# Patient Record
Sex: MALE | Race: WHITE | NOT HISPANIC OR LATINO | Employment: FULL TIME | URBAN - METROPOLITAN AREA
[De-identification: names, ages, dates, MRNs, and addresses within clinical notes are randomized per-mention and may not be internally consistent; named-entity substitution may affect disease eponyms.]

---

## 2017-11-20 ENCOUNTER — HOSPITAL ENCOUNTER (INPATIENT)
Facility: HOSPITAL | Age: 62
LOS: 9 days | Discharge: HOME HEALTH | End: 2017-11-29
Attending: THORACIC SURGERY (CARDIOTHORACIC VASCULAR SURGERY) | Admitting: THORACIC SURGERY (CARDIOTHORACIC VASCULAR SURGERY)

## 2017-11-20 ENCOUNTER — APPOINTMENT (OUTPATIENT)
Dept: GENERAL RADIOLOGY | Facility: HOSPITAL | Age: 62
End: 2017-11-20

## 2017-11-20 DIAGNOSIS — I25.10 CORONARY ARTERY DISEASE INVOLVING NATIVE CORONARY ARTERY OF NATIVE HEART, ANGINA PRESENCE UNSPECIFIED: Primary | ICD-10-CM

## 2017-11-20 DIAGNOSIS — I25.119 CORONARY ARTERY DISEASE INVOLVING NATIVE CORONARY ARTERY OF NATIVE HEART WITH ANGINA PECTORIS (HCC): ICD-10-CM

## 2017-11-20 DIAGNOSIS — Z86.19 HX OF HEPATITIS C: ICD-10-CM

## 2017-11-20 DIAGNOSIS — Z74.09 IMPAIRED FUNCTIONAL MOBILITY, BALANCE, GAIT, AND ENDURANCE: ICD-10-CM

## 2017-11-20 DIAGNOSIS — Z00.6 ENCOUNTER FOR EXAMINATION FOR NORMAL COMPARISON AND CONTROL IN CLINICAL RESEARCH PROGRAM: ICD-10-CM

## 2017-11-20 LAB
ABO GROUP BLD: NORMAL
ALBUMIN SERPL-MCNC: 4.4 G/DL (ref 3.2–4.8)
ALBUMIN/GLOB SERPL: 1.5 G/DL (ref 1.5–2.5)
ALP SERPL-CCNC: 56 U/L (ref 25–100)
ALT SERPL W P-5'-P-CCNC: 52 U/L (ref 7–40)
ANION GAP SERPL CALCULATED.3IONS-SCNC: 12 MMOL/L (ref 3–11)
APTT PPP: 24.7 SECONDS (ref 24–31)
ARTICHOKE IGE QN: 124 MG/DL (ref 0–130)
AST SERPL-CCNC: 23 U/L (ref 0–33)
BILIRUB SERPL-MCNC: 0.5 MG/DL (ref 0.3–1.2)
BLD GP AB SCN SERPL QL: NEGATIVE
BUN BLD-MCNC: 23 MG/DL (ref 9–23)
BUN/CREAT SERPL: 15.3 (ref 7–25)
CALCIUM SPEC-SCNC: 9.5 MG/DL (ref 8.7–10.4)
CHLORIDE SERPL-SCNC: 100 MMOL/L (ref 99–109)
CHOLEST SERPL-MCNC: 193 MG/DL (ref 0–200)
CO2 SERPL-SCNC: 24 MMOL/L (ref 20–31)
CREAT BLD-MCNC: 1.5 MG/DL (ref 0.6–1.3)
DEPRECATED RDW RBC AUTO: 43.9 FL (ref 37–54)
ERYTHROCYTE [DISTWIDTH] IN BLOOD BY AUTOMATED COUNT: 13.2 % (ref 11.3–14.5)
GFR SERPL CREATININE-BSD FRML MDRD: 47 ML/MIN/1.73
GLOBULIN UR ELPH-MCNC: 2.9 GM/DL
GLUCOSE BLD-MCNC: 319 MG/DL (ref 70–100)
GLUCOSE BLDC GLUCOMTR-MCNC: 313 MG/DL (ref 70–130)
HBA1C MFR BLD: 7.1 % (ref 4.8–5.6)
HCT VFR BLD AUTO: 45 % (ref 38.9–50.9)
HDLC SERPL-MCNC: 37 MG/DL (ref 40–60)
HGB BLD-MCNC: 15.5 G/DL (ref 13.1–17.5)
INR PPP: 0.94
MCH RBC QN AUTO: 31.6 PG (ref 27–31)
MCHC RBC AUTO-ENTMCNC: 34.4 G/DL (ref 32–36)
MCV RBC AUTO: 91.8 FL (ref 80–99)
PA ADP PRP-ACNC: 216 PRU
PLATELET # BLD AUTO: 162 10*3/MM3 (ref 150–450)
PMV BLD AUTO: 9.4 FL (ref 6–12)
POTASSIUM BLD-SCNC: 4.1 MMOL/L (ref 3.5–5.5)
PROT SERPL-MCNC: 7.3 G/DL (ref 5.7–8.2)
PROTHROMBIN TIME: 10.2 SECONDS (ref 9.6–11.5)
RBC # BLD AUTO: 4.9 10*6/MM3 (ref 4.2–5.76)
RH BLD: POSITIVE
SODIUM BLD-SCNC: 136 MMOL/L (ref 132–146)
TRIGL SERPL-MCNC: 345 MG/DL (ref 0–150)
WBC NRBC COR # BLD: 8.24 10*3/MM3 (ref 3.5–10.8)

## 2017-11-20 PROCEDURE — 93005 ELECTROCARDIOGRAM TRACING: CPT | Performed by: PHYSICIAN ASSISTANT

## 2017-11-20 PROCEDURE — 86850 RBC ANTIBODY SCREEN: CPT | Performed by: PHYSICIAN ASSISTANT

## 2017-11-20 PROCEDURE — 86900 BLOOD TYPING SEROLOGIC ABO: CPT | Performed by: PHYSICIAN ASSISTANT

## 2017-11-20 PROCEDURE — 85730 THROMBOPLASTIN TIME PARTIAL: CPT | Performed by: PHYSICIAN ASSISTANT

## 2017-11-20 PROCEDURE — 63710000001 INSULIN ISOPHANE HUMAN PER 5 UNITS: Performed by: PHYSICIAN ASSISTANT

## 2017-11-20 PROCEDURE — 85027 COMPLETE CBC AUTOMATED: CPT | Performed by: PHYSICIAN ASSISTANT

## 2017-11-20 PROCEDURE — 86901 BLOOD TYPING SEROLOGIC RH(D): CPT | Performed by: PHYSICIAN ASSISTANT

## 2017-11-20 PROCEDURE — 71010 HC CHEST PA OR AP: CPT

## 2017-11-20 PROCEDURE — 83036 HEMOGLOBIN GLYCOSYLATED A1C: CPT | Performed by: PHYSICIAN ASSISTANT

## 2017-11-20 PROCEDURE — 85576 BLOOD PLATELET AGGREGATION: CPT | Performed by: PHYSICIAN ASSISTANT

## 2017-11-20 PROCEDURE — 63710000001 INSULIN LISPRO (HUMAN) PER 5 UNITS: Performed by: THORACIC SURGERY (CARDIOTHORACIC VASCULAR SURGERY)

## 2017-11-20 PROCEDURE — 80061 LIPID PANEL: CPT | Performed by: PHYSICIAN ASSISTANT

## 2017-11-20 PROCEDURE — 86920 COMPATIBILITY TEST SPIN: CPT

## 2017-11-20 PROCEDURE — 85610 PROTHROMBIN TIME: CPT | Performed by: PHYSICIAN ASSISTANT

## 2017-11-20 PROCEDURE — 82962 GLUCOSE BLOOD TEST: CPT

## 2017-11-20 PROCEDURE — 86900 BLOOD TYPING SEROLOGIC ABO: CPT

## 2017-11-20 PROCEDURE — 86923 COMPATIBILITY TEST ELECTRIC: CPT

## 2017-11-20 PROCEDURE — 86901 BLOOD TYPING SEROLOGIC RH(D): CPT

## 2017-11-20 PROCEDURE — 80053 COMPREHEN METABOLIC PANEL: CPT | Performed by: PHYSICIAN ASSISTANT

## 2017-11-20 RX ORDER — METOPROLOL SUCCINATE 50 MG/1
100 TABLET, EXTENDED RELEASE ORAL DAILY
Status: DISCONTINUED | OUTPATIENT
Start: 2017-11-21 | End: 2017-11-20

## 2017-11-20 RX ORDER — FAMOTIDINE 20 MG
4000 TABLET ORAL DAILY
COMMUNITY
End: 2022-06-13

## 2017-11-20 RX ORDER — SIROLIMUS 1 MG/1
1 TABLET, FILM COATED ORAL DAILY
Status: DISCONTINUED | OUTPATIENT
Start: 2017-11-21 | End: 2017-11-21

## 2017-11-20 RX ORDER — ATORVASTATIN CALCIUM 80 MG/1
80 TABLET, FILM COATED ORAL NIGHTLY
COMMUNITY
End: 2022-03-23

## 2017-11-20 RX ORDER — DEXTROSE MONOHYDRATE 25 G/50ML
25 INJECTION, SOLUTION INTRAVENOUS
Status: DISCONTINUED | OUTPATIENT
Start: 2017-11-20 | End: 2017-11-22 | Stop reason: HOSPADM

## 2017-11-20 RX ORDER — RAMIPRIL 5 MG/1
5 CAPSULE ORAL DAILY
COMMUNITY
End: 2017-11-29 | Stop reason: HOSPADM

## 2017-11-20 RX ORDER — BACITRACIN 500 UNIT/G
320 OINTMENT (GRAM) TOPICAL 2 TIMES DAILY
COMMUNITY
End: 2022-12-27

## 2017-11-20 RX ORDER — CHLORHEXIDINE GLUCONATE 0.12 MG/ML
15 RINSE ORAL EVERY 12 HOURS
Status: DISPENSED | OUTPATIENT
Start: 2017-11-20 | End: 2017-11-21

## 2017-11-20 RX ORDER — FENOFIBRATE 145 MG/1
145 TABLET, COATED ORAL DAILY
Status: DISCONTINUED | OUTPATIENT
Start: 2017-11-21 | End: 2017-11-22 | Stop reason: HOSPADM

## 2017-11-20 RX ORDER — NICOTINE POLACRILEX 4 MG
15 LOZENGE BUCCAL
Status: DISCONTINUED | OUTPATIENT
Start: 2017-11-20 | End: 2017-11-22 | Stop reason: HOSPADM

## 2017-11-20 RX ORDER — FENOFIBRATE 145 MG/1
145 TABLET, COATED ORAL DAILY
COMMUNITY
End: 2022-06-13

## 2017-11-20 RX ORDER — ACETAMINOPHEN 325 MG/1
650 TABLET ORAL EVERY 6 HOURS PRN
Status: ON HOLD | COMMUNITY
End: 2017-11-21

## 2017-11-20 RX ORDER — METOPROLOL SUCCINATE 100 MG/1
100 TABLET, EXTENDED RELEASE ORAL DAILY
COMMUNITY
End: 2017-11-29 | Stop reason: HOSPADM

## 2017-11-20 RX ORDER — ICOSAPENT ETHYL 1000 MG/1
2 CAPSULE ORAL 2 TIMES DAILY WITH MEALS
Status: ON HOLD | COMMUNITY
End: 2017-11-21

## 2017-11-20 RX ORDER — SODIUM CHLORIDE 0.9 % (FLUSH) 0.9 %
1-10 SYRINGE (ML) INJECTION AS NEEDED
Status: DISCONTINUED | OUTPATIENT
Start: 2017-11-20 | End: 2017-11-22 | Stop reason: HOSPADM

## 2017-11-20 RX ORDER — ACETAMINOPHEN 325 MG/1
650 TABLET ORAL EVERY 4 HOURS PRN
Status: DISCONTINUED | OUTPATIENT
Start: 2017-11-20 | End: 2017-11-22 | Stop reason: HOSPADM

## 2017-11-20 RX ORDER — METOPROLOL SUCCINATE 50 MG/1
100 TABLET, EXTENDED RELEASE ORAL NIGHTLY
Status: DISCONTINUED | OUTPATIENT
Start: 2017-11-20 | End: 2017-11-22 | Stop reason: HOSPADM

## 2017-11-20 RX ORDER — BISACODYL 5 MG/1
5 TABLET, DELAYED RELEASE ORAL DAILY PRN
Status: DISCONTINUED | OUTPATIENT
Start: 2017-11-20 | End: 2017-11-22 | Stop reason: HOSPADM

## 2017-11-20 RX ORDER — RAMIPRIL 5 MG/1
5 CAPSULE ORAL DAILY
Status: DISCONTINUED | OUTPATIENT
Start: 2017-11-21 | End: 2017-11-22 | Stop reason: HOSPADM

## 2017-11-20 RX ORDER — ATORVASTATIN CALCIUM 40 MG/1
80 TABLET, FILM COATED ORAL NIGHTLY
Status: DISCONTINUED | OUTPATIENT
Start: 2017-11-20 | End: 2017-11-22 | Stop reason: HOSPADM

## 2017-11-20 RX ORDER — ONDANSETRON 4 MG/1
4 TABLET, FILM COATED ORAL EVERY 6 HOURS PRN
Status: DISCONTINUED | OUTPATIENT
Start: 2017-11-20 | End: 2017-11-22 | Stop reason: HOSPADM

## 2017-11-20 RX ORDER — IPRATROPIUM BROMIDE AND ALBUTEROL SULFATE 2.5; .5 MG/3ML; MG/3ML
3 SOLUTION RESPIRATORY (INHALATION) EVERY 4 HOURS PRN
Status: DISCONTINUED | OUTPATIENT
Start: 2017-11-20 | End: 2017-11-22 | Stop reason: HOSPADM

## 2017-11-20 RX ORDER — ASPIRIN 81 MG/1
81 TABLET ORAL ONCE
Status: COMPLETED | OUTPATIENT
Start: 2017-11-20 | End: 2017-11-20

## 2017-11-20 RX ORDER — SIROLIMUS 1 MG/1
1 TABLET, FILM COATED ORAL DAILY
COMMUNITY
End: 2022-06-13 | Stop reason: SDUPTHER

## 2017-11-20 RX ORDER — TIMOLOL MALEATE 5 MG/ML
1 SOLUTION/ DROPS OPHTHALMIC DAILY
Status: DISCONTINUED | OUTPATIENT
Start: 2017-11-21 | End: 2017-11-24

## 2017-11-20 RX ORDER — ONDANSETRON 2 MG/ML
4 INJECTION INTRAMUSCULAR; INTRAVENOUS EVERY 6 HOURS PRN
Status: DISCONTINUED | OUTPATIENT
Start: 2017-11-20 | End: 2017-11-22 | Stop reason: HOSPADM

## 2017-11-20 RX ORDER — MULTIVIT WITH MINERALS/LUTEIN
1000 TABLET ORAL DAILY
COMMUNITY

## 2017-11-20 RX ORDER — CHLORHEXIDINE GLUCONATE 500 MG/1
1 CLOTH TOPICAL EVERY 12 HOURS PRN
Status: DISCONTINUED | OUTPATIENT
Start: 2017-11-20 | End: 2017-11-22 | Stop reason: HOSPADM

## 2017-11-20 RX ORDER — BRIMONIDINE TARTRATE AND TIMOLOL MALEATE 2; 5 MG/ML; MG/ML
1 SOLUTION OPHTHALMIC EVERY 12 HOURS
COMMUNITY

## 2017-11-20 RX ADMIN — Medication 5 MG: at 22:03

## 2017-11-20 RX ADMIN — INSULIN LISPRO 3 UNITS: 100 INJECTION, SOLUTION INTRAVENOUS; SUBCUTANEOUS at 22:02

## 2017-11-20 RX ADMIN — ATORVASTATIN CALCIUM 80 MG: 40 TABLET, FILM COATED ORAL at 20:49

## 2017-11-20 RX ADMIN — ASPIRIN 81 MG: 81 TABLET, COATED ORAL at 20:49

## 2017-11-20 RX ADMIN — CHLORHEXIDINE GLUCONATE 15 ML: 1.2 RINSE ORAL at 20:50

## 2017-11-20 RX ADMIN — METOPROLOL SUCCINATE 100 MG: 50 TABLET, EXTENDED RELEASE ORAL at 22:03

## 2017-11-20 RX ADMIN — INSULIN HUMAN 26 UNITS: 100 INJECTION, SUSPENSION SUBCUTANEOUS at 20:54

## 2017-11-20 NOTE — NURSING NOTE
ACC REVIEW REPORT: [facility]        PATIENT NAME: Quirino Sheppard    PATIENT ID: [id number]    BED: S 451    BED TYPE: telemetry    BED GIVEN TO: pending    TIME BED GIVEN: pending    YOB: 1955    AGE: 62    GENDER: M    PREVIOUS ADMIT TO [facility]: no    PATIENT CLASS:     TODAY'S DATE: 11/20/2017    TRANSFER DATE: 11-20-17    ETA: after 1645    TRANSFERRING FACILITY: [facility]    TRANSFERRING FACILITY PHONE # : [phone]    TRANSFERRING MD:     DATE/TIME REQUEST RECEIVED: 11-20-17@[address] [place] [address] RN: Annabelle Ragland    REPORT FROM: Michelle    TIME REPORT TAKEN: 12:00    DIAGNOSIS: CAD for CABG    REASON FOR TRANSFER TO [facility]: higher level of care    TRANSPORTATION: pending    CLINICAL REASON FOR TRANSFER TO [facility]: 62 y.o. Has had episodes of soa - he had a positive GXT -->heart cath today showing multivessel dz - is scheduled for a CABG 11/21/17      CLINICAL INFORMATION    HEIGHT:     WEIGHT:     ALLERGIES: NKA    RAM: no    INFECTIOUS DISEASE: hepatitis C    ISOLATION:     LAST VITAL SIGNS:  TIME: 12:00  TEMP: 97.7  PULSE: 82  B/P: 143/82  RESP:     UPDATE: vs @1400: hr 83, /77, RR 17, oxygen sat 96% on RA    LAB INFORMATION: labs this a.m. wnl except for elevated cholesterol; fsbs at 0730 was 188    CULTURE INFORMATION:     MEDS/IV FLUIDS: D51/2NS@KVO; only meds given was pre-cath: fentanyl & versed  Update - IV changed to SL; no new meds given      CARDIAC SYSTEM:    CHEST PAIN: no - has not had any CP - only episodes of SOA PTA    RHYTHM: NSR    Is patient taking or has patient been given any drugs that could increase bleeding? no       HEART CATH: yes    HEART CATH DATE: 11-20-17    HEART CATH RESULTS: multivessel dz - 95% blockages    VASOSEAL: MYNX closure in rt groin    CARDIAC NOTES:  Rt groin site intact without swelling; rt pedal pulses palpable but a little weaker than the left      RESPIRATORY SYSTEM:    LUNG SOUNDS: clear    OXYGEN: no    O2 SAT: 98% on RA    RESPIRATORY  STATUS: no soa today - he thought he was having panic attacks - spells occur usually in the morning when he was getting ready for work      CNS/MUSCULOSKELETAL    ALERT AND ORIENTED: yes    CNS/MUSCULOSKELETAL NOTES: has been ambulating since cath was done      GI//GY    GI//GY NOTES: pt has a hx of hepatitis C & a liver transplant (about 15 years ago)    PAST MEDICAL HISTORY: DM, HTN, HLD, panic attacks, liver transplant    OTHER NOTES:   Pt was transferred from the cath lab to the floor at 1400; updated info given per Michelle Ragland, JOAQUIM  11/20/2017  12:06 PM

## 2017-11-21 ENCOUNTER — APPOINTMENT (OUTPATIENT)
Dept: CARDIOLOGY | Facility: HOSPITAL | Age: 62
End: 2017-11-21

## 2017-11-21 ENCOUNTER — APPOINTMENT (OUTPATIENT)
Dept: PULMONOLOGY | Facility: HOSPITAL | Age: 62
End: 2017-11-21

## 2017-11-21 PROBLEM — I25.10 CORONARY ARTERY DISEASE INVOLVING NATIVE CORONARY ARTERY OF NATIVE HEART: Status: ACTIVE | Noted: 2017-11-20

## 2017-11-21 PROBLEM — Z86.19 HX OF HEPATITIS C: Status: ACTIVE | Noted: 2017-11-21

## 2017-11-21 PROBLEM — I10 ESSENTIAL HYPERTENSION: Status: ACTIVE | Noted: 2017-11-21

## 2017-11-21 PROBLEM — B19.20 HEPATITIS C: Status: ACTIVE | Noted: 2017-11-21

## 2017-11-21 PROBLEM — Z94.4 HX OF LIVER TRANSPLANT: Status: ACTIVE | Noted: 2017-11-21

## 2017-11-21 PROBLEM — E11.9 DIABETES MELLITUS, TYPE II: Status: ACTIVE | Noted: 2017-11-21

## 2017-11-21 PROBLEM — E78.5 HYPERLIPEMIA: Status: ACTIVE | Noted: 2017-11-21

## 2017-11-21 LAB
ABO GROUP BLD: NORMAL
AMPHET+METHAMPHET UR QL: NEGATIVE
AMPHETAMINES UR QL: NEGATIVE
ANION GAP SERPL CALCULATED.3IONS-SCNC: 7 MMOL/L (ref 3–11)
BARBITURATES UR QL SCN: NEGATIVE
BENZODIAZ UR QL SCN: NEGATIVE
BH CV ECHO MEAS - AO MAX PG (FULL): 4.2 MMHG
BH CV ECHO MEAS - AO MAX PG: 10 MMHG
BH CV ECHO MEAS - AO MEAN PG (FULL): 1.7 MMHG
BH CV ECHO MEAS - AO MEAN PG: 4.9 MMHG
BH CV ECHO MEAS - AO ROOT AREA (BSA CORRECTED): 1.4
BH CV ECHO MEAS - AO ROOT AREA: 7.6 CM^2
BH CV ECHO MEAS - AO ROOT DIAM: 3.1 CM
BH CV ECHO MEAS - AO V2 MAX: 156.4 CM/SEC
BH CV ECHO MEAS - AO V2 MEAN: 99.3 CM/SEC
BH CV ECHO MEAS - AO V2 VTI: 28.7 CM
BH CV ECHO MEAS - AVA(I,A): 2.8 CM^2
BH CV ECHO MEAS - AVA(I,D): 2.8 CM^2
BH CV ECHO MEAS - AVA(V,A): 2.5 CM^2
BH CV ECHO MEAS - AVA(V,D): 2.5 CM^2
BH CV ECHO MEAS - BSA(HAYCOCK): 2.1 M^2
BH CV ECHO MEAS - BSA(HAYCOCK): 2.2 M^2
BH CV ECHO MEAS - BSA: 2 M^2
BH CV ECHO MEAS - BSA: 2.2 M^2
BH CV ECHO MEAS - BZI_BMI: 23.1 KILOGRAMS/M^2
BH CV ECHO MEAS - BZI_BMI: 31.3 KILOGRAMS/M^2
BH CV ECHO MEAS - BZI_METRIC_HEIGHT: 170.2 CM
BH CV ECHO MEAS - BZI_METRIC_HEIGHT: 195.6 CM
BH CV ECHO MEAS - BZI_METRIC_WEIGHT: 88.5 KG
BH CV ECHO MEAS - BZI_METRIC_WEIGHT: 90.7 KG
BH CV ECHO MEAS - EDV(CUBED): 72.5 ML
BH CV ECHO MEAS - EDV(TEICH): 77.3 ML
BH CV ECHO MEAS - EF(CUBED): 75.9 %
BH CV ECHO MEAS - EF(TEICH): 68.3 %
BH CV ECHO MEAS - ESV(CUBED): 17.5 ML
BH CV ECHO MEAS - ESV(TEICH): 24.5 ML
BH CV ECHO MEAS - FS: 37.8 %
BH CV ECHO MEAS - IVS/LVPW: 0.93
BH CV ECHO MEAS - IVSD: 0.94 CM
BH CV ECHO MEAS - LA DIMENSION: 3.5 CM
BH CV ECHO MEAS - LA/AO: 1.1
BH CV ECHO MEAS - LV MASS(C)D: 131.4 GRAMS
BH CV ECHO MEAS - LV MASS(C)DI: 59.4 GRAMS/M^2
BH CV ECHO MEAS - LV MAX PG: 5.8 MMHG
BH CV ECHO MEAS - LV MEAN PG: 3.2 MMHG
BH CV ECHO MEAS - LV V1 MAX: 120.9 CM/SEC
BH CV ECHO MEAS - LV V1 MEAN: 81.2 CM/SEC
BH CV ECHO MEAS - LV V1 VTI: 24.9 CM
BH CV ECHO MEAS - LVIDD: 4.2 CM
BH CV ECHO MEAS - LVIDS: 2.6 CM
BH CV ECHO MEAS - LVOT AREA (M): 3.1 CM^2
BH CV ECHO MEAS - LVOT AREA: 3.2 CM^2
BH CV ECHO MEAS - LVOT DIAM: 2 CM
BH CV ECHO MEAS - LVPWD: 1 CM
BH CV ECHO MEAS - MV A MAX VEL: 77.7 CM/SEC
BH CV ECHO MEAS - MV DEC TIME: 0.12 SEC
BH CV ECHO MEAS - MV E MAX VEL: 81.4 CM/SEC
BH CV ECHO MEAS - MV E/A: 1
BH CV ECHO MEAS - PA ACC SLOPE: 622.1 CM/SEC^2
BH CV ECHO MEAS - PA ACC TIME: 0.15 SEC
BH CV ECHO MEAS - PA MAX PG: 5.8 MMHG
BH CV ECHO MEAS - PA PR(ACCEL): 10.9 MMHG
BH CV ECHO MEAS - PA V2 MAX: 120 CM/SEC
BH CV ECHO MEAS - RVDD: 1.8 CM
BH CV ECHO MEAS - SI(AO): 98.8 ML/M^2
BH CV ECHO MEAS - SI(CUBED): 24.9 ML/M^2
BH CV ECHO MEAS - SI(LVOT): 36 ML/M^2
BH CV ECHO MEAS - SI(TEICH): 23.8 ML/M^2
BH CV ECHO MEAS - SV(AO): 218.6 ML
BH CV ECHO MEAS - SV(CUBED): 55 ML
BH CV ECHO MEAS - SV(LVOT): 79.7 ML
BH CV ECHO MEAS - SV(TEICH): 52.8 ML
BH CV ECHO MEAS - TAPSE (>1.6): 1.9 CM2
BH CV ECHO MEAS - TV MAX PG: 1.4 MMHG
BH CV ECHO MEAS - TV V2 MAX: 59.7 CM/SEC
BH CV XLRA - RV BASE: 2.5 CM
BH CV XLRA - RV LENGTH: 5.4 CM
BH CV XLRA - RV MID: 2.4 CM
BH CV XLRA - TDI S': 14.3 CM/SEC
BH CV XLRA MEAS LEFT DIST CCA EDV: 28.9 CM/SEC
BH CV XLRA MEAS LEFT DIST CCA PSV: 128.2 CM/SEC
BH CV XLRA MEAS LEFT DIST ICA EDV: 26.5 CM/SEC
BH CV XLRA MEAS LEFT DIST ICA PSV: 76.1 CM/SEC
BH CV XLRA MEAS LEFT ICA/CCA RATIO: 0.55
BH CV XLRA MEAS LEFT MID CCA EDV: 25.1 CM/SEC
BH CV XLRA MEAS LEFT MID CCA PSV: 130.7 CM/SEC
BH CV XLRA MEAS LEFT MID ICA EDV: 24.4 CM/SEC
BH CV XLRA MEAS LEFT MID ICA PSV: 69.1 CM/SEC
BH CV XLRA MEAS LEFT PROX CCA EDV: 25.1 CM/SEC
BH CV XLRA MEAS LEFT PROX CCA PSV: 133.3 CM/SEC
BH CV XLRA MEAS LEFT PROX ECA EDV: 0 CM/SEC
BH CV XLRA MEAS LEFT PROX ECA PSV: 88 CM/SEC
BH CV XLRA MEAS LEFT PROX ICA EDV: 20.3 CM/SEC
BH CV XLRA MEAS LEFT PROX ICA PSV: 71.2 CM/SEC
BH CV XLRA MEAS LEFT PROX SCLA EDV: 0 CM/SEC
BH CV XLRA MEAS LEFT PROX SCLA PSV: 181 CM/SEC
BH CV XLRA MEAS LEFT VERTEBRAL A EDV: 0 CM/SEC
BH CV XLRA MEAS LEFT VERTEBRAL A PSV: 37 CM/SEC
BH CV XLRA MEAS RIGHT DIST CCA EDV: 28.1 CM/SEC
BH CV XLRA MEAS RIGHT DIST CCA PSV: 145.9 CM/SEC
BH CV XLRA MEAS RIGHT DIST ICA EDV: 22.7 CM/SEC
BH CV XLRA MEAS RIGHT DIST ICA PSV: 77.7 CM/SEC
BH CV XLRA MEAS RIGHT ICA/CCA RATIO: 0.74
BH CV XLRA MEAS RIGHT MID CCA EDV: 29.5 CM/SEC
BH CV XLRA MEAS RIGHT MID CCA PSV: 175.4 CM/SEC
BH CV XLRA MEAS RIGHT MID ICA EDV: 27.9 CM/SEC
BH CV XLRA MEAS RIGHT MID ICA PSV: 94.3 CM/SEC
BH CV XLRA MEAS RIGHT PROX CCA EDV: 22.5 CM/SEC
BH CV XLRA MEAS RIGHT PROX CCA PSV: 161.4 CM/SEC
BH CV XLRA MEAS RIGHT PROX ECA EDV: 23.9 CM/SEC
BH CV XLRA MEAS RIGHT PROX ECA PSV: 158.6 CM/SEC
BH CV XLRA MEAS RIGHT PROX ICA EDV: 23.6 CM/SEC
BH CV XLRA MEAS RIGHT PROX ICA PSV: 107.4 CM/SEC
BH CV XLRA MEAS RIGHT PROX SCLA EDV: 0 CM/SEC
BH CV XLRA MEAS RIGHT PROX SCLA PSV: 224.5 CM/SEC
BH CV XLRA MEAS RIGHT VERTEBRAL A EDV: 16.6 CM/SEC
BH CV XLRA MEAS RIGHT VERTEBRAL A PSV: 54.1 CM/SEC
BILIRUB UR QL STRIP: NEGATIVE
BUN BLD-MCNC: 25 MG/DL (ref 9–23)
BUN/CREAT SERPL: 19.2 (ref 7–25)
BUPRENORPHINE SERPL-MCNC: NEGATIVE NG/ML
CALCIUM SPEC-SCNC: 9.6 MG/DL (ref 8.7–10.4)
CANNABINOIDS SERPL QL: NEGATIVE
CHLORIDE SERPL-SCNC: 102 MMOL/L (ref 99–109)
CLARITY UR: CLEAR
CO2 SERPL-SCNC: 27 MMOL/L (ref 20–31)
COCAINE UR QL: NEGATIVE
COLOR UR: YELLOW
CREAT BLD-MCNC: 1.3 MG/DL (ref 0.6–1.3)
DEPRECATED RDW RBC AUTO: 43.1 FL (ref 37–54)
ERYTHROCYTE [DISTWIDTH] IN BLOOD BY AUTOMATED COUNT: 13 % (ref 11.3–14.5)
GFR SERPL CREATININE-BSD FRML MDRD: 56 ML/MIN/1.73
GLUCOSE BLD-MCNC: 257 MG/DL (ref 70–100)
GLUCOSE BLDC GLUCOMTR-MCNC: 225 MG/DL (ref 70–130)
GLUCOSE BLDC GLUCOMTR-MCNC: 244 MG/DL (ref 70–130)
GLUCOSE BLDC GLUCOMTR-MCNC: 257 MG/DL (ref 70–130)
GLUCOSE BLDC GLUCOMTR-MCNC: 268 MG/DL (ref 70–130)
GLUCOSE BLDC GLUCOMTR-MCNC: 304 MG/DL (ref 70–130)
GLUCOSE BLDC GLUCOMTR-MCNC: 312 MG/DL (ref 70–130)
GLUCOSE BLDC GLUCOMTR-MCNC: 354 MG/DL (ref 70–130)
GLUCOSE UR STRIP-MCNC: ABNORMAL MG/DL
HCT VFR BLD AUTO: 42 % (ref 38.9–50.9)
HGB BLD-MCNC: 14.2 G/DL (ref 13.1–17.5)
HGB UR QL STRIP.AUTO: NEGATIVE
KETONES UR QL STRIP: NEGATIVE
LEUKOCYTE ESTERASE UR QL STRIP.AUTO: NEGATIVE
LV EF 2D ECHO EST: 60 %
MCH RBC QN AUTO: 30.9 PG (ref 27–31)
MCHC RBC AUTO-ENTMCNC: 33.8 G/DL (ref 32–36)
MCV RBC AUTO: 91.3 FL (ref 80–99)
METHADONE UR QL SCN: NEGATIVE
NITRITE UR QL STRIP: NEGATIVE
OPIATES UR QL: NEGATIVE
OXYCODONE UR QL SCN: NEGATIVE
PA ADP PRP-ACNC: 240 PRU
PCP UR QL SCN: NEGATIVE
PH UR STRIP.AUTO: 7 [PH] (ref 5–8)
PLATELET # BLD AUTO: 143 10*3/MM3 (ref 150–450)
PMV BLD AUTO: 9.3 FL (ref 6–12)
POTASSIUM BLD-SCNC: 4.2 MMOL/L (ref 3.5–5.5)
PROPOXYPH UR QL: NEGATIVE
PROT UR QL STRIP: NEGATIVE
RBC # BLD AUTO: 4.6 10*6/MM3 (ref 4.2–5.76)
RH BLD: POSITIVE
SODIUM BLD-SCNC: 136 MMOL/L (ref 132–146)
SP GR UR STRIP: 1.01 (ref 1–1.03)
TRICYCLICS UR QL SCN: NEGATIVE
UROBILINOGEN UR QL STRIP: ABNORMAL
WBC NRBC COR # BLD: 7.49 10*3/MM3 (ref 3.5–10.8)

## 2017-11-21 PROCEDURE — 94010 BREATHING CAPACITY TEST: CPT

## 2017-11-21 PROCEDURE — 94660 CPAP INITIATION&MGMT: CPT

## 2017-11-21 PROCEDURE — 82962 GLUCOSE BLOOD TEST: CPT

## 2017-11-21 PROCEDURE — 99253 IP/OBS CNSLTJ NEW/EST LOW 45: CPT | Performed by: NURSE PRACTITIONER

## 2017-11-21 PROCEDURE — 63710000001 SIROLIMUS PER 1 MG: Performed by: PHYSICIAN ASSISTANT

## 2017-11-21 PROCEDURE — 93880 EXTRACRANIAL BILAT STUDY: CPT | Performed by: INTERNAL MEDICINE

## 2017-11-21 PROCEDURE — 85027 COMPLETE CBC AUTOMATED: CPT | Performed by: PHYSICIAN ASSISTANT

## 2017-11-21 PROCEDURE — 85576 BLOOD PLATELET AGGREGATION: CPT | Performed by: PHYSICIAN ASSISTANT

## 2017-11-21 PROCEDURE — 80306 DRUG TEST PRSMV INSTRMNT: CPT | Performed by: PHYSICIAN ASSISTANT

## 2017-11-21 PROCEDURE — 93010 ELECTROCARDIOGRAM REPORT: CPT | Performed by: INTERNAL MEDICINE

## 2017-11-21 PROCEDURE — 80048 BASIC METABOLIC PNL TOTAL CA: CPT | Performed by: PHYSICIAN ASSISTANT

## 2017-11-21 PROCEDURE — 63710000001 INSULIN ISOPHANE HUMAN PER 5 UNITS: Performed by: PHYSICIAN ASSISTANT

## 2017-11-21 PROCEDURE — 94799 UNLISTED PULMONARY SVC/PX: CPT

## 2017-11-21 PROCEDURE — 94010 BREATHING CAPACITY TEST: CPT | Performed by: INTERNAL MEDICINE

## 2017-11-21 PROCEDURE — 93306 TTE W/DOPPLER COMPLETE: CPT

## 2017-11-21 PROCEDURE — 93880 EXTRACRANIAL BILAT STUDY: CPT

## 2017-11-21 PROCEDURE — 81003 URINALYSIS AUTO W/O SCOPE: CPT | Performed by: PHYSICIAN ASSISTANT

## 2017-11-21 PROCEDURE — 93306 TTE W/DOPPLER COMPLETE: CPT | Performed by: INTERNAL MEDICINE

## 2017-11-21 PROCEDURE — 99221 1ST HOSP IP/OBS SF/LOW 40: CPT | Performed by: THORACIC SURGERY (CARDIOTHORACIC VASCULAR SURGERY)

## 2017-11-21 RX ORDER — ICOSAPENT ETHYL 1000 MG/1
2 CAPSULE ORAL 2 TIMES DAILY WITH MEALS
COMMUNITY
End: 2022-06-13

## 2017-11-21 RX ORDER — ACETAMINOPHEN 325 MG/1
650 TABLET ORAL EVERY 6 HOURS PRN
COMMUNITY
End: 2022-06-13

## 2017-11-21 RX ORDER — SIROLIMUS 1 MG/1
1 TABLET, FILM COATED ORAL DAILY
Status: DISCONTINUED | OUTPATIENT
Start: 2017-11-22 | End: 2017-11-29 | Stop reason: HOSPADM

## 2017-11-21 RX ORDER — CHLORHEXIDINE GLUCONATE 0.12 MG/ML
15 RINSE ORAL EVERY 12 HOURS
Status: DISCONTINUED | OUTPATIENT
Start: 2017-11-21 | End: 2017-11-22 | Stop reason: HOSPADM

## 2017-11-21 RX ORDER — ALPRAZOLAM 0.25 MG/1
0.25 TABLET ORAL EVERY 8 HOURS PRN
Status: DISCONTINUED | OUTPATIENT
Start: 2017-11-21 | End: 2017-11-22 | Stop reason: HOSPADM

## 2017-11-21 RX ADMIN — INSULIN LISPRO 5 UNITS: 100 INJECTION, SOLUTION INTRAVENOUS; SUBCUTANEOUS at 21:15

## 2017-11-21 RX ADMIN — SIROLIMUS 1 MG: 1 TABLET ORAL at 05:58

## 2017-11-21 RX ADMIN — INSULIN LISPRO 3 UNITS: 100 INJECTION, SOLUTION INTRAVENOUS; SUBCUTANEOUS at 17:36

## 2017-11-21 RX ADMIN — INSULIN HUMAN 26 UNITS: 100 INJECTION, SUSPENSION SUBCUTANEOUS at 17:37

## 2017-11-21 RX ADMIN — Medication 5 MG: at 21:16

## 2017-11-21 RX ADMIN — ALPRAZOLAM 0.25 MG: 0.25 TABLET ORAL at 21:16

## 2017-11-21 RX ADMIN — SERTRALINE 50 MG: 50 TABLET, FILM COATED ORAL at 05:58

## 2017-11-21 RX ADMIN — FENOFIBRATE 145 MG: 145 TABLET ORAL at 09:35

## 2017-11-21 RX ADMIN — ATORVASTATIN CALCIUM 80 MG: 40 TABLET, FILM COATED ORAL at 21:16

## 2017-11-21 RX ADMIN — METOPROLOL SUCCINATE 100 MG: 50 TABLET, EXTENDED RELEASE ORAL at 21:16

## 2017-11-21 RX ADMIN — INSULIN LISPRO 4 UNITS: 100 INJECTION, SOLUTION INTRAVENOUS; SUBCUTANEOUS at 08:15

## 2017-11-21 RX ADMIN — RAMIPRIL 5 MG: 5 CAPSULE ORAL at 09:35

## 2017-11-21 RX ADMIN — INSULIN LISPRO 6 UNITS: 100 INJECTION, SOLUTION INTRAVENOUS; SUBCUTANEOUS at 13:10

## 2017-11-21 RX ADMIN — TIMOLOL MALEATE 1 DROP: 5 SOLUTION/ DROPS OPHTHALMIC at 09:35

## 2017-11-21 RX ADMIN — ACETAMINOPHEN 650 MG: 325 TABLET ORAL at 14:49

## 2017-11-21 RX ADMIN — INSULIN HUMAN 32 UNITS: 100 INJECTION, SUSPENSION SUBCUTANEOUS at 08:17

## 2017-11-21 NOTE — H&P
CTS History & Physical         Patient Care Team:  Amol Raymond MD as PCP - General (Rheumatology)    Chief complaint shortness of breath    HPI  Patient is a 62 y.o. male with shortness of breath and significant fatigue.  He had very mild chest pain symptoms.  Catheterization at an outside hospital demonstrated multivessel coronary disease and the patient was transferred here to evaluate for coronary bypass grafting surgery.  On arrival here he was pain-free and breathing unlabored.  His past medical history is significant for a liver transplantation approximately 2001    Review of Systems  Constitutional: Negative for malaise/fatigue.  Negative for chills, fever, night sweats and weight loss.  HENT: Negative for hearing loss, odynophagia and sore throat.    Cardiovascular: Negative for claudication and dyspnea on exertion.  Negative for chest pain, leg swelling, orthopnea and palpitations.  Respiratory: Negative for shortness of breath.  Negative for cough and hemoptysis.  Endocrine:  Negative for cold intolerance, heat intolerance, polydipsia, polyphagia and polyuria.  Hematologic/Lymphatic: Negative for easy bruising/bleeding.  For immunosuppression.  History of hepatitis C  Musculoskeletal: Negative for joint pain, joint swelling and myalgias.  Gastrointestinal: Negative for abdominal pain, constipation, diarrhea, hematemesis, hematochezia, melena, nausea and vomiting.  Positive for liver transplant secondary to hepatitis C  Genitourinary: Negative for dysuria, frequency and hematuria.  Neurological: Negative for focal weakness, headaches, numbness and seizures.  Psychiatric/Behavioral: Positive for history of depression with anxiety  All other systems are reviewed and are negative.              Musculoskeletal: No joint pain or back pain.    All other review of systems is negative    History  No past medical history on file.  No past surgical history on file.  No family history on file.  Social History    Substance Use Topics   • Smoking status: Never Smoker   • Smokeless tobacco: Not on file   • Alcohol use Not on file     Prescriptions Prior to Admission   Medication Sig Dispense Refill Last Dose   • acetaminophen (TYLENOL) 325 MG tablet Take 650 mg by mouth Every 6 (Six) Hours As Needed for Mild Pain .      • atorvastatin (LIPITOR) 80 MG tablet Take 80 mg by mouth Every Night.      • brimonidine-timolol (COMBIGAN) 0.2-0.5 % ophthalmic solution Administer 1 drop to both eyes Every 12 (Twelve) Hours.      • fenofibrate (TRICOR) 145 MG tablet Take 145 mg by mouth Daily.      • icosapent ethyl (VASCEPA) 1 g capsule capsule Take 2 g by mouth 2 (Two) Times a Day With Meals.      • insulin NPH (humuLIN N,novoLIN N) 100 UNIT/ML injection Inject 32 Units under the skin Every Morning.      • insulin NPH (humuLIN N,novoLIN N) 100 UNIT/ML injection Inject 26 Units under the skin Every Evening.      • Liraglutide (VICTOZA) 18 MG/3ML solution pen-injector injection Inject 0.6 mg under the skin Daily.      • metoprolol succinate XL (TOPROL-XL) 100 MG 24 hr tablet Take 100 mg by mouth Daily.      • Multiple Vitamins-Minerals (MULTIVITAMIN ADULTS) tablet Take 1 tablet by mouth Daily.      • ramipril (ALTACE) 5 MG capsule Take 5 mg by mouth Daily.      • Saw Palmetto 160 MG capsule Take 160 mg by mouth Daily.      • sertraline (ZOLOFT) 50 MG tablet Take 50 mg by mouth Daily.      • sirolimus (RAPAMUNE) 1 MG tablet Take 1 mg by mouth Daily.      • Vitamin D, Cholecalciferol, 1000 units capsule Take 4,000 Units by mouth Daily.      • vitamin E 1000 UNIT capsule Take 1,000 Units by mouth Daily.        Allergies:  Review of patient's allergies indicates no known allergies.      Objective    Vital Signs  Temp:  [97.9 °F (36.6 °C)] 97.9 °F (36.6 °C)  Heart Rate:  [73-86] 83  Resp:  [18] 18  BP: (127-166)/() 127/77      Physical Exam:  CONSTITUTIONAL: Alert and conversant, appears nervous.  In his hospital bed.  Well nourished, No  acute distress  EYES: Sclera clean, Anicteric, Pupils equal  ENT: No nasal deviation, Trachea midline  NECK: No neck masses, Supple  LUNGS: No wheezing, Cough, non-congested  HEART: No rubs, No murmurs  GASTROINTESTINAL: Soft, non-distended, No masses, Non tender  to palpation, normal bowel sounds well-healed abdominal incision  NEURO: No motor deficits, No sensory deficits, Cranial Nerves 2 through 12 grossly intact  PSYCHIATRIC: Oriented to person, place and time, No memory deficits, Mood appropriate  VASCULAR: No carotid bruits, Femoral pulses palpable and symmetric  MUSKULOSKELETAL: No extremity trauma or extremity asymmetry    Results:      Results from last 7 days  Lab Units 11/21/17  0519   WBC 10*3/mm3 7.49   HEMOGLOBIN g/dL 14.2   HEMATOCRIT % 42.0   PLATELETS 10*3/mm3 143*       Results from last 7 days  Lab Units 11/21/17  0516   SODIUM mmol/L 136   POTASSIUM mmol/L 4.2   CHLORIDE mmol/L 102   CO2 mmol/L 27.0   BUN mg/dL 25*   CREATININE mg/dL 1.30   GLUCOSE mg/dL 257*   CALCIUM mg/dL 9.6         ABGs:       Invalid input(s): PO2, PCO2        Imaging Results (last 72 hours)     Procedure Component Value Units Date/Time    XR Chest 1 View [[id number]] Updated:  11/20/17 2029          Assessment    Active Problems:    CAD (coronary artery disease)      History of hepatitis C.  History of prior liver transplantation secondary to hepatitis C.  Hypertension and hyperlipidemia also history of diabetes.  All of these factors for coronary bypass grafting at increased risk.  Still awaiting carotid duplex findings as well as echocardiogram to assess left ventricular function.  Patient is aware that surgery has with it a risk of stroke bleeding infection death and renal failure and he agrees to proceed.       Plan  For coronary bypass surgery tomorrow      Please note that portions of this note were completed with a voice recognition program. Efforts were made to edit the dictations, but occasionally words are  mistranscribed.    Naga Guaman MD  11/21/17  8:21 AM

## 2017-11-21 NOTE — CONSULTS
[facility] Medicine Services  CONSULT NOTE      Patient Name: Quirino Sheppard  : 1955  MRN: [id number]    Primary Care Physician: Amol Raymond MD  Referring Provider: Naga Guaman MD    Subjective   Subjective     Reason for Consultation:  Diabetes management    HPI:  Quirino Sheppard is a 62 y.o. male who presented to OSH with SOB.  Heart cath revealed MVCAD and he was transferred here for surgical evaluation.  He is scheduled for CABG 17.  Hospital medicine was consulted for management of his diabetes.  He has been diabetic since  and states his blood sugars run 150-200.  He reports he has lots of anxiety when his blood sugar drops around 150 or if it gets to high he gives himself more insulin.  Typically patient adds 150mg/dl to his actual meter reading to justify giving himself more insulin because he knows he will eat a bunch and food and the normal dose won't be enough.        Review of Systems  Gen- No fevers, chills  CV- No chest pain, palpitations  Resp- No cough, + dyspnea  GI- No N/V/D, abd pain    Otherwise 10-system ROS reviewed and is negative except as mentioned in the HPI.      Past Medical History:   Diagnosis Date   • Anxiety and depression    • Hepatitis C    • Hyperlipidemia    • Hypertension        Past Surgical History:   Procedure Laterality Date   • LIVER TRANSPLANTATION         Family History: family history includes Diabetes in his paternal grandmother.     Social History:  reports that he has never smoked. He has never used smokeless tobacco. He reports that he does not drink alcohol.    Medications:  Prescriptions Prior to Admission   Medication Sig Dispense Refill Last Dose   • acetaminophen (TYLENOL) 325 MG tablet Take 650 mg by mouth Every 6 (Six) Hours As Needed for Mild Pain .      • atorvastatin (LIPITOR) 80 MG tablet Take 80 mg by mouth Every Night.      • brimonidine-timolol (COMBIGAN) 0.2-0.5 % ophthalmic solution Administer  1 drop to both eyes Every 12 (Twelve) Hours.      • fenofibrate (TRICOR) 145 MG tablet Take 145 mg by mouth Daily.      • icosapent ethyl (VASCEPA) 1 g capsule capsule Take 2 g by mouth 2 (Two) Times a Day With Meals.      • insulin NPH (humuLIN N,novoLIN N) 100 UNIT/ML injection Inject 32 Units under the skin Every Morning.      • insulin NPH (humuLIN N,novoLIN N) 100 UNIT/ML injection Inject 26 Units under the skin Every Evening.      • Liraglutide (VICTOZA) 18 MG/3ML solution pen-injector injection Inject 0.6 mg under the skin Daily.      • metoprolol succinate XL (TOPROL-XL) 100 MG 24 hr tablet Take 100 mg by mouth Daily.      • Multiple Vitamins-Minerals (MULTIVITAMIN ADULTS) tablet Take 1 tablet by mouth Daily.      • ramipril (ALTACE) 5 MG capsule Take 5 mg by mouth Daily.      • Saw Palmetto 160 MG capsule Take 160 mg by mouth Daily.      • sertraline (ZOLOFT) 50 MG tablet Take 50 mg by mouth Daily.      • sirolimus (RAPAMUNE) 1 MG tablet Take 1 mg by mouth Daily.      • Vitamin D, Cholecalciferol, 1000 units capsule Take 4,000 Units by mouth Daily.      • vitamin E 1000 UNIT capsule Take 1,000 Units by mouth Daily.          No Known Allergies    Objective   Objective     Vital Signs:   Temp:  [97.5 °F (36.4 °C)-97.9 °F (36.6 °C)] 97.5 °F (36.4 °C)  Heart Rate:  [73-86] 74  Resp:  [18] 18  BP: (127-166)/() 127/77     Physical Exam  Constitutional: No acute distress, awake, alert, son at bedside  Eyes: PERRLA, sclerae anicteric, no conjunctival injection  HENT: NCAT, mucous membranes moist  Neck: Supple, no thyromegaly, no lymphadenopathy, trachea midline  Respiratory: Clear to auscultation bilaterally, nonlabored respirations   Cardiovascular: RRR, no murmurs, rubs, or gallops, palpable pedal pulses bilaterally  Gastrointestinal: Positive bowel sounds, soft, nontender, nondistended  Musculoskeletal: No bilateral ankle edema, no clubbing or cyanosis to extremities  Psychiatric: Appropriate affect,  cooperative  Neurologic: Oriented x 3, strength symmetric in all extremities, Cranial Nerves grossly intact to confrontation, speech clear  Skin: No rashes      Results Reviewed:  I have personally reviewed current lab, radiology, and data and agree.      Results from last 7 days  Lab Units 11/21/17  0519 11/20/17  2042   WBC 10*3/mm3 7.49 8.24   HEMOGLOBIN g/dL 14.2 15.5   HEMATOCRIT % 42.0 45.0   PLATELETS 10*3/mm3 143* 162   INR   --  0.94       Results from last 7 days  Lab Units 11/21/17  0516 11/20/17  2042   SODIUM mmol/L 136 136   POTASSIUM mmol/L 4.2 4.1   CHLORIDE mmol/L 102 100   CO2 mmol/L 27.0 24.0   BUN mg/dL 25* 23   CREATININE mg/dL 1.30 1.50*   GLUCOSE mg/dL 257* 319*   CALCIUM mg/dL 9.6 9.5   ALT (SGPT) U/L  --  52*   AST (SGOT) U/L  --  23       Imaging Results (last 24 hours)     Procedure Component Value Units Date/Time    XR Chest 1 View [[id number]] Collected:  11/21/17 1147     Updated:  11/21/17 1147    Narrative:       EXAMINATION: XR CHEST 1 VW-11/20/2017:      INDICATION: Preop.      COMPARISON: NONE.     FINDINGS: The heart, mediastinum and pulmonary vasculature appear within  normal limits. The lungs appear normally expanded and clear except for a  couple of granulomatous calcifications.           Impression:       No evidence of active chest disease.     D:  11/21/2017  E:  11/21/2017                      Assessment/Plan   Assessment / Plan     Hospital Problem List     * (Principal)Coronary artery disease involving native coronary artery of native heart    Overview Signed 11/21/2017  8:33 AM by DEREK Conteh     Added automatically from request for surgery [id number]         CAD (coronary artery disease)    Hx of liver transplant    Essential hypertension    Hyperlipemia    Diabetes mellitus, type II    Hx of hepatitis C            Plan:  --continue current regimen, but add scheduled humalog  --diabetes educator/dietician consult  --will continue to follow until surgery and once out  of ICU      Thank you for allowing [facility] Medicine Service to provide consultative care for your patient, we will continue to follow while clinically appropriate.    DAVID Conley   11/21/17   12:09 PM

## 2017-11-21 NOTE — PROGRESS NOTES
Discharge Planning Assessment  [facility]     Patient Name: Quirino Sheppard  MRN: [id number]  Today's Date: 11/21/2017    Admit Date: 11/20/2017          Discharge Needs Assessment       11/21/17 1422    Living Environment    Lives With alone    Living Arrangements house    Transportation Available car;family or friend will provide    Living Environment    Provides Primary Care For no one    Quality Of Family Relationships supportive    Able to Return to Prior Living Arrangements yes    Discharge Needs Assessment    Equipment Currently Used at Home bipap/ cpap            Discharge Plan       11/21/17 1423    Case Management/Social Work Plan    Plan Home    Patient/Family In Agreement With Plan yes    Additional Comments Spoke with patient at bedside. He lives alone in a one story house in [facility]. He has 2 dtrs and a son who live close by and will assist if needed. PTA he was independent with ADL's and mobility. He denies any DME/HH needs at this time. [facility] Goal is to return home. CM will follow.         Discharge Placement     No information found        Expected Discharge Date and Time     Expected Discharge Date Expected Discharge Time    Nov 27, 2017               Demographic Summary       11/21/17 1422    Referral Information    Arrived From home or self-care    Reason For Consult discharge planning            Functional Status       11/21/17 1422    Functional Status Prior    Ambulation 0-->independent    Transferring 0-->independent    Toileting 0-->independent    Bathing 0-->independent    Dressing 0-->independent    Eating 0-->independent    Communication 0-->understands/communicates without difficulty    Swallowing 0-->swallows foods/liquids without difficulty    Activity Tolerance    Usual Activity Tolerance good    Current Activity Tolerance moderate            Psychosocial     None            Abuse/Neglect     None            Legal     None            Substance  Abuse     None            Patient Forms     None          Bernice Faust RN

## 2017-11-21 NOTE — PLAN OF CARE
Problem: Patient Care Overview (Adult)  Goal: Plan of Care Review  Outcome: Ongoing (interventions implemented as appropriate)  Goal: Adult Individualization and Mutuality  Outcome: Ongoing (interventions implemented as appropriate)  Goal: Discharge Needs Assessment  Outcome: Ongoing (interventions implemented as appropriate)    Problem: Cardiac Surgery (Adult)  Goal: Signs and Symptoms of Listed Potential Problems Will be Absent or Manageable (Cardiac Surgery)  Outcome: Ongoing (interventions implemented as appropriate)

## 2017-11-21 NOTE — PLAN OF CARE
Problem: Patient Care Overview (Adult)  Goal: Plan of Care Review  Outcome: Ongoing (interventions implemented as appropriate)    11/21/17 0236   Coping/Psychosocial Response Interventions   Plan Of Care Reviewed With patient   Patient Care Overview   Progress progress toward functional goals as expected   Outcome Evaluation   Outcome Summary/Follow up Plan new admit, vss, plan for cabg dr painting to see in am, will monitor         Problem: Cardiac Surgery (Adult)  Goal: Signs and Symptoms of Listed Potential Problems Will be Absent or Manageable (Cardiac Surgery)  Outcome: Ongoing (interventions implemented as appropriate)    11/21/17 0236   Cardiac Surgery   Problems Assessed (Cardiac Surgery) hemodynamic instability   Problems Present (Cardiac Surgery) hemodynamic instability

## 2017-11-22 ENCOUNTER — ANESTHESIA EVENT (OUTPATIENT)
Dept: PERIOP | Facility: HOSPITAL | Age: 62
End: 2017-11-22

## 2017-11-22 ENCOUNTER — APPOINTMENT (OUTPATIENT)
Dept: GENERAL RADIOLOGY | Facility: HOSPITAL | Age: 62
End: 2017-11-22

## 2017-11-22 ENCOUNTER — ANESTHESIA (OUTPATIENT)
Dept: PERIOP | Facility: HOSPITAL | Age: 62
End: 2017-11-22

## 2017-11-22 LAB
ACT BLD: 114 SECONDS (ref 82–152)
ACT BLD: 120 SECONDS (ref 82–152)
ACT BLD: 120 SECONDS (ref 82–152)
ACT BLD: 499 SECONDS (ref 82–152)
ACT BLD: 527 SECONDS (ref 82–152)
ACT BLD: 637 SECONDS (ref 82–152)
ACT BLD: 813 SECONDS (ref 82–152)
ALBUMIN SERPL-MCNC: 4.3 G/DL (ref 3.2–4.8)
ALBUMIN SERPL-MCNC: 4.3 G/DL (ref 3.2–4.8)
ANION GAP SERPL CALCULATED.3IONS-SCNC: 7 MMOL/L (ref 3–11)
ANION GAP SERPL CALCULATED.3IONS-SCNC: 7 MMOL/L (ref 3–11)
APTT PPP: 24.6 SECONDS (ref 24–31)
ARTERIAL PATENCY WRIST A: ABNORMAL
ARTERIAL PATENCY WRIST A: ABNORMAL
ATMOSPHERIC PRESS: ABNORMAL MMHG
ATMOSPHERIC PRESS: ABNORMAL MMHG
BACTERIA UR QL AUTO: ABNORMAL /HPF
BASE EXCESS BLDA CALC-SCNC: -1 MMOL/L (ref -5–5)
BASE EXCESS BLDA CALC-SCNC: -1 MMOL/L (ref -5–5)
BASE EXCESS BLDA CALC-SCNC: -2 MMOL/L (ref -5–5)
BASE EXCESS BLDA CALC-SCNC: -2.2 MMOL/L (ref 0–2)
BASE EXCESS BLDA CALC-SCNC: 1 MMOL/L (ref -5–5)
BASE EXCESS BLDA CALC-SCNC: 1.3 MMOL/L (ref 0–2)
BDY SITE: ABNORMAL
BDY SITE: ABNORMAL
BILIRUB UR QL STRIP: NEGATIVE
BUN BLD-MCNC: 29 MG/DL (ref 9–23)
BUN BLD-MCNC: 29 MG/DL (ref 9–23)
BUN/CREAT SERPL: 19.3 (ref 7–25)
BUN/CREAT SERPL: 20.7 (ref 7–25)
CA-I BLDA-SCNC: 1.09 MMOL/L (ref 1.2–1.32)
CA-I BLDA-SCNC: 1.1 MMOL/L (ref 1.2–1.32)
CA-I BLDA-SCNC: 1.13 MMOL/L (ref 1.2–1.32)
CA-I BLDA-SCNC: 1.27 MMOL/L (ref 1.2–1.32)
CA-I BLDA-SCNC: 1.27 MMOL/L (ref 1.2–1.32)
CA-I BLDA-SCNC: 1.41 MMOL/L (ref 1.2–1.32)
CA-I SERPL ISE-MCNC: 1.35 MMOL/L (ref 1.12–1.32)
CALCIUM SPEC-SCNC: 10.1 MG/DL (ref 8.7–10.4)
CALCIUM SPEC-SCNC: 9.2 MG/DL (ref 8.7–10.4)
CHLORIDE SERPL-SCNC: 101 MMOL/L (ref 99–109)
CHLORIDE SERPL-SCNC: 105 MMOL/L (ref 99–109)
CLARITY UR: CLEAR
CO2 BLDA-SCNC: 25 MMOL/L (ref 24–29)
CO2 BLDA-SCNC: 25 MMOL/L (ref 24–29)
CO2 BLDA-SCNC: 25.5 MMOL/L (ref 22–33)
CO2 BLDA-SCNC: 27 MMOL/L (ref 24–29)
CO2 BLDA-SCNC: 27 MMOL/L (ref 24–29)
CO2 BLDA-SCNC: 28 MMOL/L (ref 24–29)
CO2 BLDA-SCNC: 28.1 MMOL/L (ref 22–33)
CO2 BLDA-SCNC: 30 MMOL/L (ref 24–29)
CO2 SERPL-SCNC: 25 MMOL/L (ref 20–31)
CO2 SERPL-SCNC: 28 MMOL/L (ref 20–31)
COHGB MFR BLD: 0.7 % (ref 0–2)
COHGB MFR BLD: 0.8 % (ref 0–2)
COLOR UR: YELLOW
CREAT BLD-MCNC: 1.4 MG/DL (ref 0.6–1.3)
CREAT BLD-MCNC: 1.5 MG/DL (ref 0.6–1.3)
DEPRECATED RDW RBC AUTO: 42.2 FL (ref 37–54)
DEPRECATED RDW RBC AUTO: 43 FL (ref 37–54)
ERYTHROCYTE [DISTWIDTH] IN BLOOD BY AUTOMATED COUNT: 12.8 % (ref 11.3–14.5)
ERYTHROCYTE [DISTWIDTH] IN BLOOD BY AUTOMATED COUNT: 12.9 % (ref 11.3–14.5)
GFR SERPL CREATININE-BSD FRML MDRD: 47 ML/MIN/1.73
GFR SERPL CREATININE-BSD FRML MDRD: 51 ML/MIN/1.73
GLUCOSE BLD-MCNC: 171 MG/DL (ref 70–100)
GLUCOSE BLD-MCNC: 200 MG/DL (ref 70–100)
GLUCOSE BLDC GLUCOMTR-MCNC: 131 MG/DL (ref 70–130)
GLUCOSE BLDC GLUCOMTR-MCNC: 134 MG/DL (ref 70–130)
GLUCOSE BLDC GLUCOMTR-MCNC: 138 MG/DL (ref 70–130)
GLUCOSE BLDC GLUCOMTR-MCNC: 148 MG/DL (ref 70–130)
GLUCOSE BLDC GLUCOMTR-MCNC: 151 MG/DL (ref 70–130)
GLUCOSE BLDC GLUCOMTR-MCNC: 164 MG/DL (ref 70–130)
GLUCOSE BLDC GLUCOMTR-MCNC: 166 MG/DL (ref 70–130)
GLUCOSE BLDC GLUCOMTR-MCNC: 167 MG/DL (ref 70–130)
GLUCOSE BLDC GLUCOMTR-MCNC: 168 MG/DL (ref 70–130)
GLUCOSE BLDC GLUCOMTR-MCNC: 170 MG/DL (ref 70–130)
GLUCOSE BLDC GLUCOMTR-MCNC: 177 MG/DL (ref 70–130)
GLUCOSE BLDC GLUCOMTR-MCNC: 181 MG/DL (ref 70–130)
GLUCOSE BLDC GLUCOMTR-MCNC: 188 MG/DL (ref 70–130)
GLUCOSE BLDC GLUCOMTR-MCNC: 196 MG/DL (ref 70–130)
GLUCOSE BLDC GLUCOMTR-MCNC: 221 MG/DL (ref 70–130)
GLUCOSE UR STRIP-MCNC: NEGATIVE MG/DL
HCO3 BLDA-SCNC: 23.4 MMOL/L (ref 22–26)
HCO3 BLDA-SCNC: 23.8 MMOL/L (ref 22–26)
HCO3 BLDA-SCNC: 24.1 MMOL/L (ref 20–26)
HCO3 BLDA-SCNC: 25.7 MMOL/L (ref 22–26)
HCO3 BLDA-SCNC: 26.1 MMOL/L (ref 22–26)
HCO3 BLDA-SCNC: 26.7 MMOL/L (ref 20–26)
HCO3 BLDA-SCNC: 26.8 MMOL/L (ref 22–26)
HCO3 BLDA-SCNC: 27.9 MMOL/L (ref 22–26)
HCT VFR BLD AUTO: 33.7 % (ref 38.9–50.9)
HCT VFR BLD AUTO: 37.5 % (ref 38.9–50.9)
HCT VFR BLD CALC: 36.6 %
HCT VFR BLD CALC: 42 %
HCT VFR BLDA CALC: 28 % (ref 38–51)
HCT VFR BLDA CALC: 29 % (ref 38–51)
HCT VFR BLDA CALC: 30 % (ref 38–51)
HCT VFR BLDA CALC: 32 % (ref 38–51)
HCT VFR BLDA CALC: 39 % (ref 38–51)
HCT VFR BLDA CALC: 41 % (ref 38–51)
HGB BLD-MCNC: 11.4 G/DL (ref 13.1–17.5)
HGB BLD-MCNC: 13.1 G/DL (ref 13.1–17.5)
HGB BLDA-MCNC: 10.2 G/DL (ref 12–17)
HGB BLDA-MCNC: 10.9 G/DL (ref 12–17)
HGB BLDA-MCNC: 11.9 G/DL (ref 13.5–17.5)
HGB BLDA-MCNC: 13.3 G/DL (ref 12–17)
HGB BLDA-MCNC: 13.7 G/DL (ref 13.5–17.5)
HGB BLDA-MCNC: 13.9 G/DL (ref 12–17)
HGB BLDA-MCNC: 9.5 G/DL (ref 12–17)
HGB BLDA-MCNC: 9.9 G/DL (ref 12–17)
HGB UR QL STRIP.AUTO: ABNORMAL
HOROWITZ INDEX BLD+IHG-RTO: 100 %
HOROWITZ INDEX BLD+IHG-RTO: 40 %
HYALINE CASTS UR QL AUTO: ABNORMAL /LPF
INR PPP: 1.27
KETONES UR QL STRIP: NEGATIVE
LEUKOCYTE ESTERASE UR QL STRIP.AUTO: NEGATIVE
MAGNESIUM SERPL-MCNC: 3 MG/DL (ref 1.3–2.7)
MAGNESIUM SERPL-MCNC: 3.8 MG/DL (ref 1.3–2.7)
MCH RBC QN AUTO: 30.6 PG (ref 27–31)
MCH RBC QN AUTO: 31.7 PG (ref 27–31)
MCHC RBC AUTO-ENTMCNC: 33.8 G/DL (ref 32–36)
MCHC RBC AUTO-ENTMCNC: 34.9 G/DL (ref 32–36)
MCV RBC AUTO: 90.6 FL (ref 80–99)
MCV RBC AUTO: 90.8 FL (ref 80–99)
METHGB BLD QL: 1.3 % (ref 0–1.5)
METHGB BLD QL: 1.4 % (ref 0–1.5)
MODALITY: ABNORMAL
MODALITY: ABNORMAL
NITRITE UR QL STRIP: NEGATIVE
OXYHGB MFR BLDV: 96.9 % (ref 94–99)
OXYHGB MFR BLDV: 97.2 % (ref 94–99)
PCO2 BLDA: 36 MM HG (ref 35–45)
PCO2 BLDA: 42.3 MM HG (ref 35–45)
PCO2 BLDA: 43.9 MM HG (ref 35–45)
PCO2 BLDA: 44.4 MM HG (ref 35–48)
PCO2 BLDA: 46 MM HG (ref 35–48)
PCO2 BLDA: 50.4 MM HG (ref 35–45)
PCO2 BLDA: 57.7 MM HG (ref 35–45)
PCO2 BLDA: 60.5 MM HG (ref 35–45)
PEEP RESPIRATORY: 5 CM[H2O]
PH BLDA: 7.26 PH UNITS (ref 7.35–7.6)
PH BLDA: 7.27 PH UNITS (ref 7.35–7.6)
PH BLDA: 7.33 PH UNITS (ref 7.35–7.45)
PH BLDA: 7.33 PH UNITS (ref 7.35–7.6)
PH BLDA: 7.34 PH UNITS (ref 7.35–7.6)
PH BLDA: 7.39 PH UNITS (ref 7.35–7.45)
PH BLDA: 7.4 PH UNITS (ref 7.35–7.6)
PH BLDA: 7.43 PH UNITS (ref 7.35–7.6)
PH UR STRIP.AUTO: <=5 [PH] (ref 5–8)
PHOSPHATE SERPL-MCNC: 2.3 MG/DL (ref 2.4–5.1)
PHOSPHATE SERPL-MCNC: 3.4 MG/DL (ref 2.4–5.1)
PLATELET # BLD AUTO: 192 10*3/MM3 (ref 150–450)
PLATELET # BLD AUTO: 229 10*3/MM3 (ref 150–450)
PMV BLD AUTO: 9.3 FL (ref 6–12)
PMV BLD AUTO: 9.5 FL (ref 6–12)
PO2 BLDA: 126 MMHG (ref 80–105)
PO2 BLDA: 138 MM HG (ref 83–108)
PO2 BLDA: 170 MM HG (ref 83–108)
PO2 BLDA: 321 MMHG (ref 80–105)
PO2 BLDA: 378 MMHG (ref 80–105)
PO2 BLDA: 386 MMHG (ref 80–105)
PO2 BLDA: 389 MMHG (ref 80–105)
PO2 BLDA: 85 MMHG (ref 80–105)
POTASSIUM BLD-SCNC: 3.9 MMOL/L (ref 3.5–5.5)
POTASSIUM BLD-SCNC: 4.2 MMOL/L (ref 3.5–5.5)
POTASSIUM BLDA-SCNC: 4.5 MMOL/L (ref 3.5–4.9)
POTASSIUM BLDA-SCNC: 4.6 MMOL/L (ref 3.5–4.9)
POTASSIUM BLDA-SCNC: 4.9 MMOL/L (ref 3.5–4.9)
POTASSIUM BLDA-SCNC: 5.1 MMOL/L (ref 3.5–4.9)
POTASSIUM BLDA-SCNC: 5.3 MMOL/L (ref 3.5–4.9)
POTASSIUM BLDA-SCNC: 5.4 MMOL/L (ref 3.5–4.9)
PROT UR QL STRIP: NEGATIVE
PROTHROMBIN TIME: 13.9 SECONDS (ref 9.6–11.5)
RBC # BLD AUTO: 3.72 10*6/MM3 (ref 4.2–5.76)
RBC # BLD AUTO: 4.13 10*6/MM3 (ref 4.2–5.76)
RBC # UR: ABNORMAL /HPF
REF LAB TEST METHOD: ABNORMAL
SAO2 % BLDA: 100 % (ref 95–98)
SAO2 % BLDA: 97 % (ref 95–98)
SAO2 % BLDA: 99 % (ref 95–98)
SET MECH RESP RATE: 12
SODIUM BLD-SCNC: 133 MMOL/L (ref 132–146)
SODIUM BLD-SCNC: 140 MMOL/L (ref 132–146)
SODIUM BLDA-SCNC: 132 MMOL/L (ref 138–146)
SODIUM BLDA-SCNC: 133 MMOL/L (ref 138–146)
SODIUM BLDA-SCNC: 134 MMOL/L (ref 138–146)
SODIUM BLDA-SCNC: 136 MMOL/L (ref 138–146)
SP GR UR STRIP: 1.01 (ref 1–1.03)
SPERM URNS QL MICRO: ABNORMAL /HPF
SQUAMOUS #/AREA URNS HPF: ABNORMAL /HPF
UROBILINOGEN UR QL STRIP: ABNORMAL
VENTILATOR MODE: ABNORMAL
VENTILATOR MODE: ABNORMAL
VT ON VENT VENT: 700 ML
WBC NRBC COR # BLD: 13.55 10*3/MM3 (ref 3.5–10.8)
WBC NRBC COR # BLD: 15.58 10*3/MM3 (ref 3.5–10.8)
WBC UR QL AUTO: ABNORMAL /HPF

## 2017-11-22 PROCEDURE — 25010000002 POTASSIUM CHLORIDE PER 2 MEQ OF POTASSIUM

## 2017-11-22 PROCEDURE — 25010000002 PROTAMINE SULFATE PER 10 MG

## 2017-11-22 PROCEDURE — 82330 ASSAY OF CALCIUM: CPT

## 2017-11-22 PROCEDURE — 85014 HEMATOCRIT: CPT

## 2017-11-22 PROCEDURE — 85027 COMPLETE CBC AUTOMATED: CPT | Performed by: PHYSICIAN ASSISTANT

## 2017-11-22 PROCEDURE — 85347 COAGULATION TIME ACTIVATED: CPT

## 2017-11-22 PROCEDURE — 25010000002 MIDAZOLAM PER 1 MG: Performed by: ANESTHESIOLOGY

## 2017-11-22 PROCEDURE — 94799 UNLISTED PULMONARY SVC/PX: CPT

## 2017-11-22 PROCEDURE — C1729 CATH, DRAINAGE: HCPCS | Performed by: THORACIC SURGERY (CARDIOTHORACIC VASCULAR SURGERY)

## 2017-11-22 PROCEDURE — 82805 BLOOD GASES W/O2 SATURATION: CPT | Performed by: PHYSICIAN ASSISTANT

## 2017-11-22 PROCEDURE — 25010000002 DOPAMINE PER 40 MG: Performed by: ANESTHESIOLOGY

## 2017-11-22 PROCEDURE — 82330 ASSAY OF CALCIUM: CPT | Performed by: PHYSICIAN ASSISTANT

## 2017-11-22 PROCEDURE — 99024 POSTOP FOLLOW-UP VISIT: CPT | Performed by: THORACIC SURGERY (CARDIOTHORACIC VASCULAR SURGERY)

## 2017-11-22 PROCEDURE — 99291 CRITICAL CARE FIRST HOUR: CPT | Performed by: INTERNAL MEDICINE

## 2017-11-22 PROCEDURE — 80069 RENAL FUNCTION PANEL: CPT | Performed by: PHYSICIAN ASSISTANT

## 2017-11-22 PROCEDURE — 25010000002 AMIODARONE PER 30 MG

## 2017-11-22 PROCEDURE — 33508 ENDOSCOPIC VEIN HARVEST: CPT | Performed by: THORACIC SURGERY (CARDIOTHORACIC VASCULAR SURGERY)

## 2017-11-22 PROCEDURE — 25010000002 HEPARIN (PORCINE) PER 1000 UNITS

## 2017-11-22 PROCEDURE — 93010 ELECTROCARDIOGRAM REPORT: CPT | Performed by: INTERNAL MEDICINE

## 2017-11-22 PROCEDURE — 06BQ4ZZ EXCISION OF LEFT SAPHENOUS VEIN, PERCUTANEOUS ENDOSCOPIC APPROACH: ICD-10-PCS | Performed by: THORACIC SURGERY (CARDIOTHORACIC VASCULAR SURGERY)

## 2017-11-22 PROCEDURE — 85610 PROTHROMBIN TIME: CPT | Performed by: PHYSICIAN ASSISTANT

## 2017-11-22 PROCEDURE — 82803 BLOOD GASES ANY COMBINATION: CPT

## 2017-11-22 PROCEDURE — 25010000002 FENTANYL CITRATE (PF) 100 MCG/2ML SOLUTION: Performed by: THORACIC SURGERY (CARDIOTHORACIC VASCULAR SURGERY)

## 2017-11-22 PROCEDURE — 25010000002 CEFUROXIME PER 750 MG: Performed by: PHYSICIAN ASSISTANT

## 2017-11-22 PROCEDURE — 25010000002 MAGNESIUM SULFATE PER 500 MG OF MAGNESIUM

## 2017-11-22 PROCEDURE — P9041 ALBUMIN (HUMAN),5%, 50ML: HCPCS | Performed by: PHYSICIAN ASSISTANT

## 2017-11-22 PROCEDURE — 25010000002 FUROSEMIDE PER 20 MG

## 2017-11-22 PROCEDURE — 71010 HC CHEST PA OR AP: CPT

## 2017-11-22 PROCEDURE — P9035 PLATELET PHERES LEUKOREDUCED: HCPCS

## 2017-11-22 PROCEDURE — 25010000002 HEPARIN (PORCINE) PER 1000 UNITS: Performed by: ANESTHESIOLOGY

## 2017-11-22 PROCEDURE — 33518 CABG ARTERY-VEIN TWO: CPT | Performed by: PHYSICIAN ASSISTANT

## 2017-11-22 PROCEDURE — 83735 ASSAY OF MAGNESIUM: CPT | Performed by: PHYSICIAN ASSISTANT

## 2017-11-22 PROCEDURE — 25010000002 ALBUMIN HUMAN 5% PER 50 ML: Performed by: INTERNAL MEDICINE

## 2017-11-22 PROCEDURE — 25810000003 DEXTROSE 5 % WITH KCL 20 MEQ 20-5 MEQ/L-% SOLUTION: Performed by: PHYSICIAN ASSISTANT

## 2017-11-22 PROCEDURE — 82805 BLOOD GASES W/O2 SATURATION: CPT | Performed by: THORACIC SURGERY (CARDIOTHORACIC VASCULAR SURGERY)

## 2017-11-22 PROCEDURE — 93005 ELECTROCARDIOGRAM TRACING: CPT | Performed by: PHYSICIAN ASSISTANT

## 2017-11-22 PROCEDURE — 84295 ASSAY OF SERUM SODIUM: CPT

## 2017-11-22 PROCEDURE — 81001 URINALYSIS AUTO W/SCOPE: CPT | Performed by: PHYSICIAN ASSISTANT

## 2017-11-22 PROCEDURE — 25010000002 ALBUMIN HUMAN 5% PER 50 ML: Performed by: PHYSICIAN ASSISTANT

## 2017-11-22 PROCEDURE — 33533 CABG ARTERIAL SINGLE: CPT | Performed by: PHYSICIAN ASSISTANT

## 2017-11-22 PROCEDURE — 33533 CABG ARTERIAL SINGLE: CPT | Performed by: THORACIC SURGERY (CARDIOTHORACIC VASCULAR SURGERY)

## 2017-11-22 PROCEDURE — 33518 CABG ARTERY-VEIN TWO: CPT | Performed by: THORACIC SURGERY (CARDIOTHORACIC VASCULAR SURGERY)

## 2017-11-22 PROCEDURE — 02100Z9 BYPASS CORONARY ARTERY, ONE ARTERY FROM LEFT INTERNAL MAMMARY, OPEN APPROACH: ICD-10-PCS | Performed by: THORACIC SURGERY (CARDIOTHORACIC VASCULAR SURGERY)

## 2017-11-22 PROCEDURE — 63710000001 INSULIN REGULAR HUMAN PER 5 UNITS: Performed by: THORACIC SURGERY (CARDIOTHORACIC VASCULAR SURGERY)

## 2017-11-22 PROCEDURE — 87086 URINE CULTURE/COLONY COUNT: CPT | Performed by: PHYSICIAN ASSISTANT

## 2017-11-22 PROCEDURE — 25010000002 INSULIN REGULAR HUMAN PER 5 UNITS: Performed by: PHYSICIAN ASSISTANT

## 2017-11-22 PROCEDURE — 5A1221Z PERFORMANCE OF CARDIAC OUTPUT, CONTINUOUS: ICD-10-PCS | Performed by: THORACIC SURGERY (CARDIOTHORACIC VASCULAR SURGERY)

## 2017-11-22 PROCEDURE — 25010000002 ONDANSETRON PER 1 MG: Performed by: PHYSICIAN ASSISTANT

## 2017-11-22 PROCEDURE — 94002 VENT MGMT INPAT INIT DAY: CPT

## 2017-11-22 PROCEDURE — C1751 CATH, INF, PER/CENT/MIDLINE: HCPCS | Performed by: ANESTHESIOLOGY

## 2017-11-22 PROCEDURE — 25010000002 MANNITOL PER 50 ML

## 2017-11-22 PROCEDURE — 85730 THROMBOPLASTIN TIME PARTIAL: CPT | Performed by: PHYSICIAN ASSISTANT

## 2017-11-22 PROCEDURE — 021109W BYPASS CORONARY ARTERY, TWO ARTERIES FROM AORTA WITH AUTOLOGOUS VENOUS TISSUE, OPEN APPROACH: ICD-10-PCS | Performed by: THORACIC SURGERY (CARDIOTHORACIC VASCULAR SURGERY)

## 2017-11-22 PROCEDURE — 84132 ASSAY OF SERUM POTASSIUM: CPT

## 2017-11-22 PROCEDURE — 25010000002 CEFUROXIME PER 750 MG: Performed by: ANESTHESIOLOGY

## 2017-11-22 PROCEDURE — 25010000002 PAPAVERINE PER 60 MG: Performed by: THORACIC SURGERY (CARDIOTHORACIC VASCULAR SURGERY)

## 2017-11-22 PROCEDURE — 82947 ASSAY GLUCOSE BLOOD QUANT: CPT

## 2017-11-22 PROCEDURE — C1894 INTRO/SHEATH, NON-LASER: HCPCS | Performed by: THORACIC SURGERY (CARDIOTHORACIC VASCULAR SURGERY)

## 2017-11-22 PROCEDURE — 25010000002 HEPARIN (PORCINE) PER 1000 UNITS: Performed by: THORACIC SURGERY (CARDIOTHORACIC VASCULAR SURGERY)

## 2017-11-22 PROCEDURE — 25010000002 PROTAMINE SULFATE PER 10 MG: Performed by: ANESTHESIOLOGY

## 2017-11-22 PROCEDURE — 25010000002 PHENYLEPHRINE PER 1 ML

## 2017-11-22 PROCEDURE — 25010000002 PROPOFOL 10 MG/ML EMULSION: Performed by: ANESTHESIOLOGY

## 2017-11-22 PROCEDURE — P9041 ALBUMIN (HUMAN),5%, 50ML: HCPCS | Performed by: INTERNAL MEDICINE

## 2017-11-22 PROCEDURE — 36430 TRANSFUSION BLD/BLD COMPNT: CPT

## 2017-11-22 RX ORDER — ASPIRIN 325 MG
325 TABLET, DELAYED RELEASE (ENTERIC COATED) ORAL DAILY
Status: DISCONTINUED | OUTPATIENT
Start: 2017-11-23 | End: 2017-11-29 | Stop reason: HOSPADM

## 2017-11-22 RX ORDER — OXYCODONE HYDROCHLORIDE AND ACETAMINOPHEN 5; 325 MG/1; MG/1
2 TABLET ORAL EVERY 4 HOURS PRN
Status: DISCONTINUED | OUTPATIENT
Start: 2017-11-22 | End: 2017-11-29 | Stop reason: HOSPADM

## 2017-11-22 RX ORDER — PROPOFOL 10 MG/ML
VIAL (ML) INTRAVENOUS AS NEEDED
Status: DISCONTINUED | OUTPATIENT
Start: 2017-11-22 | End: 2017-11-22 | Stop reason: SURG

## 2017-11-22 RX ORDER — PROTAMINE SULFATE 10 MG/ML
50 INJECTION, SOLUTION INTRAVENOUS ONCE
Status: COMPLETED | OUTPATIENT
Start: 2017-11-22 | End: 2017-11-22

## 2017-11-22 RX ORDER — DOCUSATE SODIUM 100 MG/1
100 CAPSULE, LIQUID FILLED ORAL 2 TIMES DAILY PRN
Status: DISCONTINUED | OUTPATIENT
Start: 2017-11-22 | End: 2017-11-29 | Stop reason: HOSPADM

## 2017-11-22 RX ORDER — NITROGLYCERIN 20 MG/100ML
5-200 INJECTION INTRAVENOUS CONTINUOUS PRN
Status: DISCONTINUED | OUTPATIENT
Start: 2017-11-22 | End: 2017-11-25

## 2017-11-22 RX ORDER — PAPAVERINE HYDROCHLORIDE 30 MG/ML
INJECTION INTRAMUSCULAR; INTRAVENOUS AS NEEDED
Status: DISCONTINUED | OUTPATIENT
Start: 2017-11-22 | End: 2017-11-22 | Stop reason: HOSPADM

## 2017-11-22 RX ORDER — ONDANSETRON 2 MG/ML
4 INJECTION INTRAMUSCULAR; INTRAVENOUS EVERY 6 HOURS PRN
Status: DISCONTINUED | OUTPATIENT
Start: 2017-11-22 | End: 2017-11-29 | Stop reason: HOSPADM

## 2017-11-22 RX ORDER — FENTANYL CITRATE 50 UG/ML
25 INJECTION, SOLUTION INTRAMUSCULAR; INTRAVENOUS
Status: DISCONTINUED | OUTPATIENT
Start: 2017-11-22 | End: 2017-11-24

## 2017-11-22 RX ORDER — MAGNESIUM SULFATE HEPTAHYDRATE 40 MG/ML
4 INJECTION, SOLUTION INTRAVENOUS AS NEEDED
Status: DISCONTINUED | OUTPATIENT
Start: 2017-11-22 | End: 2017-11-29 | Stop reason: HOSPADM

## 2017-11-22 RX ORDER — HYDROCODONE BITARTRATE AND ACETAMINOPHEN 7.5; 325 MG/1; MG/1
1 TABLET ORAL EVERY 4 HOURS PRN
Status: DISCONTINUED | OUTPATIENT
Start: 2017-11-22 | End: 2017-11-29 | Stop reason: HOSPADM

## 2017-11-22 RX ORDER — BISACODYL 10 MG
10 SUPPOSITORY, RECTAL RECTAL DAILY PRN
Status: DISCONTINUED | OUTPATIENT
Start: 2017-11-23 | End: 2017-11-29 | Stop reason: HOSPADM

## 2017-11-22 RX ORDER — POTASSIUM CHLORIDE 29.8 MG/ML
20 INJECTION INTRAVENOUS
Status: DISCONTINUED | OUTPATIENT
Start: 2017-11-22 | End: 2017-11-29 | Stop reason: HOSPADM

## 2017-11-22 RX ORDER — SUFENTANIL CITRATE 50 UG/ML
INJECTION EPIDURAL; INTRAVENOUS AS NEEDED
Status: DISCONTINUED | OUTPATIENT
Start: 2017-11-22 | End: 2017-11-22 | Stop reason: SURG

## 2017-11-22 RX ORDER — SODIUM CHLORIDE 9 MG/ML
INJECTION, SOLUTION INTRAVENOUS AS NEEDED
Status: DISCONTINUED | OUTPATIENT
Start: 2017-11-22 | End: 2017-11-22 | Stop reason: HOSPADM

## 2017-11-22 RX ORDER — DOPAMINE HYDROCHLORIDE 160 MG/100ML
INJECTION, SOLUTION INTRAVENOUS CONTINUOUS PRN
Status: DISCONTINUED | OUTPATIENT
Start: 2017-11-22 | End: 2017-11-22 | Stop reason: SURG

## 2017-11-22 RX ORDER — FAMOTIDINE 20 MG/1
20 TABLET, FILM COATED ORAL ONCE
Status: COMPLETED | OUTPATIENT
Start: 2017-11-22 | End: 2017-11-22

## 2017-11-22 RX ORDER — DEXMEDETOMIDINE HYDROCHLORIDE 4 UG/ML
.2-1.5 INJECTION, SOLUTION INTRAVENOUS CONTINUOUS PRN
Status: DISCONTINUED | OUTPATIENT
Start: 2017-11-22 | End: 2017-11-24

## 2017-11-22 RX ORDER — SODIUM CHLORIDE, SODIUM LACTATE, POTASSIUM CHLORIDE, CALCIUM CHLORIDE 600; 310; 30; 20 MG/100ML; MG/100ML; MG/100ML; MG/100ML
9 INJECTION, SOLUTION INTRAVENOUS CONTINUOUS
Status: DISCONTINUED | OUTPATIENT
Start: 2017-11-22 | End: 2017-11-22 | Stop reason: HOSPADM

## 2017-11-22 RX ORDER — ALBUMIN, HUMAN INJ 5% 5 %
500 SOLUTION INTRAVENOUS AS NEEDED
Status: COMPLETED | OUTPATIENT
Start: 2017-11-22 | End: 2017-11-22

## 2017-11-22 RX ORDER — LIDOCAINE HYDROCHLORIDE 10 MG/ML
0.5 INJECTION, SOLUTION EPIDURAL; INFILTRATION; INTRACAUDAL; PERINEURAL ONCE AS NEEDED
Status: DISCONTINUED | OUTPATIENT
Start: 2017-11-22 | End: 2017-11-22 | Stop reason: HOSPADM

## 2017-11-22 RX ORDER — 0.9 % SODIUM CHLORIDE 0.9 %
10 VIAL (ML) INJECTION EVERY 12 HOURS SCHEDULED
Status: DISCONTINUED | OUTPATIENT
Start: 2017-11-22 | End: 2017-11-22 | Stop reason: HOSPADM

## 2017-11-22 RX ORDER — PHENYLEPHRINE HCL IN 0.9% NACL 0.5 MG/5ML
.5-3 SYRINGE (ML) INTRAVENOUS CONTINUOUS PRN
Status: DISCONTINUED | OUTPATIENT
Start: 2017-11-22 | End: 2017-11-25

## 2017-11-22 RX ORDER — ALPRAZOLAM 0.25 MG/1
0.25 TABLET ORAL NIGHTLY PRN
Status: DISPENSED | OUTPATIENT
Start: 2017-11-22 | End: 2017-11-24

## 2017-11-22 RX ORDER — FAMOTIDINE 10 MG/ML
20 INJECTION, SOLUTION INTRAVENOUS ONCE
Status: DISCONTINUED | OUTPATIENT
Start: 2017-11-22 | End: 2017-11-22 | Stop reason: HOSPADM

## 2017-11-22 RX ORDER — ACETAMINOPHEN 325 MG/1
650 TABLET ORAL EVERY 4 HOURS PRN
Status: DISCONTINUED | OUTPATIENT
Start: 2017-11-22 | End: 2017-11-29 | Stop reason: HOSPADM

## 2017-11-22 RX ORDER — ATORVASTATIN CALCIUM 40 MG/1
40 TABLET, FILM COATED ORAL NIGHTLY
Status: DISCONTINUED | OUTPATIENT
Start: 2017-11-22 | End: 2017-11-24

## 2017-11-22 RX ORDER — PROTAMINE SULFATE 10 MG/ML
INJECTION, SOLUTION INTRAVENOUS AS NEEDED
Status: DISCONTINUED | OUTPATIENT
Start: 2017-11-22 | End: 2017-11-22 | Stop reason: SURG

## 2017-11-22 RX ORDER — MIDAZOLAM HYDROCHLORIDE 1 MG/ML
INJECTION INTRAMUSCULAR; INTRAVENOUS AS NEEDED
Status: DISCONTINUED | OUTPATIENT
Start: 2017-11-22 | End: 2017-11-22 | Stop reason: SURG

## 2017-11-22 RX ORDER — PROPOFOL 10 MG/ML
VIAL (ML) INTRAVENOUS CONTINUOUS PRN
Status: DISCONTINUED | OUTPATIENT
Start: 2017-11-22 | End: 2017-11-22 | Stop reason: SURG

## 2017-11-22 RX ORDER — DOBUTAMINE HYDROCHLORIDE 100 MG/100ML
2-20 INJECTION INTRAVENOUS CONTINUOUS PRN
Status: DISCONTINUED | OUTPATIENT
Start: 2017-11-22 | End: 2017-11-24

## 2017-11-22 RX ORDER — SODIUM CHLORIDE 0.9 % (FLUSH) 0.9 %
1-10 SYRINGE (ML) INJECTION AS NEEDED
Status: DISCONTINUED | OUTPATIENT
Start: 2017-11-22 | End: 2017-11-22 | Stop reason: HOSPADM

## 2017-11-22 RX ORDER — SODIUM CHLORIDE 9 MG/ML
30 INJECTION, SOLUTION INTRAVENOUS CONTINUOUS PRN
Status: DISCONTINUED | OUTPATIENT
Start: 2017-11-22 | End: 2017-11-24

## 2017-11-22 RX ORDER — PROTAMINE SULFATE 10 MG/ML
INJECTION, SOLUTION INTRAVENOUS
Status: COMPLETED
Start: 2017-11-22 | End: 2017-11-22

## 2017-11-22 RX ORDER — ASPIRIN 325 MG
325 TABLET ORAL ONCE
Status: COMPLETED | OUTPATIENT
Start: 2017-11-22 | End: 2017-11-22

## 2017-11-22 RX ORDER — MEPERIDINE HYDROCHLORIDE 25 MG/ML
25 INJECTION INTRAMUSCULAR; INTRAVENOUS; SUBCUTANEOUS EVERY 4 HOURS PRN
Status: ACTIVE | OUTPATIENT
Start: 2017-11-22 | End: 2017-11-22

## 2017-11-22 RX ORDER — METOPROLOL TARTRATE 5 MG/5ML
2.5 INJECTION INTRAVENOUS EVERY 6 HOURS SCHEDULED
Status: DISCONTINUED | OUTPATIENT
Start: 2017-11-22 | End: 2017-11-23

## 2017-11-22 RX ORDER — MAGNESIUM SULFATE HEPTAHYDRATE 40 MG/ML
2 INJECTION, SOLUTION INTRAVENOUS AS NEEDED
Status: DISCONTINUED | OUTPATIENT
Start: 2017-11-22 | End: 2017-11-29 | Stop reason: HOSPADM

## 2017-11-22 RX ORDER — ROCURONIUM BROMIDE 10 MG/ML
INJECTION, SOLUTION INTRAVENOUS AS NEEDED
Status: DISCONTINUED | OUTPATIENT
Start: 2017-11-22 | End: 2017-11-22 | Stop reason: SURG

## 2017-11-22 RX ORDER — DOPAMINE HYDROCHLORIDE 160 MG/100ML
2-20 INJECTION, SOLUTION INTRAVENOUS CONTINUOUS PRN
Status: DISCONTINUED | OUTPATIENT
Start: 2017-11-22 | End: 2017-11-24

## 2017-11-22 RX ORDER — SENNA AND DOCUSATE SODIUM 50; 8.6 MG/1; MG/1
2 TABLET, FILM COATED ORAL 2 TIMES DAILY
Status: DISCONTINUED | OUTPATIENT
Start: 2017-11-22 | End: 2017-11-29 | Stop reason: HOSPADM

## 2017-11-22 RX ORDER — FAMOTIDINE 20 MG/1
20 TABLET, FILM COATED ORAL 2 TIMES DAILY
Status: DISCONTINUED | OUTPATIENT
Start: 2017-11-22 | End: 2017-11-24

## 2017-11-22 RX ORDER — BISACODYL 5 MG/1
10 TABLET, DELAYED RELEASE ORAL DAILY PRN
Status: DISCONTINUED | OUTPATIENT
Start: 2017-11-22 | End: 2017-11-29 | Stop reason: HOSPADM

## 2017-11-22 RX ORDER — CHLORHEXIDINE GLUCONATE 0.12 MG/ML
15 RINSE ORAL EVERY 12 HOURS SCHEDULED
Status: DISCONTINUED | OUTPATIENT
Start: 2017-11-22 | End: 2017-11-24

## 2017-11-22 RX ORDER — MORPHINE SULFATE 4 MG/ML
2 INJECTION, SOLUTION INTRAMUSCULAR; INTRAVENOUS
Status: DISCONTINUED | OUTPATIENT
Start: 2017-11-22 | End: 2017-11-24

## 2017-11-22 RX ORDER — ALBUMIN, HUMAN INJ 5% 5 %
250 SOLUTION INTRAVENOUS ONCE
Status: COMPLETED | OUTPATIENT
Start: 2017-11-22 | End: 2017-11-22

## 2017-11-22 RX ORDER — NALOXONE HCL 0.4 MG/ML
0.4 VIAL (ML) INJECTION
Status: DISCONTINUED | OUTPATIENT
Start: 2017-11-22 | End: 2017-11-24

## 2017-11-22 RX ORDER — POTASSIUM CHLORIDE, DEXTROSE MONOHYDRATE 150; 5 MG/100ML; G/100ML
30 INJECTION, SOLUTION INTRAVENOUS CONTINUOUS
Status: DISCONTINUED | OUTPATIENT
Start: 2017-11-22 | End: 2017-11-24

## 2017-11-22 RX ORDER — POTASSIUM CHLORIDE 1.5 G/1.77G
40 POWDER, FOR SOLUTION ORAL AS NEEDED
Status: DISCONTINUED | OUTPATIENT
Start: 2017-11-22 | End: 2017-11-29 | Stop reason: HOSPADM

## 2017-11-22 RX ORDER — HEPARIN SODIUM 1000 [USP'U]/ML
INJECTION, SOLUTION INTRAVENOUS; SUBCUTANEOUS AS NEEDED
Status: DISCONTINUED | OUTPATIENT
Start: 2017-11-22 | End: 2017-11-22 | Stop reason: SURG

## 2017-11-22 RX ORDER — SODIUM CHLORIDE 0.9 % (FLUSH) 0.9 %
30 SYRINGE (ML) INJECTION ONCE AS NEEDED
Status: DISCONTINUED | OUTPATIENT
Start: 2017-11-22 | End: 2017-11-24

## 2017-11-22 RX ORDER — FAMOTIDINE 10 MG/ML
20 INJECTION, SOLUTION INTRAVENOUS 2 TIMES DAILY
Status: DISCONTINUED | OUTPATIENT
Start: 2017-11-22 | End: 2017-11-24

## 2017-11-22 RX ORDER — AMINOCAPROIC ACID 250 MG/ML
INJECTION, SOLUTION INTRAVENOUS AS NEEDED
Status: DISCONTINUED | OUTPATIENT
Start: 2017-11-22 | End: 2017-11-22 | Stop reason: SURG

## 2017-11-22 RX ORDER — POTASSIUM CHLORIDE 750 MG/1
40 CAPSULE, EXTENDED RELEASE ORAL AS NEEDED
Status: DISCONTINUED | OUTPATIENT
Start: 2017-11-22 | End: 2017-11-29 | Stop reason: HOSPADM

## 2017-11-22 RX ORDER — GLYCOPYRROLATE 0.2 MG/ML
INJECTION INTRAMUSCULAR; INTRAVENOUS AS NEEDED
Status: DISCONTINUED | OUTPATIENT
Start: 2017-11-22 | End: 2017-11-22 | Stop reason: SURG

## 2017-11-22 RX ORDER — FAMOTIDINE 20 MG/1
20 TABLET, FILM COATED ORAL ONCE
Status: DISCONTINUED | OUTPATIENT
Start: 2017-11-22 | End: 2017-11-22 | Stop reason: HOSPADM

## 2017-11-22 RX ADMIN — OXYCODONE AND ACETAMINOPHEN 2 TABLET: 5; 325 TABLET ORAL at 23:01

## 2017-11-22 RX ADMIN — POTASSIUM CHLORIDE AND DEXTROSE MONOHYDRATE 30 ML/HR: 150; 5 INJECTION, SOLUTION INTRAVENOUS at 11:14

## 2017-11-22 RX ADMIN — SIROLIMUS 1 MG: 1 TABLET, FILM COATED ORAL at 05:28

## 2017-11-22 RX ADMIN — FAMOTIDINE 20 MG: 10 INJECTION, SOLUTION INTRAVENOUS at 18:49

## 2017-11-22 RX ADMIN — ALPRAZOLAM 0.25 MG: 0.25 TABLET ORAL at 23:59

## 2017-11-22 RX ADMIN — ALBUMIN HUMAN 500 ML: 0.05 INJECTION, SOLUTION INTRAVENOUS at 12:27

## 2017-11-22 RX ADMIN — SERTRALINE 50 MG: 50 TABLET, FILM COATED ORAL at 04:51

## 2017-11-22 RX ADMIN — SODIUM CHLORIDE, PRESERVATIVE FREE 10 ML: 5 INJECTION INTRAVENOUS at 06:10

## 2017-11-22 RX ADMIN — GLYCOPYRROLATE 0.4 MG: 0.2 INJECTION, SOLUTION INTRAMUSCULAR; INTRAVENOUS at 09:35

## 2017-11-22 RX ADMIN — PROTAMINE SULFATE 50 MG: 10 INJECTION, SOLUTION INTRAVENOUS at 11:14

## 2017-11-22 RX ADMIN — SODIUM CHLORIDE 6 UNITS/HR: 9 INJECTION, SOLUTION INTRAVENOUS at 07:45

## 2017-11-22 RX ADMIN — SUFENTANIL CITRATE 100 MCG: 50 INJECTION EPIDURAL; INTRAVENOUS at 07:55

## 2017-11-22 RX ADMIN — ALBUMIN HUMAN 250 ML: 0.05 INJECTION, SOLUTION INTRAVENOUS at 20:42

## 2017-11-22 RX ADMIN — EPHEDRINE SULFATE 10 MG: 50 INJECTION INTRAMUSCULAR; INTRAVENOUS; SUBCUTANEOUS at 09:43

## 2017-11-22 RX ADMIN — PROTAMINE SULFATE 100 MG: 10 INJECTION, SOLUTION INTRAVENOUS at 10:10

## 2017-11-22 RX ADMIN — MIDAZOLAM HYDROCHLORIDE 5 MG: 1 INJECTION, SOLUTION INTRAMUSCULAR; INTRAVENOUS at 09:35

## 2017-11-22 RX ADMIN — PROPOFOL 25 MCG/KG/MIN: 10 INJECTION, EMULSION INTRAVENOUS at 10:15

## 2017-11-22 RX ADMIN — ROCURONIUM BROMIDE 20 MG: 10 INJECTION INTRAVENOUS at 09:35

## 2017-11-22 RX ADMIN — PROTAMINE SULFATE 350 MG: 10 INJECTION, SOLUTION INTRAVENOUS at 09:40

## 2017-11-22 RX ADMIN — MIDAZOLAM HYDROCHLORIDE 5 MG: 1 INJECTION, SOLUTION INTRAMUSCULAR; INTRAVENOUS at 07:09

## 2017-11-22 RX ADMIN — HEPARIN SODIUM 36000 UNITS: 1000 INJECTION, SOLUTION INTRAVENOUS; SUBCUTANEOUS at 08:20

## 2017-11-22 RX ADMIN — ASPIRIN 325 MG ORAL TABLET 325 MG: 325 PILL ORAL at 16:19

## 2017-11-22 RX ADMIN — OXYCODONE AND ACETAMINOPHEN 2 TABLET: 5; 325 TABLET ORAL at 13:29

## 2017-11-22 RX ADMIN — SODIUM CHLORIDE, POTASSIUM CHLORIDE, SODIUM LACTATE AND CALCIUM CHLORIDE 9 ML/HR: 600; 310; 30; 20 INJECTION, SOLUTION INTRAVENOUS at 06:10

## 2017-11-22 RX ADMIN — SODIUM CHLORIDE, POTASSIUM CHLORIDE, SODIUM LACTATE AND CALCIUM CHLORIDE: 600; 310; 30; 20 INJECTION, SOLUTION INTRAVENOUS at 07:09

## 2017-11-22 RX ADMIN — FAMOTIDINE 20 MG: 20 TABLET, FILM COATED ORAL at 06:17

## 2017-11-22 RX ADMIN — SODIUM CHLORIDE 7 UNITS/HR: 9 INJECTION, SOLUTION INTRAVENOUS at 23:57

## 2017-11-22 RX ADMIN — WATER 1.5 G: 1 INJECTION INTRAMUSCULAR; INTRAVENOUS; SUBCUTANEOUS at 16:14

## 2017-11-22 RX ADMIN — WATER 1.5 G: 1 INJECTION INTRAMUSCULAR; INTRAVENOUS; SUBCUTANEOUS at 23:59

## 2017-11-22 RX ADMIN — AMINOCAPROIC ACID 10 G: 250 INJECTION, SOLUTION INTRAVENOUS at 07:32

## 2017-11-22 RX ADMIN — CHLORHEXIDINE GLUCONATE 15 ML: 1.2 RINSE ORAL at 04:51

## 2017-11-22 RX ADMIN — NOREPINEPHRINE BITARTRATE 0.04 MCG/KG/MIN: 8 SOLUTION at 13:30

## 2017-11-22 RX ADMIN — Medication 2 TABLET: at 18:49

## 2017-11-22 RX ADMIN — ONDANSETRON 4 MG: 2 INJECTION INTRAMUSCULAR; INTRAVENOUS at 19:16

## 2017-11-22 RX ADMIN — OXYCODONE AND ACETAMINOPHEN 2 TABLET: 5; 325 TABLET ORAL at 18:49

## 2017-11-22 RX ADMIN — ALPRAZOLAM 0.25 MG: 0.25 TABLET ORAL at 04:51

## 2017-11-22 RX ADMIN — DOPAMINE HYDROCHLORIDE 5 MCG/KG/MIN: 160 INJECTION, SOLUTION INTRAVENOUS at 09:51

## 2017-11-22 RX ADMIN — PROPOFOL 70 MG: 10 INJECTION, EMULSION INTRAVENOUS at 07:09

## 2017-11-22 RX ADMIN — SUFENTANIL CITRATE 100 MCG: 50 INJECTION EPIDURAL; INTRAVENOUS at 09:35

## 2017-11-22 RX ADMIN — ALBUMIN HUMAN 500 ML: 0.05 INJECTION, SOLUTION INTRAVENOUS at 17:57

## 2017-11-22 RX ADMIN — FENTANYL CITRATE 25 MCG: 50 INJECTION INTRAMUSCULAR; INTRAVENOUS at 13:29

## 2017-11-22 RX ADMIN — ROCURONIUM BROMIDE 70 MG: 10 INJECTION INTRAVENOUS at 07:09

## 2017-11-22 RX ADMIN — CEFUROXIME 1.5 G: 1.5 INJECTION, POWDER, FOR SOLUTION INTRAVENOUS at 07:30

## 2017-11-22 RX ADMIN — SUFENTANIL CITRATE 50 MCG: 50 INJECTION EPIDURAL; INTRAVENOUS at 07:09

## 2017-11-22 NOTE — ANESTHESIA PROCEDURE NOTES
Central Line    Patient location during procedure: OR  Indications: vascular access  Preanesthetic Checklist  Completed: patient identified, site marked, surgical consent, pre-op evaluation, timeout performed, IV checked, risks and benefits discussed and monitors and equipment checked  Central Line Prep  Sterile Tech:cap, gloves, gown, mask and sterile barriers  Prep: chloraprep  Patient monitoring: blood pressure monitoring, continuous pulse oximetry and EKG  Central Line Procedure  Laterality:right  Location:internal jugular  Catheter Type:Cordis and Mills-Dorie  Catheter Size:9 Fr  Guidance:ultrasound guided  PROCEDURE NOTE/ULTRASOUND INTERPRETATION.  Using ultrasound guidance the potential vascular sites for insertion of the catheter were visualized to determine the patency of the vessel to be used for vascular access.  After selecting the appropriate site for insertion, the needle was visualized under ultrasound being inserted into the internal jugular vein, followed by ultrasound confirmation of wire and catheter placement. There were no abnormalities seen on ultrasound; an image was taken; and the patient tolerated the procedure with no complications.   Assessment  Post procedure:biopatch applied, line sutured, occlusive dressing applied and secured with tape  Assessement:blood return through all ports, free fluid flow and chest x-ray ordered  Complications:no  Patient Tolerance:patient tolerated the procedure well with no apparent complications

## 2017-11-22 NOTE — ANESTHESIA POSTPROCEDURE EVALUATION
Patient: Quirino Sheppard    Procedure Summary     Date Anesthesia Start Anesthesia Stop Room / Location    11/22/17 0702 1046 [facility] / [facility]       Procedure Diagnosis Surgeon Provider    MEDIAN STERNOTOMY, CORONARY ARTERY BYPASS GRAFT X 3, UTILIZING THE LEFT INTERNAL MAMMARY ARTERY AND EVH USING THE LEFT GREATER SAPHENOUS VEIN (N/A Chest) Coronary artery disease involving native coronary artery of native heart, angina presence unspecified  (Coronary artery disease involving native coronary artery of native heart, angina presence unspecified [I25.10]) MD Maninder Kelsey MD          Anesthesia Type: general  Last vitals  BP   130/80 (11/22/17 0611)   Temp   97.5 °F (36.4 °C) (11/22/17 0611)   Pulse   76 (11/22/17 0611)   Resp   18 (11/22/17 0611)     SpO2   98 % (11/22/17 0611)     Post Anesthesia Care and Evaluation    Patient location during evaluation: ICU  Patient participation: complete - patient cannot participate  Level of consciousness: obtunded/minimal responses  Pain score: 0  Pain management: adequate  Airway patency: patent  Anesthetic complications: No anesthetic complications  PONV Status: none  Cardiovascular status: acceptable  Respiratory status: acceptable, ETT, intubated and ventilator  Hydration status: acceptable

## 2017-11-22 NOTE — PROGRESS NOTES
CTS Progress Note       LOS: 2 days   Patient Care Team:  Amol Raymond MD as PCP - General (Rheumatology)    No chief complaint on file.  Chief complaint;;; coronary artery disease    Vital Signs:  Temp:  [97.5 °F (36.4 °C)-98.7 °F (37.1 °C)] 98.1 °F (36.7 °C)  Heart Rate:  [70-83] 70  Resp:  [18] 18  BP: (125-144)/(80-93) 144/93    Physical Exam: No angina       Results:     Results from last 7 days  Lab Units 11/21/17  0519   WBC 10*3/mm3 7.49   HEMOGLOBIN g/dL 14.2   HEMATOCRIT % 42.0   PLATELETS 10*3/mm3 143*       Results from last 7 days  Lab Units 11/21/17  0516   SODIUM mmol/L 136   POTASSIUM mmol/L 4.2   CHLORIDE mmol/L 102   CO2 mmol/L 27.0   BUN mg/dL 25*   CREATININE mg/dL 1.30   GLUCOSE mg/dL 257*   CALCIUM mg/dL 9.6           Imaging Results (last 24 hours)     Procedure Component Value Units Date/Time    XR Chest 1 View [[id number]] Collected:  11/21/17 1147     Updated:  11/21/17 2159    Narrative:       EXAMINATION: XR CHEST 1 VW-11/20/2017:      INDICATION: Preop.      COMPARISON: NONE.     FINDINGS: The heart, mediastinum and pulmonary vasculature appear within  normal limits. The lungs appear normally expanded and clear except for a  couple of granulomatous calcifications.           Impression:       No evidence of active chest disease.     D:  11/21/2017  E:  11/21/2017     This report was finalized on 11/21/2017 9:57 PM by DR. Kenan Stacy MD.             Assessment    Principal Problem:    Coronary artery disease involving native coronary artery of native heart  Active Problems:    CAD (coronary artery disease)    Hx of liver transplant    Essential hypertension    Hyperlipemia    Diabetes mellitus, type II    Hx of hepatitis C    Patient is again aware the risk of infection may be higher because of his anti-rejection medications secondary to his liver transplant.  He agrees to proceed    Plan   For coronary surgery today    Please note that portions of this note were completed with a voice  recognition program. Efforts were made to edit the dictations, but occasionally words are mistranscribed.    Naga Guaman MD  11/22/17  6:02 AM

## 2017-11-22 NOTE — ANESTHESIA PROCEDURE NOTES
Airway  Urgency: elective    Airway not difficult    General Information and Staff    Patient location during procedure: OR    Indications and Patient Condition  Indications for airway management: airway protection    Preoxygenated: yes  MILS not maintained throughout  Mask difficulty assessment: 0 - not attempted    Final Airway Details  Final airway type: endotracheal airway      Successful airway: ETT  Cuffed: yes   Successful intubation technique: direct laryngoscopy  Endotracheal tube insertion site: oral  Blade: Andreina  Blade size: #4  ETT size: 8.0 mm  Cormack-Lehane Classification: grade I - full view of glottis  Placement verified by: chest auscultation and capnometry   Measured from: lips  ETT to lips (cm): 20  Number of attempts at approach: 1    Additional Comments  Negative epigastric sounds, Breath sound equal bilaterally with symmetric chest rise and fall

## 2017-11-22 NOTE — ANESTHESIA PROCEDURE NOTES
Arterial Line    Patient location during procedure: pre-op   Line placed for hemodynamic monitoring.  Preanesthetic Checklist  Completed: patient identified, site marked, surgical consent, pre-op evaluation, timeout performed, IV checked, risks and benefits discussed and monitors and equipment checked  Arterial Line Prep   Sterile Tech: cap, gloves and sterile barriers  Prep: ChloraPrep  Patient monitoring: blood pressure monitoring, continuous pulse oximetry and EKG  Arterial Line Procedure   Laterality:right  Location:  radial artery  Catheter size: 20 G   Guidance: ultrasound guided and palpation technique  Number of attempts: 1  Successful placement: yes          Post Assessment   Dressing Type: line sutured, occlusive dressing applied, secured with tape and wrist guard applied.   Complications no  Circ/Move/Sens Assessment: normal and unchanged.   Patient Tolerance: patient tolerated the procedure well with no apparent complications

## 2017-11-22 NOTE — PROGRESS NOTES
INTENSIVIST / PULMONARY FOLLOW UP NOTE     Hospital:  LOS: 2 days   Mr. Quirino Sheppard, 62 y.o. male is followed for:   Principal Problem:    Coronary artery disease involving native coronary artery of native heart  Active Problems:    CAD (coronary artery disease)    Hx of liver transplant    Essential hypertension    Hyperlipemia    Diabetes mellitus, type II    Hx of hepatitis C          SUBJECTIVE   Post op on vent    The patient's relevant past medical, surgical, family, and social history were reviewed    Allergies and medications were reviewed    ROS:  Per subjective, all other systems were reviewed and were negative        OBJECTIVE     Vital Sign Min/Max for last 24 hours:  Temp  Min: 95.8 °F (35.4 °C)  Max: 98.1 °F (36.7 °C)   BP  Min: 96/73  Max: 144/93   Pulse  Min: 70  Max: 88   Resp  Min: 12  Max: 18   SpO2  Min: 94 %  Max: 100 %   No Data Recorded     Physical Exam:  General Appearance:  Intubated, in no acute distress  Eyes:  No scleral icterus or pallor, pupils normal  Ears, Nose, Mouth, Throat:  Atraumatic, oral endotracheal tube  Neck:  Trachea midline, thyroid normal  Respiratory:  Clear to auscultation bilaterally, normal effort  Cardiovascular:  Regular rate and rhythm, no murmurs, no peripheral edema  Gastrointestinal:  Soft, non-tender, non-distended, no hepatosplenomegaly  Skin:  Normal temperature, no rash  Psychiatric:  Intubated, sedated, no agitation  Neuro:  No new focal neurologic deficits observed      Telemetry:  Sinus Rhythm: normal sinus rhythm           Hemodynamics:   CVP: CVP (mmHg): 13 mmHg   PAP: PAP: 27/13   PAOP: PCWP (mmHg): 13 mmHg (pad)   CO: CO (L/min): 5.7 L/min   CI: CI (L/min/m2): 2.6 L/min/m2   SVI: SVI: 31.33   SVR: SVR (dyne*sec)/cm5: 982.46     SpO2: 99 % SpO2  Min: 94 %  Max: 100 %   Device: mechanical ventilator    Flow Rate:   No Data Recorded     Mechanical Ventilator Settings:  Vent Mode: SIMV/VC    Vt (Set, L): 0.7 L    Resp Rate (Set): 16 Resp Rate  (Observed) Vent: 16   FiO2 (%): (S) 40 %    PEEP/CPAP (cm H2O): 5 cm H20           I:E Ratio (Obs): 1:1.90     Intake/Ouptut 24 hrs (7:00AM - 6:59 AM)  Intake & Output (last 3 days)       11/19 0701 - 11/20 0700 11/20 0701 - 11/21 0700 11/21 0701 - 11/22 0700 11/22 0701 - 11/23 0700    P.O.  480      I.V. (mL/kg)    1000 (11.5)    Blood    535    Total Intake(mL/kg)  480 (5.4)  1535 (17.7)    Urine (mL/kg/hr)  1000 1700 (0.8) 1630 (2.9)    Other    370 (0.7)    Total Output   1000 1700 2000    Net   [phone]                  Lines, Drains & Airways    Active LDAs     Name:   Placement date:   Placement time:   Site:   Days:    Pulmonary Artery Catheter - Triple Lumen 11/22/17 1046 Right internal jugular vein continuous hemodynamic catheter  11/22/17    1046      less than 1    Peripheral IV Line - Single Lumen 11/20/17 2200 basilic vein (medial side of arm), left 18 gauge  11/20/17    2200      1    Y Chest Tube 1 and 2 11/22/17 11/22/17          less than 1    Chest Tube 11/22/17 0855 chest tube placed by physician mediastinum  11/22/17    0855      less than 1    Naso/Oral/Gastric Tube 11/22/17 1046 right nostril  11/22/17    1046      less than 1    Urethral Catheter 11/22/17 0730 100% silicone 16 10 19  11/22/17    0730      less than 1    Airway 11/22/17 0620 oral;endotracheal tube with subglottic suction  11/22/17    0620      less than 1    Arterial Line 11/22/17 0620 Right radial artery 20 gauge  11/22/17    0620      less than 1    Percutaneous Central Line - Double Lumen 11/22/17 0753  11/22/17    0753 created via procedure documentation    less than 1                Hematology:    Results from last 7 days  Lab Units 11/22/17  1119 11/22/17  1004 11/22/17  0937 11/22/17  0914 11/22/17  0850 11/22/17  0836 11/22/17  0709 11/21/17  0519 11/20/17  2042   WBC 10*3/mm3 15.58*  --   --   --   --   --   --  7.49 8.24   HEMOGLOBIN g/dL 13.1  --   --   --   --   --   --  14.2 15.5   HEMOGLOBIN, POC g/dL  --   10.9* 9.9* 10.2* 9.5* 13.3 13.9  --   --    HEMATOCRIT % 37.5*  --   --   --   --   --   --  42.0 45.0   HEMATOCRIT POC %  --  32* 29* 30* 28* 39 41  --   --    PLATELETS 10*3/mm3 229  --   --   --   --   --   --  143* 162     Electrolytes, Magnesium and Phosphorus:    Results from last 7 days  Lab Units 11/22/17  1119 11/21/17  0516 11/20/17 2042   SODIUM mmol/L 133 136 136   CHLORIDE mmol/L 101 102 100   POTASSIUM mmol/L 4.2 4.2 4.1   CO2 mmol/L 25.0 27.0 24.0   MAGNESIUM mg/dL 3.8*  --   --    PHOSPHORUS mg/dL 3.4  --   --      Renal:    Results from last 7 days  Lab Units 11/22/17  1119 11/21/17  0516 11/20/17 2042   CREATININE mg/dL 1.40* 1.30 1.50*   BUN mg/dL 29* 25* 23     Estimated Creatinine Clearance: 67 mL/min (by C-G formula based on Cr of 1.4).  Hepatic:    Results from last 7 days  Lab Units 11/20/17 2042   ALK PHOS U/L 56   BILIRUBIN mg/dL 0.5   ALT (SGPT) U/L 52*   AST (SGOT) U/L 23     Arterial Blood Gases:    Results from last 7 days  Lab Units 11/22/17  1112 11/22/17  1004 11/22/17  0937 11/22/17  0914 11/22/17  0850 11/22/17  0836 11/22/17  0709   PH, ARTERIAL pH units 7.327* 7.34* 7.40 7.33* 7.26* 7.27* 7.43   PCO2, ARTERIAL mm Hg 46.0  --   --   --   --   --   --    PO2 ART mm Hg 170.0*  --   --   --   --   --   --    FIO2 % 100  --   --   --   --   --   --          Results from last 7 days  Lab Units 11/20/17 2042   HEMOGLOBIN A1C % 7.10*       No results found for: LACTATE    Relevant imaging studies and labs from 11/22/17 were reviewed and interpreted by me    Medications (drips):    dexmedetomidine    dextrose 5 % with KCl 20 mEq Last Rate: 30 mL/hr (11/22/17 1114)   DOBUTamine    DOPamine Last Rate: 2 mcg/kg/min (11/22/17 1331)   EPINEPHrine    insulin regular infusion 1 unit/mL (CCU use) Last Rate: 6 Units/hr (11/22/17 1113)   niCARdipine    nitroglycerin    norepinephrine Last Rate: 0.04 mcg/kg/min (11/22/17 1330)   phenylephrine    propofol Last Rate: Stopped (11/22/17 1310)    sodium chloride    vasopressin          [START ON 11/23/2017] aspirin 325 mg Oral Daily   aspirin 325 mg Oral Once   atorvastatin 40 mg Oral Nightly   cefuroxime 1.5 g Intravenous Q8H   chlorhexidine 15 mL Mouth/Throat Q12H   famotidine 20 mg Intravenous BID   Or      famotidine 20 mg Oral BID   [START ON 11/23/2017] metoprolol tartrate 12.5 mg Oral Q12H   metoprolol tartrate 2.5 mg Intravenous Q6H   sennosides-docusate sodium 2 tablet Oral BID   sertraline 50 mg Oral Daily   sirolimus 1 mg Oral Daily   timolol 1 drop Both Eyes Daily       Assessment/Plan   IMPRESSION / PLAN     Inpatient Problem List:  62 y.o.male:  Hospital Problem List     * (Principal)Coronary artery disease involving native coronary artery of native heart    Overview Signed 11/21/2017  8:33 AM by DEREK Conteh     Added automatically from request for surgery [id number]         CAD (coronary artery disease)    Hx of liver transplant    Essential hypertension    Hyperlipemia    Diabetes mellitus, type II    Hx of hepatitis C           Impression:  62 y.o.male with relevant PMH of Liver Transplant 2001, HTN, HLD, IDDM, h/o Hep C, CAD, Thrombocytopenia admitted 11/20/2017 post op 3vCABG+EVH&LIMA    Plan:    Mechanical Ventilation - lifetime non-smoker, wean vent per protocol    Liver Transplant - remote (2001), continue immunosuppression, monitor for infection    IDDM (A1C 7.1) - insulin gtt x 24 hours    Post op 3vCABG - per CTS    Foot Pumps    Nutrition - NPO Diet    Plan of care and goals reviewed with mulitdisciplinary team at daily rounds    Critical Care time spent in direct patient care: 30 minutes (excluding procedure time, if applicable) including high complexity decision making to assess, manipulate, and support vital organ system failure in this individual who has impairment of one or more vital organ systems such that there is a high probability of imminent or life threatening deterioration in the patient’s condition.       Eligio  MD [facility]  Intensive Care [facility]  11/22/17 1:31 PM

## 2017-11-22 NOTE — OP NOTE
Operative Report    Preop Diagnosis: Coronary artery disease.  Previous liver transplant.  Hypertension hyperlipidemia and hepatitis C.  Thrombocytopenia preoperatively        Procedure: Coronary bypass grafting ×3 with endoscopic harvesting of the left great saphenous vein.  Left internal mammary artery to left anterior descending saphenous vein graft to the obtuse marginal saphenous vein graft to posterolateral branch of the right        Surgeons: Naga Ya PA-C        Indication: Patient referred with significant coronary artery disease and the above listed medical problems.  He understood that the surgery the risk of stroke bleeding infection and death and that he may be more prone to infection secondary to his antirejection medications for his liver transplant and he agreed to proceed.        Description: Supine position sterile prep and drape left great saphenous vein harvested endoscopically median sternotomy performed fully heparinized.  Cannulas placed in the ascending aorta and right appendage patient begun on cardiopulmonary bypass.  Aorta crossclamped and antegrade cardioplegia given.  First saphenous vein graft sutured to a 1-1/2-2 mm obtuse marginal branch of the circumflex.  It should be noted that in addition to the angiographic coronary artery disease that there was significant soft rubbery cholesterol type plaque throughout the entire length of every coronary artery.  Thus the anastomoses were actually placed in areas of soft plaque as there was no area that did not plaque.  Second saphenous vein graft which sutured to the posterior lateral branch of the right which had a 2 mm lumen but again soft plaque throughout.  The diagonal branch the LAD was extremely small and nongraftable.  The left internal mammary was sutured to the left anterior descending coronary which is again had soft plaque throughout its course.  The 2 proximal vein grafts were sutured to the ascending  aorta patient was weaned from his initially paced but subsequent to sinus rhythm.  Platelets were given secondary to thrombocytopenia preoperatively but no red cells were transfused the sternum was closed wire fashion skin with suture and the sponge and needle count reported as correct.  Total cross-clamp time 51 minutes total cardiopulmonary time 60 minutes.      Please note that portions of this note were completed with a voice recognition program. Efforts were made to edit the dictations, but occasionally words are mistranscribed.

## 2017-11-22 NOTE — PLAN OF CARE
Problem: Patient Care Overview (Adult)  Goal: Plan of Care Review  Outcome: Ongoing (interventions implemented as appropriate)    11/22/17 2091   Coping/Psychosocial Response Interventions   Plan Of Care Reviewed With patient;daughter   Patient Care Overview   Progress progress toward functional goals as expected   Outcome Evaluation   Outcome Summary/Follow up Plan vss, pt prepped for surgery in cabg no issues will monitor         Problem: Cardiac Surgery (Adult)  Goal: Signs and Symptoms of Listed Potential Problems Will be Absent or Manageable (Cardiac Surgery)  Outcome: Ongoing (interventions implemented as appropriate)

## 2017-11-22 NOTE — CONSULTS
[facility] Consult Note      Patient Care Team:  Amol Raymond MD as PCP - General (Rheumatology)  MD Deniz Duke Robert O, MD    Subjective     History of Present Illness:  Mr. Sheppard is a 60-year-old male with a history of hypertension, hypercholesterolemia, diabetes mellitus, and prior liver transplant.  He was admitted to [facility] on 11/20/17 due to multivessel CAD.  He is currently intubated and sedated and status post three-vessel CABG by Dr. Guaman.  Preop echocardiogram revealed an ejection fraction of approximately 60%.  We have been asked to see Mr. Sheppard due to his hypertension, hypercholesterolemia and for medical management.      Current Facility-Administered Medications:   •  acetaminophen (TYLENOL) tablet 650 mg, 650 mg, Oral, Q4H PRN, Naga Guaman MD  •  albumin human 5 % bottle 500 mL, 500 mL, Intravenous, PRN, DEREK Conteh, 500 mL at 11/22/17 1227  •  albuterol (PROVENTIL) nebulizer solution 0.5% 2.5 mg/0.5mL, 2.5 mg, Nebulization, Q4H PRN, DEREK Conteh  •  [START ON 11/23/2017] aspirin EC tablet 325 mg, 325 mg, Oral, Daily, DEREK Conteh  •  aspirin tablet 325 mg, 325 mg, Oral, Once, DEREK Conteh, Stopped at 11/22/17 1227  •  atorvastatin (LIPITOR) tablet 40 mg, 40 mg, Oral, Nightly, DEREK Conteh  •  bisacodyl (DULCOLAX) EC tablet 10 mg, 10 mg, Oral, Daily PRN, DEREK Conteh  •  [START ON 11/23/2017] bisacodyl (DULCOLAX) suppository 10 mg, 10 mg, Rectal, Daily PRN, DEREK Conteh  •  calcium gluconate 1 g in sodium chloride 0.9 % 100 mL IVPB, 1 g, Intravenous, Once PRN **AND** calcium gluconate 6 g in sodium chloride 0.9 % 500 mL IVPB, 6 g, Intravenous, Once PRN, DEREK Conteh  •  calcium gluconate 2 g in sodium chloride 0.9 % 100 mL IVPB, 2 g, Intravenous, Once PRN, DEREK Conteh  •  cefuroxime(ZINACEF) in SWFI 1500mg/20ml IV PUSH  syringe, 1.5 g, Intravenous, Q8H, DEREK Conteh  •  chlorhexidine (PERIDEX) 0.12 % solution 15 mL, 15 mL, Mouth/Throat, Q12H, DEREK Conteh  •  dexmedetomidine (PRECEDEX) 400 mcg/100 mL (4 mcg/mL) infusion, 0.2-1.5 mcg/kg/hr, Intravenous, Continuous PRN, DEREK Conteh  •  dextrose 5 % with KCl 20 mEq/L infusion, 30 mL/hr, Intravenous, Continuous, DEREK Conteh, Last Rate: 30 mL/hr at 11/22/17 1114, 30 mL/hr at 11/22/17 1114  •  DOBUTamine (DOBUTREX) 1 mg/mL infusion, 2-20 mcg/kg/min, Intravenous, Continuous PRN, DEREK Conteh  •  docusate sodium (COLACE) capsule 100 mg, 100 mg, Oral, BID PRN, DEREK Conteh  •  DOPamine 400 mg/250 mL (1.6 mg/mL) infusion, 2-20 mcg/kg/min, Intravenous, Continuous PRN, DEREK Conteh, Last Rate: 12.99 mL/hr at 11/22/17 1140, 4 mcg/kg/min at 11/22/17 1140  •  EPINEPHrine PF (ADRENALIN) 10 mg in sodium chloride 0.9 % 250 mL (0.04 mg/mL) infusion, 0.02-0.3 mcg/kg/min, Intravenous, Continuous PRN, DEREK Conteh  •  famotidine (PEPCID) injection 20 mg, 20 mg, Intravenous, BID **OR** famotidine (PEPCID) tablet 20 mg, 20 mg, Oral, BID, DEREK Conteh  •  fentaNYL citrate (PF) (SUBLIMAZE) injection 25 mcg, 25 mcg, Intravenous, Q30 Min PRN **AND** naloxone (NARCAN) injection 0.4 mg, 0.4 mg, Intravenous, Q5 Min PRN, Naga Guaman MD  •  HYDROcodone-acetaminophen (NORCO) 7.5-325 MG per tablet 1 tablet, 1 tablet, Oral, Q4H PRN, Naga Guaman MD  •  insulin regular (HumuLIN R,NovoLIN R) 100 Units in sodium chloride 0.9 % 100 mL (1 Units/mL) infusion, 0-50 Units/hr, Intravenous, Continuous PRN, DEREK Conteh, Last Rate: 6 mL/hr at 11/22/17 1113, 6 Units/hr at 11/22/17 1113  •  [START ON 11/23/2017] magnesium hydroxide (MILK OF MAGNESIA) suspension 2400 mg/10mL 10 mL, 10 mL, Oral, Daily PRN, DEREK Conteh  •  Magnesium Sulfate 2 gram Bolus, followed by 8 gram infusion (total Mg dose 10 grams)- Mg less than or equal  to 1mg/dL, 2 g, Intravenous, PRN **OR** Magnesium Sulfate 6 gram Infusion (2 gm x 3) -Mg 1.1 -1.5 mg/dL, 2 g, Intravenous, PRN **OR** magnesium sulfate 4 gram infusion- Mg 1.6-1.9 mg/dL, 4 g, Intravenous, PRN, DEREK Conteh  •  meperidine (DEMEROL) injection 25 mg, 25 mg, Intravenous, Q4H PRN, Naga Guaman MD  •  [START ON 11/23/2017] metoprolol tartrate (LOPRESSOR) half tablet 12.5 mg, 12.5 mg, Oral, Q12H, DEREK Conteh  •  metoprolol tartrate (LOPRESSOR) injection 2.5 mg, 2.5 mg, Intravenous, Q6H, DEREK Conteh  •  Morphine sulfate (PF) injection 2 mg, 2 mg, Intravenous, Q30 Min PRN, Naga Guaman MD  •  niCARdipine (CARDENE-IV) 40 mg/200 mL (0.2 mg/mL) in 0.9% NaCl infusion, 5-15 mg/hr, Intravenous, Continuous PRN, DEREK Conteh  •  nitroglycerin 50 mg/250 mL (0.2 mg/mL) infusion, 5-200 mcg/min, Intravenous, Continuous PRN, DEREK Conteh  •  norepinephrine (LEVOPHED) 8 mg/250 mL (32 mcg/mL) in sodium chloride 0.9% infusion (premix), 0.02-0.3 mcg/kg/min, Intravenous, Continuous PRN, DEREK Conteh  •  ondansetron (ZOFRAN) injection 4 mg, 4 mg, Intravenous, Q6H PRN, DEREK Conteh  •  oxyCODONE-acetaminophen (PERCOCET) 5-325 MG per tablet 2 tablet, 2 tablet, Oral, Q4H PRN, Naga Guaman MD  •  phenylephrine (PETRA-SYNEPHRINE) 50 mg/250 mL (0.2 mg/mL) in 0.9% NS  infusion, 0.5-3 mcg/kg/min, Intravenous, Continuous PRN, DEREK Conteh  •  potassium chloride (MICRO-K) CR capsule 40 mEq, 40 mEq, Oral, PRN **OR** potassium chloride (KLOR-CON) packet 40 mEq, 40 mEq, Oral, PRN, DEREK Conteh  •  potassium chloride 20 mEq in 50 mL IVPB, 20 mEq, Intravenous, Q1H PRN **OR** potassium chloride 20 mEq in 50 mL IVPB, 20 mEq, Intravenous, Q1H PRN, DEREK Conteh  •  propofol (DIPRIVAN) infusion 10 mg/mL 100 mL, 5-50 mcg/kg/min, Intravenous, Continuous PRN, Naga Guaman MD, Last Rate: 12.99 mL/hr at 11/22/17 1113, 25 mcg/kg/min at 11/22/17  1113  •  racemic epinephrine (RACEPINEPHRINE) nebulizer solution 0.5 mL, 0.5 mL, Nebulization, Once PRN, DEREK Conteh  •  sennosides-docusate sodium (SENOKOT-S) 8.6-50 MG tablet 2 tablet, 2 tablet, Oral, BID, DEREK Conteh  •  sertraline (ZOLOFT) tablet 50 mg, 50 mg, Oral, Daily, DEREK Holliday, 50 mg at 11/22/17 0451  •  sirolimus (RAPAMUNE) tablet 1 mg, 1 mg, Oral, Daily, DEREK Holliday, 1 mg at 11/22/17 0528  •  sodium chloride 0.9 % flush 30 mL, 30 mL, Intravenous, Once PRN, DEREK Conteh  •  sodium chloride 0.9 % infusion, 30 mL/hr, Intravenous, Continuous PRN, DEREK Conteh  •  timolol (TIMOPTIC) 0.5 % ophthalmic solution 1 drop, 1 drop, Both Eyes, Daily, DEREK Holliday, 1 drop at 11/21/17 0935  •  vasopressin (PITRESSIN) 20 Units in sodium chloride 0.9 % 100 mL (0.2 Units/mL) infusion, 0.02-0.1 Units/min, Intravenous, Continuous PRN, DEREK Conteh    Social History     Social History   • Marital status:      Spouse name: N/A   • Number of children: N/A   • Years of education: N/A     Occupational History   • Not on file.     Social History Main Topics   • Smoking status: Never Smoker   • Smokeless tobacco: Never Used   • Alcohol use No   • Drug use: Not on file   • Sexual activity: Not on file     Other Topics Concern   • Not on file     Social History Narrative       Family History   Problem Relation Age of Onset   • Diabetes Paternal Grandmother        Review of Systems   Unable to perform ROS: Intubated          Objective     Vital Signs  Temp:  [95.8 °F (35.4 °C)-98.1 °F (36.7 °C)] 96.3 °F (35.7 °C)  Heart Rate:  [70-88] 83  Resp:  [12-18] 16  BP: ()/(69-93) 114/75  Arterial Line BP: ()/(57-69) 121/66  FiO2 (%):  [60 %-100 %] 60 %      Intake/Output Summary (Last 24 hours) at 11/22/17 1302  Last data filed at 11/22/17 1200   Gross per 24 hour   Intake             1535 ml   Output             3400 ml   Net            -1865 ml     I/O this  shift:  In: 1535 [I.V.:1000; Blood:535]  Out: 2000 [Urine:1630; Other:370]  Physical Exam   Constitutional: He appears well-developed and well-nourished.   HENT:   Head: Normocephalic and atraumatic.   Eyes: Right eye exhibits no discharge. Left eye exhibits no discharge.   Neck: Neck supple. No JVD present. No tracheal deviation present.   Cardiovascular: Normal rate and regular rhythm.  Exam reveals friction rub. Exam reveals no gallop.    Pulmonary/Chest: Breath sounds normal. He has no wheezes. He has no rales.   Abdominal: Soft. Bowel sounds are normal. He exhibits no distension.   Musculoskeletal: He exhibits no edema or deformity.   Neurological:   Sedated   Skin: Skin is warm and dry.   Psychiatric:   Sedated             Results Review:    I reviewed the patient's new clinical results.      WBC WBC   Date/Time Value Ref Range Status   11/22/2017 1119 15.58 (H) 3.50 - 10.80 10*3/mm3 Final   11/21/2017 0519 7.49 3.50 - 10.80 10*3/mm3 Final   11/20/2017 2042 8.24 3.50 - 10.80 10*3/mm3 Final      HGB Hemoglobin   Date/Time Value Ref Range Status   11/22/2017 1119 13.1 13.1 - 17.5 g/dL Final   11/22/2017 1004 10.9 (L) 12.0 - 17.0 g/dL Final   11/22/2017 0937 9.9 (L) 12.0 - 17.0 g/dL Final   11/22/2017 0914 10.2 (L) 12.0 - 17.0 g/dL Final   11/22/2017 0850 9.5 (L) 12.0 - 17.0 g/dL Final   11/22/2017 0836 13.3 12.0 - 17.0 g/dL Final   11/22/2017 0709 13.9 12.0 - 17.0 g/dL Final   11/21/2017 0519 14.2 13.1 - 17.5 g/dL Final   11/20/2017 2042 15.5 13.1 - 17.5 g/dL Final      HCT Hematocrit   Date/Time Value Ref Range Status   11/22/2017 1119 37.5 (L) 38.9 - 50.9 % Final   11/22/2017 1004 32 (L) 38 - 51 % Final   11/22/2017 0937 29 (L) 38 - 51 % Final   11/22/2017 0914 30 (L) 38 - 51 % Final   11/22/2017 0850 28 (L) 38 - 51 % Final   11/22/2017 0836 39 38 - 51 % Final   11/22/2017 0709 41 38 - 51 % Final   11/21/2017 0519 42.0 38.9 - 50.9 % Final   11/20/2017 2042 45.0 38.9 - 50.9 % Final      Platlets No results  found for: LABPLAT     PT/INR:      Protime   Date/Time Value Ref Range Status   11/22/2017 1119 13.9 (H) 9.6 - 11.5 Seconds Final   11/20/2017 2042 10.2 9.6 - 11.5 Seconds Final   /  INR   Date/Time Value Ref Range Status   11/22/2017 1119 1.27  Final   11/20/2017 2042 0.94  Final       Sodium Sodium   Date/Time Value Ref Range Status   11/22/2017 1119 133 132 - 146 mmol/L Final   11/21/2017 0516 136 132 - 146 mmol/L Final   11/20/2017 2042 136 132 - 146 mmol/L Final      Potassium Potassium   Date/Time Value Ref Range Status   11/22/2017 1119 4.2 3.5 - 5.5 mmol/L Final   11/21/2017 0516 4.2 3.5 - 5.5 mmol/L Final   11/20/2017 2042 4.1 3.5 - 5.5 mmol/L Final      Chloride Chloride   Date/Time Value Ref Range Status   11/22/2017 1119 101 99 - 109 mmol/L Final   11/21/2017 0516 102 99 - 109 mmol/L Final   11/20/2017 2042 100 99 - 109 mmol/L Final      Bicarbonate No results found for: PLASMABICARB   BUN BUN   Date/Time Value Ref Range Status   11/22/2017 1119 29 (H) 9 - 23 mg/dL Final   11/21/2017 0516 25 (H) 9 - 23 mg/dL Final   11/20/2017 2042 23 9 - 23 mg/dL Final      Creatinine Creatinine   Date/Time Value Ref Range Status   11/22/2017 1119 1.40 (H) 0.60 - 1.30 mg/dL Final   11/21/2017 0516 1.30 0.60 - 1.30 mg/dL Final   11/20/2017 2042 1.50 (H) 0.60 - 1.30 mg/dL Final      Calcium Calcium   Date/Time Value Ref Range Status   11/22/2017 1119 10.1 8.7 - 10.4 mg/dL Final   11/21/2017 0516 9.6 8.7 - 10.4 mg/dL Final   11/20/2017 2042 9.5 8.7 - 10.4 mg/dL Final      Magnesium Magnesium   Date/Time Value Ref Range Status   11/22/2017 1119 3.8 (H) 1.3 - 2.7 mg/dL Final      Troponin       No components found for: TROP                                                EKG: normal sinus rhythm, nonspecific ST and T waves changes.    Assessment/Plan   Patient Active Problem List   Diagnosis   • CAD (coronary artery disease)   • Hepatitis C   • Hx of liver transplant   • Essential hypertension   • Hyperlipemia   • Diabetes  mellitus, type II   • Coronary artery disease involving native coronary artery of native heart   • Hx of hepatitis C   EF~60%    Hemodynamically stable early post 3 vessel CABG    I discussed the patients findings and my recommendations with nursing staff    DEREK Stanley  11/22/17  1:02 PM

## 2017-11-22 NOTE — ANESTHESIA PREPROCEDURE EVALUATION
Anesthesia Evaluation     Patient summary reviewed and Nursing notes reviewed   NPO Solid Status: > 8 hours  NPO Liquid Status: > 8 hours     Airway   Mallampati: III  TM distance: >3 FB  Neck ROM: full  no difficulty expected  Dental      Pulmonary     breath sounds clear to auscultation  Cardiovascular     ECG reviewed  Rhythm: regular  Rate: normal    (+) hypertension, CAD, hyperlipidemia      Neuro/Psych  (+) psychiatric history Anxiety,    GI/Hepatic/Renal/Endo    (+)  hepatitis C, liver disease, diabetes mellitus using insulin,     Musculoskeletal     Abdominal    Substance History      OB/GYN          Other                                        Anesthesia Plan    ASA 4     general   ([address])  intravenous induction   Anesthetic plan and risks discussed with patient.

## 2017-11-22 NOTE — PROGRESS NOTES
Continued Stay Note  [facility]     Patient Name: Quirino Sheppard  MRN: [id number]  Today's Date: 11/22/2017    Admit Date: 11/20/2017          Discharge Plan       11/22/17 1401    Case Management/Social Work Plan    Plan ICU    Additional Comments Currently in ICU s/p CABG x 3 today.  Case Management will follow for all needs/discharge planning.  Kasey Christensen, ext. [phone]              Discharge Codes     None        Expected Discharge Date and Time     Expected Discharge Date Expected Discharge Time    Nov 27, 2017             CISCO Rizo

## 2017-11-22 NOTE — PLAN OF CARE
Problem: Perioperative Period (Adult)  Goal: Signs and Symptoms of Listed Potential Problems Will be Absent or Manageable (Perioperative Period)  Outcome: Ongoing (interventions implemented as appropriate)    11/22/17 0616   Perioperative Period   Problems Assessed (Perioperative Period) all   Problems Present (Perioperative Period) none

## 2017-11-22 NOTE — PLAN OF CARE
Problem: Patient Care Overview (Adult)  Goal: Plan of Care Review  Outcome: Ongoing (interventions implemented as appropriate)    11/22/17 1708   Coping/Psychosocial Response Interventions   Plan Of Care Reviewed With patient   Patient Care Overview   Progress improving   Outcome Evaluation   Outcome Summary/Follow up Plan pt out of OR at 1046. CABG x 3 with left EVH. Pt extubated at 1455 to 4L NC. Levophed and insulin gtt remain. dopamine off. MT's x 3; output WNL. Right femoral arterial line DC'ed. Pt oriented x 4. Passed post-extubation dysphagia screen; clear liquid diet effective now.          Problem: Cardiac Surgery (Adult)  Goal: Signs and Symptoms of Listed Potential Problems Will be Absent or Manageable (Cardiac Surgery)  Outcome: Ongoing (interventions implemented as appropriate)    11/22/17 1708   Cardiac Surgery   Problems Assessed (Cardiac Surgery) all   Problems Present (Cardiac Surgery) pain;fluid imbalance         Problem: Pressure Ulcer Risk (Abdullahi Scale) (Adult,Obstetrics,Pediatric)  Goal: Identify Related Risk Factors and Signs and Symptoms  Outcome: Ongoing (interventions implemented as appropriate)    11/22/17 1708   Pressure Ulcer Risk (Abdullahi Scale)   Related Risk Factors (Pressure Ulcer Risk (Abdullahi Scale)) critical care admission;mobility impaired;tissue perfusion altered       Goal: Skin Integrity    11/22/17 1708   Pressure Ulcer Risk (Abdullahi Scale) (Adult,Obstetrics,Pediatric)   Skin Integrity making progress toward outcome

## 2017-11-23 ENCOUNTER — APPOINTMENT (OUTPATIENT)
Dept: GENERAL RADIOLOGY | Facility: HOSPITAL | Age: 62
End: 2017-11-23

## 2017-11-23 PROBLEM — Z86.19 HX OF HEPATITIS C: Status: RESOLVED | Noted: 2017-11-21 | Resolved: 2017-11-23

## 2017-11-23 PROBLEM — I25.10 CORONARY ARTERY DISEASE INVOLVING NATIVE CORONARY ARTERY OF NATIVE HEART: Status: RESOLVED | Noted: 2017-11-20 | Resolved: 2017-11-23

## 2017-11-23 PROBLEM — I25.119 CORONARY ARTERY DISEASE INVOLVING NATIVE CORONARY ARTERY OF NATIVE HEART WITH ANGINA PECTORIS (HCC): Status: ACTIVE | Noted: 2017-11-20

## 2017-11-23 LAB
ABO + RH BLD: NORMAL
ALBUMIN SERPL-MCNC: 4.3 G/DL (ref 3.2–4.8)
ANION GAP SERPL CALCULATED.3IONS-SCNC: 8 MMOL/L (ref 3–11)
BASOPHILS # BLD AUTO: 0.01 10*3/MM3 (ref 0–0.2)
BASOPHILS NFR BLD AUTO: 0.1 % (ref 0–1)
BH BB BLOOD EXPIRATION DATE: NORMAL
BH BB BLOOD TYPE BARCODE: 6200
BH BB DISPENSE STATUS: NORMAL
BH BB PRODUCT CODE: NORMAL
BH BB UNIT NUMBER: NORMAL
BUN BLD-MCNC: 29 MG/DL (ref 9–23)
BUN/CREAT SERPL: 20.7 (ref 7–25)
CALCIUM SPEC-SCNC: 9.2 MG/DL (ref 8.7–10.4)
CHLORIDE SERPL-SCNC: 103 MMOL/L (ref 99–109)
CO2 SERPL-SCNC: 26 MMOL/L (ref 20–31)
CREAT BLD-MCNC: 1.4 MG/DL (ref 0.6–1.3)
CROSSMATCH INTERPRETATION: NORMAL
CROSSMATCH INTERPRETATION: NORMAL
DEPRECATED RDW RBC AUTO: 42.8 FL (ref 37–54)
EOSINOPHIL # BLD AUTO: 0 10*3/MM3 (ref 0–0.3)
EOSINOPHIL NFR BLD AUTO: 0 % (ref 0–3)
ERYTHROCYTE [DISTWIDTH] IN BLOOD BY AUTOMATED COUNT: 12.9 % (ref 11.3–14.5)
GFR SERPL CREATININE-BSD FRML MDRD: 51 ML/MIN/1.73
GLUCOSE BLD-MCNC: 147 MG/DL (ref 70–100)
GLUCOSE BLDC GLUCOMTR-MCNC: 136 MG/DL (ref 70–130)
GLUCOSE BLDC GLUCOMTR-MCNC: 137 MG/DL (ref 70–130)
GLUCOSE BLDC GLUCOMTR-MCNC: 139 MG/DL (ref 70–130)
GLUCOSE BLDC GLUCOMTR-MCNC: 139 MG/DL (ref 70–130)
GLUCOSE BLDC GLUCOMTR-MCNC: 141 MG/DL (ref 70–130)
GLUCOSE BLDC GLUCOMTR-MCNC: 146 MG/DL (ref 70–130)
GLUCOSE BLDC GLUCOMTR-MCNC: 149 MG/DL (ref 70–130)
GLUCOSE BLDC GLUCOMTR-MCNC: 150 MG/DL (ref 70–130)
GLUCOSE BLDC GLUCOMTR-MCNC: 150 MG/DL (ref 70–130)
GLUCOSE BLDC GLUCOMTR-MCNC: 154 MG/DL (ref 70–130)
GLUCOSE BLDC GLUCOMTR-MCNC: 155 MG/DL (ref 70–130)
GLUCOSE BLDC GLUCOMTR-MCNC: 161 MG/DL (ref 70–130)
GLUCOSE BLDC GLUCOMTR-MCNC: 191 MG/DL (ref 70–130)
GLUCOSE BLDC GLUCOMTR-MCNC: 272 MG/DL (ref 70–130)
GLUCOSE BLDC GLUCOMTR-MCNC: 302 MG/DL (ref 70–130)
HCT VFR BLD AUTO: 32.6 % (ref 38.9–50.9)
HGB BLD-MCNC: 11.1 G/DL (ref 13.1–17.5)
IMM GRANULOCYTES # BLD: 0.06 10*3/MM3 (ref 0–0.03)
IMM GRANULOCYTES NFR BLD: 0.4 % (ref 0–0.6)
INR PPP: 0.99
LYMPHOCYTES # BLD AUTO: 0.66 10*3/MM3 (ref 0.6–4.8)
LYMPHOCYTES NFR BLD AUTO: 4.8 % (ref 24–44)
MAGNESIUM SERPL-MCNC: 2.3 MG/DL (ref 1.3–2.7)
MCH RBC QN AUTO: 30.9 PG (ref 27–31)
MCHC RBC AUTO-ENTMCNC: 34 G/DL (ref 32–36)
MCV RBC AUTO: 90.8 FL (ref 80–99)
MONOCYTES # BLD AUTO: 1.57 10*3/MM3 (ref 0–1)
MONOCYTES NFR BLD AUTO: 11.5 % (ref 0–12)
NEUTROPHILS # BLD AUTO: 11.41 10*3/MM3 (ref 1.5–8.3)
NEUTROPHILS NFR BLD AUTO: 83.2 % (ref 41–71)
PHOSPHATE SERPL-MCNC: 4.8 MG/DL (ref 2.4–5.1)
PLATELET # BLD AUTO: 201 10*3/MM3 (ref 150–450)
PMV BLD AUTO: 9.6 FL (ref 6–12)
POTASSIUM BLD-SCNC: 4.3 MMOL/L (ref 3.5–5.5)
PROTHROMBIN TIME: 10.8 SECONDS (ref 9.6–11.5)
RBC # BLD AUTO: 3.59 10*6/MM3 (ref 4.2–5.76)
SODIUM BLD-SCNC: 137 MMOL/L (ref 132–146)
UNIT  ABO: NORMAL
UNIT  RH: NORMAL
WBC NRBC COR # BLD: 13.71 10*3/MM3 (ref 3.5–10.8)

## 2017-11-23 PROCEDURE — 83735 ASSAY OF MAGNESIUM: CPT | Performed by: PHYSICIAN ASSISTANT

## 2017-11-23 PROCEDURE — 85025 COMPLETE CBC W/AUTO DIFF WBC: CPT | Performed by: PHYSICIAN ASSISTANT

## 2017-11-23 PROCEDURE — 80069 RENAL FUNCTION PANEL: CPT | Performed by: PHYSICIAN ASSISTANT

## 2017-11-23 PROCEDURE — 25010000002 DOPAMINE PER 40 MG: Performed by: PHYSICIAN ASSISTANT

## 2017-11-23 PROCEDURE — 99291 CRITICAL CARE FIRST HOUR: CPT | Performed by: INTERNAL MEDICINE

## 2017-11-23 PROCEDURE — 25010000002 ONDANSETRON PER 1 MG: Performed by: PHYSICIAN ASSISTANT

## 2017-11-23 PROCEDURE — 85610 PROTHROMBIN TIME: CPT | Performed by: PHYSICIAN ASSISTANT

## 2017-11-23 PROCEDURE — 71010 HC CHEST PA OR AP: CPT

## 2017-11-23 PROCEDURE — 99232 SBSQ HOSP IP/OBS MODERATE 35: CPT | Performed by: INTERNAL MEDICINE

## 2017-11-23 PROCEDURE — 97163 PT EVAL HIGH COMPLEX 45 MIN: CPT

## 2017-11-23 PROCEDURE — 93010 ELECTROCARDIOGRAM REPORT: CPT | Performed by: INTERNAL MEDICINE

## 2017-11-23 PROCEDURE — 93005 ELECTROCARDIOGRAM TRACING: CPT | Performed by: PHYSICIAN ASSISTANT

## 2017-11-23 PROCEDURE — 63710000001 INSULIN DETEMIR PER 5 UNITS: Performed by: INTERNAL MEDICINE

## 2017-11-23 PROCEDURE — 82962 GLUCOSE BLOOD TEST: CPT

## 2017-11-23 PROCEDURE — 25010000002 CEFUROXIME PER 750 MG: Performed by: PHYSICIAN ASSISTANT

## 2017-11-23 RX ORDER — DEXTROSE MONOHYDRATE 25 G/50ML
25 INJECTION, SOLUTION INTRAVENOUS
Status: DISCONTINUED | OUTPATIENT
Start: 2017-11-23 | End: 2017-11-24

## 2017-11-23 RX ORDER — NICOTINE POLACRILEX 4 MG
15 LOZENGE BUCCAL
Status: DISCONTINUED | OUTPATIENT
Start: 2017-11-23 | End: 2017-11-24

## 2017-11-23 RX ORDER — ALBUMIN, HUMAN INJ 5% 5 %
500 SOLUTION INTRAVENOUS AS NEEDED
Status: DISCONTINUED | OUTPATIENT
Start: 2017-11-23 | End: 2017-11-29 | Stop reason: HOSPADM

## 2017-11-23 RX ORDER — SIMETHICONE 80 MG
80 TABLET,CHEWABLE ORAL 4 TIMES DAILY PRN
Status: DISCONTINUED | OUTPATIENT
Start: 2017-11-23 | End: 2017-11-29 | Stop reason: HOSPADM

## 2017-11-23 RX ADMIN — WATER 1.5 G: 1 INJECTION INTRAMUSCULAR; INTRAVENOUS; SUBCUTANEOUS at 23:38

## 2017-11-23 RX ADMIN — ASPIRIN 325 MG: 325 TABLET, DELAYED RELEASE ORAL at 08:10

## 2017-11-23 RX ADMIN — SIMETHICONE CHEW TAB 80 MG 80 MG: 80 TABLET ORAL at 16:23

## 2017-11-23 RX ADMIN — ONDANSETRON 4 MG: 2 INJECTION INTRAMUSCULAR; INTRAVENOUS at 02:31

## 2017-11-23 RX ADMIN — WATER 1.5 G: 1 INJECTION INTRAMUSCULAR; INTRAVENOUS; SUBCUTANEOUS at 08:11

## 2017-11-23 RX ADMIN — SERTRALINE 50 MG: 50 TABLET, FILM COATED ORAL at 05:00

## 2017-11-23 RX ADMIN — INSULIN DETEMIR 20 UNITS: 100 INJECTION, SOLUTION SUBCUTANEOUS at 14:22

## 2017-11-23 RX ADMIN — HYDROCODONE BITARTRATE AND ACETAMINOPHEN 1 TABLET: 7.5; 325 TABLET ORAL at 22:08

## 2017-11-23 RX ADMIN — FAMOTIDINE 20 MG: 20 TABLET, FILM COATED ORAL at 08:10

## 2017-11-23 RX ADMIN — ATORVASTATIN CALCIUM 40 MG: 40 TABLET, FILM COATED ORAL at 22:08

## 2017-11-23 RX ADMIN — HYDROCODONE BITARTRATE AND ACETAMINOPHEN 1 TABLET: 7.5; 325 TABLET ORAL at 08:43

## 2017-11-23 RX ADMIN — BISACODYL 10 MG: 5 TABLET, COATED ORAL at 22:08

## 2017-11-23 RX ADMIN — ALPRAZOLAM 0.25 MG: 0.25 TABLET ORAL at 22:08

## 2017-11-23 RX ADMIN — Medication 2 TABLET: at 17:48

## 2017-11-23 RX ADMIN — Medication 2 TABLET: at 08:10

## 2017-11-23 RX ADMIN — ONDANSETRON 4 MG: 2 INJECTION INTRAMUSCULAR; INTRAVENOUS at 16:13

## 2017-11-23 RX ADMIN — SODIUM CHLORIDE, POTASSIUM CHLORIDE, SODIUM LACTATE AND CALCIUM CHLORIDE 1000 ML: 600; 310; 30; 20 INJECTION, SOLUTION INTRAVENOUS at 16:24

## 2017-11-23 RX ADMIN — SIMETHICONE CHEW TAB 80 MG 80 MG: 80 TABLET ORAL at 22:07

## 2017-11-23 RX ADMIN — SIROLIMUS 1 MG: 1 TABLET, FILM COATED ORAL at 08:12

## 2017-11-23 RX ADMIN — ONDANSETRON 4 MG: 2 INJECTION INTRAMUSCULAR; INTRAVENOUS at 09:51

## 2017-11-23 RX ADMIN — OXYCODONE AND ACETAMINOPHEN 2 TABLET: 5; 325 TABLET ORAL at 04:58

## 2017-11-23 RX ADMIN — INSULIN DETEMIR 20 UNITS: 100 INJECTION, SOLUTION SUBCUTANEOUS at 22:07

## 2017-11-23 RX ADMIN — WATER 1.5 G: 1 INJECTION INTRAMUSCULAR; INTRAVENOUS; SUBCUTANEOUS at 16:13

## 2017-11-23 RX ADMIN — TIMOLOL MALEATE 1 DROP: 5 SOLUTION/ DROPS OPHTHALMIC at 10:14

## 2017-11-23 RX ADMIN — HYDROCODONE BITARTRATE AND ACETAMINOPHEN 1 TABLET: 7.5; 325 TABLET ORAL at 16:13

## 2017-11-23 RX ADMIN — ONDANSETRON 4 MG: 2 INJECTION INTRAMUSCULAR; INTRAVENOUS at 22:07

## 2017-11-23 RX ADMIN — CHLORHEXIDINE GLUCONATE 15 ML: 1.2 RINSE ORAL at 08:10

## 2017-11-23 RX ADMIN — DOPAMINE HYDROCHLORIDE 5 MCG/KG/MIN: 160 INJECTION, SOLUTION INTRAVENOUS at 04:14

## 2017-11-23 RX ADMIN — FAMOTIDINE 20 MG: 20 TABLET, FILM COATED ORAL at 17:48

## 2017-11-23 NOTE — PROGRESS NOTES
[facility]  IP Progress Note      Chief Complaint/Reason for service:    · Postoperative hypotension         The patient is up in chair without complaint.  Patient remains on    Past medical, surgical, social and family history reviewed in the patient's electronic medical record.         Vital Sign Min/Max for last 24 hours  Temp  Min: 96.3 °F (35.7 °C)  Max: 99.4 °F (37.4 °C)   BP  Min: 81/55  Max: 143/81   Pulse  Min: 69  Max: 86   Resp  Min: 16  Max: 24   SpO2  Min: 92 %  Max: 100 %   Flow (L/min)  Min: 2  Max: 4      Intake/Output Summary (Last 24 hours) at 11/23/17 1113  Last data filed at 11/23/17 1000   Gross per 24 hour   Intake             2733 ml   Output             3880 ml   Net            -1147 ml           CVP: CVP (mmHg): 10 mmHg   PAP: PAP: 30/13   PAOP: PCWP (mmHg): 13 mmHg (using PAD)   CO: CO (L/min): 4.8 L/min   CI: CI (L/min/m2): 2.42 L/min/m2   SVI: SVI: 30.63   SVR: SVR (dyne*sec)/cm5: 1150         Physical Exam   Constitutional: He is oriented to person, place, and time. He appears well-developed and well-nourished.   Cardiovascular: Normal rate and regular rhythm.    No murmur heard.  Pulmonary/Chest: Breath sounds normal.   Neurological: He is alert and oriented to person, place, and time.   Psychiatric: He has a normal mood and affect. His behavior is normal.       Results Review:   I reviewed the patient's recent labs in the electronic medical record.      EKG (11/23/2017): Sinus rhythm with nonspecific ST-T wave abnormality      Tele:  [facility] Problem List     Coronary artery disease involving native coronary artery of native heart with angina pectoris    Overview Addendum 11/23/2017 11:12 AM by Akil Barba IV, MD     · Echo (12/21/17): LVEF 60%.  No significant valvular abnormality  · CABG by Jordi Guaman (11/22/17): LIMA to LAD, SVG to OM, SVG to RPL         Hepatitis C    Hx of liver transplant    Essential hypertension     Hyperlipidemia LDL goal <70    Type 2 diabetes mellitus                 · Continue routine postoperative care    Akil Barba IV, MD  11/23/2017

## 2017-11-23 NOTE — PLAN OF CARE
Problem: Patient Care Overview (Adult)  Goal: Plan of Care Review  Outcome: Ongoing (interventions implemented as appropriate)    11/23/17 1040   Coping/Psychosocial Response Interventions   Plan Of Care Reviewed With patient   Patient Care Overview   Progress progress toward functional goals as expected   Outcome Evaluation   Outcome Summary/Follow up Plan PT eval completed. Pt amb 50ft with RW with Wen x2, recliner in tow. Pt limited by high anxiety with mobility, weakness, c/o dizziness. Pt required encouragement throughout mobility. PT recomends d/c to acute rehab pending progress, as pt lives alone.          Problem: Inpatient Physical Therapy  Goal: Bed Mobility Goal LTG- PT  Outcome: Ongoing (interventions implemented as appropriate)    11/23/17 1040   Bed Mobility PT LTG   Bed Mobility PT LTG, Date Established 11/23/17   Bed Mobility PT LTG, Time to Achieve 2 wks   Bed Mobility PT LTG, Activity Type roll left/roll right;supine to sit/sit to supine   Bed Mobility PT LTG, [place] Level conditional independence   Bed Mobility PT LTG, Outcome goal ongoing       Goal: Transfer Training Goal 1 LTG- PT  Outcome: Ongoing (interventions implemented as appropriate)    11/23/17 1040   Transfer Training PT LTG   Transfer Training PT LTG, Date Established 11/23/17   Transfer Training PT LTG, Time to Achieve 2 wks   Transfer Training PT LTG, Activity Type bed to chair /chair to bed;sit to stand/stand to sit   Transfer Training PT LTG, [place] Level conditional independence   Transfer Training PT LTG, Outcome goal ongoing       Goal: Gait Training Goal LTG- PT  Outcome: Ongoing (interventions implemented as appropriate)    11/23/17 1040   Gait Training PT LTG   Gait Training Goal PT LTG, Date Established 11/23/17   Gait Training Goal PT LTG, Time to Achieve 2 wks   Gait Training Goal PT LTG, [place] Level conditional independence   Gait Training Goal PT LTG, Distance to Achieve 200   Gait Training Goal PT  LTG, Outcome goal ongoing       Goal: Stair Training Goal LTG- PT  Outcome: Ongoing (interventions implemented as appropriate)    11/23/17 1040   Stair Training PT LTG   Stair Training Goal PT LTG, Date Established 11/23/17   Stair Training Goal PT LTG, Time to Achieve 2 wks   Stair Training Goal PT LTG, Number of Steps 13   Stair Training Goal PT LTG, [place] Level contact guard assist   Stair Training Goal PT LTG, Assist Device 1 handrail   Stair Training Goal PT LTG, Outcome goal ongoing

## 2017-11-23 NOTE — PLAN OF CARE
Problem: Patient Care Overview (Adult)  Goal: Plan of Care Review  Outcome: Ongoing (interventions implemented as appropriate)    11/23/17 1549   Coping/Psychosocial Response Interventions   Plan Of Care Reviewed With patient;daughter   Patient Care Overview   Progress improving   Outcome Evaluation   Outcome Summary/Follow up Plan levophed weaned off, dopamine weaning, orthostatic hypotension, complaining of being bloated and gas pains, refusing narcotics, insulin changed to sliding scale.         Problem: Cardiac Surgery (Adult)  Goal: Signs and Symptoms of Listed Potential Problems Will be Absent or Manageable (Cardiac Surgery)  Outcome: Ongoing (interventions implemented as appropriate)    11/23/17 1549   Cardiac Surgery   Problems Assessed (Cardiac Surgery) all   Problems Present (Cardiac Surgery) pain;dysrhythmia/arrhythmia;hemodynamic instability         Problem: Pressure Ulcer Risk (Abdullahi Scale) (Adult,Obstetrics,Pediatric)  Goal: Identify Related Risk Factors and Signs and Symptoms  Outcome: Ongoing (interventions implemented as appropriate)    11/23/17 1549   Pressure Ulcer Risk (Abdullahi Scale)   Related Risk Factors (Pressure Ulcer Risk (Abdullahi Scale)) age extremes;critical care admission;mobility impaired;medical devices       Goal: Skin Integrity  Outcome: Ongoing (interventions implemented as appropriate)    11/23/17 1549   Pressure Ulcer Risk (Abdullahi Scale) (Adult,Obstetrics,Pediatric)   Skin Integrity making progress toward outcome         Problem: Pain, Acute (Adult)  Goal: Identify Related Risk Factors and Signs and Symptoms  Outcome: Ongoing (interventions implemented as appropriate)    11/23/17 1549   Pain, Acute   Related Risk Factors (Acute Pain) surgery;psychosocial factor;patient perception;knowledge deficit;fear   Signs and Symptoms (Acute Pain) fear of reinjury;guarding/abnormal posturing/positioning;moaning;questions meaning of pain       Goal: Acceptable Pain Control/Comfort  Level  Outcome: Ongoing (interventions implemented as appropriate)    11/23/17 0454   Pain, Acute (Adult)   Acceptable Pain Control/Comfort Level making progress toward outcome

## 2017-11-23 NOTE — THERAPY EVALUATION
Acute Care - Physical Therapy Initial Evaluation  [facility]     Patient Name: Quirino Sheppard  : 1955  MRN: [id number]  Today's Date: 2017   Onset of Illness/Injury or Date of Surgery Date: 17  Date of Referral to PT: 17  Referring Physician: DEREK Ya      Admit Date: 2017     Visit Dx:    ICD-10-CM ICD-9-CM   1. Coronary artery disease involving native coronary artery of native heart, angina presence unspecified I25.10 414.01   2. Encounter for examination for normal comparison and control in clinical research program Z00.6 V70.7   3. Impaired functional mobility, balance, gait, and endurance Z74.09 V49.89     Patient Active Problem List   Diagnosis   • Coronary artery disease involving native coronary artery of native heart with angina pectoris   • Hepatitis C   • Hx of liver transplant   • Essential hypertension   • Hyperlipidemia LDL goal <70   • Type 2 diabetes mellitus     Past Medical History:   Diagnosis Date   • Anxiety and depression    • Diabetes mellitus    • Hepatitis C    • Hyperlipidemia    • Hypertension      Past Surgical History:   Procedure Laterality Date   • LIVER TRANSPLANTATION            PT ASSESSMENT (last 72 hours)      PT Evaluation       17 0930 17 9515    Rehab Evaluation    Document Type evaluation  -SJ     Subjective Information agree to therapy;complains of;pain;nausea/vomiting;weakness  -SJ     Patient Effort, Rehab Treatment good  -SJ     Symptoms Noted During/After Treatment none  -SJ     General Information    Patient Profile Review yes  -SJ     Onset of Illness/Injury or Date of Surgery Date 17  -SJ     Referring Physician DEREK Ya  -SJ     General Observations Pt in ICU, seated in recliner, multiple lines, chest tubes, O2nc  -SJ     Pertinent History Of Current Problem 62 y.o.M s/p CABG x3  -SJ     Precautions/Limitations oxygen therapy device and L/min;cardiac precautions;sternal precautions  -SJ     Prior Level of  Function independent:;all household mobility;community mobility;gait;transfer;bed mobility;ADL's  -SJ     Equipment Currently Used at Home none  -SJ     Plans/Goals Discussed With patient;agreed upon  -SJ     Risks Reviewed patient:;LOB;nausea/vomiting;dizziness;increased discomfort;change in vital signs;lines disloged  -SJ     Benefits Reviewed patient:;improve function;increase independence;increase strength;increase balance;decrease pain;increase knowledge  -SJ     Barriers to Rehab none identified  -SJ     Living Environment    Lives With alone  -SJ     Living Arrangements house  -SJ     Home Accessibility bed and bath on same level;stairs within home  -SJ     Number of Stairs Within Home 13  -SJ     Transportation Available  family or friend will provide;car  -    Living Environment Comment Pt states he will need to go to the basement to do laundry  -SJ     Clinical Impression    Date of Referral to PT 11/22/17  -SJ     PT Diagnosis impaired mobility  -SJ     Patient/Family Goals Statement return to home  -SJ     Criteria for Skilled Therapeutic Interventions Met yes;treatment indicated  -SJ     Pathology/Pathophysiology Noted (Describe Specifically for Each System) musculoskeletal  -SJ     Impairments Found (describe specific impairments) gait, locomotion, and balance  -SJ     Functional Limitations in Following Categories (Describe Specific Limitations) self-care;home management;community/leisure  -SJ     Rehab Potential good, to achieve stated therapy goals  -SJ     Predicted Duration of Therapy Intervention (days/wks) 2wks  -SJ     Vital Signs    Pre Systolic BP Rehab 97  -SJ     Pre Treatment Diastolic BP 60  -SJ     Post Systolic BP Rehab 123  -SJ     Post Treatment Diastolic BP 75  -SJ     Pretreatment Heart Rate (beats/min) 71  -SJ     Posttreatment Heart Rate (beats/min) 72  -SJ     Pre SpO2 (%) 97  -SJ     O2 Delivery Pre Treatment supplemental O2  -SJ     Pre Patient Position Sitting  -SJ     Intra  Patient Position Standing  -SJ     Post Patient Position Supine  -SJ     Pain Assessment    Pain Assessment Alexis-Ayala FACES  -SJ     Pain Score 7  -SJ     Post Pain Score 7  -SJ     Pain Type Acute pain  -SJ     Pain Location Mediastinum  -SJ     Pain Intervention(s) Ambulation/increased activity;Repositioned  -SJ     Response to Interventions angela  -SJ     Cognitive Assessment/Intervention    Current Cognitive/Communication Assessment functional  -SJ     Orientation Status oriented x 4  -SJ     Follows Commands/Answers Questions 75% of the time;able to follow single-step instructions;needs cueing;needs repetition  -SJ     Personal Safety mild impairment;decreased awareness, need for assist;decreased awareness, need for safety  -SJ     Personal Safety Interventions gait belt;muscle strengthening facilitated;nonskid shoes/slippers when out of bed  -SJ     ROM (Range of Motion)    General ROM no range of motion deficits identified  -SJ     MMT (Manual Muscle Testing)    General MMT Assessment lower extremity strength deficits identified  -SJ     General MMT Assessment Detail BLE's 4/5  -SJ     Bed Mobility, Assessment/Treatment    Bed Mobility, Assistive Device draw sheet  -SJ     Bed Mobility, Scoot/Bridge, [place] dependent (less than 25% patient effort);2 person assist required;verbal cues required  -     Bed Mob, Sit to Supine, [place] maximum assist (25% patient effort);2 person assist required;verbal cues required  -SJ     Bed Mobility, Safety Issues decreased use of arms for pushing/pulling;decreased use of legs for bridging/pushing  -SJ     Bed Mobility, Impairments pain;strength decreased  -     Bed Mobility, Comment max cues for sequencing, sternal precautions  -SJ     Transfer Assessment/Treatment    Transfers, Chair-Bed [place] minimum assist (75% patient effort);2 person assist required;verbal cues required  -SJ     Transfers, Sit-Stand [place] minimum assist (75% patient effort);2  person assist required;verbal cues required  -SJ     Transfers, Stand-Sit [place] minimum assist (75% patient effort);verbal cues required  -SJ     Transfer, Safety Issues sequencing ability decreased;step length decreased;weight-shifting ability decreased  -SJ     Transfer, Impairments strength decreased;impaired balance;coordination impaired;pain  -SJ     Transfer, Comment Pt has high anxiety with movement, req max cues and encouragemnt throughout mobility  -SJ     Gait Assessment/Treatment    Gait, [place] Level minimum assist (75% patient effort);2 person assist required;verbal cues required  -SJ     Gait, Assistive Device rolling walker  -SJ     Gait, Distance (Feet) 50  -SJ     Gait, Gait Pattern Analysis swing-to gait  -SJ     Gait, Gait Deviations ines decreased;decreased heel strike;double stance time increased;forward flexed posture  -SJ     Gait, Safety Issues balance decreased during turns;sequencing ability decreased;step length decreased;weight-shifting ability decreased;supplemental O2  -SJ     Gait, Impairments strength decreased;impaired balance;coordination impaired;pain  -SJ     Gait, Comment Pt has high anxiety with movement, required cues to keep RW close, and for energy conservation. Recliner in tow.  -SJ     Motor Skills/Interventions    Additional Documentation Balance Skills Training (Group)  -SJ     Balance Skills Training    Sitting-Level of Assistance Minimum assistance  -SJ     Sitting-Balance Support Feet supported  -SJ     Sitting-Balance Activities Trunk control activities  -SJ     Sitting # of Minutes 3  -SJ     Standing-Level of Assistance Minimum assistance;x2  -SJ     Static Standing Balance Support assistive device  -SJ     Gait Balance-Level of Assistance Minimum assistance;x2  -SJ     Gait Balance Support assistive device  -SJ     Positioning and Restraints    Pre-Treatment Position sitting in chair/recliner  -SJ     Post Treatment Position bed  -SJ     In Bed  supine;call light within reach;encouraged to call for assist;with nsg;SCD pump applied  -       11/21/17 1422 11/20/17 2000    General Information    Equipment Currently Used at Home bipap/ cpap  -MB respiratory supplies  -TA    Living Environment    Lives With alone  -MB alone  -TA    Living Arrangements house  -MB house  -TA    Home Accessibility  no concerns  -TA    Stair Railings at Home  none  -TA    Type of Financial/Environmental Concern  none  -TA    Transportation Available car;family or friend will provide  -MB none  -TA      User Key  (r) = Recorded By, (t) = Taken By, (c) = Cosigned By    Initials Name Provider Type     Annelise Wood, PT Physical Therapist    TA Kennedi Raygoza, RN Registered Nurse    NACHO Faust, RN Case Manager     Kate Mayen, RN Registered Nurse          Physical Therapy Education     Title: PT OT SLP Therapies (Active)     Topic: Physical Therapy (Active)     Point: Mobility training (Active)    Learning Progress Summary    Learner Readiness Method Response Comment Documented by Status   Patient Acceptance E,D NR   11/23/17 1045 Active               Point: Home exercise program (Active)    Learning Progress Summary    Learner Readiness Method Response Comment Documented by Status   Patient Acceptance E,D NR   11/23/17 1045 Active               Point: Body mechanics (Active)    Learning Progress Summary    Learner Readiness Method Response Comment Documented by Status   Patient Acceptance E,D NR   11/23/17 1045 Active               Point: Precautions (Active)    Learning Progress Summary    Learner Readiness Method Response Comment Documented by Status   Patient Acceptance E,D NR   11/23/17 1045 Active                      User Key     Initials Effective Dates Name Provider Type [facility] 06/19/15 -  Annelise Wood PT Physical Therapist PT                PT Recommendation and Plan  Anticipated Equipment Needs At Discharge:  (TBD)  Anticipated  Discharge Disposition: inpatient rehabilitation facility  Planned Therapy Interventions: balance training, bed mobility training, gait training, home exercise program, patient/family education, stair training, strengthening, transfer training  PT Frequency: daily  Plan of Care Review  Plan Of Care Reviewed With: patient  Progress: progress toward functional goals as expected  Outcome Summary/Follow up Plan: PT eval completed. Pt amb 50ft with RW with Wen x2, recliner in tow. Pt limited by high anxiety with mobility, weakness, c/o dizziness. Pt required encouragement throughout mobility. PT recomends d/c to acute rehab pending progress, as pt lives alone.           IP PT Goals       11/23/17 1040          Bed Mobility PT LTG    Bed Mobility PT LTG, Date Established 11/23/17  -SJ      Bed Mobility PT LTG, Time to Achieve 2 wks  -SJ      Bed Mobility PT LTG, Activity Type roll left/roll right;supine to sit/sit to supine  -SJ      Bed Mobility PT LTG, [place] Level conditional independence  -SJ      Bed Mobility PT LTG, Outcome goal ongoing  -SJ      Transfer Training PT LTG    Transfer Training PT LTG, Date Established 11/23/17  -SJ      Transfer Training PT LTG, Time to Achieve 2 wks  -SJ      Transfer Training PT LTG, Activity Type bed to chair /chair to bed;sit to stand/stand to sit  -SJ      Transfer Training PT LTG, [place] Level conditional independence  -SJ      Transfer Training PT LTG, Outcome goal ongoing  -SJ      Gait Training PT LTG    Gait Training Goal PT LTG, Date Established 11/23/17  -SJ      Gait Training Goal PT LTG, Time to Achieve 2 wks  -SJ      Gait Training Goal PT LTG, [place] Level conditional independence  -SJ      Gait Training Goal PT LTG, Distance to Achieve 200  -SJ      Gait Training Goal PT LTG, Outcome goal ongoing  -SJ      Stair Training PT LTG    Stair Training Goal PT LTG, Date Established 11/23/17  -SJ      Stair Training Goal PT LTG, Time to Achieve 2 wks  -SJ       Stair Training Goal PT LTG, Number of Steps 13  -      Stair Training Goal PT LTG, [place] Level contact guard assist  -      Stair Training Goal PT LTG, Assist Device 1 handrail  -      Stair Training Goal PT LTG, Outcome goal ongoing  -        User Key  (r) = Recorded By, (t) = Taken By, (c) = Cosigned By    Initials Name Provider Type    ROXI Wood PT Physical Therapist                Outcome Measures       11/23/17 0930          How much help from another person do you currently need...    Turning from your back to your side while in flat bed without using bedrails? 2  -SJ      Moving from lying on back to sitting on the side of a flat bed without bedrails? 2  -SJ      Moving to and from a bed to a chair (including a wheelchair)? 2  -SJ      Standing up from a chair using your arms (e.g., wheelchair, bedside chair)? 2  -SJ      Climbing 3-5 steps with a railing? 1  -SJ      To walk in hospital room? 2  -SJ      AM-PAC 6 Clicks Score 11  -      Functional Assessment    Outcome Measure Options AM-PAC 6 Clicks Basic Mobility (PT)  -        User Key  (r) = Recorded By, (t) = Taken By, (c) = Cosigned By    Initials Name Provider Type    ROXI Wood PT Physical Therapist           Time Calculation:         PT Charges       11/23/17 1047          Time Calculation    Start Time 0930  -SJ      PT Received On 11/23/17  -      PT Goal Re-Cert Due Date 12/03/17  -        User Key  (r) = Recorded By, (t) = Taken By, (c) = Cosigned By    Initials Name Provider Type     Annelise Wood PT Physical Therapist          Therapy Charges for Today     Code Description Service Date Service Provider Modifiers Qty    08925514130 HC PT EVAL HIGH COMPLEXITY 4 11/23/2017 Annelise Wood, PT GP 1    57027711980 HC PT THER SUPP EA 15 MIN 11/23/2017 Annelise Wood, PT GP 2          PT G-Codes  Outcome Measure Options: AM-PAC 6 Clicks Basic Mobility (PT)      Annelise Wood  PT  11/23/2017

## 2017-11-23 NOTE — PLAN OF CARE
Problem: Patient Care Overview (Adult)  Goal: Plan of Care Review  Outcome: Ongoing (interventions implemented as appropriate)    11/23/17 0346   Coping/Psychosocial Response Interventions   Plan Of Care Reviewed With patient   Patient Care Overview   Progress no change   Outcome Evaluation   Outcome Summary/Follow up Plan Pt has had severe anxiety issues this shift. Levo and Dopamine titrated, insulin gtt continued. Mcclelland catheter d/c'd and a condom cath placed.        Goal: Discharge Needs Assessment  Outcome: Ongoing (interventions implemented as appropriate)    11/23/17 0346   Discharge Needs Assessment   Discharge Disposition still a patient         Problem: Cardiac Surgery (Adult)  Goal: Signs and Symptoms of Listed Potential Problems Will be Absent or Manageable (Cardiac Surgery)  Outcome: Ongoing (interventions implemented as appropriate)    11/23/17 0346   Cardiac Surgery   Problems Assessed (Cardiac Surgery) all   Problems Present (Cardiac Surgery) pain;fluid imbalance;situational response         Problem: Pressure Ulcer Risk (Abdullahi Scale) (Adult,Obstetrics,Pediatric)  Goal: Identify Related Risk Factors and Signs and Symptoms  Outcome: Ongoing (interventions implemented as appropriate)    11/23/17 0346   Pressure Ulcer Risk (Abdullahi Scale)   Related Risk Factors (Pressure Ulcer Risk (Abdullahi Scale)) critical care admission;medical devices;medication;mobility impaired;tissue perfusion altered       Goal: Skin Integrity  Outcome: Ongoing (interventions implemented as appropriate)    11/23/17 0346   Pressure Ulcer Risk (Abdullahi Scale) (Adult,Obstetrics,Pediatric)   Skin Integrity making progress toward outcome

## 2017-11-23 NOTE — PROGRESS NOTES
INTENSIVIST / PULMONARY FOLLOW UP NOTE     Hospital:  LOS: 3 days   Mr. Quirino Sheppard, 62 y.o. male is followed for:   Principal Problem:    Coronary artery disease involving native coronary artery of native heart  Active Problems:    CAD (coronary artery disease)    Hx of liver transplant    Essential hypertension    Hyperlipemia    Diabetes mellitus, type II    Hx of hepatitis C          SUBJECTIVE   Extubated, still requiring pressors    The patient's relevant past medical, surgical, family, and social history were reviewed    Allergies and medications were reviewed    ROS:  Per subjective, all other systems were reviewed and were negative        OBJECTIVE     Vital Sign Min/Max for last 24 hours:  Temp  Min: 96.3 °F (35.7 °C)  Max: 99.4 °F (37.4 °C)   BP  Min: 81/55  Max: 143/81   Pulse  Min: 69  Max: 86   Resp  Min: 16  Max: 24   SpO2  Min: 92 %  Max: 100 %   Flow (L/min)  Min: 2  Max: 4     Physical Exam:  General Appearance:  Alert, in no acute distress  Eyes:  No scleral icterus or pallor, pupils normal  Ears, Nose, Mouth, Throat:  Atraumatic, oropharynx clear  Neck:  Trachea midline, thyroid normal  Respiratory:  Clear to auscultation bilaterally, normal effort  Cardiovascular:  Regular rate and rhythm, no murmurs, no peripheral edema  Gastrointestinal:  Soft, non-tender, non-distended, no hepatosplenomegaly  Skin:  Normal temperature, no rash  Psychiatric:  Normal mood and affect, normal judgement and insight  Neuro:  No new focal neurologic deficits observed      Telemetry:  Sinus Rhythm: normal sinus rhythm           Hemodynamics:   CVP: CVP (mmHg): 10 mmHg   PAP: PAP: 30/13   PAOP: PCWP (mmHg): 13 mmHg (using PAD)   CO: CO (L/min): 4.8 L/min   CI: CI (L/min/m2): 2.42 L/min/m2   SVI: SVI: 30.63   SVR: SVR (dyne*sec)/cm5: 1150     SpO2: 96 % SpO2  Min: 92 %  Max: 100 %   Device: nasal cannula with humidification    Flow Rate: 2 Flow (L/min)  Min: 2  Max: 4     Mechanical Ventilator Settings:  Vent  Mode: (S) PS    Vt (Set, L): 0.7 L    Resp Rate (Set): (S) 6 Resp Rate (Observed) Vent: 19.7   FiO2 (%): 40 %    PEEP/CPAP (cm H2O): 5 cm H20           I:E Ratio (Obs): 1:2.30     Intake/Ouptut 24 hrs (7:00AM - 6:59 AM)  Intake & Output (last 3 days)       11/20 0701 - 11/21 0700 11/21 0701 - 11/22 0700 11/22 0701 - 11/23 0700 11/23 0701 - 11/24 0700    P.O. 480       I.V. (mL/kg)   2513 (29) 200 (2.3)    Blood   535     IV Piggyback   1000 20    Total Intake(mL/kg) 480 (5.4)  4048 (46.7) 220 (2.5)    Urine (mL/kg/hr) 1000 1700 (0.8) 3990 (1.9)     Other   1060 (0.5) 340 (1)    Total Output 1000 1700 5050 340    Net -520 -1700 -1002 -120                  Lines, Drains & Airways    Active LDAs     Name:   Placement date:   Placement time:   Site:   Days:    Pulmonary Artery Catheter - Triple Lumen 11/22/17 1046 Right internal jugular vein continuous hemodynamic catheter  11/22/17    1046      less than 1    Peripheral IV Line - Single Lumen 11/20/17 2200 basilic vein (medial side of arm), left 18 gauge  11/20/17    2200      1    Y Chest Tube 1 and 2 11/22/17 11/22/17          less than 1    Chest Tube 11/22/17 0855 chest tube placed by physician mediastinum  11/22/17    0855      less than 1    Naso/Oral/Gastric Tube 11/22/17 1046 right nostril  11/22/17    1046      less than 1    Urethral Catheter 11/22/17 0730 100% silicone 16 10 19  11/22/17    0730      less than 1    Airway 11/22/17 0620 oral;endotracheal tube with subglottic suction  11/22/17    0620      less than 1    Arterial Line 11/22/17 0620 Right radial artery 20 gauge  11/22/17    0620      less than 1    Percutaneous Central Line - Double Lumen 11/22/17 0753  11/22/17    0753 created via procedure documentation    less than 1                Hematology:    Results from last 7 days  Lab Units 11/23/17  0349 11/22/17  1429 11/22/17  1119 11/22/17  1004 11/22/17  0937 11/22/17  0914 11/22/17  0850  11/21/17  0519 11/20/17  2042   WBC 10*3/mm3 13.71*  13.55* 15.58*  --   --   --   --   --  7.49 8.24   HEMOGLOBIN g/dL 11.1* 11.4* 13.1  --   --   --   --   --  14.2 15.5   HEMOGLOBIN, POC g/dL  --   --   --  10.9* 9.9* 10.2* 9.5*  < >  --   --    HEMATOCRIT % 32.6* 33.7* 37.5*  --   --   --   --   --  42.0 45.0   HEMATOCRIT POC %  --   --   --  32* 29* 30* 28*  < >  --   --    PLATELETS 10*3/mm3 201 192 229  --   --   --   --   --  143* 162   < > = values in this interval not displayed.  Electrolytes, Magnesium and Phosphorus:    Results from last 7 days  Lab Units 11/23/17  0349 11/22/17  1429 11/22/17  1119 11/21/17  0516 11/20/17  2042   SODIUM mmol/L 137 140 133 136 136   CHLORIDE mmol/L 103 105 101 102 100   POTASSIUM mmol/L 4.3 3.9 4.2 4.2 4.1   CO2 mmol/L 26.0 28.0 25.0 27.0 24.0   MAGNESIUM mg/dL 2.3 3.0* 3.8*  --   --    PHOSPHORUS mg/dL 4.8 2.3* 3.4  --   --      Renal:    Results from last 7 days  Lab Units 11/23/17  0349 11/22/17  1429 11/22/17  1119 11/21/17  0516 11/20/17  2042   CREATININE mg/dL 1.40* 1.50* 1.40* 1.30 1.50*   BUN mg/dL 29* 29* 29* 25* 23     Estimated Creatinine Clearance: 57.5 mL/min (by C-G formula based on Cr of 1.4).  Hepatic:    Results from last 7 days  Lab Units 11/20/17  2042   ALK PHOS U/L 56   BILIRUBIN mg/dL 0.5   ALT (SGPT) U/L 52*   AST (SGOT) U/L 23     Arterial Blood Gases:    Results from last 7 days  Lab Units 11/22/17  1442 11/22/17  1112 11/22/17  1004 11/22/17  0937 11/22/17  0914 11/22/17  0850 11/22/17  0836   PH, ARTERIAL pH units 7.387 7.327* 7.34* 7.40 7.33* 7.26* 7.27*   PCO2, ARTERIAL mm Hg 44.4 46.0  --   --   --   --   --    PO2 ART mm Hg 138.0* 170.0*  --   --   --   --   --    FIO2 % 40 100  --   --   --   --   --          Results from last 7 days  Lab Units 11/20/17 2042   HEMOGLOBIN A1C % 7.10*       No results found for: LACTATE    Relevant imaging studies and labs from 11/23/17 were reviewed and interpreted by me    Medications (drips):    dexmedetomidine    dextrose 5 % with KCl 20 mEq Last Rate:  30 mL/hr (11/22/17 1114)   DOBUTamine    DOPamine Last Rate: 3 mcg/kg/min (11/23/17 0800)   EPINEPHrine    insulin regular infusion 1 unit/mL (CCU use) Last Rate: 9 Units/hr (11/23/17 1003)   niCARdipine    nitroglycerin    norepinephrine Last Rate: 0.02 mcg/kg/min (11/23/17 0800)   phenylephrine    propofol Last Rate: Stopped (11/22/17 1310)   sodium chloride    vasopressin          aspirin 325 mg Oral Daily   atorvastatin 40 mg Oral Nightly   cefuroxime 1.5 g Intravenous Q8H   chlorhexidine 15 mL Mouth/Throat Q12H   famotidine 20 mg Intravenous BID   Or      famotidine 20 mg Oral BID   metoprolol tartrate 12.5 mg Oral Q12H   metoprolol tartrate 2.5 mg Intravenous Q6H   pharmacy consult - MTM  Does not apply Daily   sennosides-docusate sodium 2 tablet Oral BID   sertraline 50 mg Oral Daily   sirolimus 1 mg Oral Daily   timolol 1 drop Both Eyes Daily       Assessment/Plan   IMPRESSION / PLAN     Inpatient Problem List:  62 y.o.male:  Hospital Problem List     * (Principal)Coronary artery disease involving native coronary artery of native heart    Overview Signed 11/21/2017  8:33 AM by DEREK Conteh     Added automatically from request for surgery [id number]         CAD (coronary artery disease)    Hx of liver transplant    Essential hypertension    Hyperlipemia    Diabetes mellitus, type II    Hx of hepatitis C           Impression:  62 y.o.male with relevant PMH of Liver Transplant 2001, HTN, HLD, IDDM, h/o Hep C, CAD, Thrombocytopenia admitted 11/20/2017 post op 3vCABG+EVH&LIMA    Plan:    Mechanical Ventilation - extubated doing well    Liver Transplant - remote (2001), continue immunosuppression, monitor for infection    IDDM (A1C 7.1) - insulin gtt x 24 hours --> tx today    Post op 3vCABG - per CTS    Hypotension - give 500 cc 5% albumin, try to wean off low dose pressors    Monitor for gastric ileus (large air bubble on CXR)    Foot Pumps    Nutrition - Diet Regular; Cardiac, Consistent  Carbohydrate    Plan of care and goals reviewed with mulitdisciplinary team at daily rounds    Critical Care time spent in direct patient care: 30 minutes (excluding procedure time, if applicable) including high complexity decision making to assess, manipulate, and support vital organ system failure in this individual who has impairment of one or more vital organ systems such that there is a high probability of imminent or life threatening deterioration in the patient’s condition.       Eligio Mota MD  Intensive Care Medicine  11/23/17 11:04 AM

## 2017-11-23 NOTE — PROGRESS NOTES
Cardiothoracic Surgery Progress Note      POD # 1 s/p CABGX3       LOS: 3 days      Subjective:  Awake, alert, extubated  Up in chair, cooperative  Low dose pressors, weaning      Objective:  Vital Signs  Temp:  [95.8 °F (35.4 °C)-99.4 °F (37.4 °C)] 99.4 °F (37.4 °C)  Heart Rate:  [70-88] 71  Resp:  [12-24] 16  BP: ()/(52-86) 121/64  Arterial Line BP: ()/(48-73) 106/60  FiO2 (%):  [40 %-100 %] 40 %    Physical Exam:   Lungs: clear to auscultation, respirations regular, respirations even and respirations unlabored   Heart: Regular rate and rhythm, no rubs, murmurs, gallops    Skin: Incision c/d/i     Results:    Results from last 7 days  Lab Units 11/23/17  0349   WBC 10*3/mm3 13.71*   HEMOGLOBIN g/dL 11.1*   HEMATOCRIT % 32.6*   PLATELETS 10*3/mm3 201       Results from last 7 days  Lab Units 11/23/17  0349   SODIUM mmol/L 137   POTASSIUM mmol/L 4.3   CHLORIDE mmol/L 103   CO2 mmol/L 26.0   BUN mg/dL 29*   CREATININE mg/dL 1.40*   GLUCOSE mg/dL 147*   CALCIUM mg/dL 9.2         Assessment:  CV stable  CTs 1060cc out last 18 hours, slowing only 440cc out last 12 hours  Plan:  Continue support  D/C lines    DEREK Keith  11/23/17  7:48 AM

## 2017-11-24 ENCOUNTER — APPOINTMENT (OUTPATIENT)
Dept: GENERAL RADIOLOGY | Facility: HOSPITAL | Age: 62
End: 2017-11-24

## 2017-11-24 LAB
ALBUMIN SERPL-MCNC: 4.3 G/DL (ref 3.2–4.8)
ALP SERPL-CCNC: 68 U/L (ref 25–100)
ALT SERPL W P-5'-P-CCNC: 346 U/L (ref 7–40)
ANION GAP SERPL CALCULATED.3IONS-SCNC: 6 MMOL/L (ref 3–11)
AST SERPL-CCNC: 195 U/L (ref 0–33)
BACTERIA SPEC AEROBE CULT: NORMAL
BILIRUB CONJ SERPL-MCNC: 0.3 MG/DL (ref 0–0.2)
BILIRUB INDIRECT SERPL-MCNC: 0.5 MG/DL (ref 0.1–1.1)
BILIRUB SERPL-MCNC: 0.8 MG/DL (ref 0.3–1.2)
BUN BLD-MCNC: 41 MG/DL (ref 9–23)
BUN/CREAT SERPL: 25.6 (ref 7–25)
CALCIUM SPEC-SCNC: 9.2 MG/DL (ref 8.7–10.4)
CHLORIDE SERPL-SCNC: 95 MMOL/L (ref 99–109)
CO2 SERPL-SCNC: 27 MMOL/L (ref 20–31)
CREAT BLD-MCNC: 1.6 MG/DL (ref 0.6–1.3)
DEPRECATED RDW RBC AUTO: 44.1 FL (ref 37–54)
ERYTHROCYTE [DISTWIDTH] IN BLOOD BY AUTOMATED COUNT: 13 % (ref 11.3–14.5)
GFR SERPL CREATININE-BSD FRML MDRD: 44 ML/MIN/1.73
GLUCOSE BLD-MCNC: 273 MG/DL (ref 70–100)
GLUCOSE BLDC GLUCOMTR-MCNC: 271 MG/DL (ref 70–130)
GLUCOSE BLDC GLUCOMTR-MCNC: 278 MG/DL (ref 70–130)
GLUCOSE BLDC GLUCOMTR-MCNC: 298 MG/DL (ref 70–130)
GLUCOSE BLDC GLUCOMTR-MCNC: 310 MG/DL (ref 70–130)
GLUCOSE BLDC GLUCOMTR-MCNC: 317 MG/DL (ref 70–130)
GLUCOSE BLDC GLUCOMTR-MCNC: 437 MG/DL (ref 70–130)
HCT VFR BLD AUTO: 32.9 % (ref 38.9–50.9)
HGB BLD-MCNC: 11 G/DL (ref 13.1–17.5)
MCH RBC QN AUTO: 31.1 PG (ref 27–31)
MCHC RBC AUTO-ENTMCNC: 33.4 G/DL (ref 32–36)
MCV RBC AUTO: 92.9 FL (ref 80–99)
PLATELET # BLD AUTO: 168 10*3/MM3 (ref 150–450)
PMV BLD AUTO: 10 FL (ref 6–12)
POTASSIUM BLD-SCNC: 4.7 MMOL/L (ref 3.5–5.5)
PROT SERPL-MCNC: 6.9 G/DL (ref 5.7–8.2)
RBC # BLD AUTO: 3.54 10*6/MM3 (ref 4.2–5.76)
SODIUM BLD-SCNC: 128 MMOL/L (ref 132–146)
WBC NRBC COR # BLD: 11.1 10*3/MM3 (ref 3.5–10.8)

## 2017-11-24 PROCEDURE — 93010 ELECTROCARDIOGRAM REPORT: CPT | Performed by: INTERNAL MEDICINE

## 2017-11-24 PROCEDURE — 93005 ELECTROCARDIOGRAM TRACING: CPT | Performed by: PHYSICIAN ASSISTANT

## 2017-11-24 PROCEDURE — 63710000001 INSULIN DETEMIR PER 5 UNITS: Performed by: INTERNAL MEDICINE

## 2017-11-24 PROCEDURE — 80076 HEPATIC FUNCTION PANEL: CPT | Performed by: INTERNAL MEDICINE

## 2017-11-24 PROCEDURE — 99232 SBSQ HOSP IP/OBS MODERATE 35: CPT | Performed by: NURSE PRACTITIONER

## 2017-11-24 PROCEDURE — 85027 COMPLETE CBC AUTOMATED: CPT | Performed by: PHYSICIAN ASSISTANT

## 2017-11-24 PROCEDURE — 82962 GLUCOSE BLOOD TEST: CPT

## 2017-11-24 PROCEDURE — 71010 HC CHEST PA OR AP: CPT

## 2017-11-24 PROCEDURE — 99233 SBSQ HOSP IP/OBS HIGH 50: CPT | Performed by: INTERNAL MEDICINE

## 2017-11-24 PROCEDURE — 80048 BASIC METABOLIC PNL TOTAL CA: CPT | Performed by: PHYSICIAN ASSISTANT

## 2017-11-24 PROCEDURE — 97110 THERAPEUTIC EXERCISES: CPT

## 2017-11-24 PROCEDURE — 63710000001 INSULIN LISPRO (HUMAN) PER 5 UNITS: Performed by: INTERNAL MEDICINE

## 2017-11-24 RX ORDER — ATORVASTATIN CALCIUM 40 MG/1
80 TABLET, FILM COATED ORAL NIGHTLY
Status: DISCONTINUED | OUTPATIENT
Start: 2017-11-24 | End: 2017-11-29 | Stop reason: HOSPADM

## 2017-11-24 RX ORDER — BRIMONIDINE TARTRATE 2 MG/ML
1 SOLUTION/ DROPS OPHTHALMIC EVERY 12 HOURS
Status: DISCONTINUED | OUTPATIENT
Start: 2017-11-24 | End: 2017-11-29 | Stop reason: HOSPADM

## 2017-11-24 RX ORDER — TIMOLOL MALEATE 5 MG/ML
1 SOLUTION/ DROPS OPHTHALMIC EVERY 12 HOURS SCHEDULED
Status: DISCONTINUED | OUTPATIENT
Start: 2017-11-24 | End: 2017-11-29 | Stop reason: HOSPADM

## 2017-11-24 RX ORDER — FENOFIBRATE 145 MG/1
145 TABLET, COATED ORAL DAILY
Status: DISCONTINUED | OUTPATIENT
Start: 2017-11-24 | End: 2017-11-29 | Stop reason: HOSPADM

## 2017-11-24 RX ADMIN — BRIMONIDINE TARTRATE 1 DROP: 2 SOLUTION/ DROPS OPHTHALMIC at 20:45

## 2017-11-24 RX ADMIN — CHLORHEXIDINE GLUCONATE 15 ML: 1.2 RINSE ORAL at 08:34

## 2017-11-24 RX ADMIN — FENOFIBRATE 145 MG: 145 TABLET ORAL at 11:48

## 2017-11-24 RX ADMIN — ASPIRIN 325 MG: 325 TABLET, DELAYED RELEASE ORAL at 08:34

## 2017-11-24 RX ADMIN — TIMOLOL MALEATE 1 DROP: 5 SOLUTION/ DROPS OPHTHALMIC at 20:45

## 2017-11-24 RX ADMIN — METOPROLOL TARTRATE 12.5 MG: 25 TABLET, FILM COATED ORAL at 20:44

## 2017-11-24 RX ADMIN — HYDROCODONE BITARTRATE AND ACETAMINOPHEN 1 TABLET: 7.5; 325 TABLET ORAL at 02:09

## 2017-11-24 RX ADMIN — INSULIN DETEMIR 20 UNITS: 100 INJECTION, SOLUTION SUBCUTANEOUS at 08:46

## 2017-11-24 RX ADMIN — HYDROCODONE BITARTRATE AND ACETAMINOPHEN 1 TABLET: 7.5; 325 TABLET ORAL at 12:56

## 2017-11-24 RX ADMIN — INSULIN DETEMIR 10 UNITS: 100 INJECTION, SOLUTION SUBCUTANEOUS at 11:40

## 2017-11-24 RX ADMIN — ALPRAZOLAM 0.25 MG: 0.25 TABLET ORAL at 20:45

## 2017-11-24 RX ADMIN — FAMOTIDINE 20 MG: 20 TABLET, FILM COATED ORAL at 08:34

## 2017-11-24 RX ADMIN — SERTRALINE 50 MG: 50 TABLET, FILM COATED ORAL at 06:14

## 2017-11-24 RX ADMIN — INSULIN DETEMIR 30 UNITS: 100 INJECTION, SOLUTION SUBCUTANEOUS at 22:07

## 2017-11-24 RX ADMIN — Medication 2 TABLET: at 17:13

## 2017-11-24 RX ADMIN — INSULIN LISPRO 10 UNITS: 100 INJECTION, SOLUTION INTRAVENOUS; SUBCUTANEOUS at 11:43

## 2017-11-24 RX ADMIN — OXYCODONE AND ACETAMINOPHEN 2 TABLET: 5; 325 TABLET ORAL at 04:07

## 2017-11-24 RX ADMIN — ATORVASTATIN CALCIUM 80 MG: 40 TABLET, FILM COATED ORAL at 20:45

## 2017-11-24 RX ADMIN — Medication 2 TABLET: at 08:34

## 2017-11-24 RX ADMIN — SIROLIMUS 1 MG: 1 TABLET, FILM COATED ORAL at 08:36

## 2017-11-24 RX ADMIN — SIMETHICONE CHEW TAB 80 MG 80 MG: 80 TABLET ORAL at 04:07

## 2017-11-24 RX ADMIN — TIMOLOL MALEATE 1 DROP: 5 SOLUTION/ DROPS OPHTHALMIC at 08:34

## 2017-11-24 RX ADMIN — INSULIN LISPRO 10 UNITS: 100 INJECTION, SOLUTION INTRAVENOUS; SUBCUTANEOUS at 17:13

## 2017-11-24 NOTE — PROGRESS NOTES
"Adult Nutrition  Assessment/PES    Patient Name:  Quirino Sheppard  YOB: 1955  MRN: [id number]  Admit Date:  11/20/2017    Assessment Date:  11/24/2017          Reason for Assessment       11/24/17 1045    Reason for Assessment    Reason For Assessment/Visit multidisciplinary rounds;length of stay    Time Spent (min) 30    Diagnosis Diagnosis    Cardiac CABG;HTN;CAD   s/p CABG (11/22)    Endocrine DM Type 2    Other diagnosis hx of hep C, hx of liver transplant (2001)   PMH: He  has a past medical history of Anxiety and depression; Diabetes mellitus; Hepatitis C; Hyperlipidemia; and Hypertension.   PSxH: He  has a past surgical history that includes Liver transplantation and Coronary artery bypass graft (N/A, 11/22/2017).              Nutrition/Diet History       11/24/17 1045    Nutrition/Diet History    Reported/Observed By Patient    Appetite Improved    Other Pt reports tolerating PO, ate Yoruba toast, cereal, and sausage this AM            Anthropometrics       11/24/17 1046    Anthropometrics (Special Considerations)    Height Used for Calculations 1.702 m (5' 7\")    Weight Used for Calculations 86.6 kg (191 lb)   per stated wt on (11/22)    RD Calculated BMI (kg/m2) 29.9            Labs/Tests/Procedures/Meds       11/24/17 1047    Labs/Tests/Procedures/Meds    Labs/Tests Review Reviewed;Glucose;BUN;Creat   adjusting insulin    Medication Review Reviewed, pertinent     Results from last 7 days  Lab Units 11/24/17  0410   SODIUM mmol/L 128*   POTASSIUM mmol/L 4.7   CHLORIDE mmol/L 95*   CO2 mmol/L 27.0   BUN mg/dL 41*   CREATININE mg/dL 1.60*   CALCIUM mg/dL 9.2   BILIRUBIN mg/dL 0.8   ALK PHOS U/L 68   ALT (SGPT) U/L 346*   AST (SGOT) U/L 195*   GLUCOSE mg/dL 273*                Nutrition Prescription Ordered       11/24/17 1049    Nutrition Prescription PO    Current PO Diet Regular    Common Modifiers Cardiac;Consistent Carbohydrate            Evaluation of Received Nutrient/Fluid Intake       " 11/24/17 1049    PO Evaluation    Number of Meals 2    % PO Intake 38            Problem/Interventions:        Problem 1       11/24/17 1049    Nutrition Diagnoses Problem 1    Problem 1 Inadequate Nutrient Intake    Etiology (related to) --   clinical condition    Signs/Symptoms (evidenced by) PO Intake    Percent (%) intake recorded 38 %    Over number of meals 2                    Intervention Goal       11/24/17 1049    Intervention Goal    General Nutrition support treatment    PO Establish PO            Nutrition Intervention       11/24/17 1049    Nutrition Intervention    RD/Tech Action Care plan reviewd;Follow Tx progress;Encourage intake;Interview for preference;Menu provided;Menu adjusted              Education/Evaluation       11/24/17 1050    Monitor/Evaluation    Monitor Per protocol;PO intake;Pertinent labs        Electronically signed by:  Lor Abdul MS RD/LD CNSC  11/24/17 10:50 AM

## 2017-11-24 NOTE — PROGRESS NOTES
INTENSIVIST / PULMONARY FOLLOW UP NOTE     Hospital:  LOS: 4 days   Mr. Quirino Sheppard, 62 y.o. male is followed for:   Active Problems:    Coronary artery disease involving native coronary artery of native heart with angina pectoris    Hepatitis C    Hx of liver transplant    Essential hypertension    Hyperlipidemia LDL goal <70    Type 2 diabetes mellitus          SUBJECTIVE   Off pressors, slight bump in cr    The patient's relevant past medical, surgical, family, and social history were reviewed    Allergies and medications were reviewed    ROS:  Per subjective, all other systems were reviewed and were negative        OBJECTIVE     Vital Sign Min/Max for last 24 hours:  Temp  Min: 97.5 °F (36.4 °C)  Max: 98.6 °F (37 °C)   BP  Min: 87/65  Max: 126/73   Pulse  Min: 64  Max: 85   Resp  Min: 16  Max: 24   SpO2  Min: 89 %  Max: 99 %   Flow (L/min)  Min: 2  Max: 4     Physical Exam:  General Appearance:  Alert, in no acute distress  Eyes:  No scleral icterus or pallor, pupils normal  Ears, Nose, Mouth, Throat:  Atraumatic, oropharynx clear  Neck:  Trachea midline, thyroid normal  Respiratory:  Clear to auscultation bilaterally, normal effort  Cardiovascular:  Regular rate and rhythm, no murmurs, no peripheral edema  Gastrointestinal:  Soft, non-tender, non-distended, no hepatosplenomegaly  Skin:  Normal temperature, no rash  Psychiatric:  Normal mood and affect, normal judgement and insight  Neuro:  No new focal neurologic deficits observed      Telemetry:  Sinus Rhythm: normal sinus rhythm             SpO2: 95 % SpO2  Min: 89 %  Max: 99 %   Device: nasal cannula with humidification    Flow Rate: 2 Flow (L/min)  Min: 2  Max: 4   Intake/Ouptut 24 hrs (7:00AM - 6:59 AM)  Intake & Output (last 3 days)       11/21 0701 - 11/22 0700 11/22 0701 - 11/23 0700 11/23 0701 - 11/24 0700 11/24 0701 - 11/25 0700    P.O.    720    I.V. (mL/kg)  2513 (29) 886 (10.2) 20 (0.2)    Blood  535      IV Piggyback  1000 40     Total  Intake(mL/kg)  4048 (46.7) 926 (10.7) 740 (8.5)    Urine (mL/kg/hr) 1700 (0.8) 3990 (1.9) 625 (0.3) 300 (0.6)    Other  1060 (0.5) 870 (0.4) 120 (0.2)    Total Output 1700 5050 1495 420    Net -1700 -1002 -569 +320                  Lines, Drains & Airways    Active LDAs     Name:   Placement date:   Placement time:   Site:   Days:    Pulmonary Artery Catheter - Triple Lumen 11/22/17 1046 Right internal jugular vein continuous hemodynamic catheter  11/22/17    1046      less than 1    Peripheral IV Line - Single Lumen 11/20/17 2200 basilic vein (medial side of arm), left 18 gauge  11/20/17    2200      1    Y Chest Tube 1 and 2 11/22/17 11/22/17          less than 1    Chest Tube 11/22/17 0855 chest tube placed by physician mediastinum  11/22/17    0855      less than 1    Naso/Oral/Gastric Tube 11/22/17 1046 right nostril  11/22/17    1046      less than 1    Urethral Catheter 11/22/17 0730 100% silicone 16 10 19  11/22/17    0730      less than 1    Airway 11/22/17 0620 oral;endotracheal tube with subglottic suction  11/22/17    0620      less than 1    Arterial Line 11/22/17 0620 Right radial artery 20 gauge  11/22/17    0620      less than 1    Percutaneous Central Line - Double Lumen 11/22/17 0753  11/22/17    0753 created via procedure documentation    less than 1                Hematology:    Results from last 7 days  Lab Units 11/24/17  0410 11/23/17  0349 11/22/17  1429 11/22/17  1119 11/22/17  1004 11/22/17  0937 11/22/17  0914  11/21/17  0519 11/20/17  2042   WBC 10*3/mm3 11.10* 13.71* 13.55* 15.58*  --   --   --   --  7.49 8.24   HEMOGLOBIN g/dL 11.0* 11.1* 11.4* 13.1  --   --   --   --  14.2 15.5   HEMOGLOBIN, POC g/dL  --   --   --   --  10.9* 9.9* 10.2*  < >  --   --    HEMATOCRIT % 32.9* 32.6* 33.7* 37.5*  --   --   --   --  42.0 45.0   HEMATOCRIT POC %  --   --   --   --  32* 29* 30*  < >  --   --    PLATELETS 10*3/mm3 168 201 192 229  --   --   --   --  143* 162   < > = values in this interval not  displayed.  Electrolytes, Magnesium and Phosphorus:    Results from last 7 days  Lab Units 11/24/17  0410 11/23/17  0349 11/22/17  1429 11/22/17  1119 11/21/17  0516 11/20/17 2042   SODIUM mmol/L 128* 137 140 133 136 136   CHLORIDE mmol/L 95* 103 105 101 102 100   POTASSIUM mmol/L 4.7 4.3 3.9 4.2 4.2 4.1   CO2 mmol/L 27.0 26.0 28.0 25.0 27.0 24.0   MAGNESIUM mg/dL  --  2.3 3.0* 3.8*  --   --    PHOSPHORUS mg/dL  --  4.8 2.3* 3.4  --   --      Renal:    Results from last 7 days  Lab Units 11/24/17 0410 11/23/17 0349 11/22/17 1429 11/22/17 1119 11/21/17  0516 11/20/17 2042   CREATININE mg/dL 1.60* 1.40* 1.50* 1.40* 1.30 1.50*   BUN mg/dL 41* 29* 29* 29* 25* 23     Estimated Creatinine Clearance: 50.3 mL/min (by C-G formula based on Cr of 1.6).  Hepatic:    Results from last 7 days  Lab Units 11/24/17 0410 11/20/17 2042   ALK PHOS U/L 68 56   BILIRUBIN mg/dL 0.8 0.5   BILIRUBIN DIRECT mg/dL 0.3*  --    ALT (SGPT) U/L 346* 52*   AST (SGOT) U/L 195* 23     Arterial Blood Gases:    Results from last 7 days  Lab Units 11/22/17  1442 11/22/17  1112 11/22/17  1004 11/22/17  0937 11/22/17  0914 11/22/17  0850 11/22/17  0836   PH, ARTERIAL pH units 7.387 7.327* 7.34* 7.40 7.33* 7.26* 7.27*   PCO2, ARTERIAL mm Hg 44.4 46.0  --   --   --   --   --    PO2 ART mm Hg 138.0* 170.0*  --   --   --   --   --    FIO2 % 40 100  --   --   --   --   --          Results from last 7 days  Lab Units 11/20/17 2042   HEMOGLOBIN A1C % 7.10*       No results found for: LACTATE    Relevant imaging studies and labs from 11/24/17 were reviewed and interpreted by me    Medications (drips):    niCARdipine    nitroglycerin    norepinephrine Last Rate: Stopped (11/23/17 1200)   phenylephrine          aspirin 325 mg Oral Daily   atorvastatin 80 mg Oral Nightly   brimonidine 1 drop Both Eyes Q12H   And      timolol 1 drop Both Eyes Q12H   fenofibrate 145 mg Oral Daily   insulin detemir 30 Units Subcutaneous Q12H   insulin lispro 0-14 Units  Subcutaneous 4x Daily AC & at Bedtime   insulin lispro 10 Units Subcutaneous TID With Meals   metoprolol tartrate 12.5 mg Oral Q12H   pharmacy consult - MTM  Does not apply Daily   sennosides-docusate sodium 2 tablet Oral BID   sertraline 50 mg Oral Daily   sirolimus 1 mg Oral Daily       Assessment/Plan   IMPRESSION / PLAN     Inpatient Problem List:  62 y.o.male:  Hospital Problem List     Coronary artery disease involving native coronary artery of native heart with angina pectoris    Overview Addendum 11/23/2017 11:12 AM by Akil Barba IV, MD     · Echo (12/21/17): LVEF 60%.  No significant valvular abnormality  · CABG by Jordi Guaman (11/22/17): LIMA to LAD, SVG to OM, SVG to RPL         Hepatitis C    Hx of liver transplant    Essential hypertension    Hyperlipidemia LDL goal <70    Type 2 diabetes mellitus           Impression:  62 y.o.male with relevant PMH of Liver Transplant 2001, HTN, HLD, IDDM, h/o Hep C, CAD, Thrombocytopenia admitted 11/20/2017 post op 3vCABG+EVH&LIMA    Plan:    Liver Transplant - remote (2001), continue immunosuppression, monitor for infection    IDDM (A1C 7.1) - increase SQ insulin, d/c dextrose in IVF    Post op 3vCABG - per CTS    Hypotension - off pressors, slight bump in Cr, monitor    Monitor for gastric ileus (large air bubble on CXR)    Foot Pumps    Nutrition - Diet Regular; Cardiac, Consistent Carbohydrate    Plan of care and goals reviewed with mulitdisciplinary team at daily rounds           Eligio Mota MD  Intensive Care Medicine  11/24/17 12:48 PM

## 2017-11-24 NOTE — PLAN OF CARE
Problem: Patient Care Overview (Adult)  Goal: Plan of Care Review  Outcome: Ongoing (interventions implemented as appropriate)    11/24/17 1836   Coping/Psychosocial Response Interventions   Plan Of Care Reviewed With patient   Patient Care Overview   Progress progress toward functional goals as expected   Outcome Evaluation   Outcome Summary/Follow up Plan Pt weened off pressors, ambulated in hallway x2 200 ft. cont to pullmonary toilet and mgt pain. blood glucose remains high, insulin increased pre pharmacy. CT and wires remain. VSS          Problem: Cardiac Surgery (Adult)  Goal: Signs and Symptoms of Listed Potential Problems Will be Absent or Manageable (Cardiac Surgery)  Outcome: Ongoing (interventions implemented as appropriate)    11/24/17 1836   Cardiac Surgery   Problems Assessed (Cardiac Surgery) all   Problems Present (Cardiac Surgery) pain;acute renal impairment;hemodynamic instability;situational response;electrolyte imbalance         Problem: Pressure Ulcer Risk (Abdullahi Scale) (Adult,Obstetrics,Pediatric)  Goal: Identify Related Risk Factors and Signs and Symptoms  Outcome: Outcome(s) achieved Date Met:  11/24/17 11/24/17 1836   Pressure Ulcer Risk (Abdullahi Scale)   Related Risk Factors (Pressure Ulcer Risk (Abdullahi Scale)) critical care admission;mobility impaired;length of surgery;tissue perfusion altered       Goal: Skin Integrity  Outcome: Ongoing (interventions implemented as appropriate)    11/24/17 1836   Pressure Ulcer Risk (Abdullahi Scale) (Adult,Obstetrics,Pediatric)   Skin Integrity making progress toward outcome         Problem: Pain, Acute (Adult)  Goal: Identify Related Risk Factors and Signs and Symptoms  Outcome: Ongoing (interventions implemented as appropriate)    11/24/17 1836   Pain, Acute   Related Risk Factors (Acute Pain) fear;positioning   Signs and Symptoms (Acute Pain) questions meaning of pain       Goal: Acceptable Pain Control/Comfort Level  Outcome: Ongoing (interventions  implemented as appropriate)    11/24/17 4848   Pain, Acute (Adult)   Acceptable Pain Control/Comfort Level making progress toward outcome

## 2017-11-24 NOTE — PROGRESS NOTES
Cardiothoracic Surgery Progress Note      POD # 2 s/p CABGX3       LOS: 4 days      Subjective:  Up in chair, sore        Objective:  Vital Signs  Temp:  [97.5 °F (36.4 °C)-99 °F (37.2 °C)] 98.4 °F (36.9 °C)  Heart Rate:  [64-84] 82  Resp:  [16-24] 20  BP: ()/(58-79) 109/65  Arterial Line BP: ()/(53-70) 99/55    Physical Exam:   Lungs: clear to auscultation, respirations regular, respirations even and respirations unlabored   Heart: Regular rate and rhythm, no rubs, murmurs, gallops    Skin: Incision c/d/i     Results:    Results from last 7 days  Lab Units 11/24/17  0410   WBC 10*3/mm3 11.10*   HEMOGLOBIN g/dL 11.0*   HEMATOCRIT % 32.9*   PLATELETS 10*3/mm3 168       Results from last 7 days  Lab Units 11/24/17  0410   SODIUM mmol/L 128*   POTASSIUM mmol/L 4.7   CHLORIDE mmol/L 95*   CO2 mmol/L 27.0   BUN mg/dL 41*   CREATININE mg/dL 1.60*   GLUCOSE mg/dL 273*   CALCIUM mg/dL 9.2         Assessment:  BUN/CRE 41/1.6  [id number] last 24 hours, slowing    Plan:  Continue CTs till am  Then to tele    DEREK Keith  11/24/17  7:19 AM

## 2017-11-24 NOTE — THERAPY TREATMENT NOTE
Acute Care - Physical Therapy Treatment Note  [facility]     Patient Name: Quirino Sheppard  : 1955  MRN: [id number]  Today's Date: 2017  Onset of Illness/Injury or Date of Surgery Date: 17  Date of Referral to PT: 17  Referring Physician: DEREK Ya    Admit Date: 2017    Visit Dx:    ICD-10-CM ICD-9-CM   1. Coronary artery disease involving native coronary artery of native heart, angina presence unspecified I25.10 414.01   2. Encounter for examination for normal comparison and control in clinical research program Z00.6 V70.7   3. Impaired functional mobility, balance, gait, and endurance Z74.09 V49.89     Patient Active Problem List   Diagnosis   • Coronary artery disease involving native coronary artery of native heart with angina pectoris   • Hepatitis C   • Hx of liver transplant   • Essential hypertension   • Hyperlipidemia LDL goal <70   • Type 2 diabetes mellitus               Adult Rehabilitation Note       17 0825          Rehab Assessment/Intervention    Discipline physical therapist  -CARLA      Document Type therapy note (daily note)  -CARLA      Subjective Information no complaints;agree to therapy  -CARLA      Patient Effort, Rehab Treatment good  -CARLA      Symptoms Noted During/After Treatment none  -CARLA      Precautions/Limitations oxygen therapy device and L/min;cardiac precautions;sternal precautions  -CARLA      Recorded by [CARLA] Dana Ford, PT      Vital Signs    Pre Systolic BP Rehab 113  -CARLA      Pre Treatment Diastolic BP 60  -CARLA      Post Systolic BP Rehab 123  -CARLA      Post Treatment Diastolic BP 74  -CARLA      Pretreatment Heart Rate (beats/min) 84  -CARLA      Posttreatment Heart Rate (beats/min) 90  -CARLA      Pre SpO2 (%) 98  -CARLA      O2 Delivery Pre Treatment supplemental O2  -CARLA      Post SpO2 (%) 99  -CARLA      O2 Delivery Post Treatment supplemental O2  -CARLA      Pre Patient Position Sitting  -CARLA      Intra Patient Position Standing  -CARLA      Post Patient  Position Supine  -CARLA      Recorded by [CARLA] Dana Ford PT      Pain Assessment    Pain Score 3  -CARLA      Post Pain Score 3  -CARLA      Pain Location Mediastinum  -CARLA      Pain Intervention(s) Repositioned;Ambulation/increased activity  -CARLA      Response to Interventions --   tolerated  -CARLA      Recorded by [CARLA] Dana Ford PT      Cognitive Assessment/Intervention    Current Cognitive/Communication Assessment functional  -CARLA      Orientation Status oriented x 4  -CARLA      Follows Commands/Answers Questions 100% of the time  -CARLA      Personal Safety WNL/WFL  -CARLA      Personal Safety Interventions gait belt;nonskid shoes/slippers when out of bed  -CARLA      Recorded by [CARLA] Dana Ford PT      Bed Mobility, Assessment/Treatment    Bed Mobility, Assistive Device draw sheet  -CARLA      Bed Mobility, Scoot/Bridge, [place] moderate assist (50% patient effort);2 person assist required  -CARLA      Bed Mob, Sit to Supine, [place] moderate assist (50% patient effort);2 person assist required  -CARLA      Bed Mobility, Safety Issues decreased use of arms for pushing/pulling;decreased use of legs for bridging/pushing  -CARLA      Bed Mobility, Impairments strength decreased  -CARLA      Bed Mobility, Comment cues for sequencing and maintaining sternal precautions  -CARLA      Recorded by [CARLA] Dana Ford PT      Transfer Assessment/Treatment    Transfers, Chair-Bed [place] minimum assist (75% patient effort);2 person assist required  -CARLA      Transfers, Sit-Stand [place] minimum assist (75% patient effort);2 person assist required  -CARLA      Transfers, Stand-Sit [place] minimum assist (75% patient effort);2 person assist required  -CARLA      Transfer, Impairments impaired balance;strength decreased  -CARLA      Transfer, Comment sterna precautions   -CARLA      Recorded by [CARLA] Dana Ford PT      Gait Assessment/Treatment    Gait, [place] Level hand held assist;2 person assist required  -CARLA       Gait, Distance (Feet) 200  -CARLA      Gait, Gait Pattern Analysis swing-to gait  -CARLA      Gait, Gait Deviations step length decreased  -CARLA      Gait, Safety Issues step length decreased;supplemental O2;balance decreased during turns  -CARLA      Gait, Impairments strength decreased;impaired balance  -CARLA      Gait, Comment improved mobility today  -CARLA      Recorded by [CARLA] Dana Ford, PT      Balance Skills Training    Sitting-Level of Assistance Close supervision  -CARLA      Sitting-Balance Support Feet supported  -CARLA      Standing-Level of Assistance Minimum assistance;x2  -CARLA      Gait Balance-Level of Assistance Minimum assistance;x2  -CARLA      Recorded by [CARLA] Dana Ford PT      Therapy Exercises    Bilateral Lower Extremities AROM:;10 reps;sitting;ankle pumps/circles;hip flexion;LAQ  -CARLA      Recorded by [CARLA] Dana Ford PT      Positioning and Restraints    Pre-Treatment Position sitting in chair/recliner  -CARLA      Post Treatment Position bed  -CARLA      In Bed notified nsg;supine;call light within reach;with nsg  -CRALA      Recorded by [CARLA] Dana Ford PT        User Key  (r) = Recorded By, (t) = Taken By, (c) = Cosigned By    Initials Name Effective Dates    CARLA Ford, PT 06/19/15 -                 IP PT Goals       11/23/17 1040          Bed Mobility PT LTG    Bed Mobility PT LTG, Date Established 11/23/17  -SJ      Bed Mobility PT LTG, Time to Achieve 2 wks  -SJ      Bed Mobility PT LTG, Activity Type roll left/roll right;supine to sit/sit to supine  -      Bed Mobility PT LTG, [place] Level conditional independence  -      Bed Mobility PT LTG, Outcome goal ongoing  -SJ      Transfer Training PT LTG    Transfer Training PT LTG, Date Established 11/23/17  -SJ      Transfer Training PT LTG, Time to Achieve 2 wks  -SJ      Transfer Training PT LTG, Activity Type bed to chair /chair to bed;sit to stand/stand to sit  -SJ      Transfer Training PT LTG, [place] Level  conditional independence  -SJ      Transfer Training PT LTG, Outcome goal ongoing  -SJ      Gait Training PT LTG    Gait Training Goal PT LTG, Date Established 11/23/17  -SJ      Gait Training Goal PT LTG, Time to Achieve 2 wks  -SJ      Gait Training Goal PT LTG, [place] Level conditional independence  -SJ      Gait Training Goal PT LTG, Distance to Achieve 200  -SJ      Gait Training Goal PT LTG, Outcome goal ongoing  -SJ      Stair Training PT LTG    Stair Training Goal PT LTG, Date Established 11/23/17  -SJ      Stair Training Goal PT LTG, Time to Achieve 2 wks  -SJ      Stair Training Goal PT LTG, Number of Steps 13  -SJ      Stair Training Goal PT LTG, [place] Level contact guard assist  -SJ      Stair Training Goal PT LTG, Assist Device 1 handrail  -SJ      Stair Training Goal PT LTG, Outcome goal ongoing  -SJ        User Key  (r) = Recorded By, (t) = Taken By, (c) = Cosigned By    Initials Name Provider Type    ROXI Wood PT Physical Therapist          Physical Therapy Education     Title: PT OT SLP Therapies (Active)     Topic: Physical Therapy (Active)     Point: Mobility training (Active)    Learning Progress Summary    Learner Readiness Method Response Comment Documented by Status   Patient Acceptance E NR   11/24/17 1028 Active    Acceptance E,D NR   11/23/17 1045 Active               Point: Home exercise program (Active)    Learning Progress Summary    Learner Readiness Method Response Comment Documented by Status   Patient Acceptance E NR   11/24/17 1028 Active    Acceptance E,D NR   11/23/17 1045 Active               Point: Body mechanics (Active)    Learning Progress Summary    Learner Readiness Method Response Comment Documented by Status   Patient Acceptance E NR   11/24/17 1028 Active    Acceptance E,D NR   11/23/17 1045 Active               Point: Precautions (Active)    Learning Progress Summary    Learner Readiness Method Response Comment Documented by Status    Patient Acceptance E NR  CARLA 11/24/17 1028 Active    Acceptance E,D NR   11/23/17 1045 Active                      User Key     Initials Effective Dates Name Provider Type Discipline    CARLA 06/19/15 -  Dana Ford, PT Physical Therapist PT     06/19/15 -  Annelise Wood, PT Physical Therapist PT                    PT Recommendation and Plan  Anticipated Equipment Needs At Discharge:  (TBD)  Anticipated Discharge Disposition: inpatient rehabilitation facility  Planned Therapy Interventions: balance training, bed mobility training, gait training, home exercise program, patient/family education, stair training, strengthening, transfer training  PT Frequency: daily  Plan of Care Review  Plan Of Care Reviewed With: patient  Progress: improving  Outcome Summary/Follow up Plan: patient able to ambulate 200 ft with less assist today still requires max cueing for sternal precautions          Outcome Measures       11/24/17 0825 11/23/17 0930       How much help from another person do you currently need...    Turning from your back to your side while in flat bed without using bedrails? 2  -CARLA 2  -SJ     Moving from lying on back to sitting on the side of a flat bed without bedrails? 2  -CARLA 2  -SJ     Moving to and from a bed to a chair (including a wheelchair)? 3  -CARLA 2  -SJ     Standing up from a chair using your arms (e.g., wheelchair, bedside chair)? 3  -CARLA 2  -SJ     Climbing 3-5 steps with a railing? 2  -CARLA 1  -SJ     To walk in hospital room? 3  -CARLA 2  -SJ     AM-PAC 6 Clicks Score 15  -CARLA 11  -SJ     Functional Assessment    Outcome Measure Options  AM-PAC 6 Clicks Basic Mobility (PT)  -SJ       User Key  (r) = Recorded By, (t) = Taken By, (c) = Cosigned By    Initials Name Provider Type    CARLA Ford, PT Physical Therapist    SJ Annelise Wood, PT Physical Therapist           Time Calculation:         PT Charges       11/24/17 1030          Time Calculation    Start Time 0825  -CARLA      PT  Received On 11/24/17  -CARLA      PT Goal Re-Cert Due Date 12/03/17  -CARLA      Time Calculation- PT    Total Timed Code Minutes- PT 24 minute(s)  -CARLA        User Key  (r) = Recorded By, (t) = Taken By, (c) = Cosigned By    Initials Name Provider Type    CARLA Ford PT Physical Therapist          Therapy Charges for Today     Code Description Service Date Service Provider Modifiers Qty    60211844397 HC PT THER PROC EA 15 MIN 11/24/2017 Dana Ford, PT GP 2    84871540309 HC PT THER SUPP EA 15 MIN 11/24/2017 Dana Ford, PT GP 1          PT G-Codes  Outcome Measure Options: AM-PAC 6 Clicks Basic Mobility (PT)    Dana Ford PT  11/24/2017

## 2017-11-24 NOTE — PLAN OF CARE
Problem: Patient Care Overview (Adult)  Goal: Plan of Care Review  Outcome: Ongoing (interventions implemented as appropriate)    11/24/17 1028   Coping/Psychosocial Response Interventions   Plan Of Care Reviewed With patient   Patient Care Overview   Progress improving   Outcome Evaluation   Outcome Summary/Follow up Plan patient able to ambulate 200 ft with less assist today still requires max cueing for sternal precautions         Problem: Inpatient Physical Therapy  Goal: Bed Mobility Goal LTG- PT  Outcome: Ongoing (interventions implemented as appropriate)    11/23/17 1040   Bed Mobility PT LTG   Bed Mobility PT LTG, Date Established 11/23/17   Bed Mobility PT LTG, Time to Achieve 2 wks   Bed Mobility PT LTG, Activity Type roll left/roll right;supine to sit/sit to supine   Bed Mobility PT LTG, [place] Level conditional independence   Bed Mobility PT LTG, Outcome goal ongoing       Goal: Transfer Training Goal 1 LTG- PT  Outcome: Ongoing (interventions implemented as appropriate)    11/23/17 1040   Transfer Training PT LTG   Transfer Training PT LTG, Date Established 11/23/17   Transfer Training PT LTG, Time to Achieve 2 wks   Transfer Training PT LTG, Activity Type bed to chair /chair to bed;sit to stand/stand to sit   Transfer Training PT LTG, [place] Level conditional independence   Transfer Training PT LTG, Outcome goal ongoing       Goal: Gait Training Goal LTG- PT  Outcome: Ongoing (interventions implemented as appropriate)    11/23/17 1040   Gait Training PT LTG   Gait Training Goal PT LTG, Date Established 11/23/17   Gait Training Goal PT LTG, Time to Achieve 2 wks   Gait Training Goal PT LTG, [place] Level conditional independence   Gait Training Goal PT LTG, Distance to Achieve 200   Gait Training Goal PT LTG, Outcome goal ongoing       Goal: Stair Training Goal LTG- PT  Outcome: Ongoing (interventions implemented as appropriate)    11/23/17 1040   Stair Training PT LTG   Stair Training  Goal PT LTG, Date Established 11/23/17   Stair Training Goal PT LTG, Time to Achieve 2 wks   Stair Training Goal PT LTG, Number of Steps 13   Stair Training Goal PT LTG, [place] Level contact guard assist   Stair Training Goal PT LTG, Assist Device 1 handrail   Stair Training Goal PT LTG, Outcome goal ongoing

## 2017-11-24 NOTE — PLAN OF CARE
Problem: Patient Care Overview (Adult)  Goal: Plan of Care Review  Outcome: Ongoing (interventions implemented as appropriate)    11/24/17 0530   Coping/Psychosocial Response Interventions   Plan Of Care Reviewed With patient   Patient Care Overview   Progress improving   Outcome Evaluation   Outcome Summary/Follow up Plan Pt is on 3L NC, no drips, MT's continued with minimal output, and moderate anxiety. Pt c/o constant gas discomfort; medicated per order.        Goal: Discharge Needs Assessment  Outcome: Ongoing (interventions implemented as appropriate)    11/24/17 0530   Discharge Needs Assessment   Discharge Disposition still a patient         Problem: Cardiac Surgery (Adult)  Goal: Signs and Symptoms of Listed Potential Problems Will be Absent or Manageable (Cardiac Surgery)  Outcome: Ongoing (interventions implemented as appropriate)    11/24/17 0530   Cardiac Surgery   Problems Assessed (Cardiac Surgery) all   Problems Present (Cardiac Surgery) pain         Problem: Pressure Ulcer Risk (Abdullahi Scale) (Adult,Obstetrics,Pediatric)  Goal: Identify Related Risk Factors and Signs and Symptoms  Outcome: Ongoing (interventions implemented as appropriate)    11/24/17 0530   Pressure Ulcer Risk (Abdullahi Scale)   Related Risk Factors (Pressure Ulcer Risk (Abdullahi Scale)) critical care admission;medication;mobility impaired       Goal: Skin Integrity  Outcome: Ongoing (interventions implemented as appropriate)    11/24/17 0530   Pressure Ulcer Risk (Abdullahi Scale) (Adult,Obstetrics,Pediatric)   Skin Integrity making progress toward outcome         Problem: Pain, Acute (Adult)  Goal: Identify Related Risk Factors and Signs and Symptoms  Outcome: Ongoing (interventions implemented as appropriate)    11/24/17 0530   Pain, Acute   Related Risk Factors (Acute Pain) fear;knowledge deficit;patient perception;persistent pain;procedure/treatment;psychosocial factor;surgery   Signs and Symptoms (Acute Pain) altered time  perception;BADLs/IADLs reluctance/inability to perform;fatigue/weakness;impaired thought process/concentration;moaning;nausea/vomiting/anorexia;sleep pattern alteration;verbalization of pain descriptors       Goal: Acceptable Pain Control/Comfort Level  Outcome: Ongoing (interventions implemented as appropriate)    11/24/17 0530   Pain, Acute (Adult)   Acceptable Pain Control/Comfort Level making progress toward outcome

## 2017-11-24 NOTE — PROGRESS NOTES
[facility] at [facility]  IP Progress Note      Chief Complaint/Reason for service:    · Post Op HTN  · Multi Vessel CAD s/p CABG         Patient lying in bed sleeping but arouses easily.  Denies chest pain or dyspnea.     Past medical, surgical, social and family history reviewed in the patient's electronic medical record.           Vital Sign Min/Max for last 24 hours  Temp  Min: 97.5 °F (36.4 °C)  Max: 98.6 °F (37 °C)   BP  Min: 87/65  Max: 130/76   Pulse  Min: 64  Max: 85   Resp  Min: 16  Max: 24   SpO2  Min: 89 %  Max: 99 %   Flow (L/min)  Min: 2  Max: 4      Intake/Output Summary (Last 24 hours) at 11/24/17 0941  Last data filed at 11/24/17 0600   Gross per 24 hour   Intake              926 ml   Output             1425 ml   Net             -499 ml           Physical Exam   Constitutional: He is oriented to person, place, and time. He appears well-developed and well-nourished.   HENT:   Head: Normocephalic.   Neck: No JVD present. Carotid bruit is not present.   Cardiovascular: Normal rate, regular rhythm and normal heart sounds.  Exam reveals no gallop and no friction rub.    No murmur heard.  Pulmonary/Chest: Effort normal and breath sounds normal.   Abdominal: Soft. Bowel sounds are normal. He exhibits no distension.   Neurological: He is alert and oriented to person, place, and time.   Skin: Skin is warm and dry.   Psychiatric: He has a normal mood and affect.       Results Review:   I reviewed the patient's recent labs in the electronic medical record.      EKG:  NSR 11/24/2017    Tele:  [facility]         Hospital Problem List     Coronary artery disease involving native coronary artery of native heart with angina pectoris    Overview Addendum 11/23/2017 11:12 AM by Akil Barba IV, MD     · Echo (12/21/17): LVEF 60%.  No significant valvular abnormality  · CABG by Jordi Guaman (11/22/17): LIMA to LAD, SVG to OM, SVG to RPL         Hepatitis C    Hx of liver transplant     Essential hypertension    Hyperlipidemia LDL goal <70    Type 2 diabetes mellitus                 · Likely transfer to telemetry tomorrow once CT drains removed  · Continue routine post op care    Dede Payne, APRN  11/24/2017

## 2017-11-25 LAB
ALBUMIN SERPL-MCNC: 3.9 G/DL (ref 3.2–4.8)
ALP SERPL-CCNC: 62 U/L (ref 25–100)
ALT SERPL W P-5'-P-CCNC: 240 U/L (ref 7–40)
ANION GAP SERPL CALCULATED.3IONS-SCNC: 6 MMOL/L (ref 3–11)
AST SERPL-CCNC: 58 U/L (ref 0–33)
BILIRUB CONJ SERPL-MCNC: 0.2 MG/DL (ref 0–0.2)
BILIRUB INDIRECT SERPL-MCNC: 0.4 MG/DL (ref 0.1–1.1)
BILIRUB SERPL-MCNC: 0.6 MG/DL (ref 0.3–1.2)
BUN BLD-MCNC: 37 MG/DL (ref 9–23)
BUN/CREAT SERPL: 33.6 (ref 7–25)
CALCIUM SPEC-SCNC: 9.1 MG/DL (ref 8.7–10.4)
CHLORIDE SERPL-SCNC: 99 MMOL/L (ref 99–109)
CO2 SERPL-SCNC: 29 MMOL/L (ref 20–31)
CREAT BLD-MCNC: 1.1 MG/DL (ref 0.6–1.3)
DEPRECATED RDW RBC AUTO: 44.2 FL (ref 37–54)
ERYTHROCYTE [DISTWIDTH] IN BLOOD BY AUTOMATED COUNT: 13.2 % (ref 11.3–14.5)
GFR SERPL CREATININE-BSD FRML MDRD: 68 ML/MIN/1.73
GLUCOSE BLD-MCNC: 174 MG/DL (ref 70–100)
GLUCOSE BLDC GLUCOMTR-MCNC: 116 MG/DL (ref 70–130)
GLUCOSE BLDC GLUCOMTR-MCNC: 164 MG/DL (ref 70–130)
GLUCOSE BLDC GLUCOMTR-MCNC: 192 MG/DL (ref 70–130)
GLUCOSE BLDC GLUCOMTR-MCNC: 197 MG/DL (ref 70–130)
GLUCOSE BLDC GLUCOMTR-MCNC: 202 MG/DL (ref 70–130)
GLUCOSE BLDC GLUCOMTR-MCNC: 204 MG/DL (ref 70–130)
GLUCOSE BLDC GLUCOMTR-MCNC: 213 MG/DL (ref 70–130)
GLUCOSE BLDC GLUCOMTR-MCNC: 238 MG/DL (ref 70–130)
GLUCOSE BLDC GLUCOMTR-MCNC: 274 MG/DL (ref 70–130)
GLUCOSE BLDC GLUCOMTR-MCNC: 305 MG/DL (ref 70–130)
GLUCOSE BLDC GLUCOMTR-MCNC: 319 MG/DL (ref 70–130)
GLUCOSE BLDC GLUCOMTR-MCNC: 320 MG/DL (ref 70–130)
GLUCOSE BLDC GLUCOMTR-MCNC: 407 MG/DL (ref 70–130)
GLUCOSE BLDC GLUCOMTR-MCNC: 409 MG/DL (ref 70–130)
HCT VFR BLD AUTO: 32.6 % (ref 38.9–50.9)
HGB BLD-MCNC: 10.9 G/DL (ref 13.1–17.5)
MCH RBC QN AUTO: 31 PG (ref 27–31)
MCHC RBC AUTO-ENTMCNC: 33.4 G/DL (ref 32–36)
MCV RBC AUTO: 92.6 FL (ref 80–99)
PLATELET # BLD AUTO: 160 10*3/MM3 (ref 150–450)
PMV BLD AUTO: 9.7 FL (ref 6–12)
POTASSIUM BLD-SCNC: 4.6 MMOL/L (ref 3.5–5.5)
PROT SERPL-MCNC: 6.4 G/DL (ref 5.7–8.2)
RBC # BLD AUTO: 3.52 10*6/MM3 (ref 4.2–5.76)
SODIUM BLD-SCNC: 134 MMOL/L (ref 132–146)
WBC NRBC COR # BLD: 7.5 10*3/MM3 (ref 3.5–10.8)

## 2017-11-25 PROCEDURE — 99232 SBSQ HOSP IP/OBS MODERATE 35: CPT | Performed by: INTERNAL MEDICINE

## 2017-11-25 PROCEDURE — 85027 COMPLETE CBC AUTOMATED: CPT | Performed by: PHYSICIAN ASSISTANT

## 2017-11-25 PROCEDURE — 80076 HEPATIC FUNCTION PANEL: CPT

## 2017-11-25 PROCEDURE — 63710000001 INSULIN DETEMIR PER 5 UNITS: Performed by: INTERNAL MEDICINE

## 2017-11-25 PROCEDURE — 80048 BASIC METABOLIC PNL TOTAL CA: CPT | Performed by: PHYSICIAN ASSISTANT

## 2017-11-25 PROCEDURE — 25010000002 INSULIN REGULAR HUMAN PER 5 UNITS: Performed by: NURSE PRACTITIONER

## 2017-11-25 PROCEDURE — 82962 GLUCOSE BLOOD TEST: CPT

## 2017-11-25 PROCEDURE — 97110 THERAPEUTIC EXERCISES: CPT

## 2017-11-25 RX ORDER — ALPRAZOLAM 0.25 MG/1
0.25 TABLET ORAL 3 TIMES DAILY PRN
Status: DISCONTINUED | OUTPATIENT
Start: 2017-11-25 | End: 2017-11-29 | Stop reason: HOSPADM

## 2017-11-25 RX ADMIN — TIMOLOL MALEATE 1 DROP: 5 SOLUTION/ DROPS OPHTHALMIC at 20:33

## 2017-11-25 RX ADMIN — HYDROCODONE BITARTRATE AND ACETAMINOPHEN 1 TABLET: 7.5; 325 TABLET ORAL at 03:39

## 2017-11-25 RX ADMIN — SIMETHICONE CHEW TAB 80 MG 80 MG: 80 TABLET ORAL at 14:19

## 2017-11-25 RX ADMIN — TIMOLOL MALEATE 1 DROP: 5 SOLUTION/ DROPS OPHTHALMIC at 09:44

## 2017-11-25 RX ADMIN — ASPIRIN 325 MG: 325 TABLET, DELAYED RELEASE ORAL at 09:44

## 2017-11-25 RX ADMIN — METOPROLOL TARTRATE 12.5 MG: 25 TABLET, FILM COATED ORAL at 09:44

## 2017-11-25 RX ADMIN — SERTRALINE 50 MG: 50 TABLET, FILM COATED ORAL at 05:27

## 2017-11-25 RX ADMIN — FENOFIBRATE 145 MG: 145 TABLET ORAL at 09:44

## 2017-11-25 RX ADMIN — ATORVASTATIN CALCIUM 80 MG: 40 TABLET, FILM COATED ORAL at 20:33

## 2017-11-25 RX ADMIN — BRIMONIDINE TARTRATE 1 DROP: 2 SOLUTION/ DROPS OPHTHALMIC at 20:33

## 2017-11-25 RX ADMIN — Medication 2 TABLET: at 09:44

## 2017-11-25 RX ADMIN — ALPRAZOLAM 0.25 MG: 0.25 TABLET ORAL at 20:33

## 2017-11-25 RX ADMIN — HYDROCODONE BITARTRATE AND ACETAMINOPHEN 1 TABLET: 7.5; 325 TABLET ORAL at 14:16

## 2017-11-25 RX ADMIN — HYDROCODONE BITARTRATE AND ACETAMINOPHEN 1 TABLET: 7.5; 325 TABLET ORAL at 22:16

## 2017-11-25 RX ADMIN — INSULIN DETEMIR 30 UNITS: 100 INJECTION, SOLUTION SUBCUTANEOUS at 09:43

## 2017-11-25 RX ADMIN — BRIMONIDINE TARTRATE 1 DROP: 2 SOLUTION/ DROPS OPHTHALMIC at 09:44

## 2017-11-25 RX ADMIN — INSULIN LISPRO 10 UNITS: 100 INJECTION, SOLUTION INTRAVENOUS; SUBCUTANEOUS at 09:43

## 2017-11-25 RX ADMIN — METOPROLOL TARTRATE 12.5 MG: 25 TABLET, FILM COATED ORAL at 20:36

## 2017-11-25 RX ADMIN — SIROLIMUS 1 MG: 1 TABLET, FILM COATED ORAL at 09:48

## 2017-11-25 RX ADMIN — SODIUM CHLORIDE 4.2 UNITS/HR: 9 INJECTION, SOLUTION INTRAVENOUS at 16:24

## 2017-11-25 NOTE — THERAPY TREATMENT NOTE
Acute Care - Physical Therapy Treatment Note  [facility]     Patient Name: Quirino Sheppard  : 1955  MRN: [id number]  Today's Date: 2017  Onset of Illness/Injury or Date of Surgery Date: 17  Date of Referral to PT: 17  Referring Physician: DEREK Ya    Admit Date: 2017    Visit Dx:    ICD-10-CM ICD-9-CM   1. Coronary artery disease involving native coronary artery of native heart, angina presence unspecified I25.10 414.01   2. Encounter for examination for normal comparison and control in clinical research program Z00.6 V70.7   3. Impaired functional mobility, balance, gait, and endurance Z74.09 V49.89     Patient Active Problem List   Diagnosis   • Coronary artery disease involving native coronary artery of native heart with angina pectoris   • Hepatitis C   • Hx of liver transplant   • Essential hypertension   • Hyperlipidemia LDL goal <70   • Type 2 diabetes mellitus               Adult Rehabilitation Note       17 1027 17 0825       Rehab Assessment/Intervention    Discipline physical therapist  -LS physical therapist  -CARLA     Document Type therapy note (daily note)  -LS therapy note (daily note)  -CARLA     Subjective Information agree to therapy;complains of;pain  -LS no complaints;agree to therapy  -CARLA     Patient Effort, Rehab Treatment good  -LS good  -CARLA     Symptoms Noted During/After Treatment  none  -CARLA     Precautions/Limitations cardiac precautions;oxygen therapy device and L/min;sternal precautions  -LS oxygen therapy device and L/min;cardiac precautions;sternal precautions  -CARLA     Recorded by [LS] Dorita Packer, PT [CARLA] Dana Ford, PT     Vital Signs    Pre Systolic BP Rehab 123  -  -CARLA     Pre Treatment Diastolic BP 59  -LS 60  -CARLA     Post Systolic BP Rehab 152  -  -CARLA     Post Treatment Diastolic BP 85  -LS 74  -CARLA     Pretreatment Heart Rate (beats/min) 84  -LS 84  -CARLA     Posttreatment Heart Rate (beats/min) 80  -LS 90  -CARLA     Pre  SpO2 (%) 95  -LS 98  -CARLA     O2 Delivery Pre Treatment supplemental O2  -LS supplemental O2  -CARLA     Post SpO2 (%) 100  -LS 99  -CARLA     O2 Delivery Post Treatment supplemental O2  -LS supplemental O2  -CARLA     Pre Patient Position Sitting  -LS Sitting  -CARLA     Intra Patient Position Standing  -LS Standing  -CARLA     Post Patient Position Supine  -LS Supine  -CARLA     Recorded by [LS] Dorita Packer, PT [CARLA] Dana Ford, PT     Pain Assessment    Pain Assessment 0-10  -LS      Pain Score 4  -LS 3  -CARLA     Post Pain Score 4  -LS 3  -CARLA     Pain Type Surgical pain  -LS      Pain Location Sternum  -LS Mediastinum  -CARLA     Pain Intervention(s) Repositioned;Ambulation/increased activity  -LS Repositioned;Ambulation/increased activity  -CARLA     Response to Interventions tolerated  -LS --   tolerated  -CARLA     Recorded by [LS] Dorita Packer, PT [CARLA] Dana Ford PT     Cognitive Assessment/Intervention    Current Cognitive/Communication Assessment functional  -LS functional  -CARLA     Orientation Status oriented x 4  -LS oriented x 4  -CARLA     Follows Commands/Answers Questions able to follow single-step instructions;100% of the time;needs cueing  - 100% of the time  -CARLA     Personal Safety mild impairment;decreased awareness, need for safety;decreased insight to deficits  -LS WNL/WFL  -CARLA     Personal Safety Interventions fall prevention program maintained;gait belt;nonskid shoes/slippers when out of bed  -LS gait belt;nonskid shoes/slippers when out of bed  -CARLA     Recorded by [LS] Dorita Packer, PT [CARLA] Dana Ford, PT     Bed Mobility, Assessment/Treatment    Bed Mobility, Assistive Device  draw sheet  -CARLA     Bed Mobility, Scoot/Bridge, [place]  moderate assist (50% patient effort);2 person assist required  -CARLA     Bed Mob, Sit to Supine, [place] moderate assist (50% patient effort);2 person assist required;verbal cues required  -LS moderate assist (50% patient effort);2 person assist required  -CARLA      Bed Mobility, Safety Issues  decreased use of arms for pushing/pulling;decreased use of legs for bridging/pushing  -CARLA     Bed Mobility, Impairments pain;strength decreased  -LS strength decreased  -CARLA     Bed Mobility, Comment  cues for sequencing and maintaining sternal precautions  -CRALA     Recorded by [LS] Dorita Packer, PT [CARLA] Dana Ford PT     Transfer Assessment/Treatment    Transfers, Chair-Bed [place]  minimum assist (75% patient effort);2 person assist required  -CARLA     Transfers, Sit-Stand [place] contact guard assist;2 person assist required;verbal cues required  -LS minimum assist (75% patient effort);2 person assist required  -CARLA     Transfers, Stand-Sit [place] contact guard assist;2 person assist required;verbal cues required  -LS minimum assist (75% patient effort);2 person assist required  -CARLA     Transfer, Impairments impaired balance;strength decreased  -LS impaired balance;strength decreased  -CARLA     Transfer, Comment VC's for appropriate hand placement for maintaining sternal precautions.   -LS sterna precautions   -CARLA     Recorded by [LS] Dorita Packer, PT [CARLA] Dana Ford PT     Gait Assessment/Treatment    Gait, [place] Level contact guard assist;verbal cues required  -LS hand held assist;2 person assist required  -CARLA     Gait, Distance (Feet) 300  -  -CARLA     Gait, Gait Pattern Analysis  swing-to gait  -CARLA     Gait, Gait Deviations ines decreased;step length decreased  -LS step length decreased  -CARLA     Gait, Safety Issues  step length decreased;supplemental O2;balance decreased during turns  -CARLA     Gait, Impairments strength decreased;impaired balance  -LS strength decreased;impaired balance  -CARLA     Gait, Comment VC's for upright posture. Distance limited by fatigue.   -LS improved mobility today  -CARLA     Recorded by [LS] Dorita Packer, PT [CARLA] Dana Ford PT     Motor Skills/Interventions    Additional Documentation Balance Skills  Training (Group)  -LS      Recorded by [LS] Dorita Packer, PT      Balance Skills Training    Sitting-Level of Assistance Close supervision  -LS Close supervision  -CARLA     Sitting-Balance Support Feet supported  -LS Feet supported  -CARLA     Standing-Level of Assistance Contact guard;x2  -LS Minimum assistance;x2  -CARLA     Static Standing Balance Support No upper extremity supported  -LS      Gait Balance-Level of Assistance Contact guard;x2  -LS Minimum assistance;x2  -CARLA     Gait Balance Support No upper extremity supported  -LS      Recorded by [LS] Dorita Packer, PT [CARLA] Dana Ford, PT     Therapy Exercises    Bilateral Lower Extremities AROM:;10 reps;sitting;ankle pumps/circles;hip flexion;LAQ  -LS AROM:;10 reps;sitting;ankle pumps/circles;hip flexion;LAQ  -CARLA     Bilateral Upper Extremity AROM:;10 reps;elbow flexion/extension;shoulder abduction/adduction  -LS      Recorded by [LS] Dorita Packer, PT [CARLA] Dana Ford, PT     Positioning and Restraints    Pre-Treatment Position sitting in chair/recliner  -LS sitting in chair/recliner  -CARLA     Post Treatment Position bed  -LS bed  -CARLA     In Bed notified nsg;supine;call light within reach;encouraged to call for assist;exit alarm on;RUE elevated;LUE elevated;SCD pump applied;with nsg;legs elevated  -LS notified nsg;supine;call light within reach;with nsg  -CARLA     Recorded by [LS] Dorita Packer, PT [CARLA] Dana Ford, PT       User Key  (r) = Recorded By, (t) = Taken By, (c) = Cosigned By    Initials Name Effective Dates    CARLA Ford, PT 06/19/15 -     LS Dorita Packer, PT 06/19/15 -                 IP PT Goals       11/25/17 1407 11/23/17 1040       Bed Mobility PT LTG    Bed Mobility PT LTG, Date Established  11/23/17  -SJ     Bed Mobility PT LTG, Time to Achieve  2 wks  -SJ     Bed Mobility PT LTG, Activity Type  roll left/roll right;supine to sit/sit to supine  -SJ     Bed Mobility PT LTG, [place] Level  conditional independence   -SJ     Bed Mobility PT LTG, Outcome goal ongoing  -LS goal ongoing  -SJ     Transfer Training PT LTG    Transfer Training PT LTG, Date Established  11/23/17  -SJ     Transfer Training PT LTG, Time to Achieve  2 wks  -SJ     Transfer Training PT LTG, Activity Type  bed to chair /chair to bed;sit to stand/stand to sit  -SJ     Transfer Training PT LTG, [place] Level  conditional independence  -SJ     Transfer Training PT LTG, Outcome goal ongoing  -LS goal ongoing  -SJ     Gait Training PT LTG    Gait Training Goal PT LTG, Date Established  11/23/17  -SJ     Gait Training Goal PT LTG, Time to Achieve  2 wks  -SJ     Gait Training Goal PT LTG, [place] Level  conditional independence  -SJ     Gait Training Goal PT LTG, Distance to Achieve  200  -SJ     Gait Training Goal PT LTG, Outcome goal ongoing  -LS goal ongoing  -SJ     Stair Training PT LTG    Stair Training Goal PT LTG, Date Established  11/23/17  -SJ     Stair Training Goal PT LTG, Time to Achieve  2 wks  -SJ     Stair Training Goal PT LTG, Number of Steps  13  -SJ     Stair Training Goal PT LTG, [place] Level  contact guard assist  -SJ     Stair Training Goal PT LTG, Assist Device  1 handrail  -SJ     Stair Training Goal PT LTG, Outcome goal ongoing  -LS goal ongoing  -SJ       User Key  (r) = Recorded By, (t) = Taken By, (c) = Cosigned By    Initials Name Provider Type    ROXI Wood, PT Physical Therapist    LORENA Packer, PT Physical Therapist          Physical Therapy Education     Title: PT OT SLP Therapies (Active)     Topic: Physical Therapy (Active)     Point: Mobility training (Active)    Learning Progress Summary    Learner Readiness Method Response Comment Documented by Status   Patient Acceptance E,D NR  LS 11/25/17 1407 Active    Acceptance E NR  CARLA 11/24/17 1028 Active    Acceptance E,D NR  SJ 11/23/17 1045 Active               Point: Home exercise program (Active)    Learning Progress Summary    Learner Readiness Method  Response Comment Documented by Status   Patient Acceptance E,D NR  LS 11/25/17 1407 Active    Acceptance E NR  CARLA 11/24/17 1028 Active    Acceptance E,D NR   11/23/17 1045 Active               Point: Body mechanics (Active)    Learning Progress Summary    Learner Readiness Method Response Comment Documented by Status   Patient Acceptance E,D NR  LS 11/25/17 1407 Active    Acceptance E NR  CARLA 11/24/17 1028 Active    Acceptance E,D NR  SJ 11/23/17 1045 Active               Point: Precautions (Active)    Learning Progress Summary    Learner Readiness Method Response Comment Documented by Status   Patient Acceptance E,D NR  LS 11/25/17 1407 Active    Acceptance E NR  CARLA 11/24/17 1028 Active    Acceptance E,D NR   11/23/17 1045 Active                      User Key     Initials Effective Dates Name Provider Type Discipline     06/19/15 -  Dana Ford, PT Physical Therapist PT     06/19/15 -  Annelise Wood, PT Physical Therapist PT     06/19/15 -  Dorita Packer, PT Physical Therapist PT                    PT Recommendation and Plan  Anticipated Equipment Needs At Discharge:  (TBD)  Anticipated Discharge Disposition: inpatient rehabilitation facility  Planned Therapy Interventions: balance training, bed mobility training, gait training, home exercise program, patient/family education, stair training, strengthening, transfer training  PT Frequency: daily  Plan of Care Review  Plan Of Care Reviewed With: patient  Progress: improving  Outcome Summary/Follow up Plan: Pt demonstrated increased indep with STS transfers; progressed forward ambulation distance to 300 total ft with CGA. Noted slight decreasedbalance with turns. Will cont to progress as clinically warranted.           Outcome Measures       11/25/17 1027 11/24/17 0825 11/23/17 0930    How much help from another person do you currently need...    Turning from your back to your side while in flat bed without using bedrails? 2  -LS 2  -CARLA 2  -SJ     Moving from lying on back to sitting on the side of a flat bed without bedrails? 2  -LS 2  -CARLA 2  -SJ    Moving to and from a bed to a chair (including a wheelchair)? 3  -LS 3  -CARLA 2  -SJ    Standing up from a chair using your arms (e.g., wheelchair, bedside chair)? 3  -LS 3  -CARLA 2  -SJ    Climbing 3-5 steps with a railing? 2  -LS 2  -CARLA 1  -SJ    To walk in hospital room? 3  -LS 3  -CARLA 2  -SJ    AM-PAC 6 Clicks Score 15  -LS 15  -CARLA 11  -SJ    Functional Assessment    Outcome Measure Options AM-PAC 6 Clicks Basic Mobility (PT)  -LS  AM-PAC 6 Clicks Basic Mobility (PT)  -SJ      User Key  (r) = Recorded By, (t) = Taken By, (c) = Cosigned By    Initials Name Provider Type    CARLA Dana Ford, PT Physical Therapist    SJ Annelise Wood, PT Physical Therapist    LS Dorita Packer, PT Physical Therapist           Time Calculation:         PT Charges       11/25/17 1410          Time Calculation    Start Time 1027  -LS      PT Received On 11/25/17  -      PT Goal Re-Cert Due Date 12/03/17  -      Time Calculation- PT    Total Timed Code Minutes- PT 16 minute(s)  -        User Key  (r) = Recorded By, (t) = Taken By, (c) = Cosigned By    Initials Name Provider Type    LORENA Packer, PT Physical Therapist          Therapy Charges for Today     Code Description Service Date Service Provider Modifiers Qty    93906045793 HC PT THER PROC EA 15 MIN 11/25/2017 Dorita Packer, PT GP 1    39023603369 HC PT THER SUPP EA 15 MIN 11/25/2017 Dorita Packer, PT GP 1          PT G-Codes  Outcome Measure Options: AM-PAC 6 Clicks Basic Mobility (PT)    Dorita Packer, PT  11/25/2017

## 2017-11-25 NOTE — PLAN OF CARE
Problem: Patient Care Overview (Adult)  Goal: Plan of Care Review  Outcome: Ongoing (interventions implemented as appropriate)    11/25/17 1606   Coping/Psychosocial Response Interventions   Plan Of Care Reviewed With patient   Patient Care Overview   Progress progress toward functional goals as expected   Outcome Evaluation   Outcome Summary/Follow up Plan VSS, CT, wires, and cordis DC'd pt ambulated ijn hallway. Blood glucose uncontrolled placed back on insulin gtts. Increased anxiety, treated with PO meds         Problem: Cardiac Surgery (Adult)  Goal: Signs and Symptoms of Listed Potential Problems Will be Absent or Manageable (Cardiac Surgery)  Outcome: Ongoing (interventions implemented as appropriate)    11/25/17 1606   Cardiac Surgery   Problems Assessed (Cardiac Surgery) all   Problems Present (Cardiac Surgery) pain;hemodynamic instability;situational response         Problem: Pressure Ulcer Risk (Abdullahi Scale) (Adult,Obstetrics,Pediatric)  Goal: Skin Integrity  Outcome: Outcome(s) achieved Date Met:  11/25/17 11/25/17 1606   Pressure Ulcer Risk (Abdullahi Scale) (Adult,Obstetrics,Pediatric)   Skin Integrity making progress toward outcome         Problem: Pain, Acute (Adult)  Goal: Identify Related Risk Factors and Signs and Symptoms  Outcome: Ongoing (interventions implemented as appropriate)    11/25/17 1606   Pain, Acute   Related Risk Factors (Acute Pain) psychosocial factor;patient perception   Signs and Symptoms (Acute Pain) sleep pattern alteration       Goal: Acceptable Pain Control/Comfort Level  Outcome: Ongoing (interventions implemented as appropriate)    11/25/17 1606   Pain, Acute (Adult)   Acceptable Pain Control/Comfort Level making progress toward outcome

## 2017-11-25 NOTE — PLAN OF CARE
Problem: Patient Care Overview (Adult)  Goal: Plan of Care Review  Outcome: Ongoing (interventions implemented as appropriate)    11/25/17 1407   Coping/Psychosocial Response Interventions   Plan Of Care Reviewed With patient   Patient Care Overview   Progress improving   Outcome Evaluation   Outcome Summary/Follow up Plan Pt demonstrated increased indep with STS transfers; progressed forward ambulation distance to 300 total ft with CGA. Noted slight decreasedbalance with turns. Will cont to progress as clinically warranted.          Problem: Inpatient Physical Therapy  Goal: Bed Mobility Goal LTG- PT  Outcome: Ongoing (interventions implemented as appropriate)    11/23/17 1040 11/25/17 1407   Bed Mobility PT LTG   Bed Mobility PT LTG, Date Established 11/23/17 --    Bed Mobility PT LTG, Time to Achieve 2 wks --    Bed Mobility PT LTG, Activity Type roll left/roll right;supine to sit/sit to supine --    Bed Mobility PT LTG, [place] Level conditional independence --    Bed Mobility PT LTG, Outcome --  goal ongoing       Goal: Transfer Training Goal 1 LTG- PT  Outcome: Ongoing (interventions implemented as appropriate)    11/23/17 1040 11/25/17 1407   Transfer Training PT LTG   Transfer Training PT LTG, Date Established 11/23/17 --    Transfer Training PT LTG, Time to Achieve 2 wks --    Transfer Training PT LTG, Activity Type bed to chair /chair to bed;sit to stand/stand to sit --    Transfer Training PT LTG, [place] Level conditional independence --    Transfer Training PT LTG, Outcome --  goal ongoing       Goal: Gait Training Goal LTG- PT  Outcome: Ongoing (interventions implemented as appropriate)    11/23/17 1040 11/25/17 1407   Gait Training PT LTG   Gait Training Goal PT LTG, Date Established 11/23/17 --    Gait Training Goal PT LTG, Time to Achieve 2 wks --    Gait Training Goal PT LTG, [place] Level conditional independence --    Gait Training Goal PT LTG, Distance to Achieve 200 --    Gait  Training Goal PT LTG, Outcome --  goal ongoing       Goal: Stair Training Goal LTG- PT  Outcome: Ongoing (interventions implemented as appropriate)    11/23/17 1040 11/25/17 1407   Stair Training PT LTG   Stair Training Goal PT LTG, Date Established 11/23/17 --    Stair Training Goal PT LTG, Time to Achieve 2 wks --    Stair Training Goal PT LTG, Number of Steps 13 --    Stair Training Goal PT LTG, [place] Level contact guard assist --    Stair Training Goal PT LTG, Assist Device 1 handrail --    Stair Training Goal PT LTG, Outcome --  goal ongoing

## 2017-11-25 NOTE — PROGRESS NOTES
"Quirino Sheppard  [id number]  1955     LOS: 5 days   Patient Care Team:  Amol Raymond MD as PCP - General (Rheumatology)    Chief Complaint:       Subjective: Alert some pain    Objective:     Vital Sign Min/Max for last 24 hours  Temp  Min: 97.9 °F (36.6 °C)  Max: 98.6 °F (37 °C)   BP  Min: 97/65  Max: 134/77   Pulse  Min: 76  Max: 90   Resp  Min: 16  Max: 20   SpO2  Min: 91 %  Max: 100 %   Flow (L/min)  Min: 2  Max: 2   No Data Recorded     Flowsheet Rows         First Filed Value    Admission Height  77\" (195.6 cm) Documented at 11/20/2017 1918    Admission Weight  195 lb 9.6 oz (88.7 kg) Documented at 11/20/2017 1918          Physical Exam:    Wound:Satisfactory    Pulses:     Mediastinal and Chest Tube Drainage:       Results Review:     Results from last 7 days  Lab Units 11/25/17  0333   WBC 10*3/mm3 7.50   HEMOGLOBIN g/dL 10.9*   HEMATOCRIT % 32.6*   PLATELETS 10*3/mm3 160       Results from last 7 days  Lab Units 11/25/17  0333   SODIUM mmol/L 134   POTASSIUM mmol/L 4.6   CHLORIDE mmol/L 99   CO2 mmol/L 29.0   BUN mg/dL 37*   CREATININE mg/dL 1.10   GLUCOSE mg/dL 174*   CALCIUM mg/dL 9.1       Results from last 7 days  Lab Units 11/22/17  1442   PH, ARTERIAL pH units 7.387   PO2 ART mm Hg 138.0*   PCO2, ARTERIAL mm Hg 44.4   HCO3 ART mmol/L 26.7*         Assessment    Active Problems:    Coronary artery disease involving native coronary artery of native heart with angina pectoris    Hepatitis C    Hx of liver transplant    Essential hypertension    Hyperlipidemia LDL goal <70    Type 2 diabetes mellitus      DC mediastinal tubes        Zac Rico MD  11/25/17  7:05 AM      Please note that portions of this note were completed with a voice recognition program. Efforts were made to edit the dictations, but words may be mistranscribed  "

## 2017-11-25 NOTE — PROGRESS NOTES
INTENSIVIST / PULMONARY FOLLOW UP NOTE     Hospital:  LOS: 5 days   Mr. Quirino Sheppard, 62 y.o. male is followed for:   Active Problems:    Coronary artery disease involving native coronary artery of native heart with angina pectoris    Hepatitis C    Hx of liver transplant    Essential hypertension    Hyperlipidemia LDL goal <70    Type 2 diabetes mellitus          SUBJECTIVE   Off pressors, doing well    The patient's relevant past medical, surgical, family, and social history were reviewed    Allergies and medications were reviewed    ROS:  Per subjective, all other systems were reviewed and were negative        OBJECTIVE     Vital Sign Min/Max for last 24 hours:  Temp  Min: 97.9 °F (36.6 °C)  Max: 98.5 °F (36.9 °C)   BP  Min: 97/65  Max: 134/77   Pulse  Min: 76  Max: 90   Resp  Min: 16  Max: 20   SpO2  Min: 91 %  Max: 100 %   Flow (L/min)  Min: 2  Max: 2     Physical Exam:  General Appearance:  Alert, in no acute distress  Eyes:  No scleral icterus or pallor, pupils normal  Ears, Nose, Mouth, Throat:  Atraumatic, oropharynx clear  Neck:  Trachea midline, thyroid normal  Respiratory:  Clear to auscultation bilaterally, normal effort  Cardiovascular:  Regular rate and rhythm, no murmurs, no peripheral edema  Gastrointestinal:  Soft, non-tender, non-distended, no hepatosplenomegaly  Skin:  Normal temperature, no rash  Psychiatric:  Normal mood and affect, normal judgement and insight  Neuro:  No new focal neurologic deficits observed      Telemetry:  Sinus Rhythm: normal sinus rhythm             SpO2: 97 % SpO2  Min: 91 %  Max: 100 %   Device: nasal cannula with humidification    Flow Rate: 2 Flow (L/min)  Min: 2  Max: 2   Intake/Ouptut 24 hrs (7:00AM - 6:59 AM)  Intake & Output (last 3 days)       11/22 0701 - 11/23 0700 11/23 0701 - 11/24 0700 11/24 0701 - 11/25 0700 11/25 0701 - 11/26 0700    P.O.   1320     I.V. (mL/kg) 2513 (29) 886 (10.2) 20 (0.2)     Blood 535       IV Piggyback 1000 40      Total  Intake(mL/kg) 4048 (46.7) 926 (10.7) 1340 (15.5)     Urine (mL/kg/hr) 3990 (1.9) 625 (0.3) 1850 (0.9)     Other 1060 (0.5) 870 (0.4) 500 (0.2)     Total Output 5050 1495 2350      Net -1002 -569 -1010                    Lines, Drains & Airways    Active LDAs     Name:   Placement date:   Placement time:   Site:   Days:    Pulmonary Artery Catheter - Triple Lumen 11/22/17 1046 Right internal jugular vein continuous hemodynamic catheter  11/22/17    1046      less than 1    Peripheral IV Line - Single Lumen 11/20/17 2200 basilic vein (medial side of arm), left 18 gauge  11/20/17    2200      1    Y Chest Tube 1 and 2 11/22/17 11/22/17          less than 1    Chest Tube 11/22/17 0855 chest tube placed by physician mediastinum  11/22/17    0855      less than 1    Naso/Oral/Gastric Tube 11/22/17 1046 right nostril  11/22/17    1046      less than 1    Urethral Catheter 11/22/17 0730 100% silicone 16 10 19  11/22/17    0730      less than 1    Airway 11/22/17 0620 oral;endotracheal tube with subglottic suction  11/22/17    0620      less than 1    Arterial Line 11/22/17 0620 Right radial artery 20 gauge  11/22/17    0620      less than 1    Percutaneous Central Line - Double Lumen 11/22/17 0753  11/22/17    0753 created via procedure documentation    less than 1                Hematology:    Results from last 7 days  Lab Units 11/25/17  0333 11/24/17  0410 11/23/17  0349 11/22/17  1429 11/22/17  1119 11/22/17  1004 11/22/17  0937  11/21/17  0519 11/20/17  2042   WBC 10*3/mm3 7.50 11.10* 13.71* 13.55* 15.58*  --   --   --  7.49 8.24   HEMOGLOBIN g/dL 10.9* 11.0* 11.1* 11.4* 13.1  --   --   --  14.2 15.5   HEMOGLOBIN, POC g/dL  --   --   --   --   --  10.9* 9.9*  < >  --   --    HEMATOCRIT % 32.6* 32.9* 32.6* 33.7* 37.5*  --   --   --  42.0 45.0   HEMATOCRIT POC %  --   --   --   --   --  32* 29*  < >  --   --    PLATELETS 10*3/mm3 160 168 201 192 229  --   --   --  143* 162   < > = values in this interval not  displayed.  Electrolytes, Magnesium and Phosphorus:    Results from last 7 days  Lab Units 11/25/17  0333 11/24/17  0410 11/23/17  0349 11/22/17  1429 11/22/17  1119 11/21/17  0516 11/20/17  2042   SODIUM mmol/L 134 128* 137 140 133 136 136   CHLORIDE mmol/L 99 95* 103 105 101 102 100   POTASSIUM mmol/L 4.6 4.7 4.3 3.9 4.2 4.2 4.1   CO2 mmol/L 29.0 27.0 26.0 28.0 25.0 27.0 24.0   MAGNESIUM mg/dL  --   --  2.3 3.0* 3.8*  --   --    PHOSPHORUS mg/dL  --   --  4.8 2.3* 3.4  --   --      Renal:    Results from last 7 days  Lab Units 11/25/17 0333 11/24/17 0410 11/23/17 0349 11/22/17 1429 11/22/17  1119 11/21/17  0516 11/20/17  2042   CREATININE mg/dL 1.10 1.60* 1.40* 1.50* 1.40* 1.30 1.50*   BUN mg/dL 37* 41* 29* 29* 29* 25* 23     Estimated Creatinine Clearance: 73.2 mL/min (by C-G formula based on Cr of 1.1).  Hepatic:    Results from last 7 days  Lab Units 11/25/17 0333 11/24/17 0410 11/20/17  2042   ALK PHOS U/L 62 68 56   BILIRUBIN mg/dL 0.6 0.8 0.5   BILIRUBIN DIRECT mg/dL 0.2 0.3*  --    ALT (SGPT) U/L 240* 346* 52*   AST (SGOT) U/L 58* 195* 23     Arterial Blood Gases:    Results from last 7 days  Lab Units 11/22/17  1442 11/22/17  1112 11/22/17  1004 11/22/17  0937 11/22/17  0914 11/22/17  0850 11/22/17  0836   PH, ARTERIAL pH units 7.387 7.327* 7.34* 7.40 7.33* 7.26* 7.27*   PCO2, ARTERIAL mm Hg 44.4 46.0  --   --   --   --   --    PO2 ART mm Hg 138.0* 170.0*  --   --   --   --   --    FIO2 % 40 100  --   --   --   --   --          Results from last 7 days  Lab Units 11/20/17 2042   HEMOGLOBIN A1C % 7.10*       No results found for: LACTATE    Relevant imaging studies and labs from 11/25/17 were reviewed and interpreted by me    Medications (drips):    niCARdipine    nitroglycerin    norepinephrine Last Rate: Stopped (11/23/17 1200)   phenylephrine          aspirin 325 mg Oral Daily   atorvastatin 80 mg Oral Nightly   brimonidine 1 drop Both Eyes Q12H   And      timolol 1 drop Both Eyes Q12H    fenofibrate 145 mg Oral Daily   insulin detemir 30 Units Subcutaneous Q12H   insulin lispro 0-14 Units Subcutaneous 4x Daily AC & at Bedtime   insulin lispro 10 Units Subcutaneous TID With Meals   metoprolol tartrate 12.5 mg Oral Q12H   pharmacy consult - MTM  Does not apply Daily   sennosides-docusate sodium 2 tablet Oral BID   sertraline 50 mg Oral Daily   sirolimus 1 mg Oral Daily       Assessment/Plan   IMPRESSION / PLAN     Inpatient Problem List:  62 y.o.male:  Hospital Problem List     Coronary artery disease involving native coronary artery of native heart with angina pectoris    Overview Addendum 11/23/2017 11:12 AM by Akil Barba IV, MD     · Echo (12/21/17): LVEF 60%.  No significant valvular abnormality  · CABG by Jordi Guaman (11/22/17): LIMA to LAD, SVG to OM, SVG to RPL         Hepatitis C    Hx of liver transplant    Essential hypertension    Hyperlipidemia LDL goal <70    Type 2 diabetes mellitus           Impression:  62 y.o.male with relevant PMH of Liver Transplant 2001, HTN, HLD, IDDM, h/o Hep C, CAD, Thrombocytopenia admitted 11/20/2017 post op 3vCABG+EVH&LIMA    Plan:    Liver Transplant - remote (2001), continue immunosuppression, monitor for infection    IDDM (A1C 7.1) - increase SQ insulin today    Post op 3vCABG - per CTS    Hypotension - off pressors, Cr normalized    Foot Pumps, SQ heparin when ok w/ CTS    Nutrition - Diet Regular; Cardiac, Consistent Carbohydrate     Ok for floor from my standpoint    Plan of care and goals reviewed with mulitdisciplinary team at daily rounds           Eligio Mota MD  Intensive Care Medicine  11/25/17 11:18 AM

## 2017-11-25 NOTE — PLAN OF CARE
Problem: Patient Care Overview (Adult)  Goal: Plan of Care Review  Outcome: Ongoing (interventions implemented as appropriate)    11/25/17 0445   Coping/Psychosocial Response Interventions   Plan Of Care Reviewed With patient   Patient Care Overview   Progress progress toward functional goals as expected   Outcome Evaluation   Outcome Summary/Follow up Plan Pt is on 2L NC, no IV drips, MT's x3, and v-wires. Aggressive pulmonary toileting encouraged.       Goal: Discharge Needs Assessment  Outcome: Ongoing (interventions implemented as appropriate)    11/25/17 0445   Discharge Needs Assessment   Discharge Disposition still a patient         Problem: Cardiac Surgery (Adult)  Goal: Signs and Symptoms of Listed Potential Problems Will be Absent or Manageable (Cardiac Surgery)  Outcome: Ongoing (interventions implemented as appropriate)    11/25/17 0445   Cardiac Surgery   Problems Assessed (Cardiac Surgery) all   Problems Present (Cardiac Surgery) pain;situational response         Problem: Pressure Ulcer Risk (Abdullahi Scale) (Adult,Obstetrics,Pediatric)  Goal: Skin Integrity  Outcome: Ongoing (interventions implemented as appropriate)    11/25/17 0445   Pressure Ulcer Risk (Abdullahi Scale) (Adult,Obstetrics,Pediatric)   Skin Integrity making progress toward outcome         Problem: Pain, Acute (Adult)  Goal: Identify Related Risk Factors and Signs and Symptoms  Outcome: Ongoing (interventions implemented as appropriate)    11/25/17 0445   Pain, Acute   Related Risk Factors (Acute Pain) knowledge deficit;surgery   Signs and Symptoms (Acute Pain) impaired thought process/concentration;moaning;questions meaning of pain       Goal: Acceptable Pain Control/Comfort Level  Outcome: Ongoing (interventions implemented as appropriate)    11/25/17 0445   Pain, Acute (Adult)   Acceptable Pain Control/Comfort Level making progress toward outcome

## 2017-11-25 NOTE — PROGRESS NOTES
"[facility]  IP Progress Note      Chief Complaint: Follow-up of postop hypertension, multivessel CAD, status post CABG.      Subjective:  Sitting up in chair, states that he is feeling better.  Denies chest pain or shortness of breath.    Objective:  Blood pressure 118/69, pulse 82, temperature 98.2 °F (36.8 °C), temperature source Oral, resp. rate 16, height 67\" (170.2 cm), weight 191 lb (86.6 kg), SpO2 97 %.     Intake/Output Summary (Last 24 hours) at 11/25/17 0816  Last data filed at 11/25/17 0600   Gross per 24 hour   Intake             1100 ml   Output             2320 ml   Net            -1220 ml       Physical Exam:  General: No acute distress.   Neck: no JVD.  Chest:No respiratory distress,  few scattered rhonchi.  Cardiovascular: Normal S1 and S2, no murmer, gallop or rub.    Extremities: No edema.    Results Review:     I reviewed the patient's new clinical results.      Results from last 7 days  Lab Units 11/25/17  0333   WBC 10*3/mm3 7.50   HEMOGLOBIN g/dL 10.9*   HEMATOCRIT % 32.6*   PLATELETS 10*3/mm3 160       Results from last 7 days  Lab Units 11/25/17  0333   SODIUM mmol/L 134   POTASSIUM mmol/L 4.6   CHLORIDE mmol/L 99   CO2 mmol/L 29.0   BUN mg/dL 37*   CREATININE mg/dL 1.10   CALCIUM mg/dL 9.1   BILIRUBIN mg/dL 0.6   ALK PHOS U/L 62   ALT (SGPT) U/L 240*   AST (SGOT) U/L 58*   GLUCOSE mg/dL 174*       Results from last 7 days  Lab Units 11/25/17  0333   SODIUM mmol/L 134   POTASSIUM mmol/L 4.6   CHLORIDE mmol/L 99   CO2 mmol/L 29.0   BUN mg/dL 37*   CREATININE mg/dL 1.10   GLUCOSE mg/dL 174*   CALCIUM mg/dL 9.1       Results from last 7 days  Lab Units 11/23/17  0349 11/22/17  1119 11/20/17  2042   INR  0.99 1.27 0.94     No results found for: CKTOTAL, CKMB, CKMBINDEX, TROPONINI, TROPONINT        Results from last 7 days  Lab Units 11/20/17  2042   CHOLESTEROL mg/dL 193   TRIGLYCERIDES mg/dL 345*   HDL CHOL mg/dL 37*           Tele: Sinus " Rythym    Assessment:  1. Multivessel CAD, ejection fraction 60%, status post CABG ×3 by Dr. Guaman November 22, 2017.  2. History of hepatitis C.  3. History of liver transplant.  4. Essential hypertension, controlled.  5. Dyslipidemia.  6. 2 diabetes.  Plan:  1. Stable postoperative course, continue current medications and management.  2. Dr. Buckley will revisit on Monday, November 27, 2017, please call if needed sooner.    Autumn Delgado MD, FACC, [facility]

## 2017-11-26 LAB
ANION GAP SERPL CALCULATED.3IONS-SCNC: 4 MMOL/L (ref 3–11)
BUN BLD-MCNC: 29 MG/DL (ref 9–23)
BUN/CREAT SERPL: 29 (ref 7–25)
CALCIUM SPEC-SCNC: 9.2 MG/DL (ref 8.7–10.4)
CHLORIDE SERPL-SCNC: 99 MMOL/L (ref 99–109)
CO2 SERPL-SCNC: 32 MMOL/L (ref 20–31)
CREAT BLD-MCNC: 1 MG/DL (ref 0.6–1.3)
GFR SERPL CREATININE-BSD FRML MDRD: 76 ML/MIN/1.73
GLUCOSE BLD-MCNC: 167 MG/DL (ref 70–100)
GLUCOSE BLDC GLUCOMTR-MCNC: 103 MG/DL (ref 70–130)
GLUCOSE BLDC GLUCOMTR-MCNC: 119 MG/DL (ref 70–130)
GLUCOSE BLDC GLUCOMTR-MCNC: 153 MG/DL (ref 70–130)
GLUCOSE BLDC GLUCOMTR-MCNC: 176 MG/DL (ref 70–130)
GLUCOSE BLDC GLUCOMTR-MCNC: 190 MG/DL (ref 70–130)
GLUCOSE BLDC GLUCOMTR-MCNC: 221 MG/DL (ref 70–130)
GLUCOSE BLDC GLUCOMTR-MCNC: 260 MG/DL (ref 70–130)
GLUCOSE BLDC GLUCOMTR-MCNC: 264 MG/DL (ref 70–130)
GLUCOSE BLDC GLUCOMTR-MCNC: 265 MG/DL (ref 70–130)
GLUCOSE BLDC GLUCOMTR-MCNC: 293 MG/DL (ref 70–130)
GLUCOSE BLDC GLUCOMTR-MCNC: 295 MG/DL (ref 70–130)
GLUCOSE BLDC GLUCOMTR-MCNC: 308 MG/DL (ref 70–130)
GLUCOSE BLDC GLUCOMTR-MCNC: 94 MG/DL (ref 70–130)
GLUCOSE BLDC GLUCOMTR-MCNC: 99 MG/DL (ref 70–130)
POTASSIUM BLD-SCNC: 4.2 MMOL/L (ref 3.5–5.5)
SODIUM BLD-SCNC: 135 MMOL/L (ref 132–146)

## 2017-11-26 PROCEDURE — 25010000002 INSULIN REGULAR HUMAN PER 5 UNITS: Performed by: NURSE PRACTITIONER

## 2017-11-26 PROCEDURE — 80048 BASIC METABOLIC PNL TOTAL CA: CPT | Performed by: PHYSICIAN ASSISTANT

## 2017-11-26 PROCEDURE — 63710000001 INSULIN DETEMIR PER 5 UNITS: Performed by: INTERNAL MEDICINE

## 2017-11-26 PROCEDURE — 82962 GLUCOSE BLOOD TEST: CPT

## 2017-11-26 PROCEDURE — 99233 SBSQ HOSP IP/OBS HIGH 50: CPT | Performed by: INTERNAL MEDICINE

## 2017-11-26 PROCEDURE — 63710000001 INSULIN LISPRO (HUMAN) PER 5 UNITS: Performed by: INTERNAL MEDICINE

## 2017-11-26 PROCEDURE — 25010000002 HEPARIN (PORCINE) PER 1000 UNITS: Performed by: INTERNAL MEDICINE

## 2017-11-26 PROCEDURE — 97110 THERAPEUTIC EXERCISES: CPT

## 2017-11-26 RX ORDER — ACETAMINOPHEN 325 MG/1
650 TABLET ORAL EVERY 6 HOURS PRN
Status: DISCONTINUED | OUTPATIENT
Start: 2017-11-26 | End: 2017-11-29 | Stop reason: HOSPADM

## 2017-11-26 RX ORDER — NICOTINE POLACRILEX 4 MG
15 LOZENGE BUCCAL
Status: DISCONTINUED | OUTPATIENT
Start: 2017-11-26 | End: 2017-11-29 | Stop reason: HOSPADM

## 2017-11-26 RX ORDER — HEPARIN SODIUM 5000 [USP'U]/ML
5000 INJECTION, SOLUTION INTRAVENOUS; SUBCUTANEOUS EVERY 8 HOURS SCHEDULED
Status: DISCONTINUED | OUTPATIENT
Start: 2017-11-26 | End: 2017-11-29 | Stop reason: HOSPADM

## 2017-11-26 RX ORDER — DEXTROSE MONOHYDRATE 25 G/50ML
25 INJECTION, SOLUTION INTRAVENOUS
Status: DISCONTINUED | OUTPATIENT
Start: 2017-11-26 | End: 2017-11-29 | Stop reason: HOSPADM

## 2017-11-26 RX ADMIN — ATORVASTATIN CALCIUM 80 MG: 40 TABLET, FILM COATED ORAL at 20:27

## 2017-11-26 RX ADMIN — HYDROCODONE BITARTRATE AND ACETAMINOPHEN 1 TABLET: 7.5; 325 TABLET ORAL at 13:45

## 2017-11-26 RX ADMIN — TIMOLOL MALEATE 1 DROP: 5 SOLUTION/ DROPS OPHTHALMIC at 08:21

## 2017-11-26 RX ADMIN — SODIUM CHLORIDE 15 UNITS/HR: 9 INJECTION, SOLUTION INTRAVENOUS at 08:22

## 2017-11-26 RX ADMIN — INSULIN LISPRO 10 UNITS: 100 INJECTION, SOLUTION INTRAVENOUS; SUBCUTANEOUS at 16:38

## 2017-11-26 RX ADMIN — BRIMONIDINE TARTRATE 1 DROP: 2 SOLUTION/ DROPS OPHTHALMIC at 20:28

## 2017-11-26 RX ADMIN — INSULIN DETEMIR 25 UNITS: 100 INJECTION, SOLUTION SUBCUTANEOUS at 20:57

## 2017-11-26 RX ADMIN — SERTRALINE 50 MG: 50 TABLET, FILM COATED ORAL at 06:05

## 2017-11-26 RX ADMIN — BRIMONIDINE TARTRATE 1 DROP: 2 SOLUTION/ DROPS OPHTHALMIC at 08:21

## 2017-11-26 RX ADMIN — METOPROLOL TARTRATE 12.5 MG: 25 TABLET, FILM COATED ORAL at 08:20

## 2017-11-26 RX ADMIN — HEPARIN SODIUM 5000 UNITS: 5000 INJECTION, SOLUTION INTRAVENOUS; SUBCUTANEOUS at 15:16

## 2017-11-26 RX ADMIN — SIROLIMUS 1 MG: 1 TABLET, FILM COATED ORAL at 08:21

## 2017-11-26 RX ADMIN — METOPROLOL TARTRATE 12.5 MG: 25 TABLET, FILM COATED ORAL at 20:30

## 2017-11-26 RX ADMIN — HYDROCODONE BITARTRATE AND ACETAMINOPHEN 1 TABLET: 7.5; 325 TABLET ORAL at 20:28

## 2017-11-26 RX ADMIN — INSULIN LISPRO 15 UNITS: 100 INJECTION, SOLUTION INTRAVENOUS; SUBCUTANEOUS at 11:19

## 2017-11-26 RX ADMIN — INSULIN LISPRO 8 UNITS: 100 INJECTION, SOLUTION INTRAVENOUS; SUBCUTANEOUS at 20:32

## 2017-11-26 RX ADMIN — INSULIN LISPRO 5 UNITS: 100 INJECTION, SOLUTION INTRAVENOUS; SUBCUTANEOUS at 11:20

## 2017-11-26 RX ADMIN — ALPRAZOLAM 0.25 MG: 0.25 TABLET ORAL at 13:41

## 2017-11-26 RX ADMIN — ASPIRIN 325 MG: 325 TABLET, DELAYED RELEASE ORAL at 08:20

## 2017-11-26 RX ADMIN — INSULIN DETEMIR 25 UNITS: 100 INJECTION, SOLUTION SUBCUTANEOUS at 11:07

## 2017-11-26 RX ADMIN — INSULIN LISPRO 15 UNITS: 100 INJECTION, SOLUTION INTRAVENOUS; SUBCUTANEOUS at 16:38

## 2017-11-26 RX ADMIN — Medication 2 TABLET: at 17:01

## 2017-11-26 RX ADMIN — TIMOLOL MALEATE 1 DROP: 5 SOLUTION/ DROPS OPHTHALMIC at 20:28

## 2017-11-26 RX ADMIN — Medication 2 TABLET: at 08:20

## 2017-11-26 RX ADMIN — FENOFIBRATE 145 MG: 145 TABLET ORAL at 08:20

## 2017-11-26 RX ADMIN — HEPARIN SODIUM 5000 UNITS: 5000 INJECTION, SOLUTION INTRAVENOUS; SUBCUTANEOUS at 21:50

## 2017-11-26 RX ADMIN — ALPRAZOLAM 0.25 MG: 0.25 TABLET ORAL at 21:50

## 2017-11-26 NOTE — PROGRESS NOTES
INTENSIVIST / PULMONARY FOLLOW UP NOTE     Hospital:  LOS: 6 days   Mr. Quirino Sheppard, 62 y.o. male is followed for:   Active Problems:    Coronary artery disease involving native coronary artery of native heart with angina pectoris    Hepatitis C    Hx of liver transplant    Essential hypertension    Hyperlipidemia LDL goal <70    Type 2 diabetes mellitus          SUBJECTIVE   Off pressors, doing well, difficult to control DM required insulin gtt yesterday    The patient's relevant past medical, surgical, family, and social history were reviewed    Allergies and medications were reviewed    ROS:  Per subjective, all other systems were reviewed and were negative        OBJECTIVE     Vital Sign Min/Max for last 24 hours:  Temp  Min: 97.6 °F (36.4 °C)  Max: 98.2 °F (36.8 °C)   BP  Min: 97/70  Max: 139/76   Pulse  Min: 74  Max: 89   Resp  Min: 16  Max: 22   SpO2  Min: 87 %  Max: 100 %   Flow (L/min)  Min: 2  Max: 2     Physical Exam:  General Appearance:  Alert, in no acute distress  Eyes:  No scleral icterus or pallor, pupils normal  Ears, Nose, Mouth, Throat:  Atraumatic, oropharynx clear  Neck:  Trachea midline, thyroid normal  Respiratory:  Clear to auscultation bilaterally, normal effort  Cardiovascular:  Regular rate and rhythm, no murmurs, no peripheral edema  Gastrointestinal:  Soft, non-tender, non-distended, no hepatosplenomegaly  Skin:  Normal temperature, no rash  Psychiatric:  Normal mood and affect, normal judgement and insight  Neuro:  No new focal neurologic deficits observed      Telemetry:  Sinus Rhythm: normal sinus rhythm             SpO2: 97 % SpO2  Min: 87 %  Max: 100 %   Device: nasal cannula with humidification    Flow Rate: 2 Flow (L/min)  Min: 2  Max: 2   Intake/Ouptut 24 hrs (7:00AM - 6:59 AM)  Intake & Output (last 3 days)       11/23 0701 - 11/24 0700 11/24 0701 - 11/25 0700 11/25 0701 - 11/26 0700 11/26 0701 - 11/27 0700    P.O.  1320 960 480    I.V. (mL/kg) 886 (10.2) 20 (0.2) 71.1  (0.8)     Blood        IV Piggyback 40       Total Intake(mL/kg) 926 (10.7) 1340 (15.5) 1031.1 (11.9) 480 (5.5)    Urine (mL/kg/hr) 625 (0.3) 1850 (0.9) 2600 (1.3) 350 (1.2)    Other 870 (0.4) 500 (0.2)      Stool   0 (0)     Total Output 1495 2350 2600 350    Net -569 -1010 -1568.9 +130            Unmeasured Stool Occurrence   4 x           Lines, Drains & Airways    Active LDAs     Name:   Placement date:   Placement time:   Site:   Days:    Pulmonary Artery Catheter - Triple Lumen 11/22/17 1046 Right internal jugular vein continuous hemodynamic catheter  11/22/17    1046      less than 1    Peripheral IV Line - Single Lumen 11/20/17 2200 basilic vein (medial side of arm), left 18 gauge  11/20/17    2200      1    Y Chest Tube 1 and 2 11/22/17 11/22/17          less than 1    Chest Tube 11/22/17 0855 chest tube placed by physician mediastinum  11/22/17    0855      less than 1    Naso/Oral/Gastric Tube 11/22/17 1046 right nostril  11/22/17    1046      less than 1    Urethral Catheter 11/22/17 0730 100% silicone 16 10 19  11/22/17    0730      less than 1    Airway 11/22/17 0620 oral;endotracheal tube with subglottic suction  11/22/17    0620      less than 1    Arterial Line 11/22/17 0620 Right radial artery 20 gauge  11/22/17    0620      less than 1    Percutaneous Central Line - Double Lumen 11/22/17 0753  11/22/17    0753 created via procedure documentation    less than 1                Hematology:    Results from last 7 days  Lab Units 11/25/17  0333 11/24/17  0410 11/23/17  0349 11/22/17  1429 11/22/17  1119 11/22/17  1004 11/22/17  0937  11/21/17  0519 11/20/17  2042   WBC 10*3/mm3 7.50 11.10* 13.71* 13.55* 15.58*  --   --   --  7.49 8.24   HEMOGLOBIN g/dL 10.9* 11.0* 11.1* 11.4* 13.1  --   --   --  14.2 15.5   HEMOGLOBIN, POC g/dL  --   --   --   --   --  10.9* 9.9*  < >  --   --    HEMATOCRIT % 32.6* 32.9* 32.6* 33.7* 37.5*  --   --   --  42.0 45.0   HEMATOCRIT POC %  --   --   --   --   --  32* 29*  <  >  --   --    PLATELETS 10*3/mm3 160 168 201 192 229  --   --   --  143* 162   < > = values in this interval not displayed.  Electrolytes, Magnesium and Phosphorus:    Results from last 7 days  Lab Units 11/26/17 0340 11/25/17 0333 11/24/17 0410 11/23/17  0349 11/22/17  1429 11/22/17  1119 11/21/17  0516   SODIUM mmol/L 135 134 128* 137 140 133 136   CHLORIDE mmol/L 99 99 95* 103 105 101 102   POTASSIUM mmol/L 4.2 4.6 4.7 4.3 3.9 4.2 4.2   CO2 mmol/L 32.0* 29.0 27.0 26.0 28.0 25.0 27.0   MAGNESIUM mg/dL  --   --   --  2.3 3.0* 3.8*  --    PHOSPHORUS mg/dL  --   --   --  4.8 2.3* 3.4  --      Renal:    Results from last 7 days  Lab Units 11/26/17 0340 11/25/17 0333 11/24/17 0410 11/23/17 0349 11/22/17  1429 11/22/17  1119 11/21/17  0516   CREATININE mg/dL 1.00 1.10 1.60* 1.40* 1.50* 1.40* 1.30   BUN mg/dL 29* 37* 41* 29* 29* 29* 25*     Estimated Creatinine Clearance: 80.5 mL/min (by C-G formula based on Cr of 1).  Hepatic:    Results from last 7 days  Lab Units 11/25/17 0333 11/24/17 0410 11/20/17  2042   ALK PHOS U/L 62 68 56   BILIRUBIN mg/dL 0.6 0.8 0.5   BILIRUBIN DIRECT mg/dL 0.2 0.3*  --    ALT (SGPT) U/L 240* 346* 52*   AST (SGOT) U/L 58* 195* 23     Arterial Blood Gases:    Results from last 7 days  Lab Units 11/22/17  1442 11/22/17  1112 11/22/17  1004 11/22/17  0937 11/22/17  0914 11/22/17  0850 11/22/17  0836   PH, ARTERIAL pH units 7.387 7.327* 7.34* 7.40 7.33* 7.26* 7.27*   PCO2, ARTERIAL mm Hg 44.4 46.0  --   --   --   --   --    PO2 ART mm Hg 138.0* 170.0*  --   --   --   --   --    FIO2 % 40 100  --   --   --   --   --          Results from last 7 days  Lab Units 11/20/17 2042   HEMOGLOBIN A1C % 7.10*       No results found for: LACTATE    Relevant imaging studies and labs from 11/26/17 were reviewed and interpreted by me    Medications (drips):         aspirin 325 mg Oral Daily   atorvastatin 80 mg Oral Nightly   brimonidine 1 drop Both Eyes Q12H   And      timolol 1 drop Both Eyes Q12H    fenofibrate 145 mg Oral Daily   insulin detemir 25 Units Subcutaneous Q12H   insulin lispro 0-14 Units Subcutaneous 4x Daily AC & at Bedtime   insulin lispro 15 Units Subcutaneous TID With Meals   metoprolol tartrate 12.5 mg Oral Q12H   pharmacy consult - MTM  Does not apply Daily   sennosides-docusate sodium 2 tablet Oral BID   sertraline 50 mg Oral Daily   sirolimus 1 mg Oral Daily       Assessment/Plan   IMPRESSION / PLAN     Inpatient Problem List:  62 y.o.male:  Hospital Problem List     Coronary artery disease involving native coronary artery of native heart with angina pectoris    Overview Addendum 11/23/2017 11:12 AM by Akil Barba IV, MD     · Echo (12/21/17): LVEF 60%.  No significant valvular abnormality  · CABG by Jordi Guaman (11/22/17): LIMA to LAD, SVG to OM, SVG to RPL         Hepatitis C    Hx of liver transplant    Essential hypertension    Hyperlipidemia LDL goal <70    Type 2 diabetes mellitus           Impression:  62 y.o.male with relevant PMH of Liver Transplant 2001, HTN, HLD, IDDM, h/o Hep C, CAD, Thrombocytopenia admitted 11/20/2017 post op 3vCABG+EVH&LIMA    Plan:    Liver Transplant - remote (2001), continue immunosuppression, monitor for infection, monitor LFTs    IDDM (A1C 7.1) - increased SQ insulin today, d/c insulin gtt    Post op 3vCABG - per CTS    Hypotension - off pressors, Cr normalized    Foot Pumps, SQ heparin    Nutrition - Diet Regular; Consistent Carbohydrate, Cardiac     Ok for floor from my standpoint    Plan of care and goals reviewed with mulitdisciplinary team at daily rounds           Eligio Mota MD  Intensive Care Medicine  11/26/17 10:23 AM

## 2017-11-26 NOTE — PLAN OF CARE
Problem: Patient Care Overview (Adult)  Goal: Plan of Care Review  Outcome: Ongoing (interventions implemented as appropriate)    11/26/17 1354   Coping/Psychosocial Response Interventions   Plan Of Care Reviewed With patient   Patient Care Overview   Progress improving   Outcome Evaluation   Outcome Summary/Follow up Plan Pt demonstrated increased indep with STS transfer; progressed gait distance to 340 total ft but cont to demonstrate balance deficits with turns (requiring min A to correct). Edu re: seated HEP.          Problem: Inpatient Physical Therapy  Goal: Bed Mobility Goal LTG- PT  Outcome: Ongoing (interventions implemented as appropriate)    11/23/17 1040 11/26/17 1354   Bed Mobility PT LTG   Bed Mobility PT LTG, Date Established 11/23/17 --    Bed Mobility PT LTG, Time to Achieve 2 wks --    Bed Mobility PT LTG, Activity Type roll left/roll right;supine to sit/sit to supine --    Bed Mobility PT LTG, [place] Level conditional independence --    Bed Mobility PT LTG, Outcome --  goal ongoing       Goal: Transfer Training Goal 1 LTG- PT  Outcome: Ongoing (interventions implemented as appropriate)    11/23/17 1040 11/26/17 1354   Transfer Training PT LTG   Transfer Training PT LTG, Date Established 11/23/17 --    Transfer Training PT LTG, Time to Achieve 2 wks --    Transfer Training PT LTG, Activity Type bed to chair /chair to bed;sit to stand/stand to sit --    Transfer Training PT LTG, [place] Level conditional independence --    Transfer Training PT LTG, Outcome --  goal ongoing       Goal: Gait Training Goal LTG- PT  Outcome: Ongoing (interventions implemented as appropriate)    11/23/17 1040 11/26/17 1354   Gait Training PT LTG   Gait Training Goal PT LTG, Date Established 11/23/17 --    Gait Training Goal PT LTG, Time to Achieve 2 wks --    Gait Training Goal PT LTG, [place] Level conditional independence --    Gait Training Goal PT LTG, Distance to Achieve 200 --    Gait Training Goal  PT LTG, Outcome --  goal ongoing       Goal: Stair Training Goal LTG- PT  Outcome: Ongoing (interventions implemented as appropriate)    11/23/17 1040 11/26/17 1354   Stair Training PT LTG   Stair Training Goal PT LTG, Date Established 11/23/17 --    Stair Training Goal PT LTG, Time to Achieve 2 wks --    Stair Training Goal PT LTG, Number of Steps 13 --    Stair Training Goal PT LTG, [place] Level contact guard assist --    Stair Training Goal PT LTG, Assist Device 1 handrail --    Stair Training Goal PT LTG, Outcome --  goal ongoing

## 2017-11-26 NOTE — PLAN OF CARE
Problem: Patient Care Overview (Adult)  Goal: Plan of Care Review  Outcome: Ongoing (interventions implemented as appropriate)    11/26/17 1656   Coping/Psychosocial Response Interventions   Plan Of Care Reviewed With patient;daughter   Patient Care Overview   Progress progress toward functional goals as expected   Outcome Evaluation   Outcome Summary/Follow up Plan Ambulated in hallway x2 VSS, transitioned off insulin gtt. Transfered to [facility].         Problem: Cardiac Surgery (Adult)  Goal: Signs and Symptoms of Listed Potential Problems Will be Absent or Manageable (Cardiac Surgery)  Outcome: Ongoing (interventions implemented as appropriate)    11/26/17 1656   Cardiac Surgery   Problems Assessed (Cardiac Surgery) all   Problems Present (Cardiac Surgery) pain;situational response         Problem: Pain, Acute (Adult)  Goal: Identify Related Risk Factors and Signs and Symptoms  Outcome: Outcome(s) achieved Date Met:  11/26/17 11/26/17 1656   Pain, Acute   Related Risk Factors (Acute Pain) fear;surgery       Goal: Acceptable Pain Control/Comfort Level  Outcome: Ongoing (interventions implemented as appropriate)    11/26/17 1656   Pain, Acute (Adult)   Acceptable Pain Control/Comfort Level making progress toward outcome

## 2017-11-26 NOTE — PLAN OF CARE
Problem: Patient Care Overview (Adult)  Goal: Plan of Care Review  Outcome: Ongoing (interventions implemented as appropriate)    11/26/17 0247   Coping/Psychosocial Response Interventions   Plan Of Care Reviewed With patient   Patient Care Overview   Progress progress toward functional goals as expected   Outcome Evaluation   Outcome Summary/Follow up Plan Patient on 2 LNC up with 2 to chair. VS stable, blood glucose better controlled with insulin drip. Anxiety better controlled with PO meds. Will get up to chair this AM .          Problem: Cardiac Surgery (Adult)  Goal: Signs and Symptoms of Listed Potential Problems Will be Absent or Manageable (Cardiac Surgery)  Outcome: Ongoing (interventions implemented as appropriate)    11/26/17 0247   Cardiac Surgery   Problems Assessed (Cardiac Surgery) all   Problems Present (Cardiac Surgery) pain         Problem: Pain, Acute (Adult)  Goal: Identify Related Risk Factors and Signs and Symptoms  Outcome: Ongoing (interventions implemented as appropriate)    11/26/17 0247   Pain, Acute   Related Risk Factors (Acute Pain) positioning;patient perception;psychosocial factor   Signs and Symptoms (Acute Pain) sleep pattern alteration       Goal: Acceptable Pain Control/Comfort Level  Outcome: Ongoing (interventions implemented as appropriate)    11/26/17 0247   Pain, Acute (Adult)   Acceptable Pain Control/Comfort Level making progress toward outcome

## 2017-11-26 NOTE — PROGRESS NOTES
"Quirino Sheppard  [id number]  1955     LOS: 6 days   Patient Care Team:  Amol Raymond MD as PCP - General (Rheumatology)    Chief Complaint:       Subjective: Alert no complaints    Objective:     Vital Sign Min/Max for last 24 hours  Temp  Min: 97.6 °F (36.4 °C)  Max: 98.6 °F (37 °C)   BP  Min: 97/70  Max: 139/76   Pulse  Min: 75  Max: 89   Resp  Min: 16  Max: 22   SpO2  Min: 87 %  Max: 100 %   Flow (L/min)  Min: 2  Max: 2   No Data Recorded     Flowsheet Rows         First Filed Value    Admission Height  77\" (195.6 cm) Documented at 11/20/2017 1918    Admission Weight  195 lb 9.6 oz (88.7 kg) Documented at 11/20/2017 1918          Physical Exam:    Wound:Satisfactory Pulses:     Mediastinal and Chest Tube Drainage:       Results Review:     Results from last 7 days  Lab Units 11/25/17  0333   WBC 10*3/mm3 7.50   HEMOGLOBIN g/dL 10.9*   HEMATOCRIT % 32.6*   PLATELETS 10*3/mm3 160       Results from last 7 days  Lab Units 11/26/17  0340   SODIUM mmol/L 135   POTASSIUM mmol/L 4.2   CHLORIDE mmol/L 99   CO2 mmol/L 32.0*   BUN mg/dL 29*   CREATININE mg/dL 1.00   GLUCOSE mg/dL 167*   CALCIUM mg/dL 9.2       Results from last 7 days  Lab Units 11/22/17  1442   PH, ARTERIAL pH units 7.387   PO2 ART mm Hg 138.0*   PCO2, ARTERIAL mm Hg 44.4   HCO3 ART mmol/L 26.7*         Assessment    Active Problems:    Coronary artery disease involving native coronary artery of native heart with angina pectoris    Hepatitis C    Hx of liver transplant    Essential hypertension    Hyperlipidemia LDL goal <70    Type 2 diabetes mellitus      Transfer to telemetry        Zac Rico MD  11/26/17  7:27 AM      Please note that portions of this note were completed with a voice recognition program. Efforts were made to edit the dictations, but words may be mistranscribed  "

## 2017-11-26 NOTE — THERAPY TREATMENT NOTE
Acute Care - Physical Therapy Treatment Note  [facility]     Patient Name: Quirino Sheppard  : 1955  MRN: [id number]  Today's Date: 2017  Onset of Illness/Injury or Date of Surgery Date: 17  Date of Referral to PT: 17  Referring Physician: DEREK Ya    Admit Date: 2017    Visit Dx:    ICD-10-CM ICD-9-CM   1. Coronary artery disease involving native coronary artery of native heart, angina presence unspecified I25.10 414.01   2. Encounter for examination for normal comparison and control in clinical research program Z00.6 V70.7   3. Impaired functional mobility, balance, gait, and endurance Z74.09 V49.89     Patient Active Problem List   Diagnosis   • Coronary artery disease involving native coronary artery of native heart with angina pectoris   • Hepatitis C   • Hx of liver transplant   • Essential hypertension   • Hyperlipidemia LDL goal <70   • Type 2 diabetes mellitus               Adult Rehabilitation Note       17 0930 17 1027 17 0825    Rehab Assessment/Intervention    Discipline physical therapist  -LS physical therapist  -LS physical therapist  -CARLA    Document Type therapy note (daily note)  -LS therapy note (daily note)  -LS therapy note (daily note)  -CARLA    Subjective Information agree to therapy;no complaints  -LS agree to therapy;complains of;pain  -LS no complaints;agree to therapy  -CARLA    Patient Effort, Rehab Treatment good  -LS good  -LS good  -CARLA    Symptoms Noted During/After Treatment   none  -CARLA    Precautions/Limitations cardiac precautions;fall precautions;oxygen therapy device and L/min;sternal precautions  -LS cardiac precautions;oxygen therapy device and L/min;sternal precautions  -LS oxygen therapy device and L/min;cardiac precautions;sternal precautions  -CARLA    Specific Treatment Considerations Noted balance deficits with turning during mobility.   -LS      Recorded by [LS] Dorita Packer, PT [LS] Dorita Packer, PT [CARLA] Dana Ford,  PT    Vital Signs    Pre Systolic BP Rehab 111  -  -  -CARLA    Pre Treatment Diastolic BP 63  -LS 59  -LS 60  -CARLA    Post Systolic BP Rehab 164  -  -  -CARLA    Post Treatment Diastolic BP 91  -LS 85  -LS 74  -CARLA    Pretreatment Heart Rate (beats/min) 79  -LS 84  -LS 84  -CARLA    Posttreatment Heart Rate (beats/min) 76  -LS 80  -LS 90  -CARLA    Pre SpO2 (%) 99  -LS 95  -LS 98  -CARLA    O2 Delivery Pre Treatment supplemental O2   1.5L  -LS supplemental O2  -LS supplemental O2  -CARLA    Post SpO2 (%) 95  -  -LS 99  -CARLA    O2 Delivery Post Treatment supplemental O2  -LS supplemental O2  -LS supplemental O2  -CARLA    Pre Patient Position Sitting  -LS Sitting  -LS Sitting  -CARLA    Intra Patient Position Standing  -LS Standing  -LS Standing  -CARLA    Post Patient Position Supine  -LS Supine  -LS Supine  -CARLA    Recorded by [LS] Dorita Packer, PT [LS] Dorita Packer, PT [CARLA] Dana Ford, PT    Pain Assessment    Pain Assessment 0-10  -LS 0-10  -LS     Pain Score 0  -LS 4  -LS 3  -CARLA    Post Pain Score 0  -LS 4  -LS 3  -CARLA    Pain Type  Surgical pain  -LS     Pain Location  Sternum  -LS Mediastinum  -CARLA    Pain Intervention(s)  Repositioned;Ambulation/increased activity  -LS Repositioned;Ambulation/increased activity  -CARLA    Response to Interventions  tolerated  -LS --   tolerated  -CARLA    Recorded by [LS] Dorita Packer, PT [LS] Dorita Packer, PT [CARLA] Dana Ford, PT    Cognitive Assessment/Intervention    Current Cognitive/Communication Assessment functional  -LS functional  -LS functional  -CARLA    Orientation Status oriented x 4  -LS oriented x 4  -LS oriented x 4  -CARLA    Follows Commands/Answers Questions able to follow single-step instructions;100% of the time;needs cueing  -LS able to follow single-step instructions;100% of the time;needs cueing  -% of the time  -CARLA    Personal Safety mild impairment;decreased insight to deficits  -LS mild impairment;decreased awareness, need for  safety;decreased insight to deficits  -LS WNL/WFL  -CARLA    Personal Safety Interventions fall prevention program maintained;gait belt;nonskid shoes/slippers when out of bed  -LS fall prevention program maintained;gait belt;nonskid shoes/slippers when out of bed  -LS gait belt;nonskid shoes/slippers when out of bed  -CARLA    Recorded by [LS] Dorita Packer PT [LS] Dorita Packer, PT [CARLA] Dana Ford, PT    Bed Mobility, Assessment/Treatment    Bed Mobility, Assistive Device   draw sheet  -CARLA    Bed Mobility, Scoot/Bridge, Caro   moderate assist (50% patient effort);2 person assist required  -CARLA    Bed Mob, Sit to Supine, Caro moderate assist (50% patient effort);verbal cues required  -LS moderate assist (50% patient effort);2 person assist required;verbal cues required  -LS moderate assist (50% patient effort);2 person assist required  -CARLA    Bed Mob, Sidelying to Sit, Caro 2 person assist required  -LS      Bed Mobility, Safety Issues   decreased use of arms for pushing/pulling;decreased use of legs for bridging/pushing  -CARLA    Bed Mobility, Impairments strength decreased;pain  -LS pain;strength decreased  -LS strength decreased  -CARLA    Bed Mobility, Comment   cues for sequencing and maintaining sternal precautions  -CARLA    Recorded by [LS] Dorita Packer, PT [LS] Dorita Packer, PT [CARLA] Dana Ford, PT    Transfer Assessment/Treatment    Transfers, Chair-Bed Caro   minimum assist (75% patient effort);2 person assist required  -CARLA    Transfers, Sit-Stand Caro supervision required;verbal cues required  -LS contact guard assist;2 person assist required;verbal cues required  -LS minimum assist (75% patient effort);2 person assist required  -CARLA    Transfers, Stand-Sit Caro supervision required;verbal cues required  -LS contact guard assist;2 person assist required;verbal cues required  -LS minimum assist (75% patient effort);2 person assist required  -CARLA    Transfer,  Impairments impaired balance;strength decreased  -LS impaired balance;strength decreased  -LS impaired balance;strength decreased  -CARLA    Transfer, Comment VC's for sternal precautions.   -LS VC's for appropriate hand placement for maintaining sternal precautions.   -LS sterna precautions   -CARLA    Recorded by [LS] Dorita Packer, PT [LS] Dorita Packer, PT [CARLA] Dana Ford, PT    Gait Assessment/Treatment    Gait, [place] Level minimum assist (75% patient effort);1 person + 1 person to manage equipment;verbal cues required  -LS contact guard assist;verbal cues required  -LS hand held assist;2 person assist required  -CARLA    Gait, Distance (Feet) 340  -  -  -CARLA    Gait, Gait Pattern Analysis   swing-to gait  -CARLA    Gait, Gait Deviations ines decreased;step length decreased  -LS ines decreased;step length decreased  -LS step length decreased  -CARLA    Gait, Safety Issues   step length decreased;supplemental O2;balance decreased during turns  -CARLA    Gait, Impairments impaired balance  -LS strength decreased;impaired balance  -LS strength decreased;impaired balance  -CARLA    Gait, Comment Primarily CGA; demonstrated 2 slight LOB laterally during turns, requiring min A to maintain balance.   -LS VC's for upright posture. Distance limited by fatigue.   -LS improved mobility today  -CARLA    Recorded by [LS] Dorita Packer, PT [LS] Dorita Packer, PT [CARLA] Dana Ford, PT    Motor Skills/Interventions    Additional Documentation  Balance Skills Training (Group)  -LS     Recorded by  [LS] Dorita Packer PT     Balance Skills Training    Sitting-Level of Assistance Close supervision  -LS Close supervision  -LS Close supervision  -CARLA    Sitting-Balance Support Feet supported  -LS Feet supported  -LS Feet supported  -CARLA    Standing-Level of Assistance Contact guard  -LS Contact guard;x2  -LS Minimum assistance;x2  -CARLA    Static Standing Balance Support No upper extremity supported  -LS No upper extremity  supported  -LS     Gait Balance-Level of Assistance Minimum assistance  -LS Contact guard;x2  -LS Minimum assistance;x2  -CARLA    Gait Balance Support No upper extremity supported  -LS No upper extremity supported  -LS     Recorded by [LS] Dorita Packer, PT [LS] Dorita Packer, PT [CARLA] Dana Ford PT    Therapy Exercises    Bilateral Lower Extremities AROM:;10 reps;sitting;ankle pumps/circles;LAQ;hip flexion  -LS AROM:;10 reps;sitting;ankle pumps/circles;hip flexion;LAQ  -LS AROM:;10 reps;sitting;ankle pumps/circles;hip flexion;LAQ  -CARLA    Bilateral Upper Extremity AROM:;10 reps;sitting;elbow flexion/extension;shoulder abduction/adduction;shoulder extension/flexion  -LS AROM:;10 reps;elbow flexion/extension;shoulder abduction/adduction  -LS     Recorded by [LS] Dorita Packer, PT [LS] Dorita Packer, PT [CARLA] Dana Ford PT    Positioning and Restraints    Pre-Treatment Position sitting in chair/recliner  -LS sitting in chair/recliner  -LS sitting in chair/recliner  -CARLA    Post Treatment Position bed  -LS bed  -LS bed  -CARLA    In Bed notified nsg;supine;call light within reach;encouraged to call for assist;exit alarm on;RUE elevated;LUE elevated  -LS notified nsg;supine;call light within reach;encouraged to call for assist;exit alarm on;RUE elevated;LUE elevated;SCD pump applied;with nsg;legs elevated  -LS notified nsg;supine;call light within reach;with nsg  -CARLA    Recorded by [LS] Dorita Packer, PT [LS] Dorita Packer, PT [CARLA] Dana Ford, PT      User Key  (r) = Recorded By, (t) = Taken By, (c) = Cosigned By    Initials Name Effective Dates    CARLA Ford PT 06/19/15 -     LS Dorita Packer PT 06/19/15 -                 IP PT Goals       11/26/17 1354 11/25/17 1407 11/23/17 1040    Bed Mobility PT LTG    Bed Mobility PT LTG, Date Established   11/23/17  -SJ    Bed Mobility PT LTG, Time to Achieve   2 wks  -SJ    Bed Mobility PT LTG, Activity Type   roll left/roll right;supine to sit/sit to  supine  -SJ    Bed Mobility PT LTG, [place] Level   conditional independence  -SJ    Bed Mobility PT LTG, Outcome goal ongoing  -LS goal ongoing  -LS goal ongoing  -SJ    Transfer Training PT LTG    Transfer Training PT LTG, Date Established   11/23/17  -SJ    Transfer Training PT LTG, Time to Achieve   2 wks  -SJ    Transfer Training PT LTG, Activity Type   bed to chair /chair to bed;sit to stand/stand to sit  -SJ    Transfer Training PT LTG, [place] Level   conditional independence  -SJ    Transfer Training PT LTG, Outcome goal ongoing  -LS goal ongoing  -LS goal ongoing  -SJ    Gait Training PT LTG    Gait Training Goal PT LTG, Date Established   11/23/17  -SJ    Gait Training Goal PT LTG, Time to Achieve   2 wks  -SJ    Gait Training Goal PT LTG, [place] Level   conditional independence  -SJ    Gait Training Goal PT LTG, Distance to Achieve   200  -SJ    Gait Training Goal PT LTG, Outcome goal ongoing  -LS goal ongoing  -LS goal ongoing  -SJ    Stair Training PT LTG    Stair Training Goal PT LTG, Date Established   11/23/17  -SJ    Stair Training Goal PT LTG, Time to Achieve   2 wks  -SJ    Stair Training Goal PT LTG, Number of Steps   13  -SJ    Stair Training Goal PT LTG, [place] Level   contact guard assist  -SJ    Stair Training Goal PT LTG, Assist Device   1 handrail  -SJ    Stair Training Goal PT LTG, Outcome goal ongoing  -LS goal ongoing  -LS goal ongoing  -SJ      User Key  (r) = Recorded By, (t) = Taken By, (c) = Cosigned By    Initials Name Provider Type    ROXI Wood, PT Physical Therapist    LORENA Packer, PT Physical Therapist          Physical Therapy Education     Title: PT OT SLP Therapies (Active)     Topic: Physical Therapy (Active)     Point: Mobility training (Active)    Learning Progress Summary    Learner Readiness Method Response Comment Documented by Status   Patient Acceptance E,D NR  LS 11/26/17 1353 Active    Acceptance E,D NR  LS 11/25/17 1407 Active     Acceptance E NR   11/24/17 1028 Active    Acceptance E,D NR   11/23/17 1045 Active               Point: Home exercise program (Active)    Learning Progress Summary    Learner Readiness Method Response Comment Documented by Status   Patient Acceptance E,D NR   11/26/17 1353 Active    Acceptance E,D NR   11/25/17 1407 Active    Acceptance E NR  CARLA 11/24/17 1028 Active    Acceptance E,D NR   11/23/17 1045 Active               Point: Body mechanics (Active)    Learning Progress Summary    Learner Readiness Method Response Comment Documented by Status   Patient Acceptance E,D NR   11/26/17 1353 Active    Acceptance E,D NR   11/25/17 1407 Active    Acceptance E NR   11/24/17 1028 Active    Acceptance E,D NR   11/23/17 1045 Active               Point: Precautions (Active)    Learning Progress Summary    Learner Readiness Method Response Comment Documented by Status   Patient Acceptance E,D NR   11/26/17 1353 Active    Acceptance E,D NR   11/25/17 1407 Active    Acceptance E NR   11/24/17 1028 Active    Acceptance E,D NR   11/23/17 1045 Active                      User Key     Initials Effective Dates Name Provider Type Discipline     06/19/15 -  Dana Ford, PT Physical Therapist PT     06/19/15 -  Annelise Wood, PT Physical Therapist PT     06/19/15 -  Dorita Packer, PT Physical Therapist PT                    PT Recommendation and Plan  Anticipated Equipment Needs At Discharge:  (TBD)  Anticipated Discharge Disposition: inpatient rehabilitation facility  Planned Therapy Interventions: balance training, bed mobility training, gait training, home exercise program, patient/family education, stair training, strengthening, transfer training  PT Frequency: daily  Plan of Care Review  Plan Of Care Reviewed With: patient  Progress: improving  Outcome Summary/Follow up Plan: Pt demonstrated increased indep with STS transfer; progressed gait distance to 340 total ft but cont to demonstrate  balance deficits with turns (requiring min A to correct). Edu re: seated HEP.           Outcome Measures       11/26/17 0930 11/25/17 1027 11/24/17 0825    How much help from another person do you currently need...    Turning from your back to your side while in flat bed without using bedrails? 3  -LS 2  -LS 2  -CARLA    Moving from lying on back to sitting on the side of a flat bed without bedrails? 2  -LS 2  -LS 2  -CARLA    Moving to and from a bed to a chair (including a wheelchair)? 3  -LS 3  -LS 3  -CARLA    Standing up from a chair using your arms (e.g., wheelchair, bedside chair)? 3  -LS 3  -LS 3  -CARLA    Climbing 3-5 steps with a railing? 2  -LS 2  -LS 2  -CARLA    To walk in hospital room? 3  -LS 3  -LS 3  -CARLA    AM-PAC 6 Clicks Score 16  -LS 15  -LS 15  -CARLA    Functional Assessment    Outcome Measure Options AM-PAC 6 Clicks Basic Mobility (PT)  -LS AM-PAC 6 Clicks Basic Mobility (PT)  -LS       User Key  (r) = Recorded By, (t) = Taken By, (c) = Cosigned By    Initials Name Provider Type    CARLA Ford, PT Physical Therapist    LORENA Packer, PT Physical Therapist           Time Calculation:         PT Charges       11/26/17 1356          Time Calculation    Start Time 0930  -LS      PT Received On 11/26/17  -      PT Goal Re-Cert Due Date 12/03/17  -      Time Calculation- PT    Total Timed Code Minutes- PT 13 minute(s)  -LS        User Key  (r) = Recorded By, (t) = Taken By, (c) = Cosigned By    Initials Name Provider Type    LORENA Packer, PT Physical Therapist          Therapy Charges for Today     Code Description Service Date Service Provider Modifiers Qty    87227279593 HC PT THER PROC EA 15 MIN 11/25/2017 Dorita Packer, PT GP 1    34616859218 HC PT THER SUPP EA 15 MIN 11/25/2017 Dorita Packer, PT GP 1    27009918418 HC PT THER SUPP EA 15 MIN 11/26/2017 Dorita Packer, PT GP 1    76711626651 HC PT THER PROC EA 15 MIN 11/26/2017 Dorita Packer, PT GP 1          PT G-Codes  Outcome Measure  Options: AM-PAC 6 Clicks Basic Mobility (PT)    Dorita Packer, PT  11/26/2017

## 2017-11-27 LAB
ANION GAP SERPL CALCULATED.3IONS-SCNC: 3 MMOL/L (ref 3–11)
BUN BLD-MCNC: 23 MG/DL (ref 9–23)
BUN/CREAT SERPL: 23 (ref 7–25)
CALCIUM SPEC-SCNC: 9 MG/DL (ref 8.7–10.4)
CHLORIDE SERPL-SCNC: 98 MMOL/L (ref 99–109)
CO2 SERPL-SCNC: 30 MMOL/L (ref 20–31)
CREAT BLD-MCNC: 1 MG/DL (ref 0.6–1.3)
GFR SERPL CREATININE-BSD FRML MDRD: 76 ML/MIN/1.73
GLUCOSE BLD-MCNC: 224 MG/DL (ref 70–100)
GLUCOSE BLDC GLUCOMTR-MCNC: 224 MG/DL (ref 70–130)
GLUCOSE BLDC GLUCOMTR-MCNC: 254 MG/DL (ref 70–130)
GLUCOSE BLDC GLUCOMTR-MCNC: 266 MG/DL (ref 70–130)
GLUCOSE BLDC GLUCOMTR-MCNC: 273 MG/DL (ref 70–130)
POTASSIUM BLD-SCNC: 4.4 MMOL/L (ref 3.5–5.5)
SODIUM BLD-SCNC: 131 MMOL/L (ref 132–146)

## 2017-11-27 PROCEDURE — 63710000001 INSULIN LISPRO (HUMAN) PER 5 UNITS: Performed by: INTERNAL MEDICINE

## 2017-11-27 PROCEDURE — 63710000001 INSULIN DETEMIR PER 5 UNITS: Performed by: INTERNAL MEDICINE

## 2017-11-27 PROCEDURE — 99024 POSTOP FOLLOW-UP VISIT: CPT | Performed by: THORACIC SURGERY (CARDIOTHORACIC VASCULAR SURGERY)

## 2017-11-27 PROCEDURE — 82962 GLUCOSE BLOOD TEST: CPT

## 2017-11-27 PROCEDURE — 25010000002 HEPARIN (PORCINE) PER 1000 UNITS: Performed by: INTERNAL MEDICINE

## 2017-11-27 PROCEDURE — 80048 BASIC METABOLIC PNL TOTAL CA: CPT | Performed by: NURSE PRACTITIONER

## 2017-11-27 PROCEDURE — 97116 GAIT TRAINING THERAPY: CPT

## 2017-11-27 PROCEDURE — 97110 THERAPEUTIC EXERCISES: CPT

## 2017-11-27 RX ORDER — HYDROCODONE BITARTRATE AND ACETAMINOPHEN 5; 325 MG/1; MG/1
1 TABLET ORAL EVERY 6 HOURS PRN
Status: DISCONTINUED | OUTPATIENT
Start: 2017-11-27 | End: 2017-11-29 | Stop reason: HOSPADM

## 2017-11-27 RX ORDER — HYDROCODONE BITARTRATE AND ACETAMINOPHEN 5; 325 MG/1; MG/1
1 TABLET ORAL EVERY 6 HOURS PRN
Qty: 30 TABLET | Refills: 0 | Status: SHIPPED | OUTPATIENT
Start: 2017-11-27 | End: 2022-06-13

## 2017-11-27 RX ADMIN — INSULIN LISPRO 8 UNITS: 100 INJECTION, SOLUTION INTRAVENOUS; SUBCUTANEOUS at 21:26

## 2017-11-27 RX ADMIN — METOPROLOL TARTRATE 12.5 MG: 25 TABLET, FILM COATED ORAL at 09:34

## 2017-11-27 RX ADMIN — INSULIN DETEMIR 25 UNITS: 100 INJECTION, SOLUTION SUBCUTANEOUS at 09:30

## 2017-11-27 RX ADMIN — HYDROCODONE BITARTRATE AND ACETAMINOPHEN 1 TABLET: 5; 325 TABLET ORAL at 11:43

## 2017-11-27 RX ADMIN — ALPRAZOLAM 0.25 MG: 0.25 TABLET ORAL at 18:29

## 2017-11-27 RX ADMIN — FENOFIBRATE 145 MG: 145 TABLET ORAL at 09:33

## 2017-11-27 RX ADMIN — SIROLIMUS 1 MG: 1 TABLET, FILM COATED ORAL at 07:51

## 2017-11-27 RX ADMIN — INSULIN LISPRO 8 UNITS: 100 INJECTION, SOLUTION INTRAVENOUS; SUBCUTANEOUS at 07:55

## 2017-11-27 RX ADMIN — INSULIN LISPRO 15 UNITS: 100 INJECTION, SOLUTION INTRAVENOUS; SUBCUTANEOUS at 17:22

## 2017-11-27 RX ADMIN — BRIMONIDINE TARTRATE 1 DROP: 2 SOLUTION/ DROPS OPHTHALMIC at 09:32

## 2017-11-27 RX ADMIN — INSULIN LISPRO 15 UNITS: 100 INJECTION, SOLUTION INTRAVENOUS; SUBCUTANEOUS at 07:57

## 2017-11-27 RX ADMIN — BRIMONIDINE TARTRATE 1 DROP: 2 SOLUTION/ DROPS OPHTHALMIC at 21:25

## 2017-11-27 RX ADMIN — HEPARIN SODIUM 5000 UNITS: 5000 INJECTION, SOLUTION INTRAVENOUS; SUBCUTANEOUS at 05:12

## 2017-11-27 RX ADMIN — ASPIRIN 325 MG: 325 TABLET, DELAYED RELEASE ORAL at 09:33

## 2017-11-27 RX ADMIN — Medication 2 TABLET: at 17:22

## 2017-11-27 RX ADMIN — INSULIN LISPRO 15 UNITS: 100 INJECTION, SOLUTION INTRAVENOUS; SUBCUTANEOUS at 11:44

## 2017-11-27 RX ADMIN — Medication 2 TABLET: at 09:33

## 2017-11-27 RX ADMIN — INSULIN LISPRO 5 UNITS: 100 INJECTION, SOLUTION INTRAVENOUS; SUBCUTANEOUS at 17:22

## 2017-11-27 RX ADMIN — METOPROLOL TARTRATE 12.5 MG: 25 TABLET, FILM COATED ORAL at 21:25

## 2017-11-27 RX ADMIN — INSULIN DETEMIR 25 UNITS: 100 INJECTION, SOLUTION SUBCUTANEOUS at 21:34

## 2017-11-27 RX ADMIN — HEPARIN SODIUM 5000 UNITS: 5000 INJECTION, SOLUTION INTRAVENOUS; SUBCUTANEOUS at 13:20

## 2017-11-27 RX ADMIN — HYDROCODONE BITARTRATE AND ACETAMINOPHEN 1 TABLET: 7.5; 325 TABLET ORAL at 03:15

## 2017-11-27 RX ADMIN — TIMOLOL MALEATE 1 DROP: 5 SOLUTION/ DROPS OPHTHALMIC at 21:25

## 2017-11-27 RX ADMIN — INSULIN LISPRO 8 UNITS: 100 INJECTION, SOLUTION INTRAVENOUS; SUBCUTANEOUS at 11:44

## 2017-11-27 RX ADMIN — SERTRALINE 50 MG: 50 TABLET, FILM COATED ORAL at 05:12

## 2017-11-27 RX ADMIN — HYDROCODONE BITARTRATE AND ACETAMINOPHEN 1 TABLET: 5; 325 TABLET ORAL at 21:24

## 2017-11-27 RX ADMIN — ATORVASTATIN CALCIUM 80 MG: 40 TABLET, FILM COATED ORAL at 21:24

## 2017-11-27 RX ADMIN — ALPRAZOLAM 0.25 MG: 0.25 TABLET ORAL at 11:43

## 2017-11-27 RX ADMIN — HEPARIN SODIUM 5000 UNITS: 5000 INJECTION, SOLUTION INTRAVENOUS; SUBCUTANEOUS at 21:24

## 2017-11-27 RX ADMIN — TIMOLOL MALEATE 1 DROP: 5 SOLUTION/ DROPS OPHTHALMIC at 09:33

## 2017-11-27 NOTE — PLAN OF CARE
Problem: Patient Care Overview (Adult)  Goal: Plan of Care Review  Outcome: Ongoing (interventions implemented as appropriate)    11/27/17 0606   Coping/Psychosocial Response Interventions   Plan Of Care Reviewed With patient   Patient Care Overview   Progress improving   Outcome Evaluation   Outcome Summary/Follow up Plan KALIA FREY sats WNL. Ambulated in moore several times today. Pt with less pain.          Problem: Cardiac Surgery (Adult)  Goal: Signs and Symptoms of Listed Potential Problems Will be Absent or Manageable (Cardiac Surgery)  Outcome: Ongoing (interventions implemented as appropriate)

## 2017-11-27 NOTE — PROGRESS NOTES
Pt. Referred for Phase II Cardiac Rehab. Staff discussed benefits of exercise, program protocol, and educational material provided. Teach back verified.  Permission granted from patient for staff to fax referral information to outlying program at this time.  Staff to fax referral info to [facility].

## 2017-11-27 NOTE — PROGRESS NOTES
[facility]       LOS: 7 days   Patient Care Team:  Amol Raymond MD as PCP - General (Rheumatology)        Subjective       Patient Denies:  Cp, sob, palpitations      Vital Signs  Temp:  [97.4 °F (36.3 °C)-98.2 °F (36.8 °C)] 97.4 °F (36.3 °C)  Heart Rate:  [] 75  Resp:  [18-20] 18  BP: ()/(59-95) 140/95    Intake/Output Summary (Last 24 hours) at 11/27/17 0906  Last data filed at 11/27/17 0311   Gross per 24 hour   Intake              480 ml   Output             2675 ml   Net            -2195 ml          Physical Exam:     General Appearance:    Alert, cooperative, in no acute distress       Neck:   No adenopathy, supple, trachea midline, no JVD       Lungs:     Clear to auscultation,respirations regular, even and                  unlabored    Heart:    Regular rhythm and normal rate, normal S1 and S2, no            murmur, no gallop, no rub, no click   Chest Wall:    No abnormalities observed   Abdomen:     Normal bowel sounds, no masses, no organomegaly, soft        non-tender, non-distended       Extremities:   Moves all extremities well, no edema, no cyanosis, no             redness   Pulses:   Pulses palpable and equal bilaterally     Results Review:     I reviewed the patient's new clinical results.      WBC WBC   Date/Time Value Ref Range Status   11/25/2017 0333 7.50 3.50 - 10.80 10*3/mm3 Final            HGB Hemoglobin   Date/Time Value Ref Range Status   11/25/2017 0333 10.9 (L) 13.1 - 17.5 g/dL Final           HCT Hematocrit   Date/Time Value Ref Range Status   11/25/2017 0333 32.6 (L) 38.9 - 50.9 % Final            Platlets No results found for: LABPLAT  Sodium  Sodium   Date/Time Value Ref Range Status   11/27/2017 0518 131 (L) 132 - 146 mmol/L Final   11/26/2017 0340 135 132 - 146 mmol/L Final   11/25/2017 0333 134 132 - 146 mmol/L Final     Potassium  Potassium   Date/Time Value Ref Range Status   11/27/2017 0518 4.4 3.5 -  5.5 mmol/L Final   11/26/2017 0340 4.2 3.5 - 5.5 mmol/L Final   11/25/2017 0333 4.6 3.5 - 5.5 mmol/L Final     Chloride  Chloride   Date/Time Value Ref Range Status   11/27/2017 0518 98 (L) 99 - 109 mmol/L Final   11/26/2017 0340 99 99 - 109 mmol/L Final   11/25/2017 0333 99 99 - 109 mmol/L Final     BicarbonateNo results found for: PLASMABICARB    BUN BUN   Date/Time Value Ref Range Status   11/27/2017 0518 23 9 - 23 mg/dL Final   11/26/2017 0340 29 (H) 9 - 23 mg/dL Final   11/25/2017 0333 37 (H) 9 - 23 mg/dL Final      Creatinine Creatinine   Date/Time Value Ref Range Status   11/27/2017 0518 1.00 0.60 - 1.30 mg/dL Final   11/26/2017 0340 1.00 0.60 - 1.30 mg/dL Final   11/25/2017 0333 1.10 0.60 - 1.30 mg/dL Final      Calcium Calcium   Date/Time Value Ref Range Status   11/27/2017 0518 9.0 8.7 - 10.4 mg/dL Final   11/26/2017 0340 9.2 8.7 - 10.4 mg/dL Final   11/25/2017 0333 9.1 8.7 - 10.4 mg/dL Final      Mag No results found for: MG        PT/INR:  No results found for: PROTIME/No results found for: INR  Troponin I   No results found for: CKTOTAL, CKMB, CKMBINDEX, TROPONINI, TROPONINT      aspirin 325 mg Oral Daily   atorvastatin 80 mg Oral Nightly   brimonidine 1 drop Both Eyes Q12H   And      timolol 1 drop Both Eyes Q12H   fenofibrate 145 mg Oral Daily   heparin (porcine) 5,000 Units Subcutaneous Q8H   insulin detemir 25 Units Subcutaneous Q12H   insulin lispro 0-14 Units Subcutaneous 4x Daily AC & at Bedtime   insulin lispro 15 Units Subcutaneous TID With Meals   metoprolol tartrate 12.5 mg Oral Q12H   pharmacy consult - MTM  Does not apply Daily   sennosides-docusate sodium 2 tablet Oral BID   sertraline 50 mg Oral Daily   sirolimus 1 mg Oral Daily          Assessment/Plan     Patient Active Problem List   Diagnosis Code   • Coronary artery disease involving native coronary artery of native heart with angina pectoris I25.119   • Hepatitis C B19.20   • Hx of liver transplant Z94.4   • Essential hypertension  I10   • Hyperlipidemia LDL goal <70 E78.5   • Type 2 diabetes mellitus E11.9     CV stable  Increase bp meds  Ambulate     DEREK Stanley  11/27/17  9:06 AM

## 2017-11-27 NOTE — PROGRESS NOTES
Continued Stay Note  [facility]     Patient Name: Quirino Sheppard  MRN: [id number]  Today's Date: 11/27/2017    Admit Date: 11/20/2017          Discharge Plan       11/27/17 0956    Case Management/Social Work Plan    Additional Comments Cm spoke with pt who denies any discharge needs at this time.              Discharge Codes     None        Expected Discharge Date and Time     Expected Discharge Date Expected Discharge Time    Nov 27, 2017             Tamra Potter RN

## 2017-11-27 NOTE — PROGRESS NOTES
CTS Progress Note       LOS: 7 days   Patient Care Team:  Amol Raymond MD as PCP - General (Rheumatology)    No chief complaint on file.      Vital Signs:  Temp:  [97.4 °F (36.3 °C)-98.2 °F (36.8 °C)] 97.4 °F (36.3 °C)  Heart Rate:  [] 75  Resp:  [18-20] 18  BP: ()/(59-95) 140/95    Physical Exam: Chest incision is satisfactory.  Patient is breathing unlabored   Results:     Results from last 7 days  Lab Units 11/25/17  0333   WBC 10*3/mm3 7.50   HEMOGLOBIN g/dL 10.9*   HEMATOCRIT % 32.6*   PLATELETS 10*3/mm3 160       Results from last 7 days  Lab Units 11/27/17  0518   SODIUM mmol/L 131*   POTASSIUM mmol/L 4.4   CHLORIDE mmol/L 98*   CO2 mmol/L 30.0   BUN mg/dL 23   CREATININE mg/dL 1.00   GLUCOSE mg/dL 224*   CALCIUM mg/dL 9.0           Imaging Results (last 24 hours)     ** No results found for the last 24 hours. **          Assessment    Active Problems:    Coronary artery disease involving native coronary artery of native heart with angina pectoris    Hepatitis C    Hx of liver transplant    Essential hypertension    Hyperlipidemia LDL goal <70    Type 2 diabetes mellitus    Patient will continue in the hallway 3 times yesterday on room oxygen.  He looks to be ready for discharge home soon.  However when I mentioned he may be discharged tomorrow he became extremely nervous and anxious and states that he is not ready to go because no one can care for him at home.  I indicated we could arrange for home health to visit also.    Plan   Anticipate discharge home in 48 hours    Please note that portions of this note were completed with a voice recognition program. Efforts were made to edit the dictations, but occasionally words are mistranscribed.    Naga Guaman MD  11/27/17  6:50 AM

## 2017-11-27 NOTE — PROGRESS NOTES
Adult Nutrition  Assessment/PES    Patient Name:  Quirino Sheppard  YOB: 1955  MRN: [id number]  Admit Date:  11/20/2017    Assessment Date:  11/27/2017    Comments:            Reason for Assessment       11/27/17 1739    Reason for Assessment    Reason For Assessment/Visit follow up protocol    Time Spent (min) 20    Diagnosis --   per notes this adm              Nutrition/Diet History       11/27/17 1739    Nutrition/Diet History    Reported/Observed By Patient    Other doing fine              Labs/Tests/Procedures/Meds       11/27/17 1739    Labs/Tests/Procedures/Meds    Labs/Tests Review Reviewed                Nutrition Prescription Ordered       11/27/17 1740    Nutrition Prescription PO    Current PO Diet Regular    Common Modifiers Cardiac            Evaluation of Received Nutrient/Fluid Intake       11/27/17 1740    PO Evaluation    Number of Meals 6    % PO Intake 100            Problem/Interventions:          Problem 2       11/27/17 1740    Nutrition Diagnoses Problem 2    Problem 2 Nutrition Appropriate for Condition at this Time    Signs/Symptoms (evidenced by) PO Intake    Percent (%) intake recorded 100 %    Over number of meals 6                  Intervention Goal       11/27/17 1740    Intervention Goal    General Nutrition support treatment    PO Maintain intake            Nutrition Intervention       11/27/17 1740    Nutrition Intervention    RD/Tech Action Menu provided;Follow Tx progress;Care plan reviewd              Education/Evaluation       11/27/17 1740    Monitor/Evaluation    Monitor Per protocol        Electronically signed by:  Elisha Franklin RD  11/27/17 5:41 PM

## 2017-11-27 NOTE — PLAN OF CARE
Problem: Patient Care Overview (Adult)  Goal: Plan of Care Review  Outcome: Ongoing (interventions implemented as appropriate)    11/27/17 1118   Coping/Psychosocial Response Interventions   Plan Of Care Reviewed With patient   Patient Care Overview   Progress improving   Outcome Evaluation   Outcome Summary/Follow up Plan pt feels much better.ambulat 800 ft today.occan slight loss of balance on turns.try steps         Problem: Inpatient Physical Therapy  Goal: Bed Mobility Goal LTG- PT  Outcome: Outcome(s) achieved Date Met:  11/27/17 11/23/17 1040 11/27/17 1118   Bed Mobility PT LTG   Bed Mobility PT LTG, Date Established 11/23/17 --    Bed Mobility PT LTG, Time to Achieve 2 wks --    Bed Mobility PT LTG, Activity Type roll left/roll right;supine to sit/sit to supine --    Bed Mobility PT LTG, [place] Level conditional independence --    Bed Mobility PT LTG, Outcome --  goal met       Goal: Transfer Training Goal 1 LTG- PT  Outcome: Outcome(s) achieved Date Met:  11/27/17 11/23/17 1040 11/27/17 1118   Transfer Training PT LTG   Transfer Training PT LTG, Date Established 11/23/17 --    Transfer Training PT LTG, Time to Achieve 2 wks --    Transfer Training PT LTG, Activity Type bed to chair /chair to bed;sit to stand/stand to sit --    Transfer Training PT LTG, [place] Level conditional independence --    Transfer Training PT LTG, Outcome --  goal met       Goal: Gait Training Goal LTG- PT  Outcome: Ongoing (interventions implemented as appropriate)    11/23/17 1040 11/27/17 1118   Gait Training PT LTG   Gait Training Goal PT LTG, Date Established 11/23/17 --    Gait Training Goal PT LTG, Time to Achieve 2 wks --    Gait Training Goal PT LTG, [place] Level conditional independence --    Gait Training Goal PT LTG, Distance to Achieve 200 --    Gait Training Goal PT LTG, Outcome --  goal partially met       Goal: Stair Training Goal LTG- PT  Outcome: Ongoing (interventions implemented as  appropriate)    11/23/17 1040 11/27/17 1118   Stair Training PT LTG   Stair Training Goal PT LTG, Date Established 11/23/17 --    Stair Training Goal PT LTG, Time to Achieve 2 wks --    Stair Training Goal PT LTG, Number of Steps 13 --    Stair Training Goal PT LTG, [place] Level contact guard assist --    Stair Training Goal PT LTG, Assist Device 1 handrail --    Stair Training Goal PT LTG, Outcome --  goal ongoing

## 2017-11-27 NOTE — THERAPY TREATMENT NOTE
Acute Care - Physical Therapy Treatment Note  [facility]     Patient Name: Quirino Sheppard  : 1955  MRN: [id number]  Today's Date: 2017  Onset of Illness/Injury or Date of Surgery Date: 17  Date of Referral to PT: 17  Referring Physician: DEREK Ya    Admit Date: 2017    Visit Dx:    ICD-10-CM ICD-9-CM   1. Coronary artery disease involving native coronary artery of native heart, angina presence unspecified I25.10 414.01   2. Encounter for examination for normal comparison and control in clinical research program Z00.6 V70.7   3. Impaired functional mobility, balance, gait, and endurance Z74.09 V49.89     Patient Active Problem List   Diagnosis   • Coronary artery disease involving native coronary artery of native heart with angina pectoris   • Hepatitis C   • Hx of liver transplant   • Essential hypertension   • Hyperlipidemia LDL goal <70   • Type 2 diabetes mellitus               Adult Rehabilitation Note       17 1040 17 0930 17 1027    Rehab Assessment/Intervention    Discipline physical therapy assistant  -UD physical therapist  -LS physical therapist  -LS    Document Type therapy note (daily note)  -UD therapy note (daily note)  -LS therapy note (daily note)  -LS    Subjective Information agree to therapy;no complaints  -UD agree to therapy;no complaints  -LS agree to therapy;complains of;pain  -LS    Patient Effort, Rehab Treatment good  -UD good  -LS good  -LS    Precautions/Limitations cardiac precautions  -UD cardiac precautions;fall precautions;oxygen therapy device and L/min;sternal precautions  -LS cardiac precautions;oxygen therapy device and L/min;sternal precautions  -LS    Specific Treatment Considerations  Noted balance deficits with turning during mobility.   -LS     Recorded by [UD] Stacy Ramos, PTA [LS] Dorita Packer, PT [LS] Dorita Packer, PT    Vital Signs    Pre Systolic BP Rehab 105  -  -  -LS    Pre Treatment Diastolic BP 71   -UD 63  -LS 59  -LS    Post Systolic BP Rehab 165  -  -  -LS    Post Treatment Diastolic BP 86  -UD 91  -LS 85  -LS    Pretreatment Heart Rate (beats/min) 86  -UD 79  -LS 84  -LS    Posttreatment Heart Rate (beats/min) 89  -UD 76  -LS 80  -LS    Pre SpO2 (%) 95  -UD 99  -LS 95  -LS    O2 Delivery Pre Treatment room air  -UD supplemental O2   1.5L  -LS supplemental O2  -LS    Post SpO2 (%) 99  -UD 95  -  -LS    O2 Delivery Post Treatment room air  -UD supplemental O2  -LS supplemental O2  -LS    Pre Patient Position Sitting  -UD Sitting  -LS Sitting  -LS    Intra Patient Position Standing  -UD Standing  -LS Standing  -LS    Post Patient Position Sitting  -UD Supine  -LS Supine  -LS    Recorded by [UD] Stacy Ramos PTA [LS] Dorita Packer, PT [LS] Dorita Packer, PT    Pain Assessment    Pain Assessment No/denies pain  -UD 0-10  -LS 0-10  -LS    Pain Score 0  -UD 0  -LS 4  -LS    Post Pain Score 0  -UD 0  -LS 4  -LS    Pain Type   Surgical pain  -LS    Pain Location   [place]  -LS    Pain Intervention(s)   Repositioned;Ambulation/increased activity  -LS    Response to Interventions   tolerated  -LS    Recorded by [UD] Stacy Ramos PTA [LS] Dorita Packer, PT [LS] Dorita Packer, PT    Cognitive Assessment/Intervention    Current Cognitive/Communication Assessment  functional  -LS functional  -LS    Orientation Status oriented x 4  -UD oriented x 4  -LS oriented x 4  -LS    Follows Commands/Answers Questions 100% of the time  -UD able to follow single-step instructions;100% of the time;needs cueing  -LS able to follow single-step instructions;100% of the time;needs cueing  -LS    Personal Safety  mild impairment;decreased insight to deficits  -LS mild impairment;decreased awareness, need for safety;decreased insight to deficits  -LS    Personal Safety Interventions  fall prevention program maintained;gait belt;nonskid shoes/slippers when out of bed  -LS fall prevention program maintained;gait  belt;nonskid shoes/slippers when out of bed  -LS    Recorded by [UD] Stacy Ramos PTA [LS] Dorita Packer, PT [LS] Dorita Packer, PT    Bed Mobility, Assessment/Treatment    Bed Mob, Supine to Sit, [place] not tested   up in chair  -UD      Bed Mob, Sit to Supine, [place]  moderate assist (50% patient effort);verbal cues required  -LS moderate assist (50% patient effort);2 person assist required;verbal cues required  -LS    Bed Mob, Sidelying to Sit, [place]  2 person assist required  -LS     Bed Mobility, Impairments  strength decreased;pain  -LS pain;strength decreased  -LS    Recorded by [UD] Stacy Ramos PTA [LS] Dorita Packer, PT [LS] Dorita Packer, PT    Transfer Assessment/Treatment    Transfers, Sit-Stand [place] stand by assist  -UD supervision required;verbal cues required  -LS contact guard assist;2 person assist required;verbal cues required  -LS    Transfers, Stand-Sit [place] stand by assist  -UD supervision required;verbal cues required  -LS contact guard assist;2 person assist required;verbal cues required  -LS    Transfer, Impairments  impaired balance;strength decreased  -LS impaired balance;strength decreased  -LS    Transfer, Comment  VC's for sternal precautions.   -LS VC's for appropriate hand placement for maintaining sternal precautions.   -LS    Recorded by [UD] Stacy Ramos PTA [LS] Dorita Packer, PT [LS] Dorita Packer, PT    Gait Assessment/Treatment    Gait, [place] Level contact guard assist  -UD minimum assist (75% patient effort);1 person + 1 person to manage equipment;verbal cues required  -LS contact guard assist;verbal cues required  -LS    Gait, Distance (Feet) 800  -  -  -LS    Gait, Gait Deviations ines decreased;step length decreased  -UD ines decreased;step length decreased  -LS ines decreased;step length decreased  -LS    Gait, Safety Issues balance decreased during turns  -UD      Gait, Impairments strength decreased  -UD  impaired balance  -LS strength decreased;impaired balance  -LS    Gait, Comment --   occan. slight loss of  balance  -UD Primarily CGA; demonstrated 2 slight LOB laterally during turns, requiring min A to maintain balance.   -LS VC's for upright posture. Distance limited by fatigue.   -LS    Recorded by [UD] Stacy Ramos, PTA [LS] Dorita Packer, PT [LS] Dorita Packer, PT    Motor Skills/Interventions    Additional Documentation   Balance Skills Training (Group)  -LS    Recorded by   [LS] Dorita Packer PT    Balance Skills Training    Sitting-Level of Assistance  Close supervision  -LS Close supervision  -LS    Sitting-Balance Support  Feet supported  -LS Feet supported  -LS    Standing-Level of Assistance  Contact guard  -LS Contact guard;x2  -LS    Static Standing Balance Support  No upper extremity supported  -LS No upper extremity supported  -LS    Gait Balance-Level of Assistance  Minimum assistance  -LS Contact guard;x2  -LS    Gait Balance Support  No upper extremity supported  -LS No upper extremity supported  -LS    Recorded by  [LS] Dorita Packer, PT [LS] Dorita Packer, PT    Therapy Exercises    Bilateral Lower Extremities AROM:;10 reps;sitting  -UD AROM:;10 reps;sitting;ankle pumps/circles;LAQ;hip flexion  -LS AROM:;10 reps;sitting;ankle pumps/circles;hip flexion;LAQ  -LS    Bilateral Upper Extremity AROM:;10 reps;sitting  -UD AROM:;10 reps;sitting;elbow flexion/extension;shoulder abduction/adduction;shoulder extension/flexion  -LS AROM:;10 reps;elbow flexion/extension;shoulder abduction/adduction  -LS    Recorded by [UD] Stacy Ramos, PTA [LS] Dorita Packer, PT [LS] Dorita Packer, PT    Positioning and Restraints    Pre-Treatment Position sitting in chair/recliner  -UD sitting in chair/recliner  -LS sitting in chair/recliner  -LS    Post Treatment Position bed  -UD bed  -LS bed  -LS    In Bed notified nsg;sitting EOB;call light within reach;side rails up x2  -UD notified nsg;supine;call light within  reach;encouraged to call for assist;exit alarm on;RUE elevated;LUE elevated  -LS notified nsg;supine;call light within reach;encouraged to call for assist;exit alarm on;RUE elevated;LUE elevated;SCD pump applied;with nsg;legs elevated  -LS    Recorded by [UD] Stacy Ramos, PTA [LS] Dorita Packer, PT [LS] Dorita Packer, PT      User Key  (r) = Recorded By, (t) = Taken By, (c) = Cosigned By    Initials Name Effective Dates    UD Stacy Ramos, PTA 06/22/15 -     LS Dorita Packer, PT 06/19/15 -                 IP PT Goals       11/27/17 1118 11/26/17 1354 11/25/17 1407    Bed Mobility PT LTG    Bed Mobility PT LTG, Outcome goal met  -UD goal ongoing  -LS goal ongoing  -LS    Transfer Training PT LTG    Transfer Training PT LTG, Outcome goal met  -UD goal ongoing  -LS goal ongoing  -LS    Gait Training PT LTG    Gait Training Goal PT LTG, Outcome goal partially met  -UD goal ongoing  -LS goal ongoing  -LS    Stair Training PT LTG    Stair Training Goal PT LTG, Outcome goal ongoing  -UD goal ongoing  -LS goal ongoing  -LS      11/23/17 1040          Bed Mobility PT LTG    Bed Mobility PT LTG, Date Established 11/23/17  -SJ      Bed Mobility PT LTG, Time to Achieve 2 wks  -SJ      Bed Mobility PT LTG, Activity Type roll left/roll right;supine to sit/sit to supine  -SJ      Bed Mobility PT LTG, [place] Level conditional independence  -SJ      Bed Mobility PT LTG, Outcome goal ongoing  -SJ      Transfer Training PT LTG    Transfer Training PT LTG, Date Established 11/23/17  -SJ      Transfer Training PT LTG, Time to Achieve 2 wks  -SJ      Transfer Training PT LTG, Activity Type bed to chair /chair to bed;sit to stand/stand to sit  -SJ      Transfer Training PT LTG, [place] Level conditional independence  -SJ      Transfer Training PT LTG, Outcome goal ongoing  -SJ      Gait Training PT LTG    Gait Training Goal PT LTG, Date Established 11/23/17  -SJ      Gait Training Goal PT LTG, Time to Achieve 2 wks  -SJ       Gait Training Goal PT LTG, [place] Level conditional independence  -SJ      Gait Training Goal PT LTG, Distance to Achieve 200  -SJ      Gait Training Goal PT LTG, Outcome goal ongoing  -SJ      Stair Training PT LTG    Stair Training Goal PT LTG, Date Established 11/23/17  -SJ      Stair Training Goal PT LTG, Time to Achieve 2 wks  -SJ      Stair Training Goal PT LTG, Number of Steps 13  -SJ      Stair Training Goal PT LTG, [place] Level contact guard assist  -SJ      Stair Training Goal PT LTG, Assist Device 1 handrail  -SJ      Stair Training Goal PT LTG, Outcome goal ongoing  -SJ        User Key  (r) = Recorded By, (t) = Taken By, (c) = Cosigned By    Initials Name Provider Type    UD Stacy Ramos, PTA Physical Therapy Assistant    SJ Annelise Wood, PT Physical Therapist    LS Dorita Packer, PT Physical Therapist          Physical Therapy Education     Title: PT OT SLP Therapies (Done)     Topic: Physical Therapy (Done)     Point: Mobility training (Done)    Learning Progress Summary    Learner Readiness Method Response Comment Documented by Status   Patient Eager MEL MARTINEZ,KATTY BOBBY   11/27/17 1118 Done    Acceptance E,D NR   11/26/17 1353 Active    Acceptance E,D NR   11/25/17 1407 Active    Acceptance E NR   11/24/17 1028 Active    Acceptance E,D NR   11/23/17 1045 Active               Point: Home exercise program (Done)    Learning Progress Summary    Learner Readiness Method Response Comment Documented by Status   Patient Eager MEL MARTINEZ,KATTY BOBBY UD 11/27/17 1118 Done    Acceptance E,D NR   11/26/17 1353 Active    Acceptance E,D NR   11/25/17 1407 Active    Acceptance E NR   11/24/17 1028 Active    Acceptance E,D NR   11/23/17 1045 Active               Point: Body mechanics (Done)    Learning Progress Summary    Learner Readiness Method Response Comment Documented by Status   Patient Eager MEL MARTINEZ,KATTY BOBBY   11/27/17 1118 Done    Acceptance E,D NR   11/26/17 1353 Active    Acceptance E,D NR    11/25/17 1407 Active    Acceptance E NR   11/24/17 1028 Active    Acceptance E,D NR   11/23/17 1045 Active               Point: Precautions (Done)    Learning Progress Summary    Learner Readiness Method Response Comment Documented by Status   Patient Eager E,D,H MINERVAKATTY   11/27/17 1118 Done    Acceptance E,D NR   11/26/17 1353 Active    Acceptance E,D NR   11/25/17 1407 Active    Acceptance E NR   11/24/17 1028 Active    Acceptance E,D NR   11/23/17 1045 Active                      User Key     Initials Effective Dates Name Provider Type Discipline     06/19/15 -  Dana Ford, PT Physical Therapist PT     06/22/15 -  Stacy Ramos, PTA Physical Therapy Assistant PT     06/19/15 -  Annelise Wood, PT Physical Therapist PT     06/19/15 -  Dorita Packer, PT Physical Therapist PT                    PT Recommendation and Plan  Anticipated Equipment Needs At Discharge:  (TBD)  Anticipated Discharge Disposition: inpatient rehabilitation facility  Planned Therapy Interventions: balance training, bed mobility training, gait training, home exercise program, patient/family education, stair training, strengthening, transfer training  PT Frequency: daily  Plan of Care Review  Plan Of Care Reviewed With: patient  Progress: improving  Outcome Summary/Follow up Plan: pt feels much better.ambulat 800 ft today.occan slight loss of balance on turns.try steps          Outcome Measures       11/27/17 1040 11/26/17 0930 11/25/17 1027    How much help from another person do you currently need...    Turning from your back to your side while in flat bed without using bedrails? 4  -UD 3  -LS 2  -LS    Moving from lying on back to sitting on the side of a flat bed without bedrails? 4  -UD 2  -LS 2  -LS    Moving to and from a bed to a chair (including a wheelchair)? 3  -UD 3  -LS 3  -LS    Standing up from a chair using your arms (e.g., wheelchair, bedside chair)? 3  -UD 3  -LS 3  -LS    Climbing 3-5 steps with a  railing? 3  -UD 2  -LS 2  -LS    To walk in hospital room? 3  -UD 3  -LS 3  -LS    AM-PAC 6 Clicks Score 20  -UD 16  -LS 15  -LS    Functional Assessment    Outcome Measure Options  AM-PAC 6 Clicks Basic Mobility (PT)  -LS AM-PAC 6 Clicks Basic Mobility (PT)  -LS      User Key  (r) = Recorded By, (t) = Taken By, (c) = Cosigned By    Initials Name Provider Type    UD Stacy Ramos PTA Physical Therapy Assistant    LS Dorita Packer, PT Physical Therapist           Time Calculation:         PT Charges       11/27/17 1120          Time Calculation    PT Received On 11/27/17  -      PT Goal Re-Cert Due Date 12/03/17  -      Time Calculation- PT    Total Timed Code Minutes- PT 24 minute(s)  -UD        User Key  (r) = Recorded By, (t) = Taken By, (c) = Cosigned By    Initials Name Provider Type    UD Stacy Ramos PTA Physical Therapy Assistant          Therapy Charges for Today     Code Description Service Date Service Provider Modifiers Qty    47269876213 HC PT THER PROC EA 15 MIN 11/27/2017 Stacy Ramos PTA GP 1    22279327890 HC GAIT TRAINING EA 15 MIN 11/27/2017 Stacy Ramos PTA GP 1          PT G-Codes  Outcome Measure Options: AM-PAC 6 Clicks Basic Mobility (PT)    Stacy Ramos PTA  11/27/2017

## 2017-11-28 LAB
ANION GAP SERPL CALCULATED.3IONS-SCNC: 7 MMOL/L (ref 3–11)
BUN BLD-MCNC: 23 MG/DL (ref 9–23)
BUN/CREAT SERPL: 23 (ref 7–25)
CALCIUM SPEC-SCNC: 8.8 MG/DL (ref 8.7–10.4)
CHLORIDE SERPL-SCNC: 99 MMOL/L (ref 99–109)
CO2 SERPL-SCNC: 29 MMOL/L (ref 20–31)
CREAT BLD-MCNC: 1 MG/DL (ref 0.6–1.3)
GFR SERPL CREATININE-BSD FRML MDRD: 76 ML/MIN/1.73
GLUCOSE BLD-MCNC: 173 MG/DL (ref 70–100)
GLUCOSE BLDC GLUCOMTR-MCNC: 185 MG/DL (ref 70–130)
GLUCOSE BLDC GLUCOMTR-MCNC: 216 MG/DL (ref 70–130)
GLUCOSE BLDC GLUCOMTR-MCNC: 227 MG/DL (ref 70–130)
GLUCOSE BLDC GLUCOMTR-MCNC: 236 MG/DL (ref 70–130)
GLUCOSE BLDC GLUCOMTR-MCNC: 328 MG/DL (ref 70–130)
POTASSIUM BLD-SCNC: 4 MMOL/L (ref 3.5–5.5)
SODIUM BLD-SCNC: 135 MMOL/L (ref 132–146)

## 2017-11-28 PROCEDURE — 99024 POSTOP FOLLOW-UP VISIT: CPT | Performed by: THORACIC SURGERY (CARDIOTHORACIC VASCULAR SURGERY)

## 2017-11-28 PROCEDURE — 97116 GAIT TRAINING THERAPY: CPT

## 2017-11-28 PROCEDURE — 63710000001 INSULIN DETEMIR PER 5 UNITS: Performed by: NURSE PRACTITIONER

## 2017-11-28 PROCEDURE — 82962 GLUCOSE BLOOD TEST: CPT

## 2017-11-28 PROCEDURE — 63710000001 INSULIN DETEMIR PER 5 UNITS: Performed by: INTERNAL MEDICINE

## 2017-11-28 PROCEDURE — 25010000002 HEPARIN (PORCINE) PER 1000 UNITS: Performed by: INTERNAL MEDICINE

## 2017-11-28 PROCEDURE — 80048 BASIC METABOLIC PNL TOTAL CA: CPT | Performed by: NURSE PRACTITIONER

## 2017-11-28 PROCEDURE — 63710000001 INSULIN LISPRO (HUMAN) PER 5 UNITS: Performed by: NURSE PRACTITIONER

## 2017-11-28 RX ADMIN — HYDROCODONE BITARTRATE AND ACETAMINOPHEN 1 TABLET: 5; 325 TABLET ORAL at 21:29

## 2017-11-28 RX ADMIN — INSULIN LISPRO 15 UNITS: 100 INJECTION, SOLUTION INTRAVENOUS; SUBCUTANEOUS at 08:35

## 2017-11-28 RX ADMIN — INSULIN LISPRO 15 UNITS: 100 INJECTION, SOLUTION INTRAVENOUS; SUBCUTANEOUS at 12:26

## 2017-11-28 RX ADMIN — INSULIN LISPRO 5 UNITS: 100 INJECTION, SOLUTION INTRAVENOUS; SUBCUTANEOUS at 21:23

## 2017-11-28 RX ADMIN — Medication 2 TABLET: at 17:38

## 2017-11-28 RX ADMIN — TIMOLOL MALEATE 1 DROP: 5 SOLUTION/ DROPS OPHTHALMIC at 21:24

## 2017-11-28 RX ADMIN — ALPRAZOLAM 0.25 MG: 0.25 TABLET ORAL at 08:35

## 2017-11-28 RX ADMIN — ALPRAZOLAM 0.25 MG: 0.25 TABLET ORAL at 17:38

## 2017-11-28 RX ADMIN — INSULIN LISPRO 3 UNITS: 100 INJECTION, SOLUTION INTRAVENOUS; SUBCUTANEOUS at 08:35

## 2017-11-28 RX ADMIN — INSULIN DETEMIR 30 UNITS: 100 INJECTION, SOLUTION SUBCUTANEOUS at 21:30

## 2017-11-28 RX ADMIN — INSULIN LISPRO 5 UNITS: 100 INJECTION, SOLUTION INTRAVENOUS; SUBCUTANEOUS at 12:27

## 2017-11-28 RX ADMIN — FENOFIBRATE 145 MG: 145 TABLET ORAL at 08:36

## 2017-11-28 RX ADMIN — SIROLIMUS 1 MG: 1 TABLET, FILM COATED ORAL at 08:37

## 2017-11-28 RX ADMIN — BRIMONIDINE TARTRATE 1 DROP: 2 SOLUTION/ DROPS OPHTHALMIC at 08:35

## 2017-11-28 RX ADMIN — Medication 2 TABLET: at 08:35

## 2017-11-28 RX ADMIN — BRIMONIDINE TARTRATE 1 DROP: 2 SOLUTION/ DROPS OPHTHALMIC at 21:24

## 2017-11-28 RX ADMIN — HEPARIN SODIUM 5000 UNITS: 5000 INJECTION, SOLUTION INTRAVENOUS; SUBCUTANEOUS at 14:42

## 2017-11-28 RX ADMIN — SERTRALINE 50 MG: 50 TABLET, FILM COATED ORAL at 05:31

## 2017-11-28 RX ADMIN — HEPARIN SODIUM 5000 UNITS: 5000 INJECTION, SOLUTION INTRAVENOUS; SUBCUTANEOUS at 21:23

## 2017-11-28 RX ADMIN — INSULIN LISPRO 18 UNITS: 100 INJECTION, SOLUTION INTRAVENOUS; SUBCUTANEOUS at 17:40

## 2017-11-28 RX ADMIN — HEPARIN SODIUM 5000 UNITS: 5000 INJECTION, SOLUTION INTRAVENOUS; SUBCUTANEOUS at 05:31

## 2017-11-28 RX ADMIN — INSULIN LISPRO 10 UNITS: 100 INJECTION, SOLUTION INTRAVENOUS; SUBCUTANEOUS at 17:40

## 2017-11-28 RX ADMIN — ASPIRIN 325 MG: 325 TABLET, DELAYED RELEASE ORAL at 08:35

## 2017-11-28 RX ADMIN — ATORVASTATIN CALCIUM 80 MG: 40 TABLET, FILM COATED ORAL at 21:23

## 2017-11-28 RX ADMIN — TIMOLOL MALEATE 1 DROP: 5 SOLUTION/ DROPS OPHTHALMIC at 08:35

## 2017-11-28 RX ADMIN — METOPROLOL TARTRATE 12.5 MG: 25 TABLET, FILM COATED ORAL at 08:34

## 2017-11-28 RX ADMIN — METOPROLOL TARTRATE 12.5 MG: 25 TABLET, FILM COATED ORAL at 21:22

## 2017-11-28 RX ADMIN — HYDROCODONE BITARTRATE AND ACETAMINOPHEN 1 TABLET: 7.5; 325 TABLET ORAL at 08:35

## 2017-11-28 RX ADMIN — INSULIN DETEMIR 25 UNITS: 100 INJECTION, SOLUTION SUBCUTANEOUS at 08:38

## 2017-11-28 NOTE — THERAPY TREATMENT NOTE
Acute Care - Physical Therapy Treatment Note  [facility]     Patient Name: Quirino Sheppard  : 1955  MRN: [id number]  Today's Date: 2017  Onset of Illness/Injury or Date of Surgery Date: 17  Date of Referral to PT: 17  Referring Physician: DEREK Ya    Admit Date: 2017    Visit Dx:    ICD-10-CM ICD-9-CM   1. Coronary artery disease involving native coronary artery of native heart, angina presence unspecified I25.10 414.01   2. Encounter for examination for normal comparison and control in clinical research program Z00.6 V70.7   3. Impaired functional mobility, balance, gait, and endurance Z74.09 V49.89   4. Coronary artery disease involving native coronary artery of native heart with angina pectoris I25.119 414.01     413.9   5. Hx of hepatitis C Z86.19 V12.09     Patient Active Problem List   Diagnosis   • Coronary artery disease involving native coronary artery of native heart with angina pectoris   • Hepatitis C   • Hx of liver transplant   • Essential hypertension   • Hyperlipidemia LDL goal <70   • Type 2 diabetes mellitus               Adult Rehabilitation Note       17 1020 17 1040 17 0930    Rehab Assessment/Intervention    Discipline physical therapy assistant  -UD physical therapy assistant  -UD physical therapist  -LS    Document Type therapy note (daily note)  -UD therapy note (daily note)  -UD therapy note (daily note)  -LS    Subjective Information agree to therapy;complains of;weakness  -UD agree to therapy;no complaints  -UD agree to therapy;no complaints  -LS    Patient Effort, Rehab Treatment good  -UD good  -UD good  -LS    Symptoms Noted During/After Treatment fatigue  -UD      Precautions/Limitations cardiac precautions  -UD cardiac precautions  -UD cardiac precautions;fall precautions;oxygen therapy device and L/min;sternal precautions  -LS    Specific Treatment Considerations   Noted balance deficits with turning during mobility.   -LS     Recorded by [UD] Stacy Ramos PTA [UD] Stacy Ramos, PTA [LS] Dorita Packer, PT    Vital Signs    Pre Systolic BP Rehab 131  -  -  -LS    Pre Treatment Diastolic BP 75  -UD 71  -UD 63  -LS    Post Systolic BP Rehab 132  -  -  -LS    Post Treatment Diastolic BP 70  -UD 86  -UD 91  -LS    Pretreatment Heart Rate (beats/min) 72  -UD 86  -UD 79  -LS    Posttreatment Heart Rate (beats/min) 78  -UD 89  -UD 76  -LS    Pre SpO2 (%) 94  -UD 95  -UD 99  -LS    O2 Delivery Pre Treatment room air  -UD room air  -UD supplemental O2   1.5L  -LS    Post SpO2 (%) 94  -UD 99  -UD 95  -LS    O2 Delivery Post Treatment room air  -UD room air  -UD supplemental O2  -LS    Pre Patient Position Supine  -UD Sitting  -UD Sitting  -LS    Intra Patient Position Standing  -UD Standing  -UD Standing  -LS    Post Patient Position Supine  -UD Sitting  -UD Supine  -LS    Recorded by [UD] Stacy Ramos, ANGEL [UD] Stacy Ramos, PTA [LS] Dorita Packer, PT    Pain Assessment    Pain Assessment No/denies pain  -UD No/denies pain  -UD 0-10  -LS    Pain Score 0  -UD 0  -UD 0  -LS    Post Pain Score 0  -UD 0  -UD 0  -LS    Recorded by [UD] Stacy Ramos PTA [UD] Stacy Ramos, PTA [LS] Dorita Packer, PT    Cognitive Assessment/Intervention    Current Cognitive/Communication Assessment   functional  -LS    Orientation Status oriented x 4  -UD oriented x 4  -UD oriented x 4  -LS    Follows Commands/Answers Questions 100% of the time  -% of the time  -UD able to follow single-step instructions;100% of the time;needs cueing  -LS    Personal Safety   mild impairment;decreased insight to deficits  -LS    Personal Safety Interventions   fall prevention program maintained;gait belt;nonskid shoes/slippers when out of bed  -LS    Recorded by [UD] Stacy Ramos PTA [UD] Stacy Ramos, PTA [LS] Dorita Packer, PT    Bed Mobility, Assessment/Treatment    Bed Mob, Supine to Sit, [place] independent  -UD not tested   up in chair  -UD      Bed Mob, Sit to Supine, [place] independent  -UD  moderate assist (50% patient effort);verbal cues required  -LS    Bed Mob, Sidelying to Sit, [place]   2 person assist required  -LS    Bed Mobility, Impairments   strength decreased;pain  -LS    Recorded by [UD] Stacy Ramos PTA [UD] Stacy Ramos PTA [LS] Dorita Packer, PT    Transfer Assessment/Treatment    Transfers, Sit-Stand [place] independent  -UD stand by assist  -UD supervision required;verbal cues required  -LS    Transfers, Stand-Sit [place] independent  -UD stand by assist  -UD supervision required;verbal cues required  -LS    Transfer, Impairments   impaired balance;strength decreased  -LS    Transfer, Comment   VC's for sternal precautions.   -LS    Recorded by [UD] Stacy Ramos PTA [UD] Stacy Ramos PTA [LS] Dorita Packer, PT    Gait Assessment/Treatment    Gait, [place] Level supervision required  -UD contact guard assist  -UD minimum assist (75% patient effort);1 person + 1 person to manage equipment;verbal cues required  -LS    Gait, Distance (Feet) 700  -  -  -LS    Gait, Gait Deviations step length decreased;ines decreased  -UD ines decreased;step length decreased  -UD ines decreased;step length decreased  -LS    Gait, Safety Issues step length decreased  -UD balance decreased during turns  -UD     Gait, Impairments strength decreased  -UD strength decreased  -UD impaired balance  -LS    Gait, Comment  --   oc[place]. slight loss of  balance  -UD Primarily CGA; demonstrated 2 slight LOB laterally during turns, requiring min A to maintain balance.   -LS    Recorded by [UD] Stacy Ramos PTA [UD] Stacy Ramos PTA [LS] Dorita Packer, PT    Stairs Assessment/Treatment    Number of Stairs 4  -UD      Stairs, Handrail Location both sides  -UD      Stairs, [place] Level conditional independence  -UD      Stairs, Technique Used step over step (ascending);step over step (descending)  -UD      Recorded by  [UD] Stacy Ramos PTA      Balance Skills Training    Sitting-Level of Assistance   Close supervision  -LS    Sitting-Balance Support   Feet supported  -LS    Standing-Level of Assistance   Contact guard  -LS    Static Standing Balance Support   No upper extremity supported  -LS    Gait Balance-Level of Assistance   Minimum assistance  -LS    Gait Balance Support   No upper extremity supported  -LS    Recorded by   [LS] Dorita Packer, PT    Therapy Exercises    Bilateral Lower Extremities  AROM:;10 reps;sitting  -UD AROM:;10 reps;sitting;ankle pumps/circles;LAQ;hip flexion  -LS    Bilateral Upper Extremity  AROM:;10 reps;sitting  -UD AROM:;10 reps;sitting;elbow flexion/extension;shoulder abduction/adduction;shoulder extension/flexion  -LS    Recorded by  [UD] Stacy Ramos PTA [LS] Dorita Packer, PT    Positioning and Restraints    Pre-Treatment Position in bed  -UD sitting in chair/recliner  -UD sitting in chair/recliner  -LS    Post Treatment Position bed  -UD bed  -UD bed  -LS    In Bed notified nsg;supine;call light within reach;side rails up x2;exit alarm on  -UD notified nsg;sitting EOB;call light within reach;side rails up x2  -UD notified nsg;supine;call light within reach;encouraged to call for assist;exit alarm on;RUE elevated;LUE elevated  -LS    Recorded by [UD] Stacy Ramos PTA [UD] Stacy Ramos, ANGEL [LS] Dorita Packer, PT      User Key  (r) = Recorded By, (t) = Taken By, (c) = Cosigned By    Initials Name Effective Dates     Stacy Ramos PTA 06/22/15 -     LS Dorita Packer, PT 06/19/15 -                 IP PT Goals       11/28/17 1104 11/27/17 1118 11/26/17 1354    Bed Mobility PT LTG    Bed Mobility PT LTG, Outcome  goal met  -UD goal ongoing  -LS    Transfer Training PT LTG    Transfer Training PT LTG, Outcome  goal met  -UD goal ongoing  -LS    Gait Training PT LTG    Gait Training Goal PT LTG, Outcome goal met  -UD goal partially met  -UD goal ongoing  -LS    Stair Training PT LTG    Stair  Training Goal PT LTG, Outcome goal met  -UD goal ongoing  -UD goal ongoing  -LS      11/25/17 1407 11/23/17 1040       Bed Mobility PT LTG    Bed Mobility PT LTG, Date Established  11/23/17  -SJ     Bed Mobility PT LTG, Time to Achieve  2 wks  -SJ     Bed Mobility PT LTG, Activity Type  roll left/roll right;supine to sit/sit to supine  -SJ     Bed Mobility PT LTG, [place] Level  conditional independence  -SJ     Bed Mobility PT LTG, Outcome goal ongoing  -LS goal ongoing  -SJ     Transfer Training PT LTG    Transfer Training PT LTG, Date Established  11/23/17  -SJ     Transfer Training PT LTG, Time to Achieve  2 wks  -SJ     Transfer Training PT LTG, Activity Type  bed to chair /chair to bed;sit to stand/stand to sit  -SJ     Transfer Training PT LTG, [place] Level  conditional independence  -SJ     Transfer Training PT LTG, Outcome goal ongoing  -LS goal ongoing  -SJ     Gait Training PT LTG    Gait Training Goal PT LTG, Date Established  11/23/17  -SJ     Gait Training Goal PT LTG, Time to Achieve  2 wks  -SJ     Gait Training Goal PT LTG, [place] Level  conditional independence  -SJ     Gait Training Goal PT LTG, Distance to Achieve  200  -SJ     Gait Training Goal PT LTG, Outcome goal ongoing  -LS goal ongoing  -SJ     Stair Training PT LTG    Stair Training Goal PT LTG, Date Established  11/23/17  -SJ     Stair Training Goal PT LTG, Time to Achieve  2 wks  -SJ     Stair Training Goal PT LTG, Number of Steps  13  -SJ     Stair Training Goal PT LTG, [place] Level  contact guard assist  -SJ     Stair Training Goal PT LTG, Assist Device  1 handrail  -SJ     Stair Training Goal PT LTG, Outcome goal ongoing  -LS goal ongoing  -SJ       User Key  (r) = Recorded By, (t) = Taken By, (c) = Cosigned By    Initials Name Provider Type    NOE Ramos, PTA Physical Therapy Assistant    ROXI Wood, PT Physical Therapist    LS Dorita Packer, PT Physical Therapist          Physical Therapy  Education     Title: PT OT SLP Therapies (Done)     Topic: Physical Therapy (Done)     Point: Mobility training (Done)    Learning Progress Summary    Learner Readiness Method Response Comment Documented by Status   Patient Acceptance E,D DU,NR   11/28/17 1104 Done    Eager E,D,H KATTY PALMA   11/27/17 1118 Done    Acceptance E,D NR   11/26/17 1353 Active    Acceptance E,D NR   11/25/17 1407 Active    Acceptance E NR   11/24/17 1028 Active    Acceptance E,D NR   11/23/17 1045 Active               Point: Home exercise program (Done)    Learning Progress Summary    Learner Readiness Method Response Comment Documented by Status   Patient Acceptance E,D DU,NR   11/28/17 1104 Done    Eager E,D,H KATTY PALMA   11/27/17 1118 Done    Acceptance E,D NR   11/26/17 1353 Active    Acceptance E,D NR   11/25/17 1407 Active    Acceptance E NR   11/24/17 1028 Active    Acceptance E,D NR   11/23/17 1045 Active               Point: Body mechanics (Done)    Learning Progress Summary    Learner Readiness Method Response Comment Documented by Status   Patient Acceptance E,D DU,NR   11/28/17 1104 Done    Eager E,D,H KATTY PALMA   11/27/17 1118 Done    Acceptance E,D NR   11/26/17 1353 Active    Acceptance E,D NR   11/25/17 1407 Active    Acceptance E NR   11/24/17 1028 Active    Acceptance E,D NR   11/23/17 1045 Active               Point: Precautions (Done)    Learning Progress Summary    Learner Readiness Method Response Comment Documented by Status   Patient Acceptance E,D DU,NR   11/28/17 1104 Done    Eager E,D,H VU,KATTY   11/27/17 1118 Done    Acceptance E,D NR   11/26/17 1353 Active    Acceptance E,D NR   11/25/17 1407 Active    Acceptance E NR   11/24/17 1028 Active    Acceptance E,D NR   11/23/17 1045 Active                      User Key     Initials Effective Dates Name Provider Type Discipline    CARLA 06/19/15 -  Dana Ford, PT Physical Therapist PT    UD 06/22/15 -  Stacy Ramos, PTA Physical  Therapy Assistant PT    SJ 06/19/15 -  Annelise Wood, PT Physical Therapist PT    LS 06/19/15 -  Dorita Packer, PT Physical Therapist PT                    PT Recommendation and Plan  Anticipated Equipment Needs At Discharge:  (TBD)  Anticipated Discharge Disposition: inpatient rehabilitation facility  Planned Therapy Interventions: balance training, bed mobility training, gait training, home exercise program, patient/family education, stair training, strengthening, transfer training  PT Frequency: daily  Plan of Care Review  Plan Of Care Reviewed With: patient  Progress: no change  Outcome Summary/Follow up Plan: pt seems more depressed,states he worries about going home alone.was out in [place] earlier walking by himself.ambulat 4 steps up and down.          Outcome Measures       11/28/17 1020 11/27/17 1040 11/26/17 0930    How much help from another person do you currently need...    Turning from your back to your side while in flat bed without using bedrails? 4  -UD 4  -UD 3  -LS    Moving from lying on back to sitting on the side of a flat bed without bedrails? 4  -UD 4  -UD 2  -LS    Moving to and from a bed to a chair (including a wheelchair)? 4  -UD 3  -UD 3  -LS    Standing up from a chair using your arms (e.g., wheelchair, bedside chair)? 4  -UD 3  -UD 3  -LS    Climbing 3-5 steps with a railing? 4  -UD 3  -UD 2  -LS    To walk in hospital room? 4  -UD 3  -UD 3  -LS    AM-PAC 6 Clicks Score 24  -UD 20  -UD 16  -LS    Functional Assessment    Outcome Measure Options   AM-PAC 6 Clicks Basic Mobility (PT)  -LS      User Key  (r) = Recorded By, (t) = Taken By, (c) = Cosigned By    Initials Name Provider Type    UD Stacy Ramos, PTA Physical Therapy Assistant    LS Dorita Packer, PT Physical Therapist           Time Calculation:         PT Charges       11/28/17 1106          Time Calculation    PT Received On 11/28/17  -UD      PT Goal Re-Cert Due Date 12/03/17  -UD      Time Calculation- PT    Total Timed  Code Minutes- PT 16 minute(s)  -UD        User Key  (r) = Recorded By, (t) = Taken By, (c) = Cosigned By    Initials Name Provider Type    UD Stacy Ramos PTA Physical Therapy Assistant          Therapy Charges for Today     Code Description Service Date Service Provider Modifiers Qty    20670861264 HC PT THER PROC EA 15 MIN 11/27/2017 Stacy Ramos, ANGEL GP 1    28187712109 HC GAIT TRAINING EA 15 MIN 11/27/2017 Stacy Ramos PTA GP 1    95845658150 HC GAIT TRAINING EA 15 MIN 11/28/2017 Stacy Ramos PTA GP 1          PT G-Codes  Outcome Measure Options: AM-PAC 6 Clicks Basic Mobility (PT)    Stacy Ramos PTA  11/28/2017

## 2017-11-28 NOTE — PROGRESS NOTES
[facility]       LOS: 8 days   Patient Care Team:  Amol Raymond MD as PCP - General (Rheumatology)        Subjective       Patient Denies:  Cp, sob, palpitations.  Wants another day      Vital Signs  Temp:  [97.6 °F (36.4 °C)-98.7 °F (37.1 °C)] 98 °F (36.7 °C)  Heart Rate:  [74-84] 83  Resp:  [18-20] 18  BP: (108-132)/(66-77) 132/70    Intake/Output Summary (Last 24 hours) at 11/28/17 0920  Last data filed at 11/28/17 0533   Gross per 24 hour   Intake             1080 ml   Output             1950 ml   Net             -870 ml          Physical Exam:     General Appearance:    Alert, cooperative, in no acute distress       Neck:   No adenopathy, supple, trachea midline, no JVD       Lungs:     Clear to auscultation,respirations regular, even and                  unlabored    Heart:    Regular rhythm and normal rate, normal S1 and S2, no            murmur, no gallop, no rub, no click   Chest Wall:    No abnormalities observed   Abdomen:     Normal bowel sounds, no masses, no organomegaly, soft               Extremities:   Moves all extremities well, no edema, no cyanosis, no             redness   Pulses:   Pulses palpable and equal bilaterally     Results Review:     I reviewed the patient's new clinical results.      WBC No results found for: WBC         HGB No results found for: HGB        HCT No results found for: HCT         Platlets No results found for: LABPLAT  Sodium  Sodium   Date/Time Value Ref Range Status   11/28/2017 0504 135 132 - 146 mmol/L Final   11/27/2017 0518 131 (L) 132 - 146 mmol/L Final   11/26/2017 0340 135 132 - 146 mmol/L Final     Potassium  Potassium   Date/Time Value Ref Range Status   11/28/2017 0504 4.0 3.5 - 5.5 mmol/L Final   11/27/2017 0518 4.4 3.5 - 5.5 mmol/L Final   11/26/2017 0340 4.2 3.5 - 5.5 mmol/L Final     Chloride  Chloride   Date/Time Value Ref Range Status   11/28/2017 0504 99 99 - 109 mmol/L Final    11/27/2017 0518 98 (L) 99 - 109 mmol/L Final   11/26/2017 0340 99 99 - 109 mmol/L Final     BicarbonateNo results found for: PLASMABICARB    BUN BUN   Date/Time Value Ref Range Status   11/28/2017 0504 23 9 - 23 mg/dL Final   11/27/2017 0518 23 9 - 23 mg/dL Final   11/26/2017 0340 29 (H) 9 - 23 mg/dL Final      Creatinine Creatinine   Date/Time Value Ref Range Status   11/28/2017 0504 1.00 0.60 - 1.30 mg/dL Final   11/27/2017 0518 1.00 0.60 - 1.30 mg/dL Final   11/26/2017 0340 1.00 0.60 - 1.30 mg/dL Final      Calcium Calcium   Date/Time Value Ref Range Status   11/28/2017 0504 8.8 8.7 - 10.4 mg/dL Final   11/27/2017 0518 9.0 8.7 - 10.4 mg/dL Final   11/26/2017 0340 9.2 8.7 - 10.4 mg/dL Final      Mag No results found for: MG        PT/INR:  No results found for: PROTIME/No results found for: INR  Troponin I   No results found for: CKTOTAL, CKMB, CKMBINDEX, TROPONINI, TROPONINT      aspirin 325 mg Oral Daily   atorvastatin 80 mg Oral Nightly   brimonidine 1 drop Both Eyes Q12H   And      timolol 1 drop Both Eyes Q12H   fenofibrate 145 mg Oral Daily   heparin (porcine) 5,000 Units Subcutaneous Q8H   insulin detemir 25 Units Subcutaneous Q12H   insulin lispro 0-14 Units Subcutaneous 4x Daily AC & at Bedtime   insulin lispro 15 Units Subcutaneous TID With Meals   metoprolol tartrate 12.5 mg Oral Q12H   pharmacy consult - MTM  Does not apply Daily   sennosides-docusate sodium 2 tablet Oral BID   sertraline 50 mg Oral Daily   sirolimus 1 mg Oral Daily          Assessment/Plan     Patient Active Problem List   Diagnosis Code   • Coronary artery disease involving native coronary artery of native heart with angina pectoris I25.119   • Hepatitis C B19.20   • Hx of liver transplant Z94.4   • Essential hypertension I10   • Hyperlipidemia LDL goal <70 E78.5   • Type 2 diabetes mellitus E11.9     CV stable  Ambulate   Home in am    DEREK Stanley  11/28/17  9:29 AM

## 2017-11-28 NOTE — PROGRESS NOTES
CTS Progress Note       LOS: 8 days   Patient Care Team:  Amol Raymond MD as PCP - General (Rheumatology)    No chief complaint on file.      Vital Signs:  Temp:  [97.6 °F (36.4 °C)-98.7 °F (37.1 °C)] 98 °F (36.7 °C)  Heart Rate:  [74-86] 82  Resp:  [18-20] 18  BP: (102-131)/(64-77) 131/73    Physical Exam: Sternum is stable.  Breathing unlabored       Results:     Results from last 7 days  Lab Units 11/25/17  0333   WBC 10*3/mm3 7.50   HEMOGLOBIN g/dL 10.9*   HEMATOCRIT % 32.6*   PLATELETS 10*3/mm3 160       Results from last 7 days  Lab Units 11/28/17  0504   SODIUM mmol/L 135   POTASSIUM mmol/L 4.0   CHLORIDE mmol/L 99   CO2 mmol/L 29.0   BUN mg/dL 23   CREATININE mg/dL 1.00   GLUCOSE mg/dL 173*   CALCIUM mg/dL 8.8           Imaging Results (last 24 hours)     ** No results found for the last 24 hours. **          Assessment    Active Problems:    Coronary artery disease involving native coronary artery of native heart with angina pectoris    Hepatitis C    Hx of liver transplant    Essential hypertension    Hyperlipidemia LDL goal <70    Type 2 diabetes mellitus    Patient's progress has been satisfactory.  He has significant anxiety issues.  Should be able for home today or tomorrow with home health nurse.  Patient is still nervous about home today.    Plan   Home either later today or tomorrow if satisfactory with cardiology.  We will need home health at discharge.    Please note that portions of this note were completed with a voice recognition program. Efforts were made to edit the dictations, but occasionally words are mistranscribed.    Naga Guaman MD  11/28/17  7:13 AM

## 2017-11-28 NOTE — PLAN OF CARE
Problem: Patient Care Overview (Adult)  Goal: Plan of Care Review  Outcome: Ongoing (interventions implemented as appropriate)    11/28/17 1104   Coping/Psychosocial Response Interventions   Plan Of Care Reviewed With patient   Patient Care Overview   Progress no change   Outcome Evaluation   Outcome Summary/Follow up Plan pt seems more depressed,states he worries about going home alone.was out in [place] earlier walking by himself.ambulat 4 steps up and down.         Problem: Inpatient Physical Therapy  Goal: Gait Training Goal LTG- PT  Outcome: Outcome(s) achieved Date Met:  11/28/17 11/23/17 1040 11/28/17 1104   Gait Training PT LTG   Gait Training Goal PT LTG, Date Established 11/23/17 --    Gait Training Goal PT LTG, Time to Achieve 2 wks --    Gait Training Goal PT LTG, [place] Level conditional independence --    Gait Training Goal PT LTG, Distance to Achieve 200 --    Gait Training Goal PT LTG, Outcome --  goal met       Goal: Stair Training Goal LTG- PT  Outcome: Outcome(s) achieved Date Met:  11/28/17 11/23/17 1040 11/28/17 1104   Stair Training PT LTG   Stair Training Goal PT LTG, Date Established 11/23/17 --    Stair Training Goal PT LTG, Time to Achieve 2 wks --    Stair Training Goal PT LTG, Number of Steps 13 --    Stair Training Goal PT LTG, [place] Level contact guard assist --    Stair Training Goal PT LTG, Assist Device 1 handrail --    Stair Training Goal PT LTG, Outcome --  goal met

## 2017-11-28 NOTE — PLAN OF CARE
Problem: Patient Care Overview (Adult)  Goal: Plan of Care Review  Outcome: Ongoing (interventions implemented as appropriate)    11/28/17 0355   Coping/Psychosocial Response Interventions   Plan Of Care Reviewed With patient   Patient Care Overview   Progress improving   Outcome Evaluation   Outcome Summary/Follow up Plan Patient rested comfortably throughout night. Patient reported pain and requested pain medication once during night. Patient remains alert and oriented but seems very anxious. Patient's vital signs remain stable. Will continue to monitor.        Goal: Adult Individualization and Mutuality  Outcome: Ongoing (interventions implemented as appropriate)  Goal: Discharge Needs Assessment  Outcome: Ongoing (interventions implemented as appropriate)    Problem: Cardiac Surgery (Adult)  Goal: Signs and Symptoms of Listed Potential Problems Will be Absent or Manageable (Cardiac Surgery)  Outcome: Ongoing (interventions implemented as appropriate)    Problem: Pain, Acute (Adult)  Goal: Identify Related Risk Factors and Signs and Symptoms  Outcome: Ongoing (interventions implemented as appropriate)  Goal: Acceptable Pain Control/Comfort Level  Outcome: Ongoing (interventions implemented as appropriate)

## 2017-11-29 VITALS
SYSTOLIC BLOOD PRESSURE: 106 MMHG | HEART RATE: 85 BPM | TEMPERATURE: 97.3 F | WEIGHT: 195 LBS | RESPIRATION RATE: 16 BRPM | BODY MASS INDEX: 30.61 KG/M2 | DIASTOLIC BLOOD PRESSURE: 70 MMHG | OXYGEN SATURATION: 94 % | HEIGHT: 67 IN

## 2017-11-29 LAB
ANION GAP SERPL CALCULATED.3IONS-SCNC: 8 MMOL/L (ref 3–11)
BUN BLD-MCNC: 25 MG/DL (ref 9–23)
BUN/CREAT SERPL: 22.7 (ref 7–25)
CALCIUM SPEC-SCNC: 8.6 MG/DL (ref 8.7–10.4)
CHLORIDE SERPL-SCNC: 96 MMOL/L (ref 99–109)
CO2 SERPL-SCNC: 29 MMOL/L (ref 20–31)
CREAT BLD-MCNC: 1.1 MG/DL (ref 0.6–1.3)
GFR SERPL CREATININE-BSD FRML MDRD: 68 ML/MIN/1.73
GLUCOSE BLD-MCNC: 298 MG/DL (ref 70–100)
GLUCOSE BLDC GLUCOMTR-MCNC: 231 MG/DL (ref 70–130)
GLUCOSE BLDC GLUCOMTR-MCNC: 238 MG/DL (ref 70–130)
POTASSIUM BLD-SCNC: 4.4 MMOL/L (ref 3.5–5.5)
SODIUM BLD-SCNC: 133 MMOL/L (ref 132–146)

## 2017-11-29 PROCEDURE — 80048 BASIC METABOLIC PNL TOTAL CA: CPT | Performed by: NURSE PRACTITIONER

## 2017-11-29 PROCEDURE — 63710000001 INSULIN DETEMIR PER 5 UNITS: Performed by: NURSE PRACTITIONER

## 2017-11-29 PROCEDURE — 82962 GLUCOSE BLOOD TEST: CPT

## 2017-11-29 PROCEDURE — 25010000002 HEPARIN (PORCINE) PER 1000 UNITS: Performed by: INTERNAL MEDICINE

## 2017-11-29 RX ADMIN — ASPIRIN 325 MG: 325 TABLET, DELAYED RELEASE ORAL at 10:16

## 2017-11-29 RX ADMIN — INSULIN LISPRO 5 UNITS: 100 INJECTION, SOLUTION INTRAVENOUS; SUBCUTANEOUS at 08:03

## 2017-11-29 RX ADMIN — INSULIN LISPRO 18 UNITS: 100 INJECTION, SOLUTION INTRAVENOUS; SUBCUTANEOUS at 08:04

## 2017-11-29 RX ADMIN — SIROLIMUS 1 MG: 1 TABLET, FILM COATED ORAL at 07:25

## 2017-11-29 RX ADMIN — HEPARIN SODIUM 5000 UNITS: 5000 INJECTION, SOLUTION INTRAVENOUS; SUBCUTANEOUS at 06:34

## 2017-11-29 RX ADMIN — Medication 2 TABLET: at 10:16

## 2017-11-29 RX ADMIN — FENOFIBRATE 145 MG: 145 TABLET ORAL at 10:17

## 2017-11-29 RX ADMIN — INSULIN LISPRO 5 UNITS: 100 INJECTION, SOLUTION INTRAVENOUS; SUBCUTANEOUS at 12:22

## 2017-11-29 RX ADMIN — ALPRAZOLAM 0.25 MG: 0.25 TABLET ORAL at 03:34

## 2017-11-29 RX ADMIN — SERTRALINE 50 MG: 50 TABLET, FILM COATED ORAL at 06:34

## 2017-11-29 RX ADMIN — INSULIN LISPRO 18 UNITS: 100 INJECTION, SOLUTION INTRAVENOUS; SUBCUTANEOUS at 12:22

## 2017-11-29 RX ADMIN — METOPROLOL TARTRATE 12.5 MG: 25 TABLET, FILM COATED ORAL at 10:16

## 2017-11-29 RX ADMIN — INSULIN DETEMIR 30 UNITS: 100 INJECTION, SOLUTION SUBCUTANEOUS at 10:18

## 2017-11-29 RX ADMIN — BRIMONIDINE TARTRATE 1 DROP: 2 SOLUTION/ DROPS OPHTHALMIC at 10:17

## 2017-11-29 RX ADMIN — TIMOLOL MALEATE 1 DROP: 5 SOLUTION/ DROPS OPHTHALMIC at 10:17

## 2017-11-29 NOTE — PLAN OF CARE
Problem: Patient Care Overview (Adult)  Goal: Plan of Care Review  Outcome: Ongoing (interventions implemented as appropriate)    11/29/17 0849   Coping/Psychosocial Response Interventions   Plan Of Care Reviewed With patient   Patient Care Overview   Progress no change   Outcome Evaluation   Outcome Summary/Follow up Plan Patient rested comfortably throughout night. Patient's vital signs remain stable. Patient required 02 via nasal canula during night. Patient reported pain and requested pain medication once during night.        Goal: Adult Individualization and Mutuality  Outcome: Ongoing (interventions implemented as appropriate)  Goal: Discharge Needs Assessment  Outcome: Ongoing (interventions implemented as appropriate)    Problem: Cardiac Surgery (Adult)  Goal: Signs and Symptoms of Listed Potential Problems Will be Absent or Manageable (Cardiac Surgery)  Outcome: Ongoing (interventions implemented as appropriate)    Problem: Pain, Acute (Adult)  Goal: Identify Related Risk Factors and Signs and Symptoms  Outcome: Ongoing (interventions implemented as appropriate)  Goal: Acceptable Pain Control/Comfort Level  Outcome: Ongoing (interventions implemented as appropriate)

## 2017-11-29 NOTE — PROGRESS NOTES
Cardiothoracic Surgery Progress Note      POD # 7 s/p CABG x 3       LOS: 9 days      Subjective:  Without complaints. No acute overnight events. Home today.      Objective:  Vital Signs  Temp:  [97.3 °F (36.3 °C)-98.6 °F (37 °C)] 97.3 °F (36.3 °C)  Heart Rate:  [75-89] 78  Resp:  [14-20] 16  BP: (106-146)/(70-82) 106/70    Physical Exam:   General Appearance: alert, appears stated age and cooperative   Lungs: clear to auscultation, respirations regular and respirations even   Heart: regular rhythm & normal rate and normal S1, S2   Skin: Incision c/d/i, sternum stable     Results:    Results from last 7 days  Lab Units 11/25/17  0333   WBC 10*3/mm3 7.50   HEMOGLOBIN g/dL 10.9*   HEMATOCRIT % 32.6*   PLATELETS 10*3/mm3 160       Results from last 7 days  Lab Units 11/28/17  0504   SODIUM mmol/L 135   POTASSIUM mmol/L 4.0   CHLORIDE mmol/L 99   CO2 mmol/L 29.0   BUN mg/dL 23   CREATININE mg/dL 1.00   GLUCOSE mg/dL 173*   CALCIUM mg/dL 8.8         Assessment:  62 year old  male with history of coronary artery disease s/p CABG x 3, POD #7, expected recovery.    Plan:  Discharge home today  Follow up in office in 3-4 weeks    DEREK Bal  11/29/17  9:14 AM

## 2017-11-29 NOTE — PROGRESS NOTES
"Adult Nutrition  Assessment/PES    Patient Name:  Quirino Sheppard  YOB: 1955  MRN: [id number]  Admit Date:  11/20/2017    Assessment Date:  11/29/2017    Comments: Diet tech provided nutrition education related to CHF and T2DM per patient request. Written educational materials from the [facility] and [facility] were provided. Patient voiced understanding of information reviewed and asked appropriate questions. Advised patient to follow-up with outpatient RD as needed after discharge.            Reason for Assessment       11/29/17 1232    Reason for Assessment    Reason For Assessment/Visit education    Time Spent (min) 45    Diagnosis Diagnosis   per notes this admission                Anthropometrics       11/29/17 1233    Anthropometrics (Special Considerations)    Height Used for Calculations 1.702 m (5' 7\")    Weight Used for Calculations 88.5 kg (195 lb 1.7 oz)   standing scale weight per charting 11/28            Labs/Tests/Procedures/Meds       11/29/17 1233    Labs/Tests/Procedures/Meds    Labs/Tests Review Reviewed;Hgb A1C                Nutrition Prescription Ordered       11/29/17 1233    Nutrition Prescription PO    Current PO Diet Regular    Common Modifiers Cardiac;Consistent Carbohydrate          Problem/Interventions:        Problem 3       11/29/17 1234    Nutrition Diagnoses Problem 3    Problem 3 Knowledge Deficit    Etiology (related to) Medical Diagnosis    Cardiac CABG   s/p CABG    Endocrine DM Type 2    Signs/Symptoms (evidenced by) Reported Information Deficit                  Education/Evaluation       11/29/17 1235    Education    Education Provided education regarding;Education topics    Provided education regarding Healthy eating for diabetes    Education Topics Cardiac diabetic;CHO counting;CHF    Monitor/Evaluation    Monitor Per protocol        Electronically signed by:  Ludy Hopper  11/29/17 12:36 PM  "

## 2017-11-29 NOTE — PROGRESS NOTES
[facility]       LOS: 9 days   Patient Care Team:  Amol Raymond MD as PCP - General (Rheumatology)        Subjective       Patient Denies:  Cp, sob, palpitations.    Vital Signs  Temp:  [97.3 °F (36.3 °C)-98.6 °F (37 °C)] 97.3 °F (36.3 °C)  Heart Rate:  [75-89] 78  Resp:  [14-20] 16  BP: (106-146)/(70-82) 106/70    Intake/Output Summary (Last 24 hours) at 11/29/17 0915  Last data filed at 11/29/17 0800   Gross per 24 hour   Intake             1080 ml   Output              900 ml   Net              180 ml     I/O this shift:  In: 240 [P.O.:240]  Out: -     Physical Exam:     General Appearance:    Alert, cooperative, in no acute distress       Neck:   No adenopathy, supple, trachea midline, no JVD       Lungs:     Clear to auscultation,respirations regular, even and                  unlabored    Heart:    Regular rhythm and normal rate, normal S1 and S2, no            murmur, no gallop, no rub, no click   Chest Wall:    No abnormalities observed   Abdomen:     Normal bowel sounds, no masses, no organomegaly, soft               Extremities:   Moves all extremities well, no edema, no cyanosis, no             redness   Pulses:   Pulses palpable and equal bilaterally     Results Review:     I reviewed the patient's new clinical results.      WBC No results found for: WBC         HGB No results found for: HGB        HCT No results found for: HCT         Platlets No results found for: LABPLAT  Sodium  Sodium   Date/Time Value Ref Range Status   11/28/2017 0504 135 132 - 146 mmol/L Final   11/27/2017 0518 131 (L) 132 - 146 mmol/L Final     Potassium  Potassium   Date/Time Value Ref Range Status   11/28/2017 0504 4.0 3.5 - 5.5 mmol/L Final   11/27/2017 0518 4.4 3.5 - 5.5 mmol/L Final     Chloride  Chloride   Date/Time Value Ref Range Status   11/28/2017 0504 99 99 - 109 mmol/L Final   11/27/2017 0518 98 (L) 99 - 109 mmol/L Final     BicarbonateNo results  found for: PLASMABICARB    BUN BUN   Date/Time Value Ref Range Status   11/28/2017 0504 23 9 - 23 mg/dL Final   11/27/2017 0518 23 9 - 23 mg/dL Final      Creatinine Creatinine   Date/Time Value Ref Range Status   11/28/2017 0504 1.00 0.60 - 1.30 mg/dL Final   11/27/2017 0518 1.00 0.60 - 1.30 mg/dL Final      Calcium Calcium   Date/Time Value Ref Range Status   11/28/2017 0504 8.8 8.7 - 10.4 mg/dL Final   11/27/2017 0518 9.0 8.7 - 10.4 mg/dL Final      Mag No results found for: MG        PT/INR:  No results found for: PROTIME/No results found for: INR  Troponin I   No results found for: CKTOTAL, CKMB, CKMBINDEX, TROPONINI, TROPONINT      aspirin 325 mg Oral Daily   atorvastatin 80 mg Oral Nightly   brimonidine 1 drop Both Eyes Q12H   And      timolol 1 drop Both Eyes Q12H   fenofibrate 145 mg Oral Daily   heparin (porcine) 5,000 Units Subcutaneous Q8H   insulin detemir 30 Units Subcutaneous Q12H   insulin lispro 0-14 Units Subcutaneous 4x Daily AC & at Bedtime   insulin lispro 18 Units Subcutaneous TID With Meals   metoprolol tartrate 12.5 mg Oral Q12H   pharmacy consult - MTM  Does not apply Daily   sennosides-docusate sodium 2 tablet Oral BID   sertraline 50 mg Oral Daily   sirolimus 1 mg Oral Daily          Assessment/Plan     Patient Active Problem List   Diagnosis Code   • Coronary artery disease involving native coronary artery of native heart with angina pectoris I25.119   • Hepatitis C B19.20   • Hx of liver transplant Z94.4   • Essential hypertension I10   • Hyperlipidemia LDL goal <70 E78.5   • Type 2 diabetes mellitus E11.9     CV stable  Ok home  F/u with Dr. Medina 4 weeks    DEREK Stanley  11/29/17  9:15 AM

## 2017-11-30 ENCOUNTER — TELEPHONE (OUTPATIENT)
Dept: CARDIAC SURGERY | Facility: CLINIC | Age: 62
End: 2017-11-30

## 2017-11-30 NOTE — TELEPHONE ENCOUNTER
Juju called from [facility] and stated that Mr. Sheppard has refused home health. He says he cannot afford the copay and is doing ok since surgery. He has a follow-up appointment with Dr. Guaman on 12/14/17 in [place]. A reminder card was mailed to patient.

## 2017-12-14 ENCOUNTER — OFFICE VISIT (OUTPATIENT)
Dept: CARDIAC SURGERY | Facility: CLINIC | Age: 62
End: 2017-12-14

## 2017-12-14 VITALS
HEART RATE: 86 BPM | SYSTOLIC BLOOD PRESSURE: 141 MMHG | HEIGHT: 67 IN | WEIGHT: 195 LBS | BODY MASS INDEX: 30.61 KG/M2 | DIASTOLIC BLOOD PRESSURE: 82 MMHG

## 2017-12-14 DIAGNOSIS — Z95.1 S/P CABG X 3: Primary | ICD-10-CM

## 2017-12-14 PROCEDURE — 99024 POSTOP FOLLOW-UP VISIT: CPT | Performed by: THORACIC SURGERY (CARDIOTHORACIC VASCULAR SURGERY)

## 2017-12-14 RX ORDER — HYDROXYZINE HYDROCHLORIDE 25 MG/1
25 TABLET, FILM COATED ORAL AS NEEDED
Refills: 1 | COMMUNITY
Start: 2017-12-08 | End: 2022-06-13

## 2017-12-14 NOTE — PROGRESS NOTES
12/14/2017  Patient Information  Quirino Sheppard                                                                                          [address] [address]   1955  'PCP/Referring Physician'  Amol Raymond MD  [phone]  No ref. provider found    Chief Complaint   Patient presents with   • Post-op     Hospital follow up s/p CABGx3 11/22/17,complains of having anxiety attacks since surgery.   • Coronary Artery Disease       History of Present Illness:  Mr. Sheppard returns today following his three vessel coronary surgery on 11/22/17.  He is here now in the office for follow up.  He denies shortness of breath or chest pain.  His incision is healing well.  He is ambulatory.  He is scheduled to start cardiac rehab within the next week.  He is accompanied by a family member at this time.     Patient Active Problem List   Diagnosis   • Coronary artery disease involving native coronary artery of native heart with angina pectoris   • Hepatitis C   • Hx of liver transplant   • Essential hypertension   • Hyperlipidemia LDL goal <70   • Type 2 diabetes mellitus   • S/P CABG x 3     Past Medical History:   Diagnosis Date   • Anxiety and depression    • Diabetes mellitus    • Hepatitis C    • Hyperlipidemia    • Hypertension      Past Surgical History:   Procedure Laterality Date   • CORONARY ARTERY BYPASS GRAFT N/A 11/22/2017    Procedure: MEDIAN STERNOTOMY, CORONARY ARTERY BYPASS GRAFT X 3, UTILIZING THE LEFT INTERNAL MAMMARY ARTERY AND EVH USING THE LEFT GREATER SAPHENOUS VEIN;  Surgeon: Ngaa Guaman MD;  Location: [facility];  Service:    • LIVER TRANSPLANTATION         Current Outpatient Prescriptions:   •  acetaminophen (TYLENOL) 325 MG tablet, Take 650 mg by mouth Every 6 (Six) Hours As Needed for Mild Pain ., Disp: , Rfl:   •  aspirin  MG EC tablet, Take 1 tablet by mouth Daily., Disp: 30 tablet, Rfl: 2  •  atorvastatin (LIPITOR) 80 MG tablet, Take 80 mg by mouth Every Night., Disp:  , Rfl:   •  brimonidine-timolol (COMBIGAN) 0.2-0.5 % ophthalmic solution, Administer 1 drop to both eyes Every 12 (Twelve) Hours., Disp: , Rfl:   •  fenofibrate (TRICOR) 145 MG tablet, Take 145 mg by mouth Daily., Disp: , Rfl:   •  HYDROcodone-acetaminophen (NORCO) 5-325 MG per tablet, Take 1 tablet by mouth Every 6 (Six) Hours As Needed for Moderate Pain ., Disp: 30 tablet, Rfl: 0  •  hydrOXYzine (ATARAX) 25 MG tablet, Take 25 mg by mouth As Needed., Disp: , Rfl: 1  •  icosapent ethyl (VASCEPA) 1 g capsule capsule, Take 2 g by mouth 2 (Two) Times a Day With Meals., Disp: , Rfl:   •  insulin NPH (humuLIN N,novoLIN N) 100 UNIT/ML injection, Inject 32 Units under the skin Every Morning., Disp: , Rfl:   •  insulin NPH (humuLIN N,novoLIN N) 100 UNIT/ML injection, Inject 26 Units under the skin Every Evening., Disp: , Rfl:   •  Liraglutide (VICTOZA) 18 MG/3ML solution pen-injector injection, Inject 1.8 mg under the skin Daily., Disp: , Rfl:   •  metoprolol tartrate (LOPRESSOR) 25 MG tablet, Take 0.5 tablets by mouth Every 12 (Twelve) Hours., Disp: 60 tablet, Rfl: 11  •  Multiple Vitamins-Minerals (MULTIVITAMIN ADULTS) tablet, Take 1 tablet by mouth Daily., Disp: , Rfl:   •  Saw Palmetto 160 MG capsule, Take 320 mg by mouth 2 (Two) Times a Day., Disp: , Rfl:   •  sertraline (ZOLOFT) 50 MG tablet, Take 50 mg by mouth Daily., Disp: , Rfl:   •  sirolimus (RAPAMUNE) 1 MG tablet, Take 1 mg by mouth Daily., Disp: , Rfl:   •  Vitamin D, Cholecalciferol, 1000 units capsule, Take 4,000 Units by mouth Daily., Disp: , Rfl:   •  vitamin E 1000 UNIT capsule, Take 1,000 Units by mouth Daily., Disp: , Rfl:   No Known Allergies  Social History     Social History   • Marital status:      Spouse name: N/A   • Number of children: 3   • Years of education: N/A     Occupational History   •       Social History Main Topics   • Smoking status: Never Smoker   • Smokeless tobacco: Never Used   • Alcohol use No   •  "Drug use: No   • Sexual activity: Not on file     Other Topics Concern   • Not on file     Social History Narrative     Family History   Problem Relation Age of Onset   • Diabetes Paternal Grandmother    • Breast cancer Mother    • Heart attack Father    • Heart valve disorder Father      ROS  Vitals:    12/14/17 1007   BP: 141/82   BP Location: Left arm   Pulse: 86   Weight: 88.5 kg (195 lb)   Height: 170.2 cm (67\")      Physical Exam  CHEST:  Incision is healing well.    Assessment/Plan:  The patient is doing well postoperatively.  I have instructed him that he still should not lift heavy until 1/22/18, and he is agreeable to that plan.  We will see him back on a prn basis.          Patient Active Problem List   Diagnosis   • Coronary artery disease involving native coronary artery of native heart with angina pectoris   • Hepatitis C   • Hx of liver transplant   • Essential hypertension   • Hyperlipidemia LDL goal <70   • Type 2 diabetes mellitus   • S/P CABG x 3     Signed by: Naga Guaman M.D.    12/14/2017    CC:  MD Ruby Oscar transcribing on behalf of Naga Guaman MD  "

## 2017-12-15 PROBLEM — Z95.1 S/P CABG X 3: Status: ACTIVE | Noted: 2017-12-15

## 2022-03-22 DIAGNOSIS — Z79.899 HIGH RISK MEDICATION USE: ICD-10-CM

## 2022-03-22 DIAGNOSIS — K21.9 GASTROESOPHAGEAL REFLUX DISEASE, UNSPECIFIED WHETHER ESOPHAGITIS PRESENT: ICD-10-CM

## 2022-03-22 DIAGNOSIS — F32.5 MAJOR DEPRESSIVE DISORDER WITH SINGLE EPISODE, IN FULL REMISSION: ICD-10-CM

## 2022-03-22 DIAGNOSIS — E78.2 MIXED HYPERLIPIDEMIA: ICD-10-CM

## 2022-03-22 DIAGNOSIS — I10 ESSENTIAL HYPERTENSION: ICD-10-CM

## 2022-03-22 DIAGNOSIS — I25.119 CORONARY ARTERY DISEASE INVOLVING NATIVE CORONARY ARTERY OF NATIVE HEART WITH ANGINA PECTORIS: Primary | ICD-10-CM

## 2022-03-22 RX ORDER — BLOOD SUGAR DIAGNOSTIC
1 STRIP MISCELLANEOUS 3 TIMES DAILY
COMMUNITY
Start: 2022-02-22 | End: 2022-09-07

## 2022-03-22 RX ORDER — PEN NEEDLE, DIABETIC 29 G X1/2"
1 NEEDLE, DISPOSABLE MISCELLANEOUS SEE ADMIN INSTRUCTIONS
COMMUNITY
Start: 2022-03-09

## 2022-03-22 RX ORDER — INSULIN ASPART 100 [IU]/ML
100 INJECTION, SOLUTION INTRAVENOUS; SUBCUTANEOUS DAILY
COMMUNITY
Start: 2022-03-18 | End: 2022-05-13 | Stop reason: SDUPTHER

## 2022-03-22 NOTE — TELEPHONE ENCOUNTER
Per [facility], last seen 8/6/21.  [facility]    Atorvastatin 05/18/21  Metoprolol tartrate 09/19/21  wellbutrin 05/18/21  Ramipril 07/24/21  Pantoprazole 11/29/21  Potassium 10/11/21  Sertraline 02/23/21

## 2022-03-23 RX ORDER — PANTOPRAZOLE SODIUM 40 MG/1
TABLET, DELAYED RELEASE ORAL
Qty: 30 TABLET | Refills: 0 | Status: SHIPPED | OUTPATIENT
Start: 2022-03-23 | End: 2022-06-13 | Stop reason: SDUPTHER

## 2022-03-23 RX ORDER — POTASSIUM CHLORIDE 750 MG/1
TABLET, FILM COATED, EXTENDED RELEASE ORAL
Qty: 30 TABLET | Refills: 0 | Status: SHIPPED | OUTPATIENT
Start: 2022-03-23 | End: 2022-06-13 | Stop reason: SDUPTHER

## 2022-03-23 RX ORDER — SERTRALINE HYDROCHLORIDE 100 MG/1
TABLET, FILM COATED ORAL
Qty: 135 TABLET | Refills: 1 | Status: SHIPPED | OUTPATIENT
Start: 2022-03-23 | End: 2022-10-27

## 2022-03-23 RX ORDER — ATORVASTATIN CALCIUM 80 MG/1
TABLET, FILM COATED ORAL
Qty: 30 TABLET | Refills: 0 | Status: SHIPPED | OUTPATIENT
Start: 2022-03-23 | End: 2022-06-13 | Stop reason: SDUPTHER

## 2022-03-23 RX ORDER — BUPROPION HYDROCHLORIDE 300 MG/1
TABLET ORAL
Qty: 90 TABLET | Refills: 1 | Status: SHIPPED | OUTPATIENT
Start: 2022-03-23 | End: 2022-12-13

## 2022-03-23 RX ORDER — RAMIPRIL 10 MG/1
CAPSULE ORAL
Qty: 90 CAPSULE | Refills: 0 | Status: SHIPPED | OUTPATIENT
Start: 2022-03-23 | End: 2022-06-13 | Stop reason: SDUPTHER

## 2022-03-23 NOTE — TELEPHONE ENCOUNTER
Tried to call Mr. Sheppard today his girlfriend answered the phone I could hear him in the background but after 8  minutes he never came to the phone.  He needs appointment he needs blood work.    Last appointment looks like it was back in August.

## 2022-04-02 ENCOUNTER — OFFICE VISIT (OUTPATIENT)
Dept: FAMILY MEDICINE CLINIC | Facility: CLINIC | Age: 67
End: 2022-04-02

## 2022-04-02 VITALS
BODY MASS INDEX: 36.52 KG/M2 | DIASTOLIC BLOOD PRESSURE: 70 MMHG | RESPIRATION RATE: 16 BRPM | OXYGEN SATURATION: 96 % | TEMPERATURE: 98.9 F | SYSTOLIC BLOOD PRESSURE: 142 MMHG | HEIGHT: 63 IN | HEART RATE: 84 BPM | WEIGHT: 206.1 LBS

## 2022-04-02 DIAGNOSIS — L03.311 CELLULITIS OF ABDOMINAL WALL: Primary | ICD-10-CM

## 2022-04-02 PROCEDURE — 99213 OFFICE O/P EST LOW 20 MIN: CPT | Performed by: FAMILY MEDICINE

## 2022-04-02 RX ORDER — DIPHENOXYLATE HYDROCHLORIDE AND ATROPINE SULFATE 2.5; .025 MG/1; MG/1
1 TABLET ORAL DAILY
COMMUNITY
End: 2022-06-13 | Stop reason: SDUPTHER

## 2022-04-02 RX ORDER — SIROLIMUS 2 MG/1
2 TABLET, FILM COATED ORAL DAILY
COMMUNITY

## 2022-04-02 RX ORDER — ASPIRIN 81 MG/1
TABLET ORAL
COMMUNITY
End: 2022-08-01

## 2022-04-02 RX ORDER — RAMIPRIL 10 MG/1
10 CAPSULE ORAL DAILY
COMMUNITY
End: 2022-06-13 | Stop reason: SDUPTHER

## 2022-04-02 RX ORDER — SERTRALINE HYDROCHLORIDE 100 MG/1
100 TABLET, FILM COATED ORAL DAILY
COMMUNITY
End: 2022-06-13 | Stop reason: SDUPTHER

## 2022-04-02 RX ORDER — ATORVASTATIN CALCIUM 80 MG/1
80 TABLET, FILM COATED ORAL DAILY
COMMUNITY
End: 2022-06-13

## 2022-04-02 RX ORDER — HUMAN INSULIN 100 [IU]/ML
INJECTION, SUSPENSION SUBCUTANEOUS
COMMUNITY
End: 2022-04-02

## 2022-04-02 RX ORDER — DICLOFENAC SODIUM 75 MG/1
TABLET, DELAYED RELEASE ORAL
COMMUNITY
End: 2022-06-13

## 2022-04-02 RX ORDER — CEPHALEXIN 500 MG/1
500 CAPSULE ORAL 3 TIMES DAILY
Qty: 30 CAPSULE | Refills: 1 | Status: SHIPPED | OUTPATIENT
Start: 2022-04-02 | End: 2023-02-16

## 2022-04-02 RX ORDER — CHLORAL HYDRATE 500 MG
1000 CAPSULE ORAL
COMMUNITY
End: 2022-06-13

## 2022-04-02 RX ORDER — PANTOPRAZOLE SODIUM 40 MG/1
1 TABLET, DELAYED RELEASE ORAL DAILY
COMMUNITY
Start: 2021-11-29 | End: 2022-06-13

## 2022-04-02 RX ORDER — MUPIROCIN CALCIUM 20 MG/G
1 CREAM TOPICAL 3 TIMES DAILY
Qty: 60 EACH | Refills: 0 | Status: SHIPPED | OUTPATIENT
Start: 2022-04-02

## 2022-04-02 RX ORDER — ASPIRIN 325 MG
325 TABLET ORAL DAILY
COMMUNITY
End: 2022-06-13

## 2022-04-02 RX ORDER — AMLODIPINE BESYLATE 5 MG/1
5 TABLET ORAL DAILY
COMMUNITY
End: 2022-06-13

## 2022-04-02 RX ORDER — GABAPENTIN 300 MG/1
300 CAPSULE ORAL 3 TIMES DAILY
COMMUNITY
End: 2022-06-13

## 2022-04-02 RX ORDER — OXYCODONE HYDROCHLORIDE 5 MG/1
TABLET ORAL
COMMUNITY
End: 2022-06-13

## 2022-04-02 NOTE — PROGRESS NOTES
New Patient Office Visit      Patient Name: Quirino Sheppard  : 1955   MRN: [id number]     Chief Complaint:    Chief Complaint   Patient presents with   • Rash     PT HERE FOR WHAT HE THINKS IS A RING WORM, RASH IS ON PT ABD X 2 MONTHS       History of Present Illness: Quirino Sheppard is a 66 y.o. male who is here today for follow up of rash    Subjective      Review of Systems:   Review of Systems   Constitutional: Negative.    HENT: Negative.    Eyes: Negative.    Respiratory: Negative.    Cardiovascular: Negative.    Gastrointestinal: Negative.    Neurological: Negative.         Past Medical History:   Past Medical History:   Diagnosis Date   • Anxiety and depression    • Diabetes mellitus (HCC)    • Hepatitis C    • Hyperlipidemia    • Hypertension        Past Surgical History:   Past Surgical History:   Procedure Laterality Date   • CORONARY ARTERY BYPASS GRAFT N/A 2017    Procedure: MEDIAN STERNOTOMY, CORONARY ARTERY BYPASS GRAFT X 3, UTILIZING THE LEFT INTERNAL MAMMARY ARTERY AND EVH USING THE LEFT GREATER SAPHENOUS VEIN;  Surgeon: Naga Guaman MD;  Location: [facility];  Service:    • LIVER TRANSPLANTATION         Family History:   Family History   Problem Relation Age of Onset   • Diabetes Paternal Grandmother    • Breast cancer Mother    • Heart attack Father    • Heart valve disorder Father        Social History:   Social History     Socioeconomic History   • Marital status:    • Number of children: 3   Tobacco Use   • Smoking status: Never Smoker   • Smokeless tobacco: Never Used   Substance and Sexual Activity   • Alcohol use: No     Comment: PT QUIT DRINKING IN    • Drug use: No   • Sexual activity: Not Currently       Medications:     Current Outpatient Medications:   •  acetaminophen (TYLENOL) 325 MG tablet, Take 650 mg by mouth Every 6 (Six) Hours As Needed for Mild Pain ., Disp: , Rfl:   •  aspirin  MG EC tablet, Take 1 tablet by mouth Daily., Disp: 30  "tablet, Rfl: 2  •  atorvastatin (LIPITOR) 80 MG tablet, TAKE 1 TABLET BY MOUTH EVERY DAY, Disp: 30 tablet, Rfl: 0  •  BD Insulin Syringe U/F 31G X 5/16\" 1 ML misc, 1 each See Admin Instructions., Disp: , Rfl:   •  brimonidine-timolol (COMBIGAN) 0.2-0.5 % ophthalmic solution, Administer 1 drop to both eyes Every 12 (Twelve) Hours., Disp: , Rfl:   •  buPROPion XL (WELLBUTRIN XL) 300 MG 24 hr tablet, TAKE 1 TABLET BY MOUTH EVERY DAY, Disp: 90 tablet, Rfl: 1  •  fenofibrate (TRICOR) 145 MG tablet, Take 145 mg by mouth Daily., Disp: , Rfl:   •  HYDROcodone-acetaminophen (NORCO) 5-325 MG per tablet, Take 1 tablet by mouth Every 6 (Six) Hours As Needed for Moderate Pain ., Disp: 30 tablet, Rfl: 0  •  hydrOXYzine (ATARAX) 25 MG tablet, Take 25 mg by mouth As Needed., Disp: , Rfl: 1  •  icosapent ethyl (VASCEPA) 1 g capsule capsule, Take 2 g by mouth 2 (Two) Times a Day With Meals., Disp: , Rfl:   •  pantoprazole (Protonix) 40 MG EC tablet, Take 1 tablet by mouth Daily., Disp: , Rfl:   •  amLODIPine (NORVASC) 5 MG tablet, Take 5 mg by mouth Daily., Disp: , Rfl:   •  aspirin (aspirin) 81 MG EC tablet, aspirin, Disp: , Rfl:   •  aspirin 325 MG tablet, Take 325 mg by mouth Daily., Disp: , Rfl:   •  atorvastatin (LIPITOR) 80 MG tablet, Take 80 mg by mouth Daily., Disp: , Rfl:   •  cephalexin (Keflex) 500 MG capsule, Take 1 capsule by mouth 3 (Three) Times a Day., Disp: 30 capsule, Rfl: 1  •  Cholecalciferol 50 MCG (2000 UT) tablet, Take 2,000 Units by mouth Daily., Disp: , Rfl:   •  diclofenac (VOLTAREN) 75 MG EC tablet, diclofenac sodium 75 mg tablet,delayed release  TAKE 1 TABLET BY MOUTH TWICE A DAY, Disp: , Rfl:   •  gabapentin (NEURONTIN) 300 MG capsule, Take 300 mg by mouth 3 (Three) Times a Day., Disp: , Rfl:   •  insulin NPH (humuLIN N,novoLIN N) 100 UNIT/ML injection, Inject 32 Units under the skin Every Morning., Disp: , Rfl:   •  insulin NPH (humuLIN N,novoLIN N) 100 UNIT/ML injection, Inject 26 Units under the skin " Every Evening., Disp: , Rfl:   •  Insulin NPH, Human,, Isophane, (NovoLIN N FlexPen) 100 UNIT/ML injection, Novolin N Flexpen, Disp: , Rfl:   •  Liraglutide (VICTOZA) 18 MG/3ML solution pen-injector injection, Inject 1.8 mg under the skin Daily., Disp: , Rfl:   •  metoprolol tartrate (LOPRESSOR) 25 MG tablet, TAKE 1 TABLET BY MOUTH TWICE A DAY, Disp: 180 tablet, Rfl: 1  •  metoprolol tartrate (LOPRESSOR) 25 MG tablet, Take 1 tablet by mouth 2 (Two) Times a Day., Disp: , Rfl:   •  Multiple Vitamins-Minerals (MULTIVITAMIN ADULTS) tablet, Take 1 tablet by mouth Daily., Disp: , Rfl:   •  multivitamin (multivitamin) tablet tablet, Take 1 tablet by mouth Daily., Disp: , Rfl:   •  mupirocin (Bactroban) 2 % cream, Apply 1 application topically to the appropriate area as directed 3 (Three) Times a Day., Disp: 60 each, Rfl: 0  •  NovoLOG FlexPen 100 UNIT/ML solution pen-injector sc pen, Inject 100 Units under the skin into the appropriate area as directed Daily. Inject by sq route as per insulin sliding scale (25-50U) before meals, Disp: , Rfl:   •  Omega 3-6-9 Fatty Acids (OMEGA 3-6-9 COMPLEX PO), Omega 3-6-9, Disp: , Rfl:   •  Omega-3 Fatty Acids (fish oil) 1000 MG capsule capsule, Take 1,000 mg by mouth., Disp: , Rfl:   •  OneTouch Verio test strip, 1 each by Other route 3 (Three) Times a Day. for testing, Disp: , Rfl:   •  oxyCODONE (ROXICODONE) 5 MG immediate release tablet, oxycodone 5 mg tablet  TAKE 1 TABLET BY MOUTH EVERY 8 HOURS AS NEEDED FOR PAIN, Disp: , Rfl:   •  pantoprazole (PROTONIX) 40 MG EC tablet, TAKE 1 TABLET BY MOUTH EVERY DAY, Disp: 30 tablet, Rfl: 0  •  potassium chloride 10 MEQ CR tablet, TAKE 1 TABLET BY MOUTH EVERY DAY WITH FOOD WHEN ON FUROSEMIDE/LASIX, Disp: 30 tablet, Rfl: 0  •  ramipril (ALTACE) 10 MG capsule, TAKE 1 CAPSULE BY MOUTH EVERY DAY, Disp: 90 capsule, Rfl: 0  •  ramipril (ALTACE) 10 MG capsule, Take 10 mg by mouth Daily., Disp: , Rfl:   •  Saw Palmetto 160 MG capsule, Take 320 mg by  "mouth 2 (Two) Times a Day., Disp: , Rfl:   •  sertraline (ZOLOFT) 100 MG tablet, TAKE 1.5 TABLETS BY MOUTH DAILY, Disp: 135 tablet, Rfl: 1  •  sertraline (ZOLOFT) 100 MG tablet, Take 100 mg by mouth Daily., Disp: , Rfl:   •  sertraline (ZOLOFT) 50 MG tablet, Take 50 mg by mouth Daily., Disp: , Rfl:   •  sirolimus (RAPAMUNE) 1 MG tablet, Take 1 mg by mouth Daily., Disp: , Rfl:   •  sirolimus (RAPAMUNE) 2 MG tablet, Take 2 mg by mouth Daily., Disp: , Rfl:   •  Vitamin D, Cholecalciferol, 1000 units capsule, Take 4,000 Units by mouth Daily., Disp: , Rfl:   •  vitamin E 1000 UNIT capsule, Take 1,000 Units by mouth Daily., Disp: , Rfl:     Allergies:   No Known Allergies    Objective     Physical Exam:  Vital Signs:   Vitals:    04/02/22 1053   BP: 142/70   BP Location: Left arm   Patient Position: Sitting   Cuff Size: Adult   Pulse: 84   Resp: 16   Temp: 98.9 °F (37.2 °C)   SpO2: 96%  Comment: R/A   Weight: 93.5 kg (206 lb 1.6 oz)   Height: 160 cm (63\")     Body mass index is 36.51 kg/m².     Physical Exam  Constitutional:       Appearance: Normal appearance. He is normal weight.   HENT:      Head: Normocephalic and atraumatic.      Nose: Nose normal.   Eyes:      Extraocular Movements: Extraocular movements intact.      Pupils: Pupils are equal, round, and reactive to light.   Musculoskeletal:      Cervical back: Normal range of motion and neck supple.   Skin:     Findings: Rash present.   Neurological:      Mental Status: He is alert.         Assessment / Plan      Assessment/Plan:   Diagnoses and all orders for this visit:    1. Cellulitis of abdominal wall (Primary)    Other orders  -     mupirocin (Bactroban) 2 % cream; Apply 1 application topically to the appropriate area as directed 3 (Three) Times a Day.  Dispense: 60 each; Refill: 0  -     cephalexin (Keflex) 500 MG capsule; Take 1 capsule by mouth 3 (Three) Times a Day.  Dispense: 30 capsule; Refill: 1         1. Patient has a cellulitis on his abdominal wall. "  He has 3 open areas with scabs on them.  They are excoriated from where he has been itching it.  We will give him some Bactroban to put on lesions topically.  We will give him some Keflex to take by mouth.  Return to clinic if worse or not better.      Follow Up:   No follow-ups on file.    Zac Iraheta MD  [facility]

## 2022-05-12 DIAGNOSIS — I10 ESSENTIAL HYPERTENSION: ICD-10-CM

## 2022-05-13 RX ORDER — RAMIPRIL 10 MG/1
CAPSULE ORAL
Qty: 90 CAPSULE | Refills: 0 | OUTPATIENT
Start: 2022-05-13

## 2022-05-13 RX ORDER — CHOLECALCIFEROL TAB 125 MCG (5000 UNIT) 125 MCG (5000 UT)
TAB
Qty: 90 TABLET | Refills: 0 | OUTPATIENT
Start: 2022-05-13

## 2022-05-13 RX ORDER — INSULIN ASPART 100 [IU]/ML
INJECTION, SOLUTION INTRAVENOUS; SUBCUTANEOUS
Qty: 15 PEN | Refills: 1 | Status: SHIPPED | OUTPATIENT
Start: 2022-05-13 | End: 2022-06-09

## 2022-05-13 NOTE — TELEPHONE ENCOUNTER
Incoming Refill Request      Medication requested (name and dose): novalog    Pharmacy where request should be sent: [facility]    Additional details provided by patient: THIS PATIENT CALLED IN SAYING HE NEEDED REFILLS OF ALL OF HIS MEDICATION BUT ONLY NAMES NOVALOG. HE STATES HE HAS BEEN OUT OF INSULIN FOR A WEEK.     Best call back number: [phone]    Does the patient have less than a 3 day supply:  [x] Yes  [] No    Janis Rich Rep  05/13/22, 13:50 EDT

## 2022-05-13 NOTE — TELEPHONE ENCOUNTER
Rx Refill Note    Requested Prescriptions     Pending Prescriptions Disp Refills   • NovoLOG FlexPen 100 UNIT/ML solution pen-injector sc pen       Sig: Inject 100 Units under the skin into the appropriate area as directed Daily. Inject by sq route as per insulin sliding scale (25-50U) before meals        Last office visit with prescribing clinician: Visit date not found      Next office visit with prescribing clinician: Visit date not found   Last labs:   Last refill:    Pharmacy (be sure to add in Epic). correct

## 2022-05-13 NOTE — TELEPHONE ENCOUNTER
Rx Refill Note    Requested Prescriptions     Pending Prescriptions Disp Refills   • Natural Vitamin D-3 125 MCG (5000 UT) tablet [Pharmacy Med Name: VITAMIN D3 5,000 UNIT TABLET] 90 tablet 0     Sig: TAKE 1 TABLET BY MOUTH EVERY DAY IN THE EVENING        Last office visit with prescribing clinician: Visit date not found      Next office visit with prescribing clinician: Visit date not found   Last labs:   Last refill:   Pharmacy (be sure to add in Epic). correct

## 2022-05-23 RX ORDER — CHOLECALCIFEROL TAB 125 MCG (5000 UNIT) 125 MCG (5000 UT)
TAB
Qty: 90 TABLET | OUTPATIENT
Start: 2022-05-23

## 2022-05-24 ENCOUNTER — TELEPHONE (OUTPATIENT)
Dept: FAMILY MEDICINE CLINIC | Facility: CLINIC | Age: 67
End: 2022-05-24

## 2022-05-24 NOTE — TELEPHONE ENCOUNTER
Attempted to contact pt to confirm which insulin he is out of, he did not answer and his vmail was full. Pt was just sent in [employer] on 05/13/2022

## 2022-05-24 NOTE — TELEPHONE ENCOUNTER
Incoming Refill Request      Medication requested (name and dose):     ATORVASTATIN 80MG TAB    Pharmacy where request should be sent:     [facility]    Additional details provided by patient:     CMS REQUESTING A 100 DAY PRESCRIPTION TO IMPROVE PATIENT ADHERENCE.    Best call back number:     [phone]    Does the patient have less than a 3 day supply:  [] Yes  [x] No    Marian Chawla  05/24/22, 16:10 EDT

## 2022-06-08 DIAGNOSIS — E11.65 TYPE 2 DIABETES MELLITUS WITH HYPERGLYCEMIA, WITHOUT LONG-TERM CURRENT USE OF INSULIN: Primary | ICD-10-CM

## 2022-06-09 RX ORDER — INSULIN ASPART 100 [IU]/ML
INJECTION, SOLUTION INTRAVENOUS; SUBCUTANEOUS
Qty: 15 PEN | Refills: 1 | Status: SHIPPED | OUTPATIENT
Start: 2022-06-09 | End: 2023-02-17 | Stop reason: SDUPTHER

## 2022-06-13 ENCOUNTER — OFFICE VISIT (OUTPATIENT)
Dept: FAMILY MEDICINE CLINIC | Facility: CLINIC | Age: 67
End: 2022-06-13

## 2022-06-13 VITALS
BODY MASS INDEX: 31.63 KG/M2 | TEMPERATURE: 96.9 F | SYSTOLIC BLOOD PRESSURE: 142 MMHG | DIASTOLIC BLOOD PRESSURE: 80 MMHG | HEIGHT: 67 IN | OXYGEN SATURATION: 97 % | WEIGHT: 201.5 LBS | HEART RATE: 78 BPM | RESPIRATION RATE: 13 BRPM

## 2022-06-13 DIAGNOSIS — R60.0 LEG EDEMA, LEFT: ICD-10-CM

## 2022-06-13 DIAGNOSIS — K21.9 GASTROESOPHAGEAL REFLUX DISEASE, UNSPECIFIED WHETHER ESOPHAGITIS PRESENT: ICD-10-CM

## 2022-06-13 DIAGNOSIS — Z79.899 HIGH RISK MEDICATION USE: ICD-10-CM

## 2022-06-13 DIAGNOSIS — E11.65 TYPE 2 DIABETES MELLITUS WITH HYPERGLYCEMIA, WITH LONG-TERM CURRENT USE OF INSULIN: Primary | ICD-10-CM

## 2022-06-13 DIAGNOSIS — I10 ESSENTIAL HYPERTENSION: ICD-10-CM

## 2022-06-13 DIAGNOSIS — Z79.4 TYPE 2 DIABETES MELLITUS WITH HYPERGLYCEMIA, WITH LONG-TERM CURRENT USE OF INSULIN: Primary | ICD-10-CM

## 2022-06-13 DIAGNOSIS — E78.2 MIXED HYPERLIPIDEMIA: ICD-10-CM

## 2022-06-13 DIAGNOSIS — I25.119 CORONARY ARTERY DISEASE INVOLVING NATIVE CORONARY ARTERY OF NATIVE HEART WITH ANGINA PECTORIS: ICD-10-CM

## 2022-06-13 DIAGNOSIS — E55.9 VITAMIN D DEFICIENCY: ICD-10-CM

## 2022-06-13 PROBLEM — G47.33 OBSTRUCTIVE SLEEP APNEA SYNDROME: Status: ACTIVE | Noted: 2022-06-13

## 2022-06-13 PROBLEM — R35.1 BPH ASSOCIATED WITH NOCTURIA: Status: ACTIVE | Noted: 2022-06-13

## 2022-06-13 PROBLEM — Z95.1 PRESENCE OF AORTOCORONARY BYPASS GRAFT: Status: ACTIVE | Noted: 2017-12-15

## 2022-06-13 PROBLEM — M51.369 DEGENERATION OF LUMBAR INTERVERTEBRAL DISC: Status: ACTIVE | Noted: 2022-06-13

## 2022-06-13 PROBLEM — I25.10 CORONARY ARTERY DISEASE INVOLVING NATIVE CORONARY ARTERY OF NATIVE HEART WITHOUT ANGINA PECTORIS: Status: ACTIVE | Noted: 2018-01-15

## 2022-06-13 PROBLEM — M51.36 DEGENERATION OF LUMBAR INTERVERTEBRAL DISC: Status: ACTIVE | Noted: 2022-06-13

## 2022-06-13 PROBLEM — N40.1 BPH ASSOCIATED WITH NOCTURIA: Status: ACTIVE | Noted: 2022-06-13

## 2022-06-13 PROBLEM — E72.10 DISORDER OF SULFUR-BEARING AMINO ACID METABOLISM: Status: ACTIVE | Noted: 2022-06-13

## 2022-06-13 PROBLEM — F33.42 RECURRENT MAJOR DEPRESSION IN FULL REMISSION: Status: ACTIVE | Noted: 2022-06-13

## 2022-06-13 PROBLEM — E66.01 SEVERE OBESITY: Status: ACTIVE | Noted: 2022-06-13

## 2022-06-13 LAB
POC AMPHETAMINES: NEGATIVE
POC BARBITURATES: NEGATIVE
POC BENZODIAZEPHINES: NEGATIVE
POC COCAINE: NEGATIVE
POC CREATININE URINE: NORMAL
POC METHADONE: NEGATIVE
POC METHAMPHETAMINE SCREEN URINE: NEGATIVE
POC MICROALBUMIN URINE: NORMAL
POC OPIATES: NEGATIVE
POC OXYCODONE: NEGATIVE
POC PHENCYCLIDINE: NEGATIVE
POC PROPOXYPHENE: NEGATIVE
POC THC: NEGATIVE
POC TRICYCLIC ANTIDEPRESSANTS: NEGATIVE

## 2022-06-13 PROCEDURE — 99215 OFFICE O/P EST HI 40 MIN: CPT | Performed by: FAMILY MEDICINE

## 2022-06-13 PROCEDURE — 82044 UR ALBUMIN SEMIQUANTITATIVE: CPT | Performed by: FAMILY MEDICINE

## 2022-06-13 PROCEDURE — 80305 DRUG TEST PRSMV DIR OPT OBS: CPT | Performed by: FAMILY MEDICINE

## 2022-06-13 RX ORDER — RAMIPRIL 10 MG/1
10 CAPSULE ORAL DAILY
Qty: 90 CAPSULE | Refills: 1 | Status: SHIPPED | OUTPATIENT
Start: 2022-06-13 | End: 2022-12-13

## 2022-06-13 RX ORDER — FUROSEMIDE 20 MG/1
20 TABLET ORAL DAILY
Qty: 30 TABLET | Refills: 1 | Status: SHIPPED | OUTPATIENT
Start: 2022-06-13 | End: 2022-08-24

## 2022-06-13 RX ORDER — ATORVASTATIN CALCIUM 80 MG/1
80 TABLET, FILM COATED ORAL DAILY
Qty: 30 TABLET | Refills: 5 | Status: SHIPPED | OUTPATIENT
Start: 2022-06-13 | End: 2022-12-23

## 2022-06-13 RX ORDER — HUMAN INSULIN 100 [IU]/ML
40-60 INJECTION, SUSPENSION SUBCUTANEOUS EVERY 12 HOURS SCHEDULED
Qty: 3 EACH | Refills: 11 | Status: SHIPPED | OUTPATIENT
Start: 2022-06-13 | End: 2023-02-17 | Stop reason: SDUPTHER

## 2022-06-13 RX ORDER — POTASSIUM CHLORIDE 750 MG/1
10 TABLET, FILM COATED, EXTENDED RELEASE ORAL DAILY
Qty: 30 TABLET | Refills: 1 | Status: SHIPPED | OUTPATIENT
Start: 2022-06-13 | End: 2022-08-24

## 2022-06-13 RX ORDER — PANTOPRAZOLE SODIUM 40 MG/1
40 TABLET, DELAYED RELEASE ORAL DAILY
Qty: 30 TABLET | Refills: 5 | Status: SHIPPED | OUTPATIENT
Start: 2022-06-13 | End: 2022-12-13

## 2022-06-13 NOTE — PROGRESS NOTES
Follow Up Office Visit      Patient Name: Quirino Sheppard  : 1955   MRN: [id number]     Chief Complaint:    Chief Complaint   Patient presents with   • Diabetes     Pt is to follow up on his diabetes    • Skin Lesion     Pt states he has a sore on his stomach that is not healing        History of Present Illness: Quirino Sheppard is a 67 y.o. male who is here today to   follow-up on his diabetes and other chronic medical problems.  He has Been lost to follow-up and only got his blood work done when his transplant team told him he was at risk for not getting his medicines.  He had blood work done at [facility] on 2022.  He says his A1c was elevated 11.  He is run out of his insulins does need refills of his medicines I did call in his NPH a few weeks ago.  He has not yet picked up his short acting insulin.  He has been noncompliant and follow-up with us as well as his transplant doctors at [facility] in [place].  He also has been picking at spots on his abdomen and has open wounds there that Dr. Iraheta has been treating has been on Keflex and says the Bactroban was too expensive he only brought 1 prescription there.  He says he knows it will not heal as long as he keeps picking on it.      Review of Systems   Constitutional: Negative for fatigue and fever.   Respiratory: Negative for cough and shortness of breath.    Cardiovascular: Negative for chest pain and palpitations.   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)    • Gastroesophageal reflux disease without esophagitis    • Glaucoma   "  • Hepatitis C    • Heterozygous MTHFR mutation [id number]    • Heterozygous MTHFR mutation [id number]    • High risk medication use    • Hyperlipidemia    • Hypertension    • Liver transplanted (HCC)    • Low back pain    • Obstructive sleep apnea syndrome    • Recurrent major depression in full remission (HCC)    • Severe obesity (HCC)    • Type 2 diabetes mellitus (HCC)        Past Surgical History:   Past Surgical History:   Procedure Laterality Date   • CORONARY ARTERY BYPASS GRAFT N/A 11/22/2017    Procedure: MEDIAN STERNOTOMY, CORONARY ARTERY BYPASS GRAFT X 3, UTILIZING THE LEFT INTERNAL MAMMARY ARTERY AND EVH USING THE LEFT GREATER SAPHENOUS VEIN;  Surgeon: Naga Guaman MD;  Location: [facility];  Service:    • LIVER TRANSPLANTATION  2001       Family History:   Family History   Problem Relation Age of Onset   • Diabetes Paternal Grandmother    • Breast cancer Mother    • Heart attack Father    • Heart valve disorder Father        Social History:   Social History     Socioeconomic History   • Marital status:    • Number of children: 3   Tobacco Use   • Smoking status: Never Smoker   • Smokeless tobacco: Never Used   Vaping Use   • Vaping Use: Never used   Substance and Sexual Activity   • Alcohol use: No     Comment: PT QUIT DRINKING IN 1994   • Drug use: Never   • Sexual activity: Not Currently       Medications:     Current Outpatient Medications:   •  aspirin 81 MG EC tablet, aspirin, Disp: , Rfl:   •  atorvastatin (LIPITOR) 80 MG tablet, Take 1 tablet by mouth Daily., Disp: 30 tablet, Rfl: 5  •  BD Insulin Syringe U/F 31G X 5/16\" 1 ML misc, 1 each See Admin Instructions., Disp: , Rfl:   •  brimonidine-timolol (COMBIGAN) 0.2-0.5 % ophthalmic solution, Administer 1 drop to both eyes Every 12 (Twelve) Hours., Disp: , Rfl:   •  buPROPion XL (WELLBUTRIN XL) 300 MG 24 hr tablet, TAKE 1 TABLET BY MOUTH EVERY DAY, Disp: 90 tablet, Rfl: 1  •  cephalexin (Keflex) 500 MG capsule, Take 1 capsule by mouth 3 " (Three) Times a Day., Disp: 30 capsule, Rfl: 1  •  Cholecalciferol 50 MCG (2000 UT) tablet, Take 1 tablet by mouth Daily., Disp: 30 each, Rfl: 5  •  insulin aspart (NovoLOG FlexPen) 100 UNIT/ML solution pen-injector sc pen, INJECT BY SUBCUTANEOUS ROUTE AS PER INSULIN SLIDING SCALE PROTOCOL ( 25-50 UNITS) BEFORE MEALS, Disp: 15 pen, Rfl: 1  •  metoprolol tartrate (LOPRESSOR) 25 MG tablet, TAKE 1 TABLET BY MOUTH TWICE A DAY, Disp: 180 tablet, Rfl: 1  •  Multiple Vitamins-Minerals (MULTIVITAMIN ADULTS) tablet, Take 1 tablet by mouth Daily., Disp: , Rfl:   •  mupirocin (Bactroban) 2 % cream, Apply 1 application topically to the appropriate area as directed 3 (Three) Times a Day., Disp: 60 each, Rfl: 0  •  OneTouch Verio test strip, 1 each by Other route 3 (Three) Times a Day. for testing, Disp: , Rfl:   •  pantoprazole (PROTONIX) 40 MG EC tablet, Take 1 tablet by mouth Daily., Disp: 30 tablet, Rfl: 5  •  potassium chloride 10 MEQ CR tablet, Take 1 tablet by mouth Daily. When taking lasix (furosemide), Disp: 30 tablet, Rfl: 1  •  ramipril (ALTACE) 10 MG capsule, Take 1 capsule by mouth Daily., Disp: 90 capsule, Rfl: 1  •  Saw Palmetto 160 MG capsule, Take 320 mg by mouth 2 (Two) Times a Day., Disp: , Rfl:   •  sertraline (ZOLOFT) 100 MG tablet, TAKE 1.5 TABLETS BY MOUTH DAILY, Disp: 135 tablet, Rfl: 1  •  sirolimus (RAPAMUNE) 2 MG tablet, Take 2 mg by mouth Daily., Disp: , Rfl:   •  vitamin E 1000 UNIT capsule, Take 1,000 Units by mouth Daily., Disp: , Rfl:   •  furosemide (Lasix) 20 MG tablet, Take 1 tablet by mouth Daily. Prn swelling and  Take  kcl  w it, Disp: 30 tablet, Rfl: 1  •  insulin NPH (NovoLIN N) 100 UNIT/ML injection, Inject 40-60 Units under the skin into the appropriate area as directed Every 12 (Twelve) Hours., Disp: 3 each, Rfl: 11    Allergies:   No Known Allergies    Objective     Physical Exam:  Vital Signs:   Vitals:    06/13/22 1152   BP: 142/80   BP Location: Left arm   Patient Position: Sitting  "  Cuff Size: Large Adult   Pulse: 78   Resp: 13   Temp: 96.9 °F (36.1 °C)   SpO2: 97%   Weight: 91.4 kg (201 lb 8 oz)   Height: 170.2 cm (67\")   PainSc: 0-No pain     Body mass index is 31.56 kg/m².     Physical Exam  Constitutional:       Appearance: He is obese.      Comments: Alert oriented pleasant white male poorly kempt with poor hygiene in no acute distress   Abdominal:      Palpations: Abdomen is soft.      Tenderness: There is no abdominal tenderness.      Comments: He has 3 fairly large open areas on his abdomen there is a crust around the edge and then looks like good light pink granulation tissue on the open areas largest 1 is about the size of a half a banana     Neurological:      Mental Status: He is alert.   Psychiatric:         Mood and Affect: Mood normal.         Behavior: Behavior normal.         Procedures    Assessment / Plan      Assessment/Plan:   Diagnoses and all orders for this visit:    1. Type 2 diabetes mellitus with hyperglycemia, with long-term current use of insulin ([facility]) (Primary)  -     insulin NPH (NovoLIN N) 100 UNIT/ML injection; Inject 40-60 Units under the skin into the appropriate area as directed Every 12 (Twelve) Hours.  Dispense: 3 each; Refill: 11  -     CBC Auto Differential; Future  -     Comprehensive Metabolic Panel; Future  -     Hemoglobin A1c; Future  -     TSH; Future    2. Coronary artery disease involving native coronary artery of native heart with angina pectoris ([facility])    3. Essential hypertension  -     ramipril (ALTACE) 10 MG capsule; Take 1 capsule by mouth Daily.  Dispense: 90 capsule; Refill: 1    4. Mixed hyperlipidemia  -     atorvastatin (LIPITOR) 80 MG tablet; Take 1 tablet by mouth Daily.  Dispense: 30 tablet; Refill: 5  -     Lipid Panel; Future    5. High risk medication use  -     atorvastatin (LIPITOR) 80 MG tablet; Take 1 tablet by mouth Daily.  Dispense: 30 tablet; Refill: 5  -     potassium chloride 10 MEQ CR tablet; Take 1 tablet by mouth " Daily. When taking lasix (furosemide)  Dispense: 30 tablet; Refill: 1  -     CBC Auto Differential; Future  -     Comprehensive Metabolic Panel; Future  -     CK; Future    6. Vitamin D deficiency  -     Cholecalciferol 50 MCG (2000 UT) tablet; Take 1 tablet by mouth Daily.  Dispense: 30 each; Refill: 5    7. Gastroesophageal reflux disease, unspecified whether esophagitis present  -     pantoprazole (PROTONIX) 40 MG EC tablet; Take 1 tablet by mouth Daily.  Dispense: 30 tablet; Refill: 5    8. Leg edema, left  -     furosemide (Lasix) 20 MG tablet; Take 1 tablet by mouth Daily. Prn swelling and  Take  kcl  w it  Dispense: 30 tablet; Refill: 1  -     potassium chloride 10 MEQ CR tablet; Take 1 tablet by mouth Daily. When taking lasix (furosemide)  Dispense: 30 tablet; Refill: 1         We finally did get his labs faxed over his CBC was within normal limits his glucose was 279  A1c 11.2 serial Toro level was normal 5.6  Cholesterol 304 triglycerides 525 HDL 36 .  Liver function normal alk phos 128 a tad elevated he will follow-up with his liver specialist at [facility].    I instructed him to get back on his insulin as directed if his sugars are running high he knows he can taper up a couple units he has a sliding scale on the rapid acting insulin.  Follow-up in 2 months for repeat blood work --he needs to get his A1c below 9 initially then gradually hope to get him down in the 7 range spent a long time updating his medicine list.    Microalbumin was negative.  As well as his UDS.    He occasionally uses Lasix and potassium for swelling in his left leg from where his vein was harvested for his bypass surgery.  He should follow-up with his cardiologist as directed as well as his liver doctors.    Make sure he is back on his atorvastatin we will repeat lipid levels in a couple months.    I encouraged him to cut back on carbs to walk 30 to 60 minutes 5 days a week    I instructed him to stop picking at the  sores on his abdomen he can put a little Vaseline and a gauze over the open areas and then wrap an Ace wrap all the way around his abdomen so that he will break out with tape etc. and leave those sores alone to heal.  BMI is >= 30 and <35. (Class 1 Obesity). The following options were offered after discussion;: exercise counseling/recommendations and nutrition counseling/recommendations      Follow Up:   Return in about 2 months (around 8/13/2022) for Recheck, Labs prior next visit.    I spent 42 minutes caring for Quirino on this date of service. This time includes time spent by me in the following activities:reviewing tests, obtaining and/or reviewing a separately obtained history, performing a medically appropriate examination and/or evaluation , counseling and educating the patient/family/caregiver, ordering medications, tests, or procedures and documenting information in the medical record    Andrew Fernandez MD  [facility]   Portions of note created with Dragon voice recognition technology

## 2022-07-07 DIAGNOSIS — R60.0 LEG EDEMA, LEFT: ICD-10-CM

## 2022-07-07 DIAGNOSIS — Z79.899 HIGH RISK MEDICATION USE: ICD-10-CM

## 2022-07-07 RX ORDER — POTASSIUM CHLORIDE 750 MG/1
TABLET, FILM COATED, EXTENDED RELEASE ORAL
Qty: 30 TABLET | Refills: 1 | OUTPATIENT
Start: 2022-07-07

## 2022-07-07 RX ORDER — FUROSEMIDE 20 MG/1
TABLET ORAL
Qty: 30 TABLET | Refills: 1 | OUTPATIENT
Start: 2022-07-07

## 2022-08-03 ENCOUNTER — OFFICE VISIT (OUTPATIENT)
Dept: FAMILY MEDICINE CLINIC | Facility: CLINIC | Age: 67
End: 2022-08-03

## 2022-08-03 VITALS
WEIGHT: 198 LBS | HEIGHT: 66 IN | DIASTOLIC BLOOD PRESSURE: 74 MMHG | OXYGEN SATURATION: 98 % | BODY MASS INDEX: 31.82 KG/M2 | HEART RATE: 89 BPM | SYSTOLIC BLOOD PRESSURE: 138 MMHG | TEMPERATURE: 96.4 F

## 2022-08-03 DIAGNOSIS — L03.311 CELLULITIS OF ABDOMINAL WALL: Primary | ICD-10-CM

## 2022-08-03 PROCEDURE — 99214 OFFICE O/P EST MOD 30 MIN: CPT | Performed by: NURSE PRACTITIONER

## 2022-08-03 RX ORDER — DOXYCYCLINE HYCLATE 100 MG/1
100 CAPSULE ORAL 2 TIMES DAILY
Qty: 28 CAPSULE | Refills: 0 | Status: SHIPPED | OUTPATIENT
Start: 2022-08-03 | End: 2023-02-16

## 2022-08-03 RX ORDER — VIT B6/ME-THFOLATE/ME-B12/ALA 35-5-2-300
1 CAPSULE ORAL DAILY
COMMUNITY
Start: 2022-05-16 | End: 2023-02-16

## 2022-08-03 RX ORDER — DOXYCYCLINE HYCLATE 100 MG/1
100 CAPSULE ORAL 2 TIMES DAILY
Qty: 28 CAPSULE | Refills: 0 | Status: SHIPPED | OUTPATIENT
Start: 2022-08-03 | End: 2022-08-03 | Stop reason: SDUPTHER

## 2022-08-03 NOTE — PROGRESS NOTES
"Chief Complaint  Cellulitis (RLQ abd. F/u)    Subjective        Quirino Sheppard presents to [facility]  History of Present Illness he has had this for several months. He has taken keflex in the past but he states that this did nothing for him.  He has not been compliant with his f;/u regarding this wound.  He is a diabetic.  He states that this itches and he scratches it all the time. This wound in on the right lower abdominal wall and is extensive, open and draining.  No c/o systemic symptoms such as fever, headache or shortness of breath.     Objective   Vital Signs:  /74   Pulse 89   Temp 96.4 °F (35.8 °C) (Temporal)   Ht 167.6 cm (66\")   Wt 89.8 kg (198 lb)   SpO2 98%   BMI 31.96 kg/m²   Estimated body mass index is 31.96 kg/m² as calculated from the following:    Height as of this encounter: 167.6 cm (66\").    Weight as of this encounter: 89.8 kg (198 lb).          Physical Exam  Constitutional:       Appearance: He is obese.   Cardiovascular:      Rate and Rhythm: Normal rate and regular rhythm.      Heart sounds: Normal heart sounds.   Pulmonary:      Effort: Pulmonary effort is normal.      Breath sounds: Normal breath sounds.   Abdominal:      General: Abdomen is flat. Bowel sounds are normal.      Palpations: Abdomen is soft.   Skin:     General: Skin is warm and dry.      Comments: Very large, open,superficial, draining wound noted on the right lower abdomen.  It is from the upper quadrant down to the lower quadrant with finger like projections. Some areas are scabbed over and others are draining. A culture was obtained.   Neurological:      Mental Status: He is alert.        Result Review :                Assessment and Plan   Diagnoses and all orders for this visit:    1. Cellulitis of abdominal wall (Primary)  -     Ambulatory Referral to Wound Clinic  -     Discontinue: doxycycline (VIBRAMYCIN) 100 MG capsule; Take 1 capsule by mouth 2 (Two) Times a Day.  " Dispense: 28 capsule; Refill: 0  -     doxycycline (VIBRAMYCIN) 100 MG capsule; Take 1 capsule by mouth 2 (Two) Times a Day.  Dispense: 28 capsule; Refill: 0  -     Ambulatory Referral to Wound Clinic  -     Anaerobic & Aerobic Culture ([facility] Only) - Swab, Abdomen, Right; Future             Follow Up   Return in about 1 week (around 8/10/2022) for Recheck.  Patient was given instructions and counseling regarding his condition or for health maintenance advice. Please see specific information pulled into the AVS if appropriate.

## 2022-08-08 LAB
BACTERIA SPEC AEROBE CULT: ABNORMAL
BACTERIA SPEC ANAEROBE CULT: ABNORMAL
BACTERIA SPEC CULT: ABNORMAL
BACTERIA SPEC CULT: ABNORMAL
OTHER ANTIBIOTIC SUSC ISLT: ABNORMAL

## 2022-08-24 DIAGNOSIS — Z79.899 HIGH RISK MEDICATION USE: ICD-10-CM

## 2022-08-24 DIAGNOSIS — R60.0 LEG EDEMA, LEFT: ICD-10-CM

## 2022-08-24 RX ORDER — POTASSIUM CHLORIDE 750 MG/1
TABLET, FILM COATED, EXTENDED RELEASE ORAL
Qty: 30 TABLET | Refills: 0 | Status: SHIPPED | OUTPATIENT
Start: 2022-08-24 | End: 2023-02-16

## 2022-08-24 RX ORDER — FUROSEMIDE 20 MG/1
TABLET ORAL
Qty: 30 TABLET | Refills: 0 | Status: SHIPPED | OUTPATIENT
Start: 2022-08-24 | End: 2022-09-19 | Stop reason: SDUPTHER

## 2022-09-06 ENCOUNTER — TELEPHONE (OUTPATIENT)
Dept: FAMILY MEDICINE CLINIC | Facility: CLINIC | Age: 67
End: 2022-09-06

## 2022-09-06 NOTE — TELEPHONE ENCOUNTER
Caller: Quirino Sheppard    Relationship to patient: Self    Best call back number: [phone]    Patient is needing: AN APPOINTMENT TODAY DUE TO COVID SYMPTOMS OF COUGHING, CONGESTION, SORE THROAT

## 2022-09-07 ENCOUNTER — OFFICE VISIT (OUTPATIENT)
Dept: FAMILY MEDICINE CLINIC | Facility: CLINIC | Age: 67
End: 2022-09-07

## 2022-09-07 VITALS — TEMPERATURE: 98.7 F | OXYGEN SATURATION: 92 % | HEART RATE: 63 BPM

## 2022-09-07 DIAGNOSIS — K12.1 STOMATITIS: ICD-10-CM

## 2022-09-07 DIAGNOSIS — R05.9 COUGH: Primary | ICD-10-CM

## 2022-09-07 DIAGNOSIS — K14.0 TONGUE ULCERATION: ICD-10-CM

## 2022-09-07 LAB
EXPIRATION DATE: NORMAL
FLUAV AG UPPER RESP QL IA.RAPID: NOT DETECTED
FLUBV AG UPPER RESP QL IA.RAPID: NOT DETECTED
INTERNAL CONTROL: NORMAL
Lab: NORMAL
SARS-COV-2 AG UPPER RESP QL IA.RAPID: NOT DETECTED

## 2022-09-07 PROCEDURE — 99213 OFFICE O/P EST LOW 20 MIN: CPT | Performed by: FAMILY MEDICINE

## 2022-09-07 PROCEDURE — 87428 SARSCOV & INF VIR A&B AG IA: CPT | Performed by: FAMILY MEDICINE

## 2022-09-07 RX ORDER — BLOOD SUGAR DIAGNOSTIC
STRIP MISCELLANEOUS
Qty: 200 EACH | Refills: 2 | Status: SHIPPED | OUTPATIENT
Start: 2022-09-07 | End: 2023-03-13

## 2022-09-07 RX ORDER — LIDOCAINE HYDROCHLORIDE 20 MG/ML
SOLUTION OROPHARYNGEAL
Qty: 100 ML | Refills: 0 | Status: SHIPPED | OUTPATIENT
Start: 2022-09-07 | End: 2023-02-16

## 2022-09-07 NOTE — PROGRESS NOTES
Follow Up Office Visit      Patient Name: Quirino Sheppard  : 1955   MRN: [id number]     Chief Complaint:    Chief Complaint   Patient presents with   • sore on tongue     +1 month       History of Present Illness: Quirino Sheppard is a 67 y.o. male who is here today to   be evaluated as he has had a little bit of a cough and then the main issue is ulcer on his tongue sore spot there is been going on about 6 months sometimes will get a little bit better but none then worsened this time just not healing hurts every time he goes to eat.    He has been seeing wound care for his abdomen where he is picked at skin there and has crusts golf ball sized to lemon sized all over his abdomen basically 1 whole half on the right half which are gradually healing.  He has had no fever.  Does have some laryngitis.    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)    • Gastroesophageal reflux disease without esophagitis    • Glaucoma    • Hepatitis C    • Heterozygous MTHFR mutation [id number]    • Heterozygous MTHFR mutation C677T    • High risk medication use    • Hyperlipidemia    • Hypertension    • Liver transplanted (HCC)    • Low back pain    • Obstructive sleep apnea syndrome    • Recurrent major depression in full remission (HCC)    • Severe obesity (HCC)    • Type 2 diabetes mellitus (HCC)        Past Surgical History:   Past Surgical History:   Procedure Laterality Date   • CORONARY ARTERY BYPASS GRAFT N/A 2017    Procedure: MEDIAN STERNOTOMY, CORONARY ARTERY BYPASS GRAFT X 3, UTILIZING THE LEFT INTERNAL MAMMARY ARTERY AND EVH USING THE LEFT  "GREATER SAPHENOUS VEIN;  Surgeon: Naga Guaman MD;  Location: [facility];  Service:    • LIVER TRANSPLANTATION  2001       Family History:   Family History   Problem Relation Age of Onset   • Diabetes Paternal Grandmother    • Breast cancer Mother    • Heart attack Father    • Heart valve disorder Father        Social History:   Social History     Socioeconomic History   • Marital status:    • Number of children: 3   Tobacco Use   • Smoking status: Never Smoker   • Smokeless tobacco: Never Used   Vaping Use   • Vaping Use: Never used   Substance and Sexual Activity   • Alcohol use: No     Comment: PT QUIT DRINKING IN 1994   • Drug use: Never   • Sexual activity: Not Currently       Medications:     Current Outpatient Medications:   •  aspirin 325 MG EC tablet, TAKE 1 TABLET BY MOUTH EVERY DAY, Disp: 90 tablet, Rfl: 1  •  atorvastatin (LIPITOR) 80 MG tablet, Take 1 tablet by mouth Daily., Disp: 30 tablet, Rfl: 5  •  BD Insulin Syringe U/F 31G X 5/16\" 1 ML misc, 1 each See Admin Instructions., Disp: , Rfl:   •  brimonidine-timolol (COMBIGAN) 0.2-0.5 % ophthalmic solution, Administer 1 drop to both eyes Every 12 (Twelve) Hours., Disp: , Rfl:   •  buPROPion XL (WELLBUTRIN XL) 300 MG 24 hr tablet, TAKE 1 TABLET BY MOUTH EVERY DAY, Disp: 90 tablet, Rfl: 1  •  cephalexin (Keflex) 500 MG capsule, Take 1 capsule by mouth 3 (Three) Times a Day., Disp: 30 capsule, Rfl: 1  •  Cholecalciferol 50 MCG (2000 UT) tablet, Take 1 tablet by mouth Daily., Disp: 30 each, Rfl: 5  •  doxycycline (VIBRAMYCIN) 100 MG capsule, Take 1 capsule by mouth 2 (Two) Times a Day., Disp: 28 capsule, Rfl: 0  •  furosemide (LASIX) 20 MG tablet, TAKE 1 TABLET DAILY AS NEEDED FOR SWELLING. TAKE WITH POTASSIUM AS DIRECTED, Disp: 30 tablet, Rfl: 0  •  insulin aspart (NovoLOG FlexPen) 100 UNIT/ML solution pen-injector sc pen, INJECT BY SUBCUTANEOUS ROUTE AS PER INSULIN SLIDING SCALE PROTOCOL ( 25-50 UNITS) BEFORE MEALS, Disp: 15 pen, Rfl: 1  •  " insulin NPH (NovoLIN N) 100 UNIT/ML injection, Inject 40-60 Units under the skin into the appropriate area as directed Every 12 (Twelve) Hours., Disp: 3 each, Rfl: 11  •  L-Methylfolate-B6-B12 (PoDiaPN) capsule, Take 1 capsule by mouth Daily., Disp: , Rfl:   •  Lidocaine Viscous HCl (XYLOCAINE) 2 % solution, 5 mL mixed with 5 mL of Mylanta-- swish and spit every 4 hours as needed, Disp: 100 mL, Rfl: 0  •  metoprolol tartrate (LOPRESSOR) 25 MG tablet, TAKE 1 TABLET BY MOUTH TWICE A DAY, Disp: 180 tablet, Rfl: 1  •  Multiple Vitamins-Minerals (MULTIVITAMIN ADULTS) tablet, Take 1 tablet by mouth Daily., Disp: , Rfl:   •  mupirocin (Bactroban) 2 % cream, Apply 1 application topically to the appropriate area as directed 3 (Three) Times a Day., Disp: 60 each, Rfl: 0  •  OneTouch Verio test strip, 1 each by Other route 3 (Three) Times a Day. for testing, Disp: , Rfl:   •  pantoprazole (PROTONIX) 40 MG EC tablet, Take 1 tablet by mouth Daily., Disp: 30 tablet, Rfl: 5  •  potassium chloride 10 MEQ CR tablet, TAKE 1 TABLET DAILY WHEN TAKING FUROSEMIDE, Disp: 30 tablet, Rfl: 0  •  ramipril (ALTACE) 10 MG capsule, Take 1 capsule by mouth Daily., Disp: 90 capsule, Rfl: 1  •  Saw Palmetto 160 MG capsule, Take 320 mg by mouth 2 (Two) Times a Day., Disp: , Rfl:   •  sertraline (ZOLOFT) 100 MG tablet, TAKE 1.5 TABLETS BY MOUTH DAILY, Disp: 135 tablet, Rfl: 1  •  sirolimus (RAPAMUNE) 2 MG tablet, Take 2 mg by mouth Daily., Disp: , Rfl:   •  vitamin E 1000 UNIT capsule, Take 1,000 Units by mouth Daily., Disp: , Rfl:     Allergies:   No Known Allergies    Objective     Physical Exam:  Vital Signs:   Vitals:    09/07/22 1056   Pulse: 63   Temp: 98.7 °F (37.1 °C)   TempSrc: Infrared   SpO2: 92%     There is no height or weight on file to calculate BMI.     Physical Exam  Constitutional:       Appearance: Normal appearance.   HENT:      Head: Normocephalic and atraumatic.      Nose: Nose normal.      Mouth/Throat:      Mouth: Mucous  membranes are moist.      Pharynx: No posterior oropharyngeal erythema.      Comments: Does have a ulceration on the right side of his tongue there with a yellow base its probably 2 to 3 cm x 1 cm and is irregular  Poor dentition  Skin:     Comments: Crusts scattered all over her abdomen as in HPI dry did not appear infected at this point  Where he has been picking at them.   Neurological:      Mental Status: He is alert.   Psychiatric:         Mood and Affect: Mood normal.         Behavior: Behavior normal.         Procedures    PHQ-9 Total Score:       Assessment / Plan      Assessment/Plan:   Diagnoses and all orders for this visit:    1. Cough (Primary)  -     POCT SARS-CoV-2 Antigen MARCEL + Flu    2. Stomatitis  -     Lidocaine Viscous HCl (XYLOCAINE) 2 % solution; 5 mL mixed with 5 mL of Mylanta-- swish and spit every 4 hours as needed  Dispense: 100 mL; Refill: 0    3. Tongue ulceration         We swabbed him for COVID was negative he was seen outside in his car.  This ulcerations been there way too long I am worried it could be oral cancer of some sort will refer to oral surgery Dr. Raymond Mohr I gave him the phone number make sure he follows up there for evaluation and possible biopsy.    In the short-term I gave him some viscous lidocaine he can mix with Mylanta and use this before meals at least numb his mouth a little bit so he can eat.    He knows to keep a close eye on his sugars for his diabetes and maintain good oral hygiene he can do salt water gargles or gargle with some Listerine ,brushes teeth twice a day as directed.        Follow Up:   No follow-ups on file.        Andrew Fernandez MD  [facility]   Portions of note created with Dragon voice recognition technology

## 2022-09-07 NOTE — TELEPHONE ENCOUNTER
Rx Refill Note    Requested Prescriptions     Pending Prescriptions Disp Refills   • OneTouch Verio test strip [Pharmacy Med Name: ONE TOUCH VERIO TEST STRIP] 200 each 2     Sig: TEST 3 TIMES A DAY        Last office visit with prescribing clinician: 6/13/2022      Next office visit with prescribing clinician: 9/7/2022   Last labs:   Last refill:  needs  Pharmacy (be sure to add in Epic). correct

## 2022-09-19 DIAGNOSIS — R60.0 LEG EDEMA, LEFT: ICD-10-CM

## 2022-09-19 RX ORDER — FUROSEMIDE 20 MG/1
TABLET ORAL
Qty: 30 TABLET | Refills: 0 | Status: SHIPPED | OUTPATIENT
Start: 2022-09-19 | End: 2023-02-16

## 2022-09-19 NOTE — TELEPHONE ENCOUNTER
Rx Refill Note    Requested Prescriptions     Pending Prescriptions Disp Refills   • furosemide (LASIX) 20 MG tablet 30 tablet 0        Last office visit with prescribing clinician: 9/7/2022      Next office visit with prescribing clinician: 9/20/2022   Last labs:   Last refill: 08/2022   Pharmacy (be sure to add in Epic). correct

## 2022-10-21 ENCOUNTER — TELEPHONE (OUTPATIENT)
Dept: FAMILY MEDICINE CLINIC | Facility: CLINIC | Age: 67
End: 2022-10-21

## 2022-10-27 DIAGNOSIS — I25.119 CORONARY ARTERY DISEASE INVOLVING NATIVE CORONARY ARTERY OF NATIVE HEART WITH ANGINA PECTORIS: ICD-10-CM

## 2022-10-27 DIAGNOSIS — F32.5 MAJOR DEPRESSIVE DISORDER WITH SINGLE EPISODE, IN FULL REMISSION: ICD-10-CM

## 2022-10-27 DIAGNOSIS — I10 ESSENTIAL HYPERTENSION: ICD-10-CM

## 2022-10-27 RX ORDER — SERTRALINE HYDROCHLORIDE 100 MG/1
TABLET, FILM COATED ORAL
Qty: 135 TABLET | Refills: 0 | Status: SHIPPED | OUTPATIENT
Start: 2022-10-27 | End: 2022-12-13

## 2022-10-27 NOTE — TELEPHONE ENCOUNTER
Rx Refill Note    Requested Prescriptions     Pending Prescriptions Disp Refills   • sertraline (ZOLOFT) 100 MG tablet [Pharmacy Med Name: SERTRALINE  MG TABLET] 135 tablet 0     Sig: TAKE 1 AND 1/2 TABLETS BY MOUTH EVERY DAY   • metoprolol tartrate (LOPRESSOR) 25 MG tablet [Pharmacy Med Name: METOPROLOL TARTRATE 25 MG TAB] 180 tablet 0     Sig: TAKE 1 TABLET BY MOUTH TWICE A DAY        Last office visit with prescribing clinician: 9/7/2022      Next office visit with prescribing clinician: Visit date not found   Last labs:   Last refill: 03/23/2022   Pharmacy (be sure to add in Epic). correct

## 2022-11-18 ENCOUNTER — TELEPHONE (OUTPATIENT)
Dept: FAMILY MEDICINE CLINIC | Facility: CLINIC | Age: 67
End: 2022-11-18

## 2022-11-18 NOTE — TELEPHONE ENCOUNTER
Ranjan of [facility] [place], [place] [id number] [facility] - [phone] ext [phone] is requesting a call back from MA.  He said that patient transferred to them, he is trying to do compliance packs, and  would like ok for 30 day supply, no refills on patient's meds. Ph: [phone] ext [phone]

## 2022-11-19 DIAGNOSIS — R60.0 LEG EDEMA, LEFT: ICD-10-CM

## 2022-11-21 RX ORDER — FUROSEMIDE 20 MG/1
TABLET ORAL
Qty: 30 TABLET | Refills: 0 | OUTPATIENT
Start: 2022-11-21

## 2022-11-21 NOTE — TELEPHONE ENCOUNTER
Rx Refill Note    Requested Prescriptions     Pending Prescriptions Disp Refills   • furosemide (LASIX) 20 MG tablet [Pharmacy Med Name: FUROSEMIDE 20 MG TABLET] 30 tablet 0     Sig: TAKE 1 TABLET 2 OR 3 DAYS A WEEK AS NEEDED FOR SWELLING, AS DIRECTED        Last office visit with prescribing clinician: 9/7/2022      Next office visit with prescribing clinician: Visit date not found   Last labs:   Last refill: 09/19/2022   Pharmacy (be sure to add in Epic). correct

## 2022-11-22 ENCOUNTER — TELEPHONE (OUTPATIENT)
Dept: FAMILY MEDICINE CLINIC | Facility: CLINIC | Age: 67
End: 2022-11-22

## 2022-12-12 DIAGNOSIS — F32.5 MAJOR DEPRESSIVE DISORDER WITH SINGLE EPISODE, IN FULL REMISSION: ICD-10-CM

## 2022-12-12 DIAGNOSIS — K21.9 GASTROESOPHAGEAL REFLUX DISEASE, UNSPECIFIED WHETHER ESOPHAGITIS PRESENT: ICD-10-CM

## 2022-12-12 DIAGNOSIS — I10 ESSENTIAL HYPERTENSION: ICD-10-CM

## 2022-12-12 RX ORDER — BACITRACIN 500 UNIT/G
OINTMENT (GRAM) TOPICAL
Qty: 120 EACH | Refills: 1 | OUTPATIENT
Start: 2022-12-12

## 2022-12-12 NOTE — TELEPHONE ENCOUNTER
Rx Refill Note    Requested Prescriptions     Pending Prescriptions Disp Refills   • Saw Palmetto 160 MG capsule [Pharmacy Med Name: SAW PALMETTO 160MG CAP 160MG Capsule] 120 each 1     Sig: Take 2 capsules po twice a day        Last office visit with prescribing clinician: 9/7/2022      Next office visit with prescribing clinician: Visit date not found   Last labs:   Last refill:    Pharmacy (be sure to add in Epic). correct

## 2022-12-13 DIAGNOSIS — I10 ESSENTIAL HYPERTENSION: ICD-10-CM

## 2022-12-13 RX ORDER — MULTIVIT WITH MINERALS/LUTEIN
TABLET ORAL
Qty: 30 CAPSULE | Refills: 0 | OUTPATIENT
Start: 2022-12-13

## 2022-12-13 RX ORDER — SIROLIMUS 1 MG/1
TABLET, FILM COATED ORAL
Qty: 60 TABLET | Refills: 0 | OUTPATIENT
Start: 2022-12-13

## 2022-12-13 RX ORDER — RAMIPRIL 10 MG/1
CAPSULE ORAL
Qty: 30 CAPSULE | Refills: 0 | Status: SHIPPED | OUTPATIENT
Start: 2022-12-13 | End: 2022-12-13 | Stop reason: SDUPTHER

## 2022-12-13 RX ORDER — BUPROPION HYDROCHLORIDE 300 MG/1
TABLET ORAL
Qty: 30 TABLET | Refills: 0 | Status: SHIPPED | OUTPATIENT
Start: 2022-12-13 | End: 2023-01-10

## 2022-12-13 RX ORDER — SERTRALINE HYDROCHLORIDE 100 MG/1
TABLET, FILM COATED ORAL
Qty: 45 TABLET | Refills: 0 | Status: SHIPPED | OUTPATIENT
Start: 2022-12-13 | End: 2023-01-10

## 2022-12-13 RX ORDER — MECOBAL/LEVOMEFOLAT CA/B6 PHOS 2-3-35 MG
TABLET ORAL
Qty: 30 TABLET | Refills: 0 | OUTPATIENT
Start: 2022-12-13

## 2022-12-13 RX ORDER — CHOLECALCIFEROL (VITAMIN D3) 50 MCG
CAPSULE ORAL
Qty: 30 CAPSULE | Refills: 0 | Status: SHIPPED | OUTPATIENT
Start: 2022-12-13 | End: 2023-01-10

## 2022-12-13 RX ORDER — MULTIVITAMIN
TABLET ORAL
Qty: 30 TABLET | Refills: 0 | Status: SHIPPED | OUTPATIENT
Start: 2022-12-13 | End: 2023-01-10

## 2022-12-13 RX ORDER — ASPIRIN 325 MG
TABLET, DELAYED RELEASE (ENTERIC COATED) ORAL
Qty: 30 TABLET | Refills: 0 | Status: SHIPPED | OUTPATIENT
Start: 2022-12-13 | End: 2023-01-10

## 2022-12-13 RX ORDER — RAMIPRIL 10 MG/1
10 CAPSULE ORAL DAILY
Qty: 30 CAPSULE | Refills: 0 | Status: SHIPPED | OUTPATIENT
Start: 2022-12-13 | End: 2023-01-10

## 2022-12-13 RX ORDER — PANTOPRAZOLE SODIUM 40 MG/1
TABLET, DELAYED RELEASE ORAL
Qty: 30 TABLET | Refills: 0 | Status: SHIPPED | OUTPATIENT
Start: 2022-12-13 | End: 2023-01-10

## 2022-12-13 NOTE — TELEPHONE ENCOUNTER
Rx Refill Note    Requested Prescriptions     Pending Prescriptions Disp Refills   • aspirin  MG tablet [Pharmacy Med Name: ASPIRIN  MG TABLET 325 Tablet] 30 tablet 0     Sig: TAKE ONE TABLET BY MOUTH DAILY   • buPROPion XL (WELLBUTRIN XL) 300 MG 24 hr tablet [Pharmacy Med Name: BUPROPION HCL ER (XL) 300 M 300 Tablet] 30 tablet 0     Sig: TAKE ONE TABLET BY MOUTH DAILY   • D3 Super Strength 50 MCG (2000 UT) capsule [Pharmacy Med Name: VITAMIN D3 2,000 STGL OTC 50 MCG Capsule] 30 capsule 0     Sig: TAKE 1 CAPSULE BY MOUTH EVERY DAY   • L-Methylfolate-B6-B12 3-35-2 MG tablet [Pharmacy Med Name: L-METHYL-B6-B12 TABLET^ 3-35-2 Tablet] 30 tablet 0     Sig: TAKE ONE TABLET BY MOUTH DAILY   • One-Daily Multi-Vitamin tablet tablet [Pharmacy Med Name: ONE DAILY MULTI-VITAMIN TAB Tablet] 30 tablet 0     Sig: TAKE ONE TABLET BY MOUTH DAILY   • pantoprazole (PROTONIX) 40 MG EC tablet [Pharmacy Med Name: PANTOPRAZOLE SOD DR 40MG 40 Tablet] 30 tablet 0     Sig: TAKE ONE TABLET BY MOUTH DAILY   • ramipril (ALTACE) 10 MG capsule [Pharmacy Med Name: RAMIPRIL 10 MG CAPS 10 Capsule] 30 capsule 0     Sig: TAKE 1 CAPSULE BY MOUTH EVERY DAY   • sertraline (ZOLOFT) 100 MG tablet [Pharmacy Med Name: SERTRALINE HCL 100MG TAB ^ 100 Tablet] 45 tablet 0     Sig: TAKE 1&1/2 TABLETS BY MOUTH EVERY DAY   • sirolimus (RAPAMUNE) 1 MG tablet [Pharmacy Med Name: SIROLIMUS 1 MG TABS 1 Tablet] 60 tablet 0     Sig: TAKE 2 TABLETS BY MOUTH EVERY DAY   • Vitamin E 450 MG (1000 UT) capsule [Pharmacy Med Name: VITAMIN E 1000 UNIT CAPS 450 MG Capsule] 30 capsule 0     Sig: TAKE 1 CAPSULE BY MOUTH EVERY DAY        Last office visit with prescribing clinician: 9/7/2022      Next office visit with prescribing clinician: 12/12/2022   Last labs:   Last refill: needs   Pharmacy (be sure to add in Epic). correct

## 2022-12-23 DIAGNOSIS — Z79.899 HIGH RISK MEDICATION USE: ICD-10-CM

## 2022-12-23 DIAGNOSIS — E78.2 MIXED HYPERLIPIDEMIA: ICD-10-CM

## 2022-12-23 RX ORDER — ATORVASTATIN CALCIUM 80 MG/1
TABLET, FILM COATED ORAL
Qty: 90 TABLET | Refills: 0 | Status: SHIPPED | OUTPATIENT
Start: 2022-12-23 | End: 2023-01-10

## 2022-12-23 NOTE — TELEPHONE ENCOUNTER
Rx Refill Note    Requested Prescriptions     Pending Prescriptions Disp Refills   • atorvastatin (LIPITOR) 80 MG tablet [Pharmacy Med Name: ATORVASTATIN 80 MG TABLET] 90 tablet 0     Sig: TAKE 1 TABLET BY MOUTH EVERY DAY        Last office visit with prescribing clinician: 9/7/2022      Next office visit with prescribing clinician: Visit date not found   Last labs:   Last refill: 06/13/22   Pharmacy (be sure to add in Epic). correct

## 2022-12-26 DIAGNOSIS — I10 ESSENTIAL HYPERTENSION: ICD-10-CM

## 2022-12-27 RX ORDER — SIROLIMUS 1 MG/1
TABLET, FILM COATED ORAL
Qty: 60 TABLET | Refills: 0 | OUTPATIENT
Start: 2022-12-27

## 2022-12-27 RX ORDER — BACITRACIN 500 UNIT/G
OINTMENT (GRAM) TOPICAL
Qty: 120 EACH | Refills: 0 | Status: SHIPPED | OUTPATIENT
Start: 2022-12-27

## 2022-12-27 RX ORDER — RAMIPRIL 10 MG/1
CAPSULE ORAL
Qty: 30 CAPSULE | Refills: 0 | OUTPATIENT
Start: 2022-12-27

## 2022-12-27 RX ORDER — MECOBAL/LEVOMEFOLAT CA/B6 PHOS 2-3-35 MG
TABLET ORAL
Qty: 30 TABLET | Refills: 0 | OUTPATIENT
Start: 2022-12-27

## 2022-12-27 NOTE — TELEPHONE ENCOUNTER
Rx Refill Note    Requested Prescriptions     Pending Prescriptions Disp Refills   • Saw Palmetto 160 MG capsule [Pharmacy Med Name: SAW PALMETTO 160MG CAP 160MG Capsule] 120 each 0     Sig: Take 2 capsules po twice a day        Last office visit with prescribing clinician: 9/7/2022      Next office visit with prescribing clinician: Visit date not found   Last labs:   Last refill: n/a   Pharmacy (be sure to add in Epic). correct

## 2022-12-27 NOTE — TELEPHONE ENCOUNTER
Rx Refill Note    Requested Prescriptions     Pending Prescriptions Disp Refills   • L-Methylfolate-B6-B12 3-35-2 MG tablet [Pharmacy Med Name: L-METHYL-B6-B12 TABLET^ 3-35-2 Tablet] 30 tablet 0     Sig: TAKE ONE TABLET BY MOUTH DAILY   • ramipril (ALTACE) 10 MG capsule [Pharmacy Med Name: RAMIPRIL 10 MG CAPS 10 Capsule] 30 capsule 0     Sig: TAKE 1 CAPSULE BY MOUTH EVERY DAY   • sirolimus (RAPAMUNE) 1 MG tablet [Pharmacy Med Name: SIROLIMUS 1 MG TABS 1 Tablet] 60 tablet 0     Sig: TAKE 2 TABLETS BY MOUTH EVERY DAY        Last office visit with prescribing clinician: 9/7/2022      Next office visit with prescribing clinician: 12/27/2022   Last labs:   Last refill: Dr. Howell patient   Pharmacy (be sure to add in Epic). correct

## 2023-01-08 DIAGNOSIS — E78.2 MIXED HYPERLIPIDEMIA: ICD-10-CM

## 2023-01-08 DIAGNOSIS — F32.5 MAJOR DEPRESSIVE DISORDER WITH SINGLE EPISODE, IN FULL REMISSION: ICD-10-CM

## 2023-01-08 DIAGNOSIS — Z79.899 HIGH RISK MEDICATION USE: ICD-10-CM

## 2023-01-08 DIAGNOSIS — K21.9 GASTROESOPHAGEAL REFLUX DISEASE, UNSPECIFIED WHETHER ESOPHAGITIS PRESENT: ICD-10-CM

## 2023-01-08 DIAGNOSIS — I10 ESSENTIAL HYPERTENSION: ICD-10-CM

## 2023-01-10 DIAGNOSIS — I10 ESSENTIAL HYPERTENSION: ICD-10-CM

## 2023-01-10 DIAGNOSIS — F32.5 MAJOR DEPRESSIVE DISORDER WITH SINGLE EPISODE, IN FULL REMISSION: ICD-10-CM

## 2023-01-10 DIAGNOSIS — K21.9 GASTROESOPHAGEAL REFLUX DISEASE, UNSPECIFIED WHETHER ESOPHAGITIS PRESENT: ICD-10-CM

## 2023-01-10 DIAGNOSIS — E78.2 MIXED HYPERLIPIDEMIA: ICD-10-CM

## 2023-01-10 DIAGNOSIS — Z79.899 HIGH RISK MEDICATION USE: ICD-10-CM

## 2023-01-10 RX ORDER — RAMIPRIL 10 MG/1
CAPSULE ORAL
Qty: 30 CAPSULE | Refills: 0 | Status: SHIPPED | OUTPATIENT
Start: 2023-01-10 | End: 2023-04-06 | Stop reason: SDUPTHER

## 2023-01-10 RX ORDER — MULTIVITAMIN
TABLET ORAL
Qty: 30 TABLET | Refills: 2 | Status: SHIPPED | OUTPATIENT
Start: 2023-01-10 | End: 2023-04-06 | Stop reason: SDUPTHER

## 2023-01-10 RX ORDER — ASPIRIN 325 MG
TABLET, DELAYED RELEASE (ENTERIC COATED) ORAL
Qty: 30 TABLET | Refills: 0 | Status: SHIPPED | OUTPATIENT
Start: 2023-01-10 | End: 2023-04-06 | Stop reason: SDUPTHER

## 2023-01-10 RX ORDER — SERTRALINE HYDROCHLORIDE 100 MG/1
TABLET, FILM COATED ORAL
Qty: 45 TABLET | Refills: 0 | Status: SHIPPED | OUTPATIENT
Start: 2023-01-10 | End: 2023-04-06 | Stop reason: SDUPTHER

## 2023-01-10 RX ORDER — MECOBAL/LEVOMEFOLAT CA/B6 PHOS 2-3-35 MG
TABLET ORAL
Qty: 30 TABLET | Refills: 2 | Status: SHIPPED | OUTPATIENT
Start: 2023-01-10 | End: 2023-04-03

## 2023-01-10 RX ORDER — SIROLIMUS 1 MG/1
TABLET, FILM COATED ORAL
Qty: 60 TABLET | Refills: 0 | OUTPATIENT
Start: 2023-01-10

## 2023-01-10 RX ORDER — BUPROPION HYDROCHLORIDE 300 MG/1
TABLET ORAL
Qty: 30 TABLET | Refills: 0 | Status: SHIPPED | OUTPATIENT
Start: 2023-01-10 | End: 2023-04-06 | Stop reason: SDUPTHER

## 2023-01-10 RX ORDER — CHOLECALCIFEROL (VITAMIN D3) 50 MCG
CAPSULE ORAL
Qty: 30 CAPSULE | Refills: 2 | Status: SHIPPED | OUTPATIENT
Start: 2023-01-10 | End: 2023-04-03

## 2023-01-10 RX ORDER — PANTOPRAZOLE SODIUM 40 MG/1
TABLET, DELAYED RELEASE ORAL
Qty: 30 TABLET | Refills: 0 | Status: SHIPPED | OUTPATIENT
Start: 2023-01-10 | End: 2023-02-16 | Stop reason: SDUPTHER

## 2023-01-10 RX ORDER — ATORVASTATIN CALCIUM 80 MG/1
TABLET, FILM COATED ORAL
Qty: 30 TABLET | Refills: 0 | Status: SHIPPED | OUTPATIENT
Start: 2023-01-10 | End: 2023-02-16 | Stop reason: SDUPTHER

## 2023-01-10 NOTE — TELEPHONE ENCOUNTER
Rx Refill Note    Requested Prescriptions     Pending Prescriptions Disp Refills   • L-Methylfolate-B6-B12 3-35-2 MG tablet [Pharmacy Med Name: L-METHYL-B6-B12 TABLET^ 3-35-2 Tablet] 30 tablet 2     Sig: TAKE ONE TABLET BY MOUTH DAILY   • ramipril (ALTACE) 10 MG capsule [Pharmacy Med Name: RAMIPRIL 10 MG CAPS 10 Capsule] 30 capsule 0     Sig: TAKE 1 CAPSULE BY MOUTH EVERY DAY   • sirolimus (RAPAMUNE) 1 MG tablet [Pharmacy Med Name: SIROLIMUS 1 MG TABS 1 Tablet] 60 tablet 0     Sig: TAKE 2 TABLETS BY MOUTH EVERY DAY   • sertraline (ZOLOFT) 100 MG tablet [Pharmacy Med Name: SERTRALINE HCL 100MG TAB ^ 100 Tablet] 45 tablet 0     Sig: TAKE 1&1/2 TABLETS BY MOUTH EVERY DAY   • pantoprazole (PROTONIX) 40 MG EC tablet [Pharmacy Med Name: PANTOPRAZOLE SOD DR 40MG 40 Tablet] 30 tablet 0     Sig: TAKE ONE TABLET BY MOUTH DAILY   • D3 Super Strength 50 MCG (2000 UT) capsule [Pharmacy Med Name: VITAMIN D3 2,000 STGL OTC 50 MCG Capsule] 30 capsule 2     Sig: TAKE 1 CAPSULE BY MOUTH EVERY DAY   • One-Daily Multi-Vitamin tablet tablet [Pharmacy Med Name: ONE DAILY MULTI-VITAMIN TAB Tablet] 30 tablet 2     Sig: TAKE ONE TABLET BY MOUTH DAILY   • buPROPion XL (WELLBUTRIN XL) 300 MG 24 hr tablet [Pharmacy Med Name: BUPROPION HCL ER (XL) 300 M 300 Tablet] 30 tablet 0     Sig: TAKE ONE TABLET BY MOUTH DAILY   • aspirin  MG tablet [Pharmacy Med Name: ASPIRIN  MG TABLET 325 Tablet] 30 tablet 0     Sig: TAKE ONE TABLET BY MOUTH DAILY   • atorvastatin (LIPITOR) 80 MG tablet [Pharmacy Med Name: ATORVASTATIN CALCIUM 80 MG 80 Tablet] 30 tablet 0     Sig: TAKE 1 TABLET BY MOUTH DAILY AT BEDTIME        Last office visit with prescribing clinician: 9/7/2022      Next office visit with prescribing clinician: Visit date not found   Last labs:   Last refill: needs   Pharmacy (be sure to add in Epic). correct

## 2023-01-11 RX ORDER — PANTOPRAZOLE SODIUM 40 MG/1
TABLET, DELAYED RELEASE ORAL
Qty: 28 TABLET | Refills: 11 | OUTPATIENT
Start: 2023-01-11

## 2023-01-11 RX ORDER — SERTRALINE HYDROCHLORIDE 100 MG/1
TABLET, FILM COATED ORAL
Qty: 42 TABLET | Refills: 11 | OUTPATIENT
Start: 2023-01-11

## 2023-01-11 RX ORDER — CHOLECALCIFEROL (VITAMIN D3) 50 MCG
CAPSULE ORAL
Qty: 28 CAPSULE | Refills: 11 | OUTPATIENT
Start: 2023-01-11

## 2023-01-11 RX ORDER — SIROLIMUS 1 MG/1
TABLET, FILM COATED ORAL
Qty: 60 TABLET | Refills: 11 | OUTPATIENT
Start: 2023-01-11

## 2023-01-11 RX ORDER — MULTIVITAMIN
TABLET ORAL
Qty: 28 TABLET | Refills: 11 | OUTPATIENT
Start: 2023-01-11

## 2023-01-11 RX ORDER — ATORVASTATIN CALCIUM 80 MG/1
TABLET, FILM COATED ORAL
Qty: 30 TABLET | Refills: 11 | OUTPATIENT
Start: 2023-01-11

## 2023-01-11 RX ORDER — RAMIPRIL 10 MG/1
CAPSULE ORAL
Qty: 30 CAPSULE | Refills: 11 | OUTPATIENT
Start: 2023-01-11

## 2023-01-11 RX ORDER — BUPROPION HYDROCHLORIDE 300 MG/1
TABLET ORAL
Qty: 28 TABLET | Refills: 11 | OUTPATIENT
Start: 2023-01-11

## 2023-01-11 RX ORDER — MECOBAL/LEVOMEFOLAT CA/B6 PHOS 2-3-35 MG
TABLET ORAL
Qty: 30 TABLET | Refills: 11 | OUTPATIENT
Start: 2023-01-11

## 2023-01-11 RX ORDER — ASPIRIN 325 MG
TABLET, DELAYED RELEASE (ENTERIC COATED) ORAL
Qty: 28 TABLET | Refills: 11 | OUTPATIENT
Start: 2023-01-11

## 2023-01-18 ENCOUNTER — TELEPHONE (OUTPATIENT)
Dept: FAMILY MEDICINE CLINIC | Facility: CLINIC | Age: 68
End: 2023-01-18

## 2023-01-18 NOTE — TELEPHONE ENCOUNTER
Hub staff attempted to follow warm transfer process and was unsuccessful     Caller: Quirino Sheppard    Relationship to patient: Self    Best call back number: [phone]     Patient is needing: PATIENT RETURNED CALL FROM THE OFFICE.

## 2023-01-27 ENCOUNTER — TELEPHONE (OUTPATIENT)
Dept: FAMILY MEDICINE CLINIC | Facility: CLINIC | Age: 68
End: 2023-01-27
Payer: MEDICARE

## 2023-01-27 NOTE — TELEPHONE ENCOUNTER
Chief Complaint:     Pharmacist called requesting clarification on a medication denied called, SIROLIMUS 2 MG.     [facility] ([place]): [phone]

## 2023-01-28 NOTE — TELEPHONE ENCOUNTER
Pharmacy contacted and relayed message below...    We do not prescribe this medicine !!   it should come from his liver transplant doctors in [place] please tell the pharmacy

## 2023-02-16 ENCOUNTER — OFFICE VISIT (OUTPATIENT)
Dept: FAMILY MEDICINE CLINIC | Facility: CLINIC | Age: 68
End: 2023-02-16
Payer: MEDICARE

## 2023-02-16 VITALS
OXYGEN SATURATION: 98 % | SYSTOLIC BLOOD PRESSURE: 90 MMHG | DIASTOLIC BLOOD PRESSURE: 56 MMHG | WEIGHT: 194 LBS | RESPIRATION RATE: 15 BRPM | TEMPERATURE: 97.1 F | HEART RATE: 77 BPM | HEIGHT: 66 IN | BODY MASS INDEX: 31.18 KG/M2

## 2023-02-16 DIAGNOSIS — E53.8 VITAMIN B 12 DEFICIENCY: ICD-10-CM

## 2023-02-16 DIAGNOSIS — E11.65 TYPE 2 DIABETES MELLITUS WITH HYPERGLYCEMIA, WITH LONG-TERM CURRENT USE OF INSULIN: ICD-10-CM

## 2023-02-16 DIAGNOSIS — Z79.4 TYPE 2 DIABETES MELLITUS WITH HYPERGLYCEMIA, WITH LONG-TERM CURRENT USE OF INSULIN: ICD-10-CM

## 2023-02-16 DIAGNOSIS — Z00.00 ENCOUNTER FOR SUBSEQUENT ANNUAL WELLNESS VISIT (AWV) IN MEDICARE PATIENT: Primary | ICD-10-CM

## 2023-02-16 DIAGNOSIS — K21.9 GASTROESOPHAGEAL REFLUX DISEASE, UNSPECIFIED WHETHER ESOPHAGITIS PRESENT: ICD-10-CM

## 2023-02-16 DIAGNOSIS — Z79.899 HIGH RISK MEDICATION USE: ICD-10-CM

## 2023-02-16 DIAGNOSIS — E78.2 MIXED HYPERLIPIDEMIA: ICD-10-CM

## 2023-02-16 DIAGNOSIS — E55.9 VITAMIN D DEFICIENCY: ICD-10-CM

## 2023-02-16 DIAGNOSIS — R60.0 LEG EDEMA, LEFT: ICD-10-CM

## 2023-02-16 LAB
EXPIRATION DATE: NORMAL
HBA1C MFR BLD: 9.5 %
Lab: NORMAL

## 2023-02-16 PROCEDURE — 3046F HEMOGLOBIN A1C LEVEL >9.0%: CPT | Performed by: FAMILY MEDICINE

## 2023-02-16 PROCEDURE — 36415 COLL VENOUS BLD VENIPUNCTURE: CPT | Performed by: FAMILY MEDICINE

## 2023-02-16 PROCEDURE — 99214 OFFICE O/P EST MOD 30 MIN: CPT | Performed by: FAMILY MEDICINE

## 2023-02-16 PROCEDURE — 1170F FXNL STATUS ASSESSED: CPT | Performed by: FAMILY MEDICINE

## 2023-02-16 PROCEDURE — 1159F MED LIST DOCD IN RCRD: CPT | Performed by: FAMILY MEDICINE

## 2023-02-16 PROCEDURE — 83036 HEMOGLOBIN GLYCOSYLATED A1C: CPT | Performed by: FAMILY MEDICINE

## 2023-02-16 PROCEDURE — 1126F AMNT PAIN NOTED NONE PRSNT: CPT | Performed by: FAMILY MEDICINE

## 2023-02-16 PROCEDURE — G0439 PPPS, SUBSEQ VISIT: HCPCS | Performed by: FAMILY MEDICINE

## 2023-02-16 RX ORDER — PROCHLORPERAZINE 25 MG/1
SUPPOSITORY RECTAL
Qty: 3 EACH | Refills: 11 | Status: SHIPPED | OUTPATIENT
Start: 2023-02-16

## 2023-02-16 RX ORDER — PROCHLORPERAZINE 25 MG/1
1 SUPPOSITORY RECTAL ONCE
Qty: 1 EACH | Refills: 0 | Status: SHIPPED | OUTPATIENT
Start: 2023-02-16 | End: 2023-02-16

## 2023-02-16 RX ORDER — PROCHLORPERAZINE 25 MG/1
1 SUPPOSITORY RECTAL
Qty: 1 EACH | Refills: 3 | Status: SHIPPED | OUTPATIENT
Start: 2023-02-16

## 2023-02-16 RX ORDER — PANTOPRAZOLE SODIUM 40 MG/1
40 TABLET, DELAYED RELEASE ORAL DAILY
Qty: 90 TABLET | Refills: 3 | Status: SHIPPED | OUTPATIENT
Start: 2023-02-16

## 2023-02-16 RX ORDER — ATORVASTATIN CALCIUM 80 MG/1
80 TABLET, FILM COATED ORAL
Qty: 90 TABLET | Refills: 1 | Status: SHIPPED | OUTPATIENT
Start: 2023-02-16

## 2023-02-16 NOTE — PROGRESS NOTES
"The ABCs of the Annual Wellness Visit  Initial Medicare Wellness Visit    Subjective     Quirino Sheppard is a 67 y.o. male who presents for an Initial Medicare Wellness Visit.    The following portions of the patient's history were reviewed and   updated as appropriate: allergies, current medications, past family history, past medical history, past social history, past surgical history and problem list.     Compared to one year ago, the patient feels his physical   health is the same.    Compared to one year ago, the patient feels his mental   health is the same.    Recent Hospitalizations:  He was not admitted to the hospital during the last year.       Current Medical Providers:  Patient Care Team:  Andrew Fernandez MD as PCP - General (Family Medicine)    Outpatient Medications Prior to Visit   Medication Sig Dispense Refill   • aspirin  MG tablet TAKE ONE TABLET BY MOUTH DAILY 30 tablet 0   • BD Insulin Syringe U/F 31G X 5/16\" 1 ML misc 1 each See Admin Instructions.     • brimonidine-timolol (COMBIGAN) 0.2-0.5 % ophthalmic solution Administer 1 drop to both eyes Every 12 (Twelve) Hours.     • buPROPion XL (WELLBUTRIN XL) 300 MG 24 hr tablet TAKE ONE TABLET BY MOUTH DAILY 30 tablet 0   • D3 Super Strength 50 MCG (2000 UT) capsule TAKE 1 CAPSULE BY MOUTH EVERY DAY 30 capsule 2   • insulin aspart (NovoLOG FlexPen) 100 UNIT/ML solution pen-injector sc pen INJECT BY SUBCUTANEOUS ROUTE AS PER INSULIN SLIDING SCALE PROTOCOL ( 25-50 UNITS) BEFORE MEALS 15 pen 1   • insulin NPH (NovoLIN N) 100 UNIT/ML injection Inject 40-60 Units under the skin into the appropriate area as directed Every 12 (Twelve) Hours. 3 each 11   • L-Methylfolate-B6-B12 3-35-2 MG tablet TAKE ONE TABLET BY MOUTH DAILY 30 tablet 2   • metoprolol tartrate (LOPRESSOR) 25 MG tablet TAKE 1 TABLET BY MOUTH TWICE A  tablet 0   • mupirocin (Bactroban) 2 % cream Apply 1 application topically to the appropriate area as directed 3 (Three) Times a " Day. 60 each 0   • One-Daily Multi-Vitamin tablet tablet TAKE ONE TABLET BY MOUTH DAILY 30 tablet 2   • OneTouch Verio test strip TEST 3 TIMES A  each 2   • ramipril (ALTACE) 10 MG capsule TAKE 1 CAPSULE BY MOUTH EVERY DAY 30 capsule 0   • Saw Palmetto 160 MG capsule Take 2 capsules po twice a day 120 each 0   • sertraline (ZOLOFT) 100 MG tablet TAKE 1&1/2 TABLETS BY MOUTH EVERY DAY 45 tablet 0   • sirolimus (RAPAMUNE) 2 MG tablet Take 2 mg by mouth Daily.     • vitamin E 1000 UNIT capsule Take 1,000 Units by mouth Daily.     • atorvastatin (LIPITOR) 80 MG tablet TAKE 1 TABLET BY MOUTH DAILY AT BEDTIME 30 tablet 0   • cephalexin (Keflex) 500 MG capsule Take 1 capsule by mouth 3 (Three) Times a Day. 30 capsule 1   • Cholecalciferol 50 MCG (2000 UT) tablet Take 1 tablet by mouth Daily. 30 each 5   • doxycycline (VIBRAMYCIN) 100 MG capsule Take 1 capsule by mouth 2 (Two) Times a Day. 28 capsule 0   • furosemide (LASIX) 20 MG tablet 1 p.o. daily for 2 to 3 days/week as needed for swelling 30 tablet 0   • L-Methylfolate-B6-B12 (PoDiaPN) capsule Take 1 capsule by mouth Daily.     • Lidocaine Viscous HCl (XYLOCAINE) 2 % solution 5 mL mixed with 5 mL of Mylanta-- swish and spit every 4 hours as needed 100 mL 0   • Multiple Vitamins-Minerals (MULTIVITAMIN ADULTS) tablet Take 1 tablet by mouth Daily.     • pantoprazole (PROTONIX) 40 MG EC tablet TAKE ONE TABLET BY MOUTH DAILY 30 tablet 0   • potassium chloride 10 MEQ CR tablet TAKE 1 TABLET DAILY WHEN TAKING FUROSEMIDE 30 tablet 0     No facility-administered medications prior to visit.       No opioid medication identified on active medication list. I have reviewed chart for other potential  high risk medication/s and harmful drug interactions in the elderly.          Aspirin is on active medication list. Aspirin use is indicated based on review of current medical condition/s. Pros and cons of this therapy have been discussed today. Benefits of this medication  "outweigh potential harm.  Patient has been encouraged to continue taking this medication.  .      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  ACP discussion was held with the patient during this visit. Patient has an advance directive (not in EMR), copy requested. pt daughter would be his healthcare surrogate Kassie Xie        Objective    Vitals:    02/16/23 1509   BP: 90/56   BP Location: Left arm   Patient Position: Sitting   Cuff Size: Adult   Pulse: 77   Resp: 15   Temp: 97.1 °F (36.2 °C)   TempSrc: Infrared   SpO2: 98%   Weight: 88 kg (194 lb)   Height: 167.6 cm (66\")   PainSc: 0-No pain     Estimated body mass index is 31.31 kg/m² as calculated from the following:    Height as of this encounter: 167.6 cm (66\").    Weight as of this encounter: 88 kg (194 lb).    BMI is >= 30 and <35. (Class 1 Obesity). The following options were offered after discussion;: exercise counseling/recommendations and nutrition counseling/recommendations      Does the patient have evidence of cognitive impairment?   No    Lab Results   Component Value Date    HGBA1C 9.5 02/16/2023        HEALTH RISK ASSESSMENT    Smoking Status:  Social " History     Tobacco Use   Smoking Status Never   Smokeless Tobacco Never     Alcohol Consumption:  Social History     Substance and Sexual Activity   Alcohol Use No    Comment: PT QUIT DRINKING IN 1994     Fall Risk Screen:    MARTINEZADI Fall Risk Assessment was completed, and patient is at HIGH risk for falls. Assessment completed on:2/16/2023    Depression Screen:   PHQ-2/PHQ-9 Depression Screening 2/16/2023   Little Interest or Pleasure in Doing Things 0-->not at all   Feeling Down, Depressed or Hopeless 0-->not at all   PHQ-9: Brief Depression Severity Measure Score 0       Health Habits and Functional and Cognitive Screening:  Functional & Cognitive Status 2/16/2023   Do you have difficulty preparing food and eating? No   Do you have difficulty bathing yourself, getting dressed or grooming yourself? No   Do you have difficulty using the toilet? No   Do you have difficulty moving around from place to place? No   Do you have trouble with steps or getting out of a bed or a chair? No   Current Diet Well Balanced Diet   Dental Exam Not up to date   Eye Exam Not up to date   Exercise (times per week) 0 times per week   Current Exercises Include No Regular Exercise   Do you need help using the phone?  No   Are you deaf or do you have serious difficulty hearing?  No   Do you need help with transportation? No   Do you need help shopping? No   Do you need help preparing meals?  No   Do you need help with housework?  No   Do you need help with laundry? No   Do you need help taking your medications? No   Do you need help managing money? No   Do you ever drive or ride in a car without wearing a seat belt? No   Have you felt unusual stress, anger or loneliness in the last month? No   Who do you live with? Alone   If you need help, do you have trouble finding someone available to you? No   Do you have difficulty concentrating, remembering or making decisions? No       Age-appropriate Screening Schedule:  Refer to the list below  for future screening recommendations based on patient's age, sex and/or medical conditions. Orders for these recommended tests are listed in the plan section. The patient has been provided with a written plan.    Health Maintenance   Topic Date Due   • ZOSTER VACCINE (1 of 2) Never done   • DIABETIC FOOT EXAM  Never done   • LIPID PANEL  11/20/2018   • DIABETIC EYE EXAM  03/26/2019   • URINE MICROALBUMIN  06/13/2023   • HEMOGLOBIN A1C  08/16/2023   • TDAP/TD VACCINES (2 - Td or Tdap) 11/20/2025   • INFLUENZA VACCINE  Completed          CMS Preventative Services Quick Reference  Risk Factors Identified During Encounter    Inactivity/Sedentary: Patient was advised to exercise at least 150 minutes a week per CDC recommendations.    The above risks/problems have been discussed with the patient.  Pertinent information has been shared with the patient in the After Visit Summary.  An After Visit Summary and PPPS were made available to the patient.  Diagnoses and all orders for this visit:    1. Encounter for subsequent annual wellness visit (AWV) in Medicare patient (Primary)    2. Type 2 diabetes mellitus with hyperglycemia, with long-term current use of insulin ([facility])  -     POC Glycosylated Hemoglobin (Hb A1C)  -     CBC Auto Differential  -     Comprehensive Metabolic Panel  -     Hemoglobin A1c  -     TSH  -     Continuous Blood Gluc Transmit (Dexcom G6 Transmitter) misc; 1 each Every 3 (Three) Months.  Dispense: 1 each; Refill: 3  -     Continuous Blood Gluc Sensor (Dexcom G6 Sensor); Every 10 (Ten) Days.  Dispense: 3 each; Refill: 11  -     Continuous Blood Gluc  (Dexcom G6 ) device; 1 each 1 (One) Time for 1 dose.  Dispense: 1 each; Refill: 0    3. Mixed hyperlipidemia  -     atorvastatin (LIPITOR) 80 MG tablet; Take 1 tablet by mouth every night at bedtime.  Dispense: 90 tablet; Refill: 1  -     Lipid Panel    4. High risk medication use  -     atorvastatin (LIPITOR) 80 MG tablet; Take 1 tablet by  mouth every night at bedtime.  Dispense: 90 tablet; Refill: 1  -     CBC Auto Differential  -     Comprehensive Metabolic Panel  -     CK    5. Leg edema, left    6. Gastroesophageal reflux disease, unspecified whether esophagitis present  -     pantoprazole (PROTONIX) 40 MG EC tablet; Take 1 tablet by mouth Daily.  Dispense: 90 tablet; Refill: 3    7. Vitamin D deficiency  -     Vitamin D,25-Hydroxy; Future  -     Vitamin D,25-Hydroxy    8. Vitamin B 12 deficiency  -     Vitamin B12; Future  -     Vitamin B12      Follow Up:  Next Medicare Wellness visit to be scheduled in 1 year.        Additional E&M Note during same encounter follows:  Patient has multiple medical problems which are significant and separately identifiable that require additional work above and beyond the Medicare Wellness Visit.      Chief Complaint  Medicare Wellness-Initial Visit    Subjective        HPI  Quirino Sheppard is also being seen today for follow-up on his diabetes and chronic medical problems.  He is somewhat been lost to follow-up we have not had blood work on him in quite some time he also says he is not followed up with his liver transplant doctors in [place] as directed either.  He admits that he is not taking his insulin as directed and misses doses frequently says he has had sugars as high as 600.  He knows that he has not been taking care of himself lately.    He relates that he is taking insulin NPH about 50 units twice a day and the no NovoLog sliding scale as directed per sliding scale schedule C ---he said he took about 9 units before his lunch today prior to coming to the office here.  He would like a continuous glucose monitor.    He is on aspirin as he had a CABG three-vessel in the past.  In addition to his liver transplant years ago.         Objective   Vital Signs:  BP 90/56 (BP Location: Left arm, Patient Position: Sitting, Cuff Size: Adult)   Pulse 77   Temp 97.1 °F (36.2 °C) (Infrared)   Resp 15   Ht  "167.6 cm (66\")   Wt 88 kg (194 lb)   SpO2 98%   BMI 31.31 kg/m²     Physical Exam  Vitals and nursing note reviewed.   Constitutional:       Appearance: Normal appearance. He is obese.   HENT:      Head: Normocephalic and atraumatic.   Cardiovascular:      Rate and Rhythm: Normal rate and regular rhythm.   Pulmonary:      Effort: Pulmonary effort is normal.      Breath sounds: Normal breath sounds.   Abdominal:      Palpations: Abdomen is soft.      Tenderness: There is no abdominal tenderness.   Musculoskeletal:         General: Normal range of motion.      Cervical back: Normal range of motion and neck supple.      Right lower leg: No edema.      Left lower leg: No edema.   Skin:     General: Skin is warm and dry.      Comments: He has a few minor breast on the abdomen but is actually looking better than it has in the past.  Try not to pick on it   Neurological:      General: No focal deficit present.      Mental Status: He is alert.                         Assessment and Plan   Diagnoses and all orders for this visit:    1. Encounter for subsequent annual wellness visit (AWV) in Medicare patient (Primary)    2. Type 2 diabetes mellitus with hyperglycemia, with long-term current use of insulin ([facility])  -     POC Glycosylated Hemoglobin (Hb A1C)  -     CBC Auto Differential  -     Comprehensive Metabolic Panel  -     Hemoglobin A1c  -     TSH  -     Continuous Blood Gluc Transmit (Dexcom G6 Transmitter) misc; 1 each Every 3 (Three) Months.  Dispense: 1 each; Refill: 3  -     Continuous Blood Gluc Sensor (Dexcom G6 Sensor); Every 10 (Ten) Days.  Dispense: 3 each; Refill: 11  -     Continuous Blood Gluc  (Dexcom G6 ) device; 1 each 1 (One) Time for 1 dose.  Dispense: 1 each; Refill: 0    3. Mixed hyperlipidemia  -     atorvastatin (LIPITOR) 80 MG tablet; Take 1 tablet by mouth every night at bedtime.  Dispense: 90 tablet; Refill: 1  -     Lipid Panel    4. High risk medication use  -     atorvastatin " (LIPITOR) 80 MG tablet; Take 1 tablet by mouth every night at bedtime.  Dispense: 90 tablet; Refill: 1  -     CBC Auto Differential  -     Comprehensive Metabolic Panel  -     CK    5. Leg edema, left    6. Gastroesophageal reflux disease, unspecified whether esophagitis present  -     pantoprazole (PROTONIX) 40 MG EC tablet; Take 1 tablet by mouth Daily.  Dispense: 90 tablet; Refill: 3    7. Vitamin D deficiency  -     Vitamin D,25-Hydroxy; Future  -     Vitamin D,25-Hydroxy    8. Vitamin B 12 deficiency  -     Vitamin B12; Future  -     Vitamin B12    Annual wellness completed    A1c is elevated 9.5 here in the office told him he needs to work on his insulin take it regularly schedule see and regarding his NPH I would like him to adjust the dose based on his fasting sugars every third day if the fasting sugars above 150 he will increase his dose by 3 units for example if he is on 50 units now in the morning and 50 units in the evening he would increase to 53 units in the morning and 50 units in the evening and then recheck sugar in 3 days in the morning fasting and if sugars less than 70 he may decrease his dose by 3 units otherwise if it is above 150 increase the dose by 3 units as directed and gave him written instructions in this regard    He knows to use the NovoLog for his Premeal insulin sliding scale schedule C and follow-up in 2 months sooner if worse make sure to follow-up on blood work we did today in the office.    Continue other medicines as directed    I encouraged him to keep his follow-up appointments with his transplant surgeons liver doctors in [place] as directed.  He needs to watch his carb intake and avoid concentrated sweets.Dexcom meter ordered for continuous glucose monitoring and hopefully this will encourage him to take better control of his sugars.           Follow Up   Return in about 2 months (around 4/16/2023) for Recheck.  Patient was given instructions and counseling regarding his  condition or for health maintenance advice. Please see specific information pulled into the AVS if appropriate.

## 2023-02-17 ENCOUNTER — TELEPHONE (OUTPATIENT)
Dept: FAMILY MEDICINE CLINIC | Facility: CLINIC | Age: 68
End: 2023-02-17
Payer: MEDICARE

## 2023-02-17 DIAGNOSIS — E11.65 TYPE 2 DIABETES MELLITUS WITH HYPERGLYCEMIA, WITHOUT LONG-TERM CURRENT USE OF INSULIN: ICD-10-CM

## 2023-02-17 DIAGNOSIS — E11.65 TYPE 2 DIABETES MELLITUS WITH HYPERGLYCEMIA, WITH LONG-TERM CURRENT USE OF INSULIN: ICD-10-CM

## 2023-02-17 DIAGNOSIS — Z79.4 TYPE 2 DIABETES MELLITUS WITH HYPERGLYCEMIA, WITH LONG-TERM CURRENT USE OF INSULIN: ICD-10-CM

## 2023-02-17 LAB
25(OH)D3+25(OH)D2 SERPL-MCNC: 21.7 NG/ML (ref 30–100)
ALBUMIN SERPL-MCNC: 3.7 G/DL (ref 3.8–4.8)
ALBUMIN/GLOB SERPL: 1.3 {RATIO} (ref 1.2–2.2)
ALP SERPL-CCNC: 113 IU/L (ref 44–121)
ALT SERPL-CCNC: 21 IU/L (ref 0–44)
AST SERPL-CCNC: 14 IU/L (ref 0–40)
BASOPHILS # BLD AUTO: 0 X10E3/UL (ref 0–0.2)
BASOPHILS NFR BLD AUTO: 0 %
BILIRUB SERPL-MCNC: 0.2 MG/DL (ref 0–1.2)
BUN SERPL-MCNC: 21 MG/DL (ref 8–27)
BUN/CREAT SERPL: 18 (ref 10–24)
CALCIUM SERPL-MCNC: 8.9 MG/DL (ref 8.6–10.2)
CHLORIDE SERPL-SCNC: 104 MMOL/L (ref 96–106)
CHOLEST SERPL-MCNC: 191 MG/DL (ref 100–199)
CK SERPL-CCNC: 113 U/L (ref 41–331)
CO2 SERPL-SCNC: 24 MMOL/L (ref 20–29)
CREAT SERPL-MCNC: 1.2 MG/DL (ref 0.76–1.27)
EGFRCR SERPLBLD CKD-EPI 2021: 66 ML/MIN/1.73
EOSINOPHIL # BLD AUTO: 0.2 X10E3/UL (ref 0–0.4)
EOSINOPHIL NFR BLD AUTO: 2 %
ERYTHROCYTE [DISTWIDTH] IN BLOOD BY AUTOMATED COUNT: 15.2 % (ref 11.6–15.4)
GLOBULIN SER CALC-MCNC: 2.8 G/DL (ref 1.5–4.5)
GLUCOSE SERPL-MCNC: 273 MG/DL (ref 70–99)
HBA1C MFR BLD: 9.8 % (ref 4.8–5.6)
HCT VFR BLD AUTO: 39 % (ref 37.5–51)
HDLC SERPL-MCNC: 42 MG/DL
HGB BLD-MCNC: 13 G/DL (ref 13–17.7)
IMM GRANULOCYTES # BLD AUTO: 0 X10E3/UL (ref 0–0.1)
IMM GRANULOCYTES NFR BLD AUTO: 0 %
LDLC SERPL CALC-MCNC: 91 MG/DL (ref 0–99)
LYMPHOCYTES # BLD AUTO: 1.6 X10E3/UL (ref 0.7–3.1)
LYMPHOCYTES NFR BLD AUTO: 21 %
MCH RBC QN AUTO: 28.8 PG (ref 26.6–33)
MCHC RBC AUTO-ENTMCNC: 33.3 G/DL (ref 31.5–35.7)
MCV RBC AUTO: 87 FL (ref 79–97)
MONOCYTES # BLD AUTO: 0.6 X10E3/UL (ref 0.1–0.9)
MONOCYTES NFR BLD AUTO: 8 %
NEUTROPHILS # BLD AUTO: 5.1 X10E3/UL (ref 1.4–7)
NEUTROPHILS NFR BLD AUTO: 69 %
PLATELET # BLD AUTO: 186 X10E3/UL (ref 150–450)
POTASSIUM SERPL-SCNC: 4.7 MMOL/L (ref 3.5–5.2)
PROT SERPL-MCNC: 6.5 G/DL (ref 6–8.5)
RBC # BLD AUTO: 4.51 X10E6/UL (ref 4.14–5.8)
SODIUM SERPL-SCNC: 140 MMOL/L (ref 134–144)
TRIGL SERPL-MCNC: 350 MG/DL (ref 0–149)
TSH SERPL DL<=0.005 MIU/L-ACNC: 1.24 UIU/ML (ref 0.45–4.5)
VIT B12 SERPL-MCNC: 1706 PG/ML (ref 232–1245)
VLDLC SERPL CALC-MCNC: 58 MG/DL (ref 5–40)
WBC # BLD AUTO: 7.4 X10E3/UL (ref 3.4–10.8)

## 2023-02-17 RX ORDER — HUMAN INSULIN 100 [IU]/ML
40-60 INJECTION, SUSPENSION SUBCUTANEOUS EVERY 12 HOURS SCHEDULED
Qty: 3 EACH | Refills: 11 | Status: SHIPPED | OUTPATIENT
Start: 2023-02-17

## 2023-02-17 RX ORDER — INSULIN ASPART 100 [IU]/ML
INJECTION, SOLUTION INTRAVENOUS; SUBCUTANEOUS
Qty: 15 ML | Refills: 1 | Status: SHIPPED | OUTPATIENT
Start: 2023-02-17

## 2023-02-17 NOTE — TELEPHONE ENCOUNTER
Pharmacy Name: [facility] [address] [place] [address] - [phone] [facility] [phone]      Pharmacy representative name: RACHEL    Pharmacy representative phone number: [phone]    What medication are you calling in regards to:    insulin aspart (NovoLOG FlexPen) 100 UNIT/ML solution pen-injector sc pen      insulin NPH (NovoLIN N) 100 UNIT/ML injection       What question does the pharmacy have: PHARMACY CALLED TO FOLLOW UP ON REFILL REQUESTS FROM YESTERDAY.      [facility] PATIENT IS OUT OF HIS NOVALOG AND NOVOLIN    Who is the provider that prescribed the medication: CIPRIANO    Additional notes: NA

## 2023-03-11 ENCOUNTER — TELEPHONE (OUTPATIENT)
Dept: FAMILY MEDICINE CLINIC | Facility: CLINIC | Age: 68
End: 2023-03-11

## 2023-03-11 NOTE — TELEPHONE ENCOUNTER
Came in this morning to office needing refills of lancets and one touch strips sent to [facility]

## 2023-03-12 RX ORDER — LANCETS 33 GAUGE
1 EACH MISCELLANEOUS 4 TIMES DAILY
Qty: 100 EACH | Refills: 12 | Status: SHIPPED | OUTPATIENT
Start: 2023-03-12

## 2023-03-13 RX ORDER — BLOOD SUGAR DIAGNOSTIC
STRIP MISCELLANEOUS
Qty: 200 EACH | Refills: 2 | Status: SHIPPED | OUTPATIENT
Start: 2023-03-13

## 2023-03-13 NOTE — TELEPHONE ENCOUNTER
Rx Refill Note    Requested Prescriptions     Pending Prescriptions Disp Refills   • OneTouch Verio test strip [Pharmacy Med Name: ONE TOUCH VERIO TEST STRIP Strip] 200 each 2     Sig: USE TO TEST THREE TIMES A DAY        Last office visit with prescribing clinician: 2/16/2023      Next office visit with prescribing clinician: 3/11/2023   Last labs:   Last refill: needs   Pharmacy (be sure to add in Epic). correct

## 2023-04-03 RX ORDER — CHOLECALCIFEROL (VITAMIN D3) 50 MCG
CAPSULE ORAL
Qty: 30 CAPSULE | Refills: 1 | Status: SHIPPED | OUTPATIENT
Start: 2023-04-03

## 2023-04-03 RX ORDER — MECOBAL/LEVOMEFOLAT CA/B6 PHOS 2-3-35 MG
TABLET ORAL
Qty: 30 TABLET | Refills: 1 | Status: SHIPPED | OUTPATIENT
Start: 2023-04-03 | End: 2023-04-13 | Stop reason: SDUPTHER

## 2023-04-03 NOTE — TELEPHONE ENCOUNTER
Rx Refill Note    Requested Prescriptions     Pending Prescriptions Disp Refills   • L-Methylfolate-B6-B12 3-35-2 MG tablet [Pharmacy Med Name: L-METHYL-B6-B12 TABLET^ 3-35-2 Tablet] 30 tablet 1     Sig: TAKE ONE TABLET BY MOUTH DAILY   • D3 Super Strength 50 MCG (2000 UT) capsule [Pharmacy Med Name: VITAMIN D3 2,000 STGL OTC 50 MCG Capsule] 30 capsule 1     Sig: TAKE 1 CAPSULE BY MOUTH EVERY DAY        Last office visit with prescribing clinician: 2/16/2023      Next office visit with prescribing clinician: 4/13/2023   Last labs:   Last refill: 01/10/2023   Pharmacy (be sure to add in Epic). correct

## 2023-04-06 ENCOUNTER — TELEPHONE (OUTPATIENT)
Dept: FAMILY MEDICINE CLINIC | Facility: CLINIC | Age: 68
End: 2023-04-06
Payer: MEDICARE

## 2023-04-06 DIAGNOSIS — I10 ESSENTIAL HYPERTENSION: ICD-10-CM

## 2023-04-06 DIAGNOSIS — F32.5 MAJOR DEPRESSIVE DISORDER WITH SINGLE EPISODE, IN FULL REMISSION: ICD-10-CM

## 2023-04-06 DIAGNOSIS — I25.119 CORONARY ARTERY DISEASE INVOLVING NATIVE CORONARY ARTERY OF NATIVE HEART WITH ANGINA PECTORIS: ICD-10-CM

## 2023-04-06 RX ORDER — RAMIPRIL 10 MG/1
10 CAPSULE ORAL DAILY
Qty: 30 CAPSULE | Refills: 0 | Status: SHIPPED | OUTPATIENT
Start: 2023-04-06 | End: 2023-04-13 | Stop reason: SDUPTHER

## 2023-04-06 RX ORDER — SERTRALINE HYDROCHLORIDE 100 MG/1
150 TABLET, FILM COATED ORAL DAILY
Qty: 45 TABLET | Refills: 0 | Status: SHIPPED | OUTPATIENT
Start: 2023-04-06 | End: 2023-04-13 | Stop reason: SDUPTHER

## 2023-04-06 RX ORDER — ASPIRIN 325 MG
325 TABLET, DELAYED RELEASE (ENTERIC COATED) ORAL DAILY
Qty: 30 TABLET | Refills: 0 | Status: SHIPPED | OUTPATIENT
Start: 2023-04-06 | End: 2023-04-13 | Stop reason: SDUPTHER

## 2023-04-06 RX ORDER — BUPROPION HYDROCHLORIDE 300 MG/1
300 TABLET ORAL DAILY
Qty: 30 TABLET | Refills: 0 | Status: SHIPPED | OUTPATIENT
Start: 2023-04-06 | End: 2023-04-13 | Stop reason: SDUPTHER

## 2023-04-06 RX ORDER — DIPHENOXYLATE HYDROCHLORIDE AND ATROPINE SULFATE 2.5; .025 MG/1; MG/1
1 TABLET ORAL DAILY
Qty: 30 TABLET | Refills: 0 | Status: SHIPPED | OUTPATIENT
Start: 2023-04-06 | End: 2023-04-13 | Stop reason: SDUPTHER

## 2023-04-06 NOTE — TELEPHONE ENCOUNTER
Incoming Refill Request      Medication requested (name and dose):     ASPIRIN  MG TABLET    BUPROPION XL (WELLBUTRIN XL) 300 MG 24 HR TABLET     METOPROLOL TARTRATE (LOPRESSOR) 25 MG TABLET     ONE-DAILY MULTI-VITAMIN TABLET     RAMIPRIL (ALTACE) 10 MG CAPSULE    SERTRALINE (ZOLOFT) 100 MG TABLET     Pharmacy where request should be sent:     [facility]       Additional details provided by patient: PHARMACY STATED THEY HAVE CALLED SEVERAL TIMES FOR REILLS    Best call back number:     [phone]    Does the patient have less than a 3 day supply:  [] Yes  [x] No    Janis Samuel Rep  04/06/23, 11:12 EDT        {Tip  DIRECTIONS Send the encounter HIGH priority, If patient has less than a 3 day supply. If the patient will run out of medication over the weekend add that information to the additional details line. Send this encounter to the clinical pool:[id number]

## 2023-04-13 ENCOUNTER — OFFICE VISIT (OUTPATIENT)
Dept: FAMILY MEDICINE CLINIC | Facility: CLINIC | Age: 68
End: 2023-04-13
Payer: MEDICARE

## 2023-04-13 VITALS
HEIGHT: 66 IN | SYSTOLIC BLOOD PRESSURE: 134 MMHG | TEMPERATURE: 97.1 F | HEART RATE: 71 BPM | BODY MASS INDEX: 32.78 KG/M2 | WEIGHT: 204 LBS | RESPIRATION RATE: 15 BRPM | OXYGEN SATURATION: 95 % | DIASTOLIC BLOOD PRESSURE: 80 MMHG

## 2023-04-13 DIAGNOSIS — I25.119 CORONARY ARTERY DISEASE INVOLVING NATIVE CORONARY ARTERY OF NATIVE HEART WITH ANGINA PECTORIS: ICD-10-CM

## 2023-04-13 DIAGNOSIS — E11.65 TYPE 2 DIABETES MELLITUS WITH HYPERGLYCEMIA, WITH LONG-TERM CURRENT USE OF INSULIN: Primary | ICD-10-CM

## 2023-04-13 DIAGNOSIS — I10 ESSENTIAL HYPERTENSION: ICD-10-CM

## 2023-04-13 DIAGNOSIS — Z94.4 HX OF LIVER TRANSPLANT: ICD-10-CM

## 2023-04-13 DIAGNOSIS — R35.1 BPH ASSOCIATED WITH NOCTURIA: ICD-10-CM

## 2023-04-13 DIAGNOSIS — Z79.4 TYPE 2 DIABETES MELLITUS WITH HYPERGLYCEMIA, WITH LONG-TERM CURRENT USE OF INSULIN: Primary | ICD-10-CM

## 2023-04-13 DIAGNOSIS — E55.9 VITAMIN D DEFICIENCY: ICD-10-CM

## 2023-04-13 DIAGNOSIS — F32.5 MAJOR DEPRESSIVE DISORDER WITH SINGLE EPISODE, IN FULL REMISSION: ICD-10-CM

## 2023-04-13 DIAGNOSIS — N40.1 BPH ASSOCIATED WITH NOCTURIA: ICD-10-CM

## 2023-04-13 DIAGNOSIS — E53.8 VITAMIN B 12 DEFICIENCY: ICD-10-CM

## 2023-04-13 LAB
EXPIRATION DATE: NORMAL
HBA1C MFR BLD: 8.7 %
Lab: NORMAL

## 2023-04-13 RX ORDER — CHOLECALCIFEROL (VITAMIN D3) 50 MCG
2000 CAPSULE ORAL DAILY
Qty: 90 CAPSULE | Refills: 3 | Status: CANCELLED | OUTPATIENT
Start: 2023-04-13

## 2023-04-13 RX ORDER — RAMIPRIL 10 MG/1
10 CAPSULE ORAL DAILY
Qty: 90 CAPSULE | Refills: 1 | Status: SHIPPED | OUTPATIENT
Start: 2023-04-13

## 2023-04-13 RX ORDER — ASPIRIN 325 MG
325 TABLET, DELAYED RELEASE (ENTERIC COATED) ORAL DAILY
Qty: 90 TABLET | Refills: 3 | Status: SHIPPED | OUTPATIENT
Start: 2023-04-13

## 2023-04-13 RX ORDER — DIPHENOXYLATE HYDROCHLORIDE AND ATROPINE SULFATE 2.5; .025 MG/1; MG/1
1 TABLET ORAL DAILY
Qty: 90 TABLET | Refills: 3 | Status: SHIPPED | OUTPATIENT
Start: 2023-04-13

## 2023-04-13 RX ORDER — BUPROPION HYDROCHLORIDE 300 MG/1
300 TABLET ORAL DAILY
Qty: 90 TABLET | Refills: 1 | Status: SHIPPED | OUTPATIENT
Start: 2023-04-13

## 2023-04-13 RX ORDER — MECOBAL/LEVOMEFOLAT CA/B6 PHOS 2-3-35 MG
1 TABLET ORAL DAILY
Qty: 90 TABLET | Refills: 1 | Status: SHIPPED | OUTPATIENT
Start: 2023-04-13

## 2023-04-13 RX ORDER — SERTRALINE HYDROCHLORIDE 100 MG/1
150 TABLET, FILM COATED ORAL DAILY
Qty: 135 TABLET | Refills: 1 | Status: SHIPPED | OUTPATIENT
Start: 2023-04-13

## 2023-04-13 RX ORDER — TAMSULOSIN HYDROCHLORIDE 0.4 MG/1
1 CAPSULE ORAL DAILY
Qty: 90 CAPSULE | Refills: 1 | Status: SHIPPED | OUTPATIENT
Start: 2023-04-13

## 2023-04-13 NOTE — PROGRESS NOTES
Follow Up Office Visit      Patient Name: Quirino Sheppard  : 1955   MRN: [id number]     Chief Complaint:    Chief Complaint   Patient presents with   • Follow-up       History of Present Illness: Quirino Sheppard is a 67 y.o. male who is here today to   follow-up on his diabetes and chronic medical problems and to go over blood work.  Admits that he does not always eat the best and says he is currently on NPH 40 to 50 units twice a day the timing on that can vary depending on how late he sleeps in the morning etc.  He relates his sugars are mostly in the 200s occasionally in the 300s.  Uses his NovoLog pen before meals as per sliding scale schedule C.  He wonders about a continuous glucose monitor.    He does have history of liver transplant we will follow-up in [place] with those transplant physician said he did have quite a large liver transplanted and feels that may be why his stomach bloated a little bit.    He is still going to wound care for his abdomen where he scratched a lot and says this is getting better.    He is also had a little bit of swelling in his feet or ankles and says occasionally has troubles with his balance walking his just intermittent cannot predict when it may happen.    Labs reviewed with him.  Sugar is still high as is his A1c A1c here in the office was 8.7 which is better but still has plenty room to improve.  His vitamin D level was low and his B12 level was high he does take medicines methylated B vitamins that are prepackaged from the pharmacy.    He also relates he has nocturia 3-4 5 times a day occasionally has to wear diapers due to overflow incontinence urge incontinence.  He says he is taking over-the-counter medicines for his prostate not sure what the name is.  Does not want to see urologist at this point    Review of Systems   Constitutional: Negative for fatigue and fever.   Respiratory: Negative for cough and shortness of breath.    Cardiovascular: Negative  for chest pain and palpitations.   Skin: Negative for rash or itching      Subjective      Review of Systems:   Review of Systems    Past Medical History:   Past Medical History:   Diagnosis Date   • Anxiety and depression    • BPH associated with nocturia    • Chronic kidney disease, stage 2 (mild)    • Coronary arteriosclerosis in native artery     3V   • Degeneration of lumbar intervertebral disc    • Diabetes mellitus    • Disorder of sulfur-bearing amino acid metabolism    • Gastroesophageal reflux disease without esophagitis    • Glaucoma    • Hepatitis C    • Heterozygous MTHFR mutation [id number]    • Heterozygous MTHFR mutation [id number]    • High risk medication use    • Hyperlipidemia    • Hypertension    • Liver transplanted    • Low back pain    • Obstructive sleep apnea syndrome    • Recurrent major depression in full remission    • Severe obesity    • Type 2 diabetes mellitus        Past Surgical History:   Past Surgical History:   Procedure Laterality Date   • CORONARY ARTERY BYPASS GRAFT N/A 11/22/2017    Procedure: MEDIAN STERNOTOMY, CORONARY ARTERY BYPASS GRAFT X 3, UTILIZING THE LEFT INTERNAL MAMMARY ARTERY AND EVH USING THE LEFT GREATER SAPHENOUS VEIN;  Surgeon: Naga Guaman MD;  Location: [facility];  Service:    • LIVER TRANSPLANTATION  2001       Family History:   Family History   Problem Relation Age of Onset   • Diabetes Paternal Grandmother    • Breast cancer Mother    • Heart attack Father    • Heart valve disorder Father        Social History:   Social History     Socioeconomic History   • Marital status:    • Number of children: 3   Tobacco Use   • Smoking status: Never   • Smokeless tobacco: Never   Vaping Use   • Vaping Use: Never used   Substance and Sexual Activity   • Alcohol use: No     Comment: PT QUIT DRINKING IN 1994   • Drug use: Never   • Sexual activity: Not Currently       Medications:     Current Outpatient Medications:   •  aspirin  MG tablet, Take 1 tablet by  "mouth Daily., Disp: 90 tablet, Rfl: 3  •  buPROPion XL (WELLBUTRIN XL) 300 MG 24 hr tablet, Take 1 tablet by mouth Daily., Disp: 90 tablet, Rfl: 1  •  L-Methylfolate-B6-B12 3-35-2 MG tablet, Take 1 tablet by mouth Daily. (Hold dose for  2 weeks), Disp: 90 tablet, Rfl: 1  •  metoprolol tartrate (LOPRESSOR) 25 MG tablet, Take 1 tablet by mouth 2 (Two) Times a Day., Disp: 180 tablet, Rfl: 1  •  multivitamin (One-Daily Multi-Vitamin) tablet tablet, Take 1 tablet by mouth Daily., Disp: 90 tablet, Rfl: 3  •  OneTouch Verio test strip, USE TO TEST THREE TIMES A DAY, Disp: 200 each, Rfl: 2  •  ramipril (ALTACE) 10 MG capsule, Take 1 capsule by mouth Daily., Disp: 90 capsule, Rfl: 1  •  sertraline (ZOLOFT) 100 MG tablet, Take 1.5 tablets by mouth Daily., Disp: 135 tablet, Rfl: 1  •  atorvastatin (LIPITOR) 80 MG tablet, Take 1 tablet by mouth every night at bedtime., Disp: 90 tablet, Rfl: 1  •  BD Insulin Syringe U/F 31G X 5/16\" 1 ML misc, 1 each See Admin Instructions., Disp: , Rfl:   •  brimonidine-timolol (COMBIGAN) 0.2-0.5 % ophthalmic solution, Administer 1 drop to both eyes Every 12 (Twelve) Hours., Disp: , Rfl:   •  Continuous Blood Gluc Sensor (Dexcom G6 Sensor), Every 10 (Ten) Days., Disp: 3 each, Rfl: 11  •  Continuous Blood Gluc Transmit (Dexcom G6 Transmitter) misc, 1 each Every 3 (Three) Months., Disp: 1 each, Rfl: 3  •  D3 Super Strength 50 MCG (2000 UT) capsule, TAKE 1 CAPSULE BY MOUTH EVERY DAY, Disp: 30 capsule, Rfl: 1  •  glucose blood test strip, 3 times daily to 4 times daily as directed, Disp: 100 each, Rfl: 12  •  insulin aspart (NovoLOG FlexPen) 100 UNIT/ML solution pen-injector sc pen, INJECT BY SUBCUTANEOUS ROUTE AS PER INSULIN SLIDING SCALE PROTOCOL ( 25-50 UNITS) BEFORE MEALS, Disp: 15 mL, Rfl: 1  •  insulin NPH (NovoLIN N) 100 UNIT/ML injection, Inject 40-60 Units under the skin into the appropriate area as directed Every 12 (Twelve) Hours., Disp: 3 each, Rfl: 11  •  mupirocin (Bactroban) 2 % " "cream, Apply 1 application topically to the appropriate area as directed 3 (Three) Times a Day., Disp: 60 each, Rfl: 0  •  OneTouch Delica Lancets 33G misc, 1 each 4 (Four) Times a Day., Disp: 100 each, Rfl: 12  •  pantoprazole (PROTONIX) 40 MG EC tablet, Take 1 tablet by mouth Daily., Disp: 90 tablet, Rfl: 3  •  Saw Palmetto 160 MG capsule, Take 2 capsules po twice a day, Disp: 120 each, Rfl: 0  •  sirolimus (RAPAMUNE) 2 MG tablet, Take 2 mg by mouth Daily., Disp: , Rfl:   •  tamsulosin (FLOMAX) 0.4 MG capsule 24 hr capsule, Take 1 capsule by mouth Daily. 30 min after  Same meal qd, Disp: 90 capsule, Rfl: 1  •  vitamin D3 125 MCG (5000 UT) capsule capsule, Take 1 capsule by mouth Daily., Disp: 90 capsule, Rfl: 0  •  vitamin E 1000 UNIT capsule, Take 1,000 Units by mouth Daily., Disp: , Rfl:     Allergies:   No Known Allergies    Objective     Physical Exam:  Vital Signs:   Vitals:    04/13/23 1447   BP: 134/80   BP Location: Left arm   Patient Position: Sitting   Cuff Size: Adult   Pulse: 71   Resp: 15   Temp: 97.1 °F (36.2 °C)   TempSrc: Infrared   SpO2: 95%   Weight: 92.5 kg (204 lb)   Height: 167.6 cm (66\")   PainSc: 0-No pain     Body mass index is 32.93 kg/m².     Physical Exam  Vitals and nursing note reviewed.   Constitutional:       Appearance: Normal appearance. He is obese.      Comments: Alert oriented poorly kempt white male no acute distress    He has Tegaderm and dressings on his abdomen wounds but it looks like it is healing better now   HENT:      Head: Normocephalic and atraumatic.   Cardiovascular:      Rate and Rhythm: Normal rate and regular rhythm.      Pulses:           Dorsalis pedis pulses are 0 on the right side and 0 on the left side.        Posterior tibial pulses are 0 on the right side and 0 on the left side.   Pulmonary:      Effort: Pulmonary effort is normal.      Breath sounds: Normal breath sounds.   Abdominal:      Comments: Dressings in place surrounding skin is pink "   Musculoskeletal:         General: Normal range of motion.      Cervical back: Normal range of motion and neck supple.      Right lower leg: No edema.      Left lower leg: Edema present.      Comments: 1+ pitting edema of the bilateral lower leg ankle/foot   Feet:      Right foot:      Protective Sensation: 8 sites tested. 10 sites sensed.      Skin integrity: No ulcer or erythema.      Left foot:      Protective Sensation: 10 sites tested. 10 sites sensed.      Skin integrity: No ulcer or erythema.      Comments: he does have a number of crusts more so on the left foot than the right and thickened nails on the left    Poor hygiene  Skin:     General: Skin is warm and dry.   Neurological:      General: No focal deficit present.      Mental Status: He is alert. Mental status is at baseline.   Psychiatric:         Mood and Affect: Mood normal.         Behavior: Behavior normal.         Procedures    PHQ-9 Total Score:       Assessment / Plan      Assessment/Plan:   Diagnoses and all orders for this visit:    1. Type 2 diabetes mellitus with hyperglycemia, with long-term current use of insulin (Primary)  -     multivitamin (One-Daily Multi-Vitamin) tablet tablet; Take 1 tablet by mouth Daily.  Dispense: 90 tablet; Refill: 3  -     POC Glycosylated Hemoglobin (Hb A1C)    2. Major depressive disorder with single episode, in full remission  -     buPROPion XL (WELLBUTRIN XL) 300 MG 24 hr tablet; Take 1 tablet by mouth Daily.  Dispense: 90 tablet; Refill: 1  -     sertraline (ZOLOFT) 100 MG tablet; Take 1.5 tablets by mouth Daily.  Dispense: 135 tablet; Refill: 1    3. Coronary artery disease involving native coronary artery of native heart with angina pectoris  -     aspirin  MG tablet; Take 1 tablet by mouth Daily.  Dispense: 90 tablet; Refill: 3  -     metoprolol tartrate (LOPRESSOR) 25 MG tablet; Take 1 tablet by mouth 2 (Two) Times a Day.  Dispense: 180 tablet; Refill: 1    4. Essential hypertension  -      metoprolol tartrate (LOPRESSOR) 25 MG tablet; Take 1 tablet by mouth 2 (Two) Times a Day.  Dispense: 180 tablet; Refill: 1  -     ramipril (ALTACE) 10 MG capsule; Take 1 capsule by mouth Daily.  Dispense: 90 capsule; Refill: 1    5. Vitamin D deficiency  -     vitamin D3 125 MCG (5000 UT) capsule capsule; Take 1 capsule by mouth Daily.  Dispense: 90 capsule; Refill: 0    6. Vitamin B 12 deficiency  -     L-Methylfolate-B6-B12 3-35-2 MG tablet; Take 1 tablet by mouth Daily. (Hold dose for  2 weeks)  Dispense: 90 tablet; Refill: 1    7. BPH associated with nocturia  -     tamsulosin (FLOMAX) 0.4 MG capsule 24 hr capsule; Take 1 capsule by mouth Daily. 30 min after  Same meal qd  Dispense: 90 capsule; Refill: 1         For his diabetes I wrote out instructions for him to cut back on his carbs eat more vegetables and lean meat he knows he needs to exercise do little more walking 30 minutes 5 days a week as this will help as well.    In regards to his insulin for his NPH he is on 40 to 50 units twice a day I told him to check his sugars in the morning fasting every third day if his sugar is above 150 to increase his dose by 3 units and if his sugar is less than 70 to decrease his dose by 3 units this way he will gradually taper up his insulin.    He will continue with sliding scale before mealtime with his NovoLog insulin.  And we will get him involved in chronic care management and hopefully get him a Dexcom continuous glucose meter to help with his diabetes management    Continue other meds as directed and for his BPH we will start him on tamsulosin he can stop his over-the-counter saw palmetto.  Follow-up in 2 months    We will get A1c in house next time.    Monitor his weight and edema of his legs if worse return.  We did discuss possibility of seen physical therapy to help with his balance issues.  He relates that he has had recent CAT scans of his brain etc.    Needs diabetic eye exam  BMI is >= 30 and <35. (Class  1 Obesity). The following options were offered after discussion;: exercise counseling/recommendations and nutrition counseling/recommendations      Follow Up:   Return in about 2 months (around 6/13/2023) for Recheck.    I spent 46 minutes caring for Quirino on this date of service. This time includes time spent by me in the following activities:preparing for the visit, reviewing tests, obtaining and/or reviewing a separately obtained history, performing a medically appropriate examination and/or evaluation , counseling and educating the patient/family/caregiver, ordering medications, tests, or procedures and documenting information in the medical record    Andrew Fernandez MD  [facility]   Portions of note created with Dragon voice recognition technology

## 2023-04-14 ENCOUNTER — TELEPHONE (OUTPATIENT)
Dept: FAMILY MEDICINE CLINIC | Facility: CLINIC | Age: 68
End: 2023-04-14
Payer: MEDICARE

## 2023-04-14 ENCOUNTER — REFERRAL TRIAGE (OUTPATIENT)
Dept: CASE MANAGEMENT | Facility: OTHER | Age: 68
End: 2023-04-14
Payer: MEDICARE

## 2023-04-14 NOTE — TELEPHONE ENCOUNTER
Incoming Refill Request      Medication requested (name and dose): NOVOLOG FLEXPEN    novolin n 100 unit    Pharmacy where request should be sent:  [facility]     Additional details provided by patient:     Best call back number: [phone]    Does the patient have less than a 3 day supply:  [x] Yes  [] No    aJnis Sánchez Rep  04/14/23, 15:17 EDT

## 2023-04-17 ENCOUNTER — TELEPHONE (OUTPATIENT)
Dept: FAMILY MEDICINE CLINIC | Facility: CLINIC | Age: 68
End: 2023-04-17
Payer: MEDICARE

## 2023-04-17 ENCOUNTER — TELEPHONE (OUTPATIENT)
Dept: CASE MANAGEMENT | Facility: OTHER | Age: 68
End: 2023-04-17
Payer: MEDICARE

## 2023-04-17 DIAGNOSIS — Z79.4 TYPE 2 DIABETES MELLITUS WITH HYPERGLYCEMIA, WITH LONG-TERM CURRENT USE OF INSULIN: ICD-10-CM

## 2023-04-17 DIAGNOSIS — E11.65 TYPE 2 DIABETES MELLITUS WITH HYPERGLYCEMIA, WITH LONG-TERM CURRENT USE OF INSULIN: ICD-10-CM

## 2023-04-17 RX ORDER — HUMAN INSULIN 100 [IU]/ML
INJECTION, SUSPENSION SUBCUTANEOUS
Qty: 20 ML | Refills: 11 | OUTPATIENT
Start: 2023-04-17

## 2023-04-17 RX ORDER — HUMAN INSULIN 100 [IU]/ML
40-60 INJECTION, SUSPENSION SUBCUTANEOUS EVERY 12 HOURS SCHEDULED
Qty: 3 EACH | Refills: 11 | Status: SHIPPED | OUTPATIENT
Start: 2023-04-17

## 2023-04-17 NOTE — TELEPHONE ENCOUNTER
Caller: JESSYMARQUITA     Relationship:     Best call back number: [phone]    Requested Prescriptions:   Requested Prescriptions      No prescriptions requested or ordered in this encounter      insulin NPH (NovoLIN N) 100 UNIT/ML injection    Pharmacy where request should be sent:      Last office visit with prescribing clinician: 4/13/2023   Last telemedicine visit with prescribing clinician: 6/7/2023   Next office visit with prescribing clinician: 6/7/2023     Additional details provided by patient:     COMPLETELY OUT OF MEDICATION    Does the patient have less than a 3 day supply:    [x] Yes  [] No    Would you like a call back once the refill request has been completed: [] Yes [x] No    If the office needs to give you a call back, can they leave a voicemail: [] Yes [x] No    Dorita Michelle, PCT   04/17/23 12:48 EDT

## 2023-04-18 ENCOUNTER — TELEPHONE (OUTPATIENT)
Dept: CASE MANAGEMENT | Facility: OTHER | Age: 68
End: 2023-04-18
Payer: MEDICARE

## 2023-04-18 NOTE — TELEPHONE ENCOUNTER
HUB - Attempted to reach patient to offer CCM services. Left message requesting return call. This is attempt #2.

## 2023-04-28 DIAGNOSIS — E11.65 TYPE 2 DIABETES MELLITUS WITH HYPERGLYCEMIA, WITHOUT LONG-TERM CURRENT USE OF INSULIN: ICD-10-CM

## 2023-04-28 RX ORDER — INSULIN ASPART 100 [IU]/ML
INJECTION, SOLUTION INTRAVENOUS; SUBCUTANEOUS
Qty: 15 ML | Refills: 1 | Status: SHIPPED | OUTPATIENT
Start: 2023-04-28

## 2023-04-28 NOTE — TELEPHONE ENCOUNTER
Rx Refill Note    Requested Prescriptions     Pending Prescriptions Disp Refills   • insulin aspart (NovoLOG FlexPen) 100 UNIT/ML solution pen-injector sc pen 15 mL 1     Sig: INJECT BY SUBCUTANEOUS ROUTE AS PER INSULIN SLIDING SCALE PROTOCOL ( 25-50 UNITS) BEFORE MEALS        Last office visit with prescribing clinician: 4/13/2023      Next office visit with prescribing clinician: 6/7/2023   Last labs:   Last refill: 02/17/2023   Pharmacy (be sure to add in Epic). correct

## 2023-04-28 NOTE — TELEPHONE ENCOUNTER
Caller: Quirino Sheppard    Relationship: Self    Best call back number: [phone]    Requested Prescriptions:   Requested Prescriptions     Pending Prescriptions Disp Refills   • insulin aspart (NovoLOG FlexPen) 100 UNIT/ML solution pen-injector sc pen 15 mL 1     Sig: INJECT BY SUBCUTANEOUS ROUTE AS PER INSULIN SLIDING SCALE PROTOCOL ( 25-50 UNITS) BEFORE MEALS        Pharmacy where request should be sent: [address] [phone] [facility] [phone] [facility]     Last office visit with prescribing clinician: 4/13/2023   Last telemedicine visit with prescribing clinician: 6/7/2023   Next office visit with prescribing clinician: 6/7/2023     Additional details provided by patient: PATIENT OUT OF MEDICATION.      Does the patient have less than a 3 day supply:  [x] Yes  [] No    Would you like a call back once the refill request has been completed: [] Yes [x] No    If the office needs to give you a call back, can they leave a voicemail: [] Yes [x] No    Kate Locke   04/28/23 15:05 EDT

## 2023-05-05 ENCOUNTER — TELEPHONE (OUTPATIENT)
Dept: CASE MANAGEMENT | Facility: OTHER | Age: 68
End: 2023-05-05
Payer: MEDICARE

## 2023-05-12 ENCOUNTER — TELEPHONE (OUTPATIENT)
Dept: CASE MANAGEMENT | Facility: OTHER | Age: 68
End: 2023-05-12
Payer: MEDICARE

## 2023-05-12 NOTE — TELEPHONE ENCOUNTER
HUB - Attempted to reach patient to offer CCM services. Left message with number for return call.    HUB - If patient returns call please direct messages or call to CCM, please

## 2023-08-21 DIAGNOSIS — Z79.899 HIGH RISK MEDICATION USE: ICD-10-CM

## 2023-08-21 DIAGNOSIS — E78.2 MIXED HYPERLIPIDEMIA: ICD-10-CM

## 2023-08-22 RX ORDER — ATORVASTATIN CALCIUM 80 MG/1
TABLET, FILM COATED ORAL
Qty: 30 TABLET | Refills: 1 | Status: SHIPPED | OUTPATIENT
Start: 2023-08-22

## 2023-08-22 NOTE — TELEPHONE ENCOUNTER
Rx Refill Note    Requested Prescriptions     Pending Prescriptions Disp Refills    atorvastatin (LIPITOR) 80 MG tablet [Pharmacy Med Name: ATORVASTATIN CALCIUM 80 MG 80 Tablet] 30 tablet 1     Sig: TAKE 1 TABLET BY MOUTH DAILY AT BEDTIME        Last office visit with prescribing clinician: 4/13/2023      Next office visit with prescribing clinician: 9/6/2023   Last labs:   Last refill: 02/16/2023   Pharmacy (be sure to add in Epic). correct

## 2023-08-23 DIAGNOSIS — E78.2 MIXED HYPERLIPIDEMIA: ICD-10-CM

## 2023-08-23 DIAGNOSIS — Z79.899 HIGH RISK MEDICATION USE: ICD-10-CM

## 2023-08-24 RX ORDER — ATORVASTATIN CALCIUM 80 MG/1
TABLET, FILM COATED ORAL
Qty: 12 TABLET | Refills: 1 | OUTPATIENT
Start: 2023-08-24

## 2023-09-06 ENCOUNTER — OFFICE VISIT (OUTPATIENT)
Dept: FAMILY MEDICINE CLINIC | Facility: CLINIC | Age: 68
End: 2023-09-06
Payer: MEDICARE

## 2023-09-06 VITALS
WEIGHT: 185 LBS | HEIGHT: 66 IN | HEART RATE: 90 BPM | OXYGEN SATURATION: 96 % | SYSTOLIC BLOOD PRESSURE: 118 MMHG | BODY MASS INDEX: 29.73 KG/M2 | RESPIRATION RATE: 16 BRPM | DIASTOLIC BLOOD PRESSURE: 82 MMHG

## 2023-09-06 DIAGNOSIS — F32.5 MAJOR DEPRESSIVE DISORDER WITH SINGLE EPISODE, IN FULL REMISSION: ICD-10-CM

## 2023-09-06 DIAGNOSIS — E55.9 VITAMIN D DEFICIENCY: ICD-10-CM

## 2023-09-06 DIAGNOSIS — E78.2 MIXED HYPERLIPIDEMIA: ICD-10-CM

## 2023-09-06 DIAGNOSIS — Z79.4 TYPE 2 DIABETES MELLITUS WITH HYPERGLYCEMIA, WITH LONG-TERM CURRENT USE OF INSULIN: Primary | ICD-10-CM

## 2023-09-06 DIAGNOSIS — E53.8 VITAMIN B 12 DEFICIENCY: ICD-10-CM

## 2023-09-06 DIAGNOSIS — Z23 IMMUNIZATION DUE: ICD-10-CM

## 2023-09-06 DIAGNOSIS — E11.65 TYPE 2 DIABETES MELLITUS WITH HYPERGLYCEMIA, WITH LONG-TERM CURRENT USE OF INSULIN: Primary | ICD-10-CM

## 2023-09-06 DIAGNOSIS — S09.90XA TRAUMATIC INJURY OF HEAD, INITIAL ENCOUNTER: ICD-10-CM

## 2023-09-06 DIAGNOSIS — I25.119 CORONARY ARTERY DISEASE INVOLVING NATIVE CORONARY ARTERY OF NATIVE HEART WITH ANGINA PECTORIS: ICD-10-CM

## 2023-09-06 DIAGNOSIS — Z79.899 HIGH RISK MEDICATION USE: ICD-10-CM

## 2023-09-06 DIAGNOSIS — I10 ESSENTIAL HYPERTENSION: ICD-10-CM
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
## 2023-09-06 LAB
EXPIRATION DATE: NORMAL
EXPIRATION DATE: NORMAL
HBA1C MFR BLD: 9.5 %
Lab: NORMAL
Lab: NORMAL
POC CREATININE URINE: NEGATIVE
POC MICROALBUMIN URINE: NEGATIVE

## 2023-09-06 RX ORDER — INSULIN ASPART 100 [IU]/ML
INJECTION, SOLUTION INTRAVENOUS; SUBCUTANEOUS
Qty: 15 ML | Refills: 1 | Status: SHIPPED | OUTPATIENT
Start: 2023-09-06

## 2023-09-06 RX ORDER — BUPROPION HYDROCHLORIDE 300 MG/1
300 TABLET ORAL DAILY
Qty: 90 TABLET | Refills: 1 | Status: SHIPPED | OUTPATIENT
Start: 2023-09-06

## 2023-09-06 RX ORDER — SERTRALINE HYDROCHLORIDE 100 MG/1
150 TABLET, FILM COATED ORAL DAILY
Qty: 135 TABLET | Refills: 1 | Status: SHIPPED | OUTPATIENT
Start: 2023-09-06

## 2023-09-06 RX ORDER — ATORVASTATIN CALCIUM 80 MG/1
80 TABLET, FILM COATED ORAL
Qty: 90 TABLET | Refills: 1 | Status: SHIPPED | OUTPATIENT
Start: 2023-09-06

## 2023-09-06 RX ORDER — RAMIPRIL 10 MG/1
10 CAPSULE ORAL DAILY
Qty: 90 CAPSULE | Refills: 1 | Status: SHIPPED | OUTPATIENT
Start: 2023-09-06

## 2023-09-06 NOTE — PROGRESS NOTES
Follow Up Office Visit      Patient Name: Quirino Sheppard  : 1955   MRN: [id number]     Chief Complaint:    Chief Complaint   Patient presents with    Follow-up    Diabetes       History of Present Illness: Quirino Sheppard is a 68 y.o. male who is here today to   follow-up on his diabetes with hyperglycemia and has little difficulty recalling exact doses on his insulin but thinks he is taking 40 to 50 units of his NPH twice a day and then says he is doing about 12 units of his NovoLog rapid acting insulin prior to meals.  He says his sugars are staying above 200.  Had no low blood sugars he also relates that he has been having some falls he states he is fallen about 4 times this month or less and he says he is falls been going on for about a year now he says sometimes will kind of leaning towards the right and then falls over he has had no loss of consciousness he relates he is on uneven ground by his house and ended up falling in the ditch and hit his head on concrete footer he denies any loss of consciousness but said he had to lay there for about 15 minutes for he can get himself out of the ditch and then he had another episode more recently 2 or 3 days ago where he stood up quickly and had some vertigo and then it kind of went down he does not think he passed out but ended up falling to the floor and then someone helped him up pretty quickly he has not been evaluated for these falls.    NEEDS All his medicines refilled.    He says he is due for blood work and follow-up with his liver transplant doctors in [place].    Review of Systems   Constitutional: Negative for fatigue and fever.   Respiratory: Negative for cough and shortness of breath.    Cardiovascular: Negative for chest pain and palpitations.   Skin: Negative for rash or itching          Subjective      Review of Systems:   Review of Systems    Past Medical History:   Past Medical History:   Diagnosis Date    Anxiety and depression      "BPH associated with nocturia     Chronic kidney disease, stage 2 (mild)     Coronary arteriosclerosis in native artery     3V    Degeneration of lumbar intervertebral disc     Diabetes mellitus     Disorder of sulfur-bearing amino acid metabolism     Gastroesophageal reflux disease without esophagitis     Glaucoma     Hepatitis C     Heterozygous MTHFR mutation [id number]     Heterozygous MTHFR mutation [id number]     High risk medication use     Hyperlipidemia     Hypertension     Liver transplanted     Low back pain     Obstructive sleep apnea syndrome     Recurrent major depression in full remission     Severe obesity     Type 2 diabetes mellitus        Past Surgical History:   Past Surgical History:   Procedure Laterality Date    CORONARY ARTERY BYPASS GRAFT N/A 11/22/2017    Procedure: MEDIAN STERNOTOMY, CORONARY ARTERY BYPASS GRAFT X 3, UTILIZING THE LEFT INTERNAL MAMMARY ARTERY AND EVH USING THE LEFT GREATER SAPHENOUS VEIN;  Surgeon: Naga Guaman MD;  Location: [facility];  Service:     LIVER TRANSPLANTATION  2001       Family History:   Family History   Problem Relation Age of Onset    Diabetes Paternal Grandmother     Breast cancer Mother     Heart attack Father     Heart valve disorder Father        Social History:   Social History     Socioeconomic History    Marital status:     Number of children: 3   Tobacco Use    Smoking status: Never    Smokeless tobacco: Never   Vaping Use    Vaping Use: Never used   Substance and Sexual Activity    Alcohol use: No     Comment: PT QUIT DRINKING IN 1994    Drug use: Never    Sexual activity: Not Currently       Medications:     Current Outpatient Medications:     aspirin  MG tablet, Take 1 tablet by mouth Daily., Disp: 90 tablet, Rfl: 3    atorvastatin (LIPITOR) 80 MG tablet, Take 1 tablet by mouth every night at bedtime., Disp: 90 tablet, Rfl: 1    BD Insulin Syringe U/F 31G X 5/16\" 1 ML misc, 1 each See Admin Instructions., Disp: , Rfl:     " brimonidine-timolol (COMBIGAN) 0.2-0.5 % ophthalmic solution, Administer 1 drop to both eyes Every 12 (Twelve) Hours., Disp: , Rfl:     buPROPion XL (WELLBUTRIN XL) 300 MG 24 hr tablet, Take 1 tablet by mouth Daily., Disp: 90 tablet, Rfl: 1    Continuous Blood Gluc Sensor (Dexcom G6 Sensor), Every 10 (Ten) Days., Disp: 3 each, Rfl: 11    Continuous Blood Gluc Transmit (Dexcom G6 Transmitter) misc, 1 each Every 3 (Three) Months., Disp: 1 each, Rfl: 3    D3 Super Strength 50 MCG (2000 UT) capsule, TAKE 1 CAPSULE BY MOUTH EVERY DAY, Disp: 30 capsule, Rfl: 1    glucose blood test strip, 3 times daily to 4 times daily as directed, Disp: 100 each, Rfl: 12    insulin aspart (NovoLOG FlexPen) 100 UNIT/ML solution pen-injector sc pen, INJECT BY SUBCUTANEOUS ROUTE AS PER INSULIN SLIDING SCALE PROTOCOL ( 25-50 UNITS) BEFORE MEALS, Disp: 15 mL, Rfl: 1    insulin NPH (NovoLIN N) 100 UNIT/ML injection, Inject 40-60 Units under the skin into the appropriate area as directed Every 12 (Twelve) Hours., Disp: 3 each, Rfl: 11    L-Methylfolate-B6-B12 3-35-2 MG tablet, Take 1 tablet by mouth Daily. (Hold dose for  2 weeks), Disp: 90 tablet, Rfl: 1    metoprolol tartrate (LOPRESSOR) 25 MG tablet, Take 1 tablet by mouth 2 (Two) Times a Day., Disp: 180 tablet, Rfl: 1    multivitamin (One-Daily Multi-Vitamin) tablet tablet, Take 1 tablet by mouth Daily., Disp: 90 tablet, Rfl: 3    mupirocin (Bactroban) 2 % cream, Apply 1 application topically to the appropriate area as directed 3 (Three) Times a Day., Disp: 60 each, Rfl: 0    OneTouch Delica Lancets 33G misc, 1 each 4 (Four) Times a Day., Disp: 100 each, Rfl: 12    OneTouch Verio test strip, USE TO TEST THREE TIMES A DAY, Disp: 200 each, Rfl: 2    pantoprazole (PROTONIX) 40 MG EC tablet, Take 1 tablet by mouth Daily., Disp: 90 tablet, Rfl: 3    ramipril (ALTACE) 10 MG capsule, Take 1 capsule by mouth Daily., Disp: 90 capsule, Rfl: 1    Saw Palmetto 160 MG capsule, Take 2 capsules po twice a  "day, Disp: 120 each, Rfl: 0    sertraline (ZOLOFT) 100 MG tablet, Take 1.5 tablets by mouth Daily., Disp: 135 tablet, Rfl: 1    sirolimus (RAPAMUNE) 2 MG tablet, Take 1 tablet by mouth Daily., Disp: , Rfl:     tamsulosin (FLOMAX) 0.4 MG capsule 24 hr capsule, Take 1 capsule by mouth Daily. 30 min after  Same meal qd, Disp: 90 capsule, Rfl: 1    vitamin D3 125 MCG (5000 UT) capsule capsule, Take 1 capsule by mouth Daily., Disp: 90 capsule, Rfl: 0    vitamin E 1000 UNIT capsule, Take 1 capsule by mouth Daily., Disp: , Rfl:     Allergies:   No Known Allergies    Objective     Physical Exam:  Vital Signs:   Vitals:    09/06/23 0929   BP: 118/82   BP Location: Left arm   Patient Position: Sitting   Cuff Size: Adult   Pulse: 90   Resp: 16   SpO2: 96%   Weight: 83.9 kg (185 lb)   Height: 167.6 cm (66\")     Body mass index is 29.86 kg/m².     Physical Exam  Vitals and nursing note reviewed.   Constitutional:       Appearance: Normal appearance.   HENT:      Head: Normocephalic and atraumatic.      Right Ear: Tympanic membrane and ear canal normal.      Left Ear: Tympanic membrane and ear canal normal.      Nose: Nose normal.      Mouth/Throat:      Mouth: Mucous membranes are moist.      Pharynx: Oropharynx is clear. No posterior oropharyngeal erythema.   Eyes:      Extraocular Movements: Extraocular movements intact.      Pupils: Pupils are equal, round, and reactive to light.   Cardiovascular:      Rate and Rhythm: Normal rate and regular rhythm.   Pulmonary:      Effort: Pulmonary effort is normal.      Breath sounds: Normal breath sounds.   Abdominal:      Palpations: Abdomen is soft.      Tenderness: There is no abdominal tenderness.   Musculoskeletal:         General: Normal range of motion.      Cervical back: Normal range of motion and neck supple. No rigidity.      Right lower leg: No edema.      Left lower leg: No edema.   Skin:     General: Skin is warm and dry.   Neurological:      General: No focal deficit " present.      Mental Status: He is alert and oriented to person, place, and time.      Cranial Nerves: No cranial nerve deficit.      Comments: Normal Romberg , normal finger-nose with eyes closed he does have a somewhat slow gait   Psychiatric:         Mood and Affect: Mood normal.         Behavior: Behavior normal.       Procedures    PHQ-9 Total Score:       Assessment / Plan      Assessment/Plan:   Diagnoses and all orders for this visit:    1. Type 2 diabetes mellitus with hyperglycemia, with long-term current use of insulin (Primary)  -     POC Glycosylated Hemoglobin (Hb A1C)  -     insulin aspart (NovoLOG FlexPen) 100 UNIT/ML solution pen-injector sc pen; INJECT BY SUBCUTANEOUS ROUTE AS PER INSULIN SLIDING SCALE PROTOCOL ( 25-50 UNITS) BEFORE MEALS  Dispense: 15 mL; Refill: 1  -     POC Microalbumin  -     Comprehensive Metabolic Panel; Future  -     TSH; Future  -     Comprehensive Metabolic Panel  -     TSH    2. Major depressive disorder with single episode, in full remission  -     buPROPion XL (WELLBUTRIN XL) 300 MG 24 hr tablet; Take 1 tablet by mouth Daily.  Dispense: 90 tablet; Refill: 1  -     sertraline (ZOLOFT) 100 MG tablet; Take 1.5 tablets by mouth Daily.  Dispense: 135 tablet; Refill: 1    3. Mixed hyperlipidemia  -     atorvastatin (LIPITOR) 80 MG tablet; Take 1 tablet by mouth every night at bedtime.  Dispense: 90 tablet; Refill: 1    4. High risk medication use  -     atorvastatin (LIPITOR) 80 MG tablet; Take 1 tablet by mouth every night at bedtime.  Dispense: 90 tablet; Refill: 1  -     Comprehensive Metabolic Panel; Future  -     CBC Auto Differential; Future  -     Magnesium; Future  -     Comprehensive Metabolic Panel  -     CBC Auto Differential  -     Magnesium    5. Immunization due  -     Pneumococcal Conjugate Vaccine 20-Valent All    6. Essential hypertension  -     ramipril (ALTACE) 10 MG capsule; Take 1 capsule by mouth Daily.  Dispense: 90 capsule; Refill: 1  -     metoprolol  tartrate (LOPRESSOR) 25 MG tablet; Take 1 tablet by mouth 2 (Two) Times a Day.  Dispense: 180 tablet; Refill: 1    7. Coronary artery disease involving native coronary artery of native heart with angina pectoris  -     metoprolol tartrate (LOPRESSOR) 25 MG tablet; Take 1 tablet by mouth 2 (Two) Times a Day.  Dispense: 180 tablet; Refill: 1    8. Fall, initial encounter  -     Ambulatory Referral to Neurology    9. Traumatic injury of head, initial encounter  -     MRI Brain Without Contrast; Future    10. Vitamin B 12 deficiency  -     Vitamin B12; Future  -     Vitamin B12    11. Vitamin D deficiency  -     Vitamin D,25-Hydroxy; Future  -     Vitamin D,25-Hydroxy       A1C= 9.5      Due to his history of falls and recent head trauma we will get an MRI of his brain and referral to neurology for evaluation    Continue with current medications as directed we had a long discussion about diabetes for his hyperglycemia    I gave him my diabetic handout and discussed signs symptoms treatments of hypoglycemia    Instruct him on how to increase his basal insulin the NPH by 2 units each dose if his fasting sugar every third day is above 150 he knows not to adjust his insulin dose but every third day for this long-acting NPH not feeling and dose.    Also instruct him to continue with his mealtime insulin to check 2-hour postprandials and the goal being less than 180  2 hours after eating and he may gradually increase his mealtime insulin 2 to 4 units if he eats the same food the next day if 2-hour postprandial greater than 180, or if he is going to have a heavy carb meal etc.    Follow-up in 2 months     Many issues discussed  Be sure to follow-up on lab tested today    Prevnar 20 given    May get flu and COVID vaccines at the pharmacy in the fall    Long time spent for diabetic education insulin use hypoglycemia hyperglycemia etc. discussed with him and handwritten instructions given well as my printed diabetic  handout  Currently he is on sliding scale schedule see for his rapid acting insulin NovoLog  Follow Up:   Return in about 2 months (around 11/6/2023) for Recheck.    I spent 51 minutes caring for Quirino on this date of service. This time includes time spent by me in the following activities:preparing for the visit, reviewing tests, obtaining and/or reviewing a separately obtained history, performing a medically appropriate examination and/or evaluation , counseling and educating the patient/family/caregiver, ordering medications, tests, or procedures, referring and communicating with other health care professionals , and documenting information in the medical record    Andrew Fernandez MD  [facility]   Portions of note created with Dragon voice recognition technology

## 2023-09-07 DIAGNOSIS — E55.9 VITAMIN D DEFICIENCY: Primary | ICD-10-CM

## 2023-09-07 LAB
25(OH)D3+25(OH)D2 SERPL-MCNC: 21.3 NG/ML (ref 30–100)
ALBUMIN SERPL-MCNC: 4.3 G/DL (ref 3.9–4.9)
ALBUMIN/GLOB SERPL: 1.4 {RATIO} (ref 1.2–2.2)
ALP SERPL-CCNC: 115 IU/L (ref 44–121)
ALT SERPL-CCNC: 24 IU/L (ref 0–44)
AST SERPL-CCNC: 19 IU/L (ref 0–40)
BASOPHILS # BLD AUTO: 0 X10E3/UL (ref 0–0.2)
BASOPHILS NFR BLD AUTO: 0 %
BILIRUB SERPL-MCNC: 0.2 MG/DL (ref 0–1.2)
BUN SERPL-MCNC: 18 MG/DL (ref 8–27)
BUN/CREAT SERPL: 14 (ref 10–24)
CALCIUM SERPL-MCNC: 9.1 MG/DL (ref 8.6–10.2)
CHLORIDE SERPL-SCNC: 101 MMOL/L (ref 96–106)
CO2 SERPL-SCNC: 24 MMOL/L (ref 20–29)
CREAT SERPL-MCNC: 1.29 MG/DL (ref 0.76–1.27)
EGFRCR SERPLBLD CKD-EPI 2021: 60 ML/MIN/1.73
EOSINOPHIL # BLD AUTO: 0.1 X10E3/UL (ref 0–0.4)
EOSINOPHIL NFR BLD AUTO: 1 %
ERYTHROCYTE [DISTWIDTH] IN BLOOD BY AUTOMATED COUNT: 14.7 % (ref 11.6–15.4)
GLOBULIN SER CALC-MCNC: 3 G/DL (ref 1.5–4.5)
GLUCOSE SERPL-MCNC: 161 MG/DL (ref 70–99)
HCT VFR BLD AUTO: 45 % (ref 37.5–51)
HGB BLD-MCNC: 14.5 G/DL (ref 13–17.7)
IMM GRANULOCYTES # BLD AUTO: 0.1 X10E3/UL (ref 0–0.1)
IMM GRANULOCYTES NFR BLD AUTO: 1 %
LYMPHOCYTES # BLD AUTO: 1.6 X10E3/UL (ref 0.7–3.1)
LYMPHOCYTES NFR BLD AUTO: 16 %
MAGNESIUM SERPL-MCNC: 1.4 MG/DL (ref 1.6–2.3)
MCH RBC QN AUTO: 28.2 PG (ref 26.6–33)
MCHC RBC AUTO-ENTMCNC: 32.2 G/DL (ref 31.5–35.7)
MCV RBC AUTO: 88 FL (ref 79–97)
MONOCYTES # BLD AUTO: 0.6 X10E3/UL (ref 0.1–0.9)
MONOCYTES NFR BLD AUTO: 6 %
NEUTROPHILS # BLD AUTO: 7.4 X10E3/UL (ref 1.4–7)
NEUTROPHILS NFR BLD AUTO: 76 %
PLATELET # BLD AUTO: 234 X10E3/UL (ref 150–450)
POTASSIUM SERPL-SCNC: 4.5 MMOL/L (ref 3.5–5.2)
PROT SERPL-MCNC: 7.3 G/DL (ref 6–8.5)
RBC # BLD AUTO: 5.14 X10E6/UL (ref 4.14–5.8)
SODIUM SERPL-SCNC: 140 MMOL/L (ref 134–144)
TSH SERPL DL<=0.005 MIU/L-ACNC: 1.88 UIU/ML (ref 0.45–4.5)
VIT B12 SERPL-MCNC: >2000 PG/ML (ref 232–1245)
WBC # BLD AUTO: 9.8 X10E3/UL (ref 3.4–10.8)

## 2023-09-07 RX ORDER — MAGNESIUM OXIDE 400 MG/1
400 TABLET ORAL DAILY
Qty: 30 TABLET | Refills: 0 | Status: SHIPPED | OUTPATIENT
Start: 2023-09-07

## 2023-09-08 DIAGNOSIS — Z79.4 TYPE 2 DIABETES MELLITUS WITH HYPERGLYCEMIA, WITH LONG-TERM CURRENT USE OF INSULIN: ICD-10-CM

## 2023-09-08 DIAGNOSIS — E11.65 TYPE 2 DIABETES MELLITUS WITH HYPERGLYCEMIA, WITH LONG-TERM CURRENT USE OF INSULIN: ICD-10-CM

## 2023-09-11 RX ORDER — INSULIN ASPART 100 [IU]/ML
INJECTION, SOLUTION INTRAVENOUS; SUBCUTANEOUS
Qty: 15 ML | Refills: 1 | OUTPATIENT
Start: 2023-09-11

## 2023-10-05 ENCOUNTER — HOSPITAL ENCOUNTER (OUTPATIENT)
Dept: MRI IMAGING | Facility: HOSPITAL | Age: 68
Discharge: HOME | End: 2023-10-05
Admitting: FAMILY MEDICINE
Payer: MEDICARE

## 2023-10-05 DIAGNOSIS — S09.90XA TRAUMATIC INJURY OF HEAD, INITIAL ENCOUNTER: ICD-10-CM

## 2023-10-05 PROCEDURE — 70551 MRI BRAIN STEM W/O DYE: CPT

## 2023-10-13 DIAGNOSIS — R35.1 BPH ASSOCIATED WITH NOCTURIA: ICD-10-CM

## 2023-10-13 DIAGNOSIS — E53.8 VITAMIN B 12 DEFICIENCY: ICD-10-CM

## 2023-10-13 DIAGNOSIS — N40.1 BPH ASSOCIATED WITH NOCTURIA: ICD-10-CM

## 2023-10-13 RX ORDER — LANOLIN ALCOHOL/MO/W.PET/CERES
1 CREAM (GRAM) TOPICAL DAILY
Qty: 30 TABLET | Refills: 0 | Status: SHIPPED | OUTPATIENT
Start: 2023-10-13

## 2023-10-13 RX ORDER — MECOBAL/LEVOMEFOLAT CA/B6 PHOS 2-3-35 MG
TABLET ORAL
Qty: 30 TABLET | Refills: 2 | Status: SHIPPED | OUTPATIENT
Start: 2023-10-13

## 2023-10-13 RX ORDER — TAMSULOSIN HYDROCHLORIDE 0.4 MG/1
CAPSULE ORAL
Qty: 30 CAPSULE | Refills: 0 | Status: SHIPPED | OUTPATIENT
Start: 2023-10-13

## 2023-10-13 NOTE — TELEPHONE ENCOUNTER
Rx Refill Note    Requested Prescriptions     Pending Prescriptions Disp Refills    L-Methylfolate-B6-B12 3-35-2 MG tablet [Pharmacy Med Name: L-METHYL-B6-B12 TABLET^ 3-35-2 Tablet] 30 tablet 2     Sig: @@TAKE ONE TABLET BY MOUTH DAILY    tamsulosin (FLOMAX) 0.4 MG capsule 24 hr capsule [Pharmacy Med Name: TAMSULOSIN HCL 0.4 MG CAP 0.4 Capsule] 30 capsule 0     Sig: TAKE 1 CAPSULE BY MOUTH EVERY DAY WITH FOOD    Magnesium Oxide -Mg Supplement 400 (240 Mg) MG tablet [Pharmacy Med Name: MAGNESIUM OXIDE 400 (241.3 400 (240 MG Tablet] 30 tablet 0     Sig: TAKE ONE TABLET BY MOUTH DAILY        Last office visit with prescribing clinician: 9/6/2023      Next office visit with prescribing clinician: 11/10/2023   Last labs:   Last refill: Dr. Fernandez patient   Pharmacy (be sure to add in Epic). correct

## 2023-11-10 DIAGNOSIS — N40.1 BPH ASSOCIATED WITH NOCTURIA: ICD-10-CM

## 2023-11-10 DIAGNOSIS — R35.1 BPH ASSOCIATED WITH NOCTURIA: ICD-10-CM

## 2023-11-10 RX ORDER — LANOLIN ALCOHOL/MO/W.PET/CERES
1 CREAM (GRAM) TOPICAL DAILY
Qty: 28 TABLET | Refills: 2 | Status: SHIPPED | OUTPATIENT
Start: 2023-11-10

## 2023-11-10 RX ORDER — TAMSULOSIN HYDROCHLORIDE 0.4 MG/1
CAPSULE ORAL
Qty: 28 CAPSULE | Refills: 5 | Status: SHIPPED | OUTPATIENT
Start: 2023-11-10

## 2023-12-05 RX ORDER — SYRINGE AND NEEDLE,INSULIN,1ML 31 GX5/16"
SYRINGE, EMPTY DISPOSABLE MISCELLANEOUS
Qty: 100 EACH | Refills: 11 | Status: SHIPPED | OUTPATIENT
Start: 2023-12-05

## 2023-12-12 RX ORDER — CHOLECALCIFEROL (VITAMIN D3) 125 MCG
1 CAPSULE ORAL DAILY
Qty: 30 CAPSULE | Refills: 0 | Status: SHIPPED | OUTPATIENT
Start: 2023-12-12

## 2023-12-12 NOTE — TELEPHONE ENCOUNTER
Rx Refill Note    Requested Prescriptions     Pending Prescriptions Disp Refills    D-3-5 125 MCG (5000 UT) capsule capsule [Pharmacy Med Name: VIT D3 5000 U CAPSULE!!!! 125 MCG Capsule] 30 capsule 0     Sig: TAKE 1 CAPSULE BY MOUTH EVERY DAY        Last office visit with prescribing clinician: 9/6/2023      Next office visit with prescribing clinician: 12/28/2023   Last labs:   Last refill: needs   Pharmacy (be sure to add in Epic). correct

## 2024-01-05 DIAGNOSIS — E53.8 VITAMIN B 12 DEFICIENCY: ICD-10-CM

## 2024-01-05 DIAGNOSIS — K21.9 GASTROESOPHAGEAL REFLUX DISEASE, UNSPECIFIED WHETHER ESOPHAGITIS PRESENT: ICD-10-CM

## 2024-01-05 DIAGNOSIS — E55.9 VITAMIN D DEFICIENCY: Primary | ICD-10-CM

## 2024-01-05 RX ORDER — MECOBAL/LEVOMEFOLAT CA/B6 PHOS 2-3-35 MG
TABLET ORAL
Qty: 30 TABLET | Refills: 2 | Status: SHIPPED | OUTPATIENT
Start: 2024-01-05

## 2024-01-05 NOTE — TELEPHONE ENCOUNTER
Rx Refill Note    Requested Prescriptions     Pending Prescriptions Disp Refills    L-Methylfolate-B6-B12 3-35-2 MG tablet [Pharmacy Med Name: L-METHYL-B6-B12 TABLET^ 3-35-2 Tablet] 30 tablet 2     Sig: @@TAKE ONE TABLET BY MOUTH DAILY        Last office visit with prescribing clinician: 4/2/2022      Next office visit with prescribing clinician: Visit date not found   Last labs:   Last refill: 10/13/2023   Pharmacy (be sure to add in Epic). correct

## 2024-01-06 RX ORDER — CHOLECALCIFEROL (VITAMIN D3) 125 MCG
1 CAPSULE ORAL DAILY
Qty: 28 CAPSULE | Refills: 3 | Status: SHIPPED | OUTPATIENT
Start: 2024-01-06

## 2024-01-06 RX ORDER — PANTOPRAZOLE SODIUM 40 MG/1
40 TABLET, DELAYED RELEASE ORAL DAILY
Qty: 28 TABLET | Refills: 3 | Status: SHIPPED | OUTPATIENT
Start: 2024-01-06

## 2024-01-24 ENCOUNTER — OFFICE VISIT (OUTPATIENT)
Dept: FAMILY MEDICINE CLINIC | Facility: CLINIC | Age: 69
End: 2024-01-24
Payer: MEDICARE

## 2024-01-24 DIAGNOSIS — Z79.4 TYPE 2 DIABETES MELLITUS WITH HYPERGLYCEMIA, WITH LONG-TERM CURRENT USE OF INSULIN: ICD-10-CM

## 2024-01-24 DIAGNOSIS — E55.9 VITAMIN D DEFICIENCY: Primary | ICD-10-CM

## 2024-01-24 DIAGNOSIS — Z79.899 HIGH RISK MEDICATION USE: ICD-10-CM

## 2024-01-24 DIAGNOSIS — E11.65 TYPE 2 DIABETES MELLITUS WITH HYPERGLYCEMIA, WITH LONG-TERM CURRENT USE OF INSULIN: ICD-10-CM

## 2024-01-24 DIAGNOSIS — Z94.4 HX OF LIVER TRANSPLANT: ICD-10-CM

## 2024-01-24 DIAGNOSIS — I10 ESSENTIAL HYPERTENSION: ICD-10-CM

## 2024-02-06 RX ORDER — LANOLIN ALCOHOL/MO/W.PET/CERES
1 CREAM (GRAM) TOPICAL DAILY
Qty: 30 TABLET | Refills: 1 | Status: SHIPPED | OUTPATIENT
Start: 2024-02-06

## 2024-02-06 NOTE — TELEPHONE ENCOUNTER
Rx Refill Note    Requested Prescriptions     Pending Prescriptions Disp Refills    Magnesium Oxide -Mg Supplement 400 (240 Mg) MG tablet [Pharmacy Med Name: MAGNESIUM OXIDE 400 (241.3 400 (240 MG Tablet] 30 tablet 1     Sig: TAKE ONE TABLET BY MOUTH DAILY        Last office visit with prescribing clinician: 1/24/2024      Next office visit with prescribing clinician: Visit date not found   Last labs:   Last refill: Dr. Fernandez patient    Pharmacy (be sure to add in Epic). correct

## 2024-03-01 DIAGNOSIS — I10 ESSENTIAL HYPERTENSION: ICD-10-CM

## 2024-03-01 DIAGNOSIS — I25.119 CORONARY ARTERY DISEASE INVOLVING NATIVE CORONARY ARTERY OF NATIVE HEART WITH ANGINA PECTORIS: ICD-10-CM

## 2024-03-01 DIAGNOSIS — F32.5 MAJOR DEPRESSIVE DISORDER WITH SINGLE EPISODE, IN FULL REMISSION: ICD-10-CM

## 2024-03-04 RX ORDER — RAMIPRIL 10 MG/1
10 CAPSULE ORAL DAILY
Qty: 28 CAPSULE | Refills: 1 | Status: SHIPPED | OUTPATIENT
Start: 2024-03-04

## 2024-03-04 RX ORDER — SERTRALINE HYDROCHLORIDE 100 MG/1
150 TABLET, FILM COATED ORAL DAILY
Qty: 42 TABLET | Refills: 1 | Status: SHIPPED | OUTPATIENT
Start: 2024-03-04

## 2024-03-04 RX ORDER — BUPROPION HYDROCHLORIDE 300 MG/1
300 TABLET ORAL DAILY
Qty: 28 TABLET | Refills: 1 | Status: SHIPPED | OUTPATIENT
Start: 2024-03-04

## 2024-03-12 RX ORDER — BLOOD SUGAR DIAGNOSTIC
STRIP MISCELLANEOUS
Qty: 200 EACH | Refills: 6 | Status: SHIPPED | OUTPATIENT
Start: 2024-03-12

## 2024-03-12 NOTE — TELEPHONE ENCOUNTER
Caller: Quirino Sheppard    Relationship: Self    Best call back number: [phone]     Requested Prescriptions:   Requested Prescriptions     Pending Prescriptions Disp Refills    OneTouch Verio test strip [Pharmacy Med Name: ONE TOUCH VERIO TEST STRIP Strip]  12     Sig: USE TO TEST THREE TO FOUR TIMES A DAY      Pharmacy where request should be sent: [address] [phone] [facility] [phone]      Last office visit with prescribing clinician: 1/24/2024   Last telemedicine visit with prescribing clinician: Visit date not found   Next office visit with prescribing clinician: Visit date not found     Does the patient have less than a 3 day supply:  [x] Yes  [] No    Janis Lomas Rep   03/12/24 12:04 EDT

## 2024-03-12 NOTE — TELEPHONE ENCOUNTER
Rx Refill Note    Requested Prescriptions     Pending Prescriptions Disp Refills    OneTouch Verio test strip [Pharmacy Med Name: ONE TOUCH VERIO TEST STRIP Strip] 200 each 6     Sig: USE TO TEST THREE TO FOUR TIMES A DAY        Last office visit with prescribing clinician: 1/24/2024      Next office visit with prescribing clinician: 3/28/2024   Last labs:   Last refill: needs   Pharmacy (be sure to add in Epic). correct

## 2024-03-18 RX ORDER — BLOOD SUGAR DIAGNOSTIC
STRIP MISCELLANEOUS
Refills: 12 | OUTPATIENT
Start: 2024-03-18

## 2024-03-25 ENCOUNTER — READMISSION MANAGEMENT (OUTPATIENT)
Dept: CALL CENTER | Facility: HOSPITAL | Age: 69
End: 2024-03-25
Payer: MEDICARE

## 2024-03-26 ENCOUNTER — TRANSITIONAL CARE MANAGEMENT TELEPHONE ENCOUNTER (OUTPATIENT)
Dept: CALL CENTER | Facility: HOSPITAL | Age: 69
End: 2024-03-26
Payer: MEDICARE

## 2024-03-26 NOTE — OUTREACH NOTE
Call Center TCM Note      Flowsheet Row Responses   [facility] patient discharged from? Non-   Does the patient have one of the following disease processes/diagnoses(primary or secondary)? Other   TCM attempt successful? No   Unsuccessful attempts Attempt 1   Call Status Left message            Marilynn Burch RN    3/26/2024, 14:21 EDT

## 2024-03-26 NOTE — OUTREACH NOTE
Prep Survey      Flowsheet Row Responses   [facility] facility patient discharged from? Non-BH   Is LACE score < 7 ? Non-BH Discharge   Eligibility [facility]   Date of Discharge 03/25/24   Discharge Disposition Home or Self Care   Discharge diagnosis DEHYDRATION   Does the patient have one of the following disease processes/diagnoses(primary or secondary)? Other   Prep survey completed? Yes            Breanna RADFORD - Registered Nurse

## 2024-03-26 NOTE — OUTREACH NOTE
Call Center TCM Note      Flowsheet Row Responses   [facility] patient discharged from? Non-BH   Does the patient have one of the following disease processes/diagnoses(primary or secondary)? Other   TCM attempt successful? No  [VR from 2022 lists: Harley Madrid & Arben]   Unsuccessful attempts Attempt 2            Marilynn Burch, RN    3/26/2024, 16:35 EDT

## 2024-03-27 ENCOUNTER — TRANSITIONAL CARE MANAGEMENT TELEPHONE ENCOUNTER (OUTPATIENT)
Dept: CALL CENTER | Facility: HOSPITAL | Age: 69
End: 2024-03-27
Payer: MEDICARE

## 2024-03-27 NOTE — OUTREACH NOTE
Call Center TCM Note      Flowsheet Row Responses   [facility] patient discharged from? Non-  [[facility]]   Does the patient have one of the following disease processes/diagnoses(primary or secondary)? Other   TCM attempt successful? No  [VR from 2022 lists Harley Pham and Kassie]   Unsuccessful attempts Attempt 3  [spoke with tex Souza (listed on VR but who has not spoken with pt in a few years and is unable to provide an update) TCM call deferred, no answer at brother's number]            Kalli Toney RN    3/27/2024, 10:04 EDT

## 2024-03-28 ENCOUNTER — OFFICE VISIT (OUTPATIENT)
Dept: FAMILY MEDICINE CLINIC | Facility: CLINIC | Age: 69
End: 2024-03-28
Payer: MEDICARE

## 2024-03-28 VITALS
SYSTOLIC BLOOD PRESSURE: 94 MMHG | BODY MASS INDEX: 27.8 KG/M2 | WEIGHT: 173 LBS | HEART RATE: 72 BPM | OXYGEN SATURATION: 92 % | DIASTOLIC BLOOD PRESSURE: 58 MMHG | HEIGHT: 66 IN

## 2024-03-28 DIAGNOSIS — I95.9 HYPOTENSION, UNSPECIFIED HYPOTENSION TYPE: ICD-10-CM

## 2024-03-28 DIAGNOSIS — Z94.4 HISTORY OF LIVER TRANSPLANT: ICD-10-CM

## 2024-03-28 DIAGNOSIS — D64.9 ANEMIA, UNSPECIFIED TYPE: ICD-10-CM

## 2024-03-28 DIAGNOSIS — N18.9 ACUTE KIDNEY INJURY SUPERIMPOSED ON CKD: ICD-10-CM

## 2024-03-28 DIAGNOSIS — N40.1 BPH ASSOCIATED WITH NOCTURIA: ICD-10-CM

## 2024-03-28 DIAGNOSIS — E11.65 TYPE 2 DIABETES MELLITUS WITH HYPERGLYCEMIA, WITH LONG-TERM CURRENT USE OF INSULIN: ICD-10-CM

## 2024-03-28 DIAGNOSIS — Z00.00 ENCOUNTER FOR SUBSEQUENT ANNUAL WELLNESS VISIT (AWV) IN MEDICARE PATIENT: Primary | ICD-10-CM

## 2024-03-28 DIAGNOSIS — N17.9 ACUTE KIDNEY INJURY SUPERIMPOSED ON CKD: ICD-10-CM

## 2024-03-28 DIAGNOSIS — Z79.4 TYPE 2 DIABETES MELLITUS WITH HYPERGLYCEMIA, WITH LONG-TERM CURRENT USE OF INSULIN: ICD-10-CM

## 2024-03-28 DIAGNOSIS — I10 ESSENTIAL HYPERTENSION: ICD-10-CM

## 2024-03-28 DIAGNOSIS — F19.10 POLYSUBSTANCE ABUSE: ICD-10-CM

## 2024-03-28 DIAGNOSIS — L03.311 CELLULITIS OF ABDOMINAL WALL: ICD-10-CM

## 2024-03-28 DIAGNOSIS — E83.42 HYPOMAGNESEMIA: ICD-10-CM

## 2024-03-28 DIAGNOSIS — R35.1 BPH ASSOCIATED WITH NOCTURIA: ICD-10-CM

## 2024-03-28 LAB
EXPIRATION DATE: ABNORMAL
HBA1C MFR BLD: 9.8 % (ref 4.5–5.7)
Lab: ABNORMAL

## 2024-03-28 RX ORDER — INSULIN GLARGINE 100 [IU]/ML
25 INJECTION, SOLUTION SUBCUTANEOUS 2 TIMES DAILY
COMMUNITY

## 2024-03-28 RX ORDER — METOPROLOL SUCCINATE 50 MG/1
50 TABLET, EXTENDED RELEASE ORAL DAILY
COMMUNITY

## 2024-03-28 RX ORDER — DOXAZOSIN MESYLATE 4 MG/1
4 TABLET ORAL NIGHTLY
COMMUNITY

## 2024-03-28 NOTE — PROGRESS NOTES
"The ABCs of the Annual Wellness Visit  Subsequent Medicare Wellness Visit    Subjective    Quirino Sheppard is a 68 y.o. male who presents for a Subsequent Medicare Wellness Visit.    The following portions of the patient's history were reviewed and   updated as appropriate: allergies, current medications, past family history, past medical history, past social history, past surgical history, and problem list.    Compared to one year ago, the patient feels his physical   health is the same.    Compared to one year ago, the patient feels his mental   health is the same.    Recent Hospitalizations:  He was admitted within the past 365 days at [facility] [place]   hospital.       Current Medical Providers:  Patient Care Team:  Andrew Fernandez MD as PCP - General (Family Medicine)    Outpatient Medications Prior to Visit   Medication Sig Dispense Refill    aspirin  MG tablet Take 1 tablet by mouth Daily. 90 tablet 3    atorvastatin (LIPITOR) 80 MG tablet Take 1 tablet by mouth every night at bedtime. 90 tablet 1    brimonidine-timolol (COMBIGAN) 0.2-0.5 % ophthalmic solution Administer 1 drop to both eyes Every 12 (Twelve) Hours.      buPROPion XL (WELLBUTRIN XL) 300 MG 24 hr tablet TAKE ONE TABLET BY MOUTH DAILY 28 tablet 1    Continuous Blood Gluc Sensor (Dexcom G6 Sensor) Every 10 (Ten) Days. 3 each 11    Continuous Blood Gluc Transmit (Dexcom G6 Transmitter) misc 1 each Every 3 (Three) Months. 1 each 3    D3 Super Strength 50 MCG (2000 UT) capsule TAKE 1 CAPSULE BY MOUTH EVERY DAY 30 capsule 1    doxazosin (CARDURA) 4 MG tablet Take 1 tablet by mouth Every Night.      Global Inject Ease Insulin Syr 31G X 5/16\" 1 ML misc USE AS DIRECTED PER  each 11    glucose blood test strip 3 times daily to 4 times daily as directed 100 each 12    insulin aspart (NovoLOG FlexPen) 100 UNIT/ML solution pen-injector sc pen INJECT BY SUBCUTANEOUS ROUTE AS PER INSULIN SLIDING SCALE PROTOCOL ( 25-50 UNITS) BEFORE MEALS 15 mL 1    insulin " glargine (LANTUS, SEMGLEE) 100 UNIT/ML injection Inject 25 Units under the skin into the appropriate area as directed 2 (Two) Times a Day.      L-Methylfolate-B6-B12 3-35-2 MG tablet @@TAKE ONE TABLET BY MOUTH DAILY 30 tablet 2    Magnesium Oxide -Mg Supplement 400 (240 Mg) MG tablet TAKE ONE TABLET BY MOUTH DAILY 30 tablet 1    metFORMIN (GLUCOPHAGE) 500 MG tablet Take 1 tablet by mouth 2 (Two) Times a Day With Meals.      metoprolol succinate XL (TOPROL-XL) 50 MG 24 hr tablet Take 1 tablet by mouth Daily.      multivitamin (One-Daily Multi-Vitamin) tablet tablet Take 1 tablet by mouth Daily. 90 tablet 3    OneTouch Delica Lancets 33G misc 1 each 4 (Four) Times a Day. 100 each 12    OneTouch Verio test strip USE TO TEST THREE TO FOUR TIMES A  each 6    pantoprazole (PROTONIX) 40 MG EC tablet TAKE ONE TABLET BY MOUTH DAILY 28 tablet 3    ramipril (ALTACE) 10 MG capsule TAKE 1 CAPSULE BY MOUTH EVERY DAY 28 capsule 1    Saw Palmetto 160 MG capsule Take 2 capsules po twice a day 120 each 0    sertraline (ZOLOFT) 100 MG tablet TAKE 1&1/2 TABLETS BY MOUTH EVERY DAY 42 tablet 1    sirolimus (RAPAMUNE) 2 MG tablet Take 1 tablet by mouth Daily.      vitamin E 1000 UNIT capsule Take 1 capsule by mouth Daily.      mupirocin (Bactroban) 2 % cream Apply 1 application topically to the appropriate area as directed 3 (Three) Times a Day. 60 each 0    insulin NPH (NovoLIN N) 100 UNIT/ML injection Inject 40-60 Units under the skin into the appropriate area as directed Every 12 (Twelve) Hours. (Patient not taking: Reported on 3/28/2024) 3 each 11    metoprolol tartrate (LOPRESSOR) 25 MG tablet TAKE ONE TABLET BY MOUTH TWICE A DAY (Patient not taking: Reported on 3/28/2024) 56 tablet 1    tamsulosin (FLOMAX) 0.4 MG capsule 24 hr capsule TAKE 1 CAPSULE BY MOUTH EVERY DAY WITH FOOD (Patient not taking: Reported on 3/28/2024) 28 capsule 5    vitamin D3 (D-3-5) 125 MCG (5000 UT) capsule capsule TAKE 1 CAPSULE BY MOUTH EVERY DAY  (Patient not taking: Reported on 3/28/2024) 28 capsule 3    vitamin D3 125 MCG (5000 UT) capsule capsule Take 1 capsule by mouth Daily. (Patient not taking: Reported on 3/28/2024) 90 capsule 0    vitamin D3 125 MCG (5000 UT) capsule capsule Take 1 capsule by mouth Daily. (Patient not taking: Reported on 3/28/2024) 30 capsule 2     No facility-administered medications prior to visit.       No opioid medication identified on active medication list. I have reviewed chart for other potential  high risk medication/s and harmful drug interactions in the elderly.        Aspirin is on active medication list. Aspirin use is indicated based on review of current medical condition/s. Pros and cons of this therapy have been discussed today. Benefits of this medication outweigh potential harm.  Patient has been encouraged to continue taking this medication.  .      Patient Active Problem List   Diagnosis    Coronary artery disease involving native coronary artery of native heart with angina pectoris    Hepatitis C    Hx of liver transplant    Essential hypertension    Hyperlipidemia LDL goal <70    Type 2 diabetes mellitus    S/P CABG x 3    BPH associated with nocturia    Degeneration of lumbar intervertebral disc    Disorder of sulfur-bearing amino acid metabolism    Gastroesophageal reflux disease without esophagitis    Obstructive sleep apnea syndrome    Presence of aortocoronary bypass graft    Recurrent major depression in full remission    Severe obesity    Coronary artery disease involving native coronary artery of native heart without angina pectoris    Cellulitis of abdominal wall     Advance Care Planning   Advance Care Planning     Advance Directive is not on file.  ACP discussion was held with the patient during this visit. Patient has an advance directive (not in EMR), copy requested. daughter would be his healthcare surrogate Kassie Xie     Objective    Vitals:    03/28/24 1310 03/28/24 1358 03/28/24 1515   BP:   "(!) 84/58 94/58   Cuff Size:  Adult Adult   Pulse: 72     SpO2: 92%     Weight: 78.5 kg (173 lb)     Height: 167.6 cm (66\")       Estimated body mass index is 27.92 kg/m² as calculated from the following:    Height as of this encounter: 167.6 cm (66\").    Weight as of this encounter: 78.5 kg (173 lb).           Does the patient have evidence of cognitive impairment? No    Lab Results   Component Value Date    HGBA1C 9.8 (A) 03/28/2024        HEALTH RISK ASSESSMENT    Smoking Status:  Social History     Tobacco Use   Smoking Status Never   Smokeless Tobacco Never     Alcohol Consumption:  Social History     Substance and Sexual Activity   Alcohol Use No    Comment: PT QUIT DRINKING IN 1994     Fall Risk Screen:    BRITNEY Fall Risk Assessment was completed, and patient is at HIGH risk for falls. Assessment completed on:3/28/2024    Depression Screening:      3/28/2024     1:11 PM   PHQ-2/PHQ-9 Depression Screening   Little Interest or Pleasure in Doing Things 0-->not at all   Feeling Down, Depressed or Hopeless 0-->not at all   PHQ-9: Brief Depression Severity Measure Score 0       Health Habits and Functional and Cognitive Screening:      3/28/2024     1:11 PM   Functional & Cognitive Status   Do you have difficulty preparing food and eating? No   Do you have difficulty bathing yourself, getting dressed or grooming yourself? No   Do you have difficulty using the toilet? No   Do you have difficulty moving around from place to place? No   Do you have trouble with steps or getting out of a bed or a chair? No   Current Diet Well Balanced Diet   Dental Exam Not up to date   Eye Exam Not up to date   Exercise (times per week) 0 times per week   Current Exercises Include No Regular Exercise   Do you need help using the phone?  No   Are you deaf or do you have serious difficulty hearing?  No   Do you need help to go to places out of walking distance? No   Do you need help shopping? No   Do you need help preparing meals?  No "   Do you need help with housework?  No   Do you need help with laundry? No   Do you need help taking your medications? No   Do you need help managing money? No   Do you ever drive or ride in a car without wearing a seat belt? No   Have you felt unusual stress, anger or loneliness in the last month? No   Who do you live with? Other   If you need help, do you have trouble finding someone available to you? No   Do you have difficulty concentrating, remembering or making decisions? No       Age-appropriate Screening Schedule:  Refer to the list below for future screening recommendations based on patient's age, sex and/or medical conditions. Orders for these recommended tests are listed in the plan section. The patient has been provided with a written plan.    Health Maintenance   Topic Date Due    ZOSTER VACCINE (1 of 2) Never done    RSV Vaccine - Adults (1 - 1-dose 60+ series) Never done    DIABETIC FOOT EXAM  Never done    DIABETIC EYE EXAM  03/26/2019    INFLUENZA VACCINE  08/01/2023    COVID-19 Vaccine (5 - 2023-24 season) 09/01/2023    ANNUAL WELLNESS VISIT  02/16/2024    LIPID PANEL  02/16/2024    BMI FOLLOWUP  04/13/2024    URINE MICROALBUMIN  09/06/2024    HEMOGLOBIN A1C  09/28/2024    TDAP/TD VACCINES (2 - Td or Tdap) 11/20/2025    COLORECTAL CANCER SCREENING  12/18/2025    HEPATITIS C SCREENING  Completed    Pneumococcal Vaccine 65+  Completed    Hepatitis B  Aged Out                  CMS Preventative Services Quick Reference  Risk Factors Identified During Encounter  Drug Use/Abuse Identified or Suspected:  Patient is actively involved in [facility] and relates that he has not been using drugs as was listed on the drug screen from the hospital.  And he no longer drinks alcohol as he has had a liver transplant.  The above risks/problems have been discussed with the patient.  Pertinent information has been shared with the patient in the After Visit Summary.  An After Visit Summary and PPPS were made  available to the patient.    Follow Up:   Next Medicare Wellness visit to be scheduled in 1 year.       Additional E&M Note during same encounter follows:  Patient has multiple medical problems which are significant and separately identifiable that require additional work above and beyond the Medicare Wellness Visit.      Chief Complaint  No chief complaint on file.    Subjective        HPI  Quirino Sheppard is also being seen today for TCM as he was recently in the hospital.  He relates that he has not picked up any of his new medicines as directed.  He relates that he is doing well.    He was admitted March 22 and discharged March 25 for uncontrolled hypertension  Acute kidney injury on top of CKD 3  Hypomagnesia him  Uncontrolled diabetes  History liver transplant  BPH  History of polysubstance abuse-urine drug screen positive for cocaine methamphetamine and marijuana.  Patient denies any recent substance abuse and states he does not drink alcohol due to his history of liver transplant.      He is supposed to be seeing the wound care for his abdomen wound and has a wound covering the right upper half of his abdomen.    For his blood pressure they switched Lopressor 25 twice daily to Toprol 50 once a day and nephrology starting him on lisinopril 20.  But at home he is on ramipril 10 and he has not yet picked up the metoprolol 50    He was on NPH 60 units twice daily at home in the hospital they switched to Lantus 25 units twice daily he has not yet picked up the Lantus.  They also recommended metformin 500 mg twice daily    In the hospital they switched him to doxazosin 4 mg and stop tamsulosin he has not yet picked up the doxazosin and is continuing on tamsulosin.    He relates that he has not drank a whole lot of fluids today.  And just took his blood pressure medicines.    He denies any chest pains palpitation shortness of breath no syncope no dizziness or lightheadedness.  No GI issues.  Review of Systems  "  Constitutional: Negative for fatigue and fever.   Respiratory: Negative for cough and shortness of breath.    Cardiovascular: Negative for chest pain and palpitations.   Skin: Negative for rash or itching  He denies any recent drug use.         Objective   Vital Signs:  BP 94/58 (Cuff Size: Adult)   Pulse 72   Ht 167.6 cm (66\")   Wt 78.5 kg (173 lb)   SpO2 92%   BMI 27.92 kg/m²     Physical Exam  Vitals and nursing note reviewed.   Constitutional:       Appearance: Normal appearance.      Comments: Alert oriented poorly kempt white male in no acute distress   HENT:      Head: Normocephalic and atraumatic.      Nose: Nose normal.      Mouth/Throat:      Mouth: Mucous membranes are moist.      Pharynx: Oropharynx is clear.   Cardiovascular:      Rate and Rhythm: Normal rate and regular rhythm.   Pulmonary:      Effort: Pulmonary effort is normal.      Breath sounds: Normal breath sounds.   Abdominal:      Palpations: Abdomen is soft.      Tenderness: There is no abdominal tenderness.      Comments: He does have a plastic wound cover about the size of the volleyball in the right upper quadrant of his abdomen.  Starting to come loose on the medial edge.  I do not see any drainage.  However there is a foul smell filling the room.   Musculoskeletal:         General: Normal range of motion.      Cervical back: Normal range of motion and neck supple.      Right lower leg: No edema.      Left lower leg: No edema.   Skin:     General: Skin is warm and dry.   Neurological:      General: No focal deficit present.      Mental Status: He is alert.   Psychiatric:         Mood and Affect: Mood normal.         Behavior: Behavior normal.              Discharge summary and hospitalist progress note reviewed labs on the 25th showed a sugar of 225   magnesium 1.8 sodium 135  Creatinine 1.29  Calcium 8.9  Potassium 4.3  White blood cell count 8.4  Hemoglobin 11.7  Platelet count 191    ECG 12 Lead    Date/Time: 3/28/2024 4:49 " PM  Performed by: Andrew Fernandez MD    Authorized by: Andrew Fernandez MD  Comparison: compared with previous ECG from 6/19/2021  Rhythm: sinus rhythm  Rate: normal  BPM: 67  Conduction: 1st degree AV block  T inversion: I and aVL  QRS axis: normal  Other findings: non-specific ST-T wave changes    Clinical impression: non-specific ECG  Comments: T wave inversion unchanged from previous              Assessment and Plan   Diagnoses and all orders for this visit:    1. Encounter for subsequent annual wellness visit (AWV) in Medicare patient (Primary)    2. Type 2 diabetes mellitus with hyperglycemia, with long-term current use of insulin  -     POC Glycosylated Hemoglobin (Hb A1C)  -     Comprehensive Metabolic Panel; Future  -     CBC Auto Differential; Future    3. Essential hypertension    4. Polysubstance abuse    5. Acute kidney injury superimposed on CKD    6. Hypomagnesemia  -     Magnesium; Future    7. History of liver transplant    8. Cellulitis of abdominal wall    9. Anemia, unspecified type  -     CBC Auto Differential; Future    10. BPH associated with nocturia    11. Hypotension, unspecified hypotension type    Annual wellness completed    TCM completed highly complex    Due to his hypotension he was observed here in the office given water at least 312 ounce glasses and observed EKG was done blood pressure was repeated multiple times    He declined going to the ER so he was treated here in the office for a couple hours with fluids orally he was totally asymptomatic blood pressure did improve after fluids.    I told him to cut the dose of metoprolol in half told him not to take the doxazosin just to continue with his tamsulosin    Regarding his insulin his A1c here in the office was 9.8 today he may switch over to the Lantus as directed if he would like certainly keep an eye on his sugars but checking Accu-Cheks regularly drink plenty of water to keep his fluid status up and will follow-up for free blood  pressure that check tomorrow    Otherwise I will see him in 2 weeks for repeat blood work.    If he has any chest pain shortness of breath passing out go to the ER immediately.    Instructed him to see the wound care told him to keep things clean make sure he takes a good shower and follow-up with the wound care doctors make sure his abdominal wound is not getting infected.    Continue to not use cocaine methamphetamine or marijuana and certainly not to drink any alcohol.    Time of visit over 2 hours  5 minutes to review EKG     I spent 120 minutes caring for Quirino on this date of service. This time includes time spent by me in the following activities:preparing for the visit, reviewing tests, obtaining and/or reviewing a separately obtained history, performing a medically appropriate examination and/or evaluation , counseling and educating the patient/family/caregiver, ordering medications, tests, or procedures, and documenting information in the medical record  Follow Up   Return in about 2 weeks (around 4/11/2024) for Recheck, Labs prior next visit.  Patient was given instructions and counseling regarding his condition or for health maintenance advice. Please see specific information pulled into the AVS if appropriate.

## 2024-03-30 DIAGNOSIS — E53.8 VITAMIN B 12 DEFICIENCY: ICD-10-CM

## 2024-03-30 DIAGNOSIS — E78.2 MIXED HYPERLIPIDEMIA: ICD-10-CM

## 2024-03-30 DIAGNOSIS — Z79.899 HIGH RISK MEDICATION USE: ICD-10-CM

## 2024-04-01 ENCOUNTER — TELEPHONE (OUTPATIENT)
Dept: FAMILY MEDICINE CLINIC | Facility: CLINIC | Age: 69
End: 2024-04-01

## 2024-04-01 RX ORDER — ATORVASTATIN CALCIUM 80 MG/1
80 TABLET, FILM COATED ORAL
Qty: 28 TABLET | Refills: 1 | Status: SHIPPED | OUTPATIENT
Start: 2024-04-01

## 2024-04-01 RX ORDER — MECOBAL/LEVOMEFOLAT CA/B6 PHOS 2-3-35 MG
TABLET ORAL
Qty: 28 TABLET | Refills: 11 | Status: SHIPPED | OUTPATIENT
Start: 2024-04-01

## 2024-04-01 NOTE — TELEPHONE ENCOUNTER
Caller: Quirino Sheppard    Relationship: Self    Best call back number: [phone]    What is the best time to reach you: ANYTIME    Who are you requesting to speak with (clinical staff, provider,  specific staff member): CLINICAL STAFF    Do you know the name of the person who called: PATIENT/ QUIRINO    What was the call regarding: HIS MEDICATION THAT WAS CHANGED    Is it okay if the provider responds through MyChart:

## 2024-04-02 ENCOUNTER — TELEPHONE (OUTPATIENT)
Dept: FAMILY MEDICINE CLINIC | Facility: CLINIC | Age: 69
End: 2024-04-02
Payer: MEDICARE

## 2024-04-02 NOTE — TELEPHONE ENCOUNTER
Hub may relay     tried to call him back--no answer, left message    Just need to know which of the new medicines he was concerned about and also he really needs to be in for follow-up and recheck blood pressure etc. this week--he may stop in for free blood pressure check anytime

## 2024-04-05 ENCOUNTER — LAB (OUTPATIENT)
Dept: FAMILY MEDICINE CLINIC | Facility: CLINIC | Age: 69
End: 2024-04-05
Payer: MEDICARE

## 2024-04-05 DIAGNOSIS — E11.65 TYPE 2 DIABETES MELLITUS WITH HYPERGLYCEMIA, WITH LONG-TERM CURRENT USE OF INSULIN: ICD-10-CM

## 2024-04-05 DIAGNOSIS — Z79.899 HIGH RISK MEDICATION USE: ICD-10-CM

## 2024-04-05 DIAGNOSIS — E55.9 VITAMIN D DEFICIENCY: ICD-10-CM

## 2024-04-05 DIAGNOSIS — Z79.4 TYPE 2 DIABETES MELLITUS WITH HYPERGLYCEMIA, WITH LONG-TERM CURRENT USE OF INSULIN: ICD-10-CM

## 2024-04-05 PROCEDURE — 36415 COLL VENOUS BLD VENIPUNCTURE: CPT | Performed by: FAMILY MEDICINE

## 2024-04-06 ENCOUNTER — TELEPHONE (OUTPATIENT)
Dept: FAMILY MEDICINE CLINIC | Facility: CLINIC | Age: 69
End: 2024-04-06
Payer: MEDICARE

## 2024-04-06 LAB
25(OH)D3+25(OH)D2 SERPL-MCNC: 30.4 NG/ML (ref 30–100)
ALBUMIN SERPL-MCNC: 3.9 G/DL (ref 3.9–4.9)
ALBUMIN/GLOB SERPL: 1.1 {RATIO} (ref 1.2–2.2)
ALP SERPL-CCNC: 177 IU/L (ref 44–121)
ALT SERPL-CCNC: 20 IU/L (ref 0–44)
AST SERPL-CCNC: 13 IU/L (ref 0–40)
BASOPHILS # BLD AUTO: 0 X10E3/UL (ref 0–0.2)
BASOPHILS NFR BLD AUTO: 0 %
BILIRUB SERPL-MCNC: 0.4 MG/DL (ref 0–1.2)
BUN SERPL-MCNC: 19 MG/DL (ref 8–27)
BUN/CREAT SERPL: 14 (ref 10–24)
CALCIUM SERPL-MCNC: 9.2 MG/DL (ref 8.6–10.2)
CHLORIDE SERPL-SCNC: 96 MMOL/L (ref 96–106)
CO2 SERPL-SCNC: 24 MMOL/L (ref 20–29)
CREAT SERPL-MCNC: 1.35 MG/DL (ref 0.76–1.27)
EGFRCR SERPLBLD CKD-EPI 2021: 57 ML/MIN/1.73
EOSINOPHIL # BLD AUTO: 0.1 X10E3/UL (ref 0–0.4)
EOSINOPHIL NFR BLD AUTO: 1 %
ERYTHROCYTE [DISTWIDTH] IN BLOOD BY AUTOMATED COUNT: 17 % (ref 11.6–15.4)
GLOBULIN SER CALC-MCNC: 3.5 G/DL (ref 1.5–4.5)
GLUCOSE SERPL-MCNC: 293 MG/DL (ref 70–99)
HCT VFR BLD AUTO: 39.3 % (ref 37.5–51)
HGB BLD-MCNC: 12.3 G/DL (ref 13–17.7)
IMM GRANULOCYTES # BLD AUTO: 0 X10E3/UL (ref 0–0.1)
IMM GRANULOCYTES NFR BLD AUTO: 0 %
LYMPHOCYTES # BLD AUTO: 1.2 X10E3/UL (ref 0.7–3.1)
LYMPHOCYTES NFR BLD AUTO: 14 %
MAGNESIUM SERPL-MCNC: 1.4 MG/DL (ref 1.6–2.3)
MCH RBC QN AUTO: 26.5 PG (ref 26.6–33)
MCHC RBC AUTO-ENTMCNC: 31.3 G/DL (ref 31.5–35.7)
MCV RBC AUTO: 85 FL (ref 79–97)
MONOCYTES # BLD AUTO: 0.6 X10E3/UL (ref 0.1–0.9)
MONOCYTES NFR BLD AUTO: 7 %
NEUTROPHILS # BLD AUTO: 6.7 X10E3/UL (ref 1.4–7)
NEUTROPHILS NFR BLD AUTO: 78 %
PLATELET # BLD AUTO: 223 X10E3/UL (ref 150–450)
POTASSIUM SERPL-SCNC: 4.5 MMOL/L (ref 3.5–5.2)
PROT SERPL-MCNC: 7.4 G/DL (ref 6–8.5)
RBC # BLD AUTO: 4.65 X10E6/UL (ref 4.14–5.8)
SODIUM SERPL-SCNC: 134 MMOL/L (ref 134–144)
WBC # BLD AUTO: 8.6 X10E3/UL (ref 3.4–10.8)

## 2024-04-06 RX ORDER — LANOLIN ALCOHOL/MO/W.PET/CERES
1 CREAM (GRAM) TOPICAL DAILY
Qty: 30 TABLET | Refills: 1 | Status: SHIPPED | OUTPATIENT
Start: 2024-04-06

## 2024-04-06 NOTE — TELEPHONE ENCOUNTER
Hub may relay    Called no answer left message for him to call back    Magnesium was a little low on his blood work I want him to continue taking his magnesium pills and then call me back with other questions he had about his blood pressure medicines etc. and he also needs to come in for free blood pressure check and appointments soon

## 2024-04-17 ENCOUNTER — TELEPHONE (OUTPATIENT)
Dept: FAMILY MEDICINE CLINIC | Facility: CLINIC | Age: 69
End: 2024-04-17

## 2024-04-17 NOTE — TELEPHONE ENCOUNTER
Caller: Quirino Sheppard    Relationship: Self    Best call back number: [phone]    What medication are you requesting: FOR GOUT    What are your current symptoms: GOUT PAIN IN LEFT FOOT    How long have you been experiencing symptoms: 3 DAYS    Have you had these symptoms before:    [x] Yes  [] No    Have you been treated for these symptoms before:   [x] Yes  [] No    If a prescription is needed, what is your preferred pharmacy and phone number: [address] [phone] [facility] [phone]

## 2024-04-18 ENCOUNTER — TELEPHONE (OUTPATIENT)
Dept: FAMILY MEDICINE CLINIC | Facility: CLINIC | Age: 69
End: 2024-04-18
Payer: MEDICARE

## 2024-04-18 NOTE — TELEPHONE ENCOUNTER
HUB TO RELAY       PT LEFT A MESSAGE ABOUT HAVING GOUT PAIN , DR COTA WOULD LIKE HIM TO HAVE AN APPT. PLEASE ADVISE AND SCHEDULE.

## 2024-04-19 ENCOUNTER — OFFICE VISIT (OUTPATIENT)
Dept: FAMILY MEDICINE CLINIC | Facility: CLINIC | Age: 69
End: 2024-04-19
Payer: MEDICARE

## 2024-04-19 VITALS
BODY MASS INDEX: 30.73 KG/M2 | RESPIRATION RATE: 15 BRPM | SYSTOLIC BLOOD PRESSURE: 98 MMHG | HEART RATE: 95 BPM | OXYGEN SATURATION: 100 % | WEIGHT: 180 LBS | HEIGHT: 64 IN | DIASTOLIC BLOOD PRESSURE: 64 MMHG

## 2024-04-19 DIAGNOSIS — E83.42 HYPOMAGNESEMIA: ICD-10-CM

## 2024-04-19 DIAGNOSIS — D64.9 ANEMIA, UNSPECIFIED TYPE: ICD-10-CM

## 2024-04-19 DIAGNOSIS — Z79.4 TYPE 2 DIABETES MELLITUS WITH HYPERGLYCEMIA, WITH LONG-TERM CURRENT USE OF INSULIN: ICD-10-CM

## 2024-04-19 DIAGNOSIS — E11.65 TYPE 2 DIABETES MELLITUS WITH HYPERGLYCEMIA, WITH LONG-TERM CURRENT USE OF INSULIN: ICD-10-CM

## 2024-04-19 DIAGNOSIS — M25.572 ARTHRALGIA OF LEFT FOOT: ICD-10-CM

## 2024-04-19 DIAGNOSIS — S09.90XA INJURY OF HEAD, INITIAL ENCOUNTER: Primary | ICD-10-CM

## 2024-04-19 PROCEDURE — 36415 COLL VENOUS BLD VENIPUNCTURE: CPT | Performed by: STUDENT IN AN ORGANIZED HEALTH CARE EDUCATION/TRAINING PROGRAM

## 2024-04-19 NOTE — TELEPHONE ENCOUNTER
Name: Quirino Sheppard      Relationship: Self      Best Callback Number:  [phone]       HUB PROVIDED THE RELAY MESSAGE FROM THE OFFICE      PATIENT: SCHEDULED PER NOTE    ADDITIONAL INFORMATION: NOTHING AVAILABLE WITH PCP UNTIL MID MAY. ALREADY SCHEDULED WITH HIM THEN.  SCHEDULED FOR TODAY WITH DO POWERS.

## 2024-04-20 LAB
ALBUMIN SERPL-MCNC: NORMAL G/DL
ALP SERPL-CCNC: NORMAL U/L
ALT SERPL-CCNC: NORMAL U/L
AST SERPL-CCNC: NORMAL U/L
BILIRUB SERPL-MCNC: NORMAL MG/DL
BUN SERPL-MCNC: NORMAL MG/DL
CALCIUM SERPL-MCNC: NORMAL MG/DL
CHLORIDE SERPL-SCNC: NORMAL MMOL/L
CO2 SERPL-SCNC: NORMAL MMOL/L
CREAT SERPL-MCNC: NORMAL MG/DL
GLUCOSE SERPL-MCNC: NORMAL MG/DL
MAGNESIUM SERPL-MCNC: 1.9 MG/DL (ref 1.6–2.3)
POTASSIUM SERPL-SCNC: NORMAL MMOL/L
PROT SERPL-MCNC: NORMAL G/DL
SODIUM SERPL-SCNC: NORMAL MMOL/L
SPECIMEN STATUS: NORMAL

## 2024-04-21 LAB
ALBUMIN SERPL-MCNC: 4.1 G/DL (ref 3.9–4.9)
ALBUMIN/GLOB SERPL: 1.5 {RATIO} (ref 1.2–2.2)
ALP SERPL-CCNC: 63 IU/L (ref 44–121)
ALT SERPL-CCNC: 23 IU/L (ref 0–44)
AST SERPL-CCNC: 22 IU/L (ref 0–40)
BASOPHILS # BLD AUTO: NORMAL 10*3/UL
BILIRUB SERPL-MCNC: <0.2 MG/DL (ref 0–1.2)
BUN SERPL-MCNC: 19 MG/DL (ref 8–27)
BUN/CREAT SERPL: 18 (ref 10–24)
CALCIUM SERPL-MCNC: 9 MG/DL (ref 8.6–10.2)
CHLORIDE SERPL-SCNC: 106 MMOL/L (ref 96–106)
CO2 SERPL-SCNC: 18 MMOL/L (ref 20–29)
CREAT SERPL-MCNC: 1.05 MG/DL (ref 0.76–1.27)
EGFRCR SERPLBLD CKD-EPI 2021: 77 ML/MIN/1.73
EOSINOPHIL # BLD AUTO: NORMAL 10*3/UL
EOSINOPHIL NFR BLD AUTO: NORMAL %
GLOBULIN SER CALC-MCNC: 2.7 G/DL (ref 1.5–4.5)
GLUCOSE SERPL-MCNC: 104 MG/DL (ref 70–99)
HCT VFR BLD AUTO: NORMAL %
HGB BLD-MCNC: NORMAL G/DL
LYMPHOCYTES # BLD AUTO: NORMAL 10*3/UL
LYMPHOCYTES NFR BLD AUTO: NORMAL %
MONOCYTES NFR BLD AUTO: NORMAL %
NEUTROPHILS NFR BLD AUTO: NORMAL %
PLATELET # BLD AUTO: NORMAL 10*3/UL
POTASSIUM SERPL-SCNC: 4.2 MMOL/L (ref 3.5–5.2)
PROT SERPL-MCNC: 6.8 G/DL (ref 6–8.5)
RBC # BLD AUTO: NORMAL 10*6/UL
SODIUM SERPL-SCNC: 140 MMOL/L (ref 134–144)
WBC # BLD AUTO: NORMAL X10E3/UL

## 2024-04-24 DIAGNOSIS — F32.5 MAJOR DEPRESSIVE DISORDER WITH SINGLE EPISODE, IN FULL REMISSION: ICD-10-CM

## 2024-04-24 DIAGNOSIS — K21.9 GASTROESOPHAGEAL REFLUX DISEASE, UNSPECIFIED WHETHER ESOPHAGITIS PRESENT: ICD-10-CM

## 2024-04-24 DIAGNOSIS — I10 ESSENTIAL HYPERTENSION: ICD-10-CM

## 2024-04-24 DIAGNOSIS — I25.119 CORONARY ARTERY DISEASE INVOLVING NATIVE CORONARY ARTERY OF NATIVE HEART WITH ANGINA PECTORIS: ICD-10-CM

## 2024-04-25 ENCOUNTER — TELEPHONE (OUTPATIENT)
Dept: FAMILY MEDICINE CLINIC | Facility: CLINIC | Age: 69
End: 2024-04-25
Payer: MEDICARE

## 2024-04-25 RX ORDER — TAMSULOSIN HYDROCHLORIDE 0.4 MG/1
CAPSULE ORAL
Qty: 28 CAPSULE | Refills: 5 | OUTPATIENT
Start: 2024-04-25

## 2024-04-25 RX ORDER — PANTOPRAZOLE SODIUM 40 MG/1
40 TABLET, DELAYED RELEASE ORAL DAILY
Qty: 28 TABLET | Refills: 5 | Status: SHIPPED | OUTPATIENT
Start: 2024-04-25

## 2024-04-25 RX ORDER — SERTRALINE HYDROCHLORIDE 100 MG/1
150 TABLET, FILM COATED ORAL DAILY
Qty: 42 TABLET | Refills: 5 | Status: SHIPPED | OUTPATIENT
Start: 2024-04-25

## 2024-04-25 RX ORDER — ASPIRIN 325 MG
TABLET, DELAYED RELEASE (ENTERIC COATED) ORAL
Qty: 28 TABLET | Refills: 3 | Status: SHIPPED | OUTPATIENT
Start: 2024-04-25

## 2024-04-25 RX ORDER — MULTIVIT-MINERALS/FA/LYCOPENE 0.4 MG-6
TABLET ORAL
Qty: 28 TABLET | Refills: 3 | Status: SHIPPED | OUTPATIENT
Start: 2024-04-25

## 2024-04-25 RX ORDER — RAMIPRIL 10 MG/1
10 CAPSULE ORAL DAILY
Qty: 28 CAPSULE | Refills: 5 | Status: SHIPPED | OUTPATIENT
Start: 2024-04-25

## 2024-04-25 RX ORDER — CHOLECALCIFEROL (VITAMIN D3) 125 MCG
1 CAPSULE ORAL DAILY
Qty: 28 CAPSULE | Refills: 1 | Status: SHIPPED | OUTPATIENT
Start: 2024-04-25

## 2024-04-25 RX ORDER — BUPROPION HYDROCHLORIDE 300 MG/1
300 TABLET ORAL DAILY
Qty: 28 TABLET | Refills: 5 | Status: SHIPPED | OUTPATIENT
Start: 2024-04-25

## 2024-04-25 NOTE — TELEPHONE ENCOUNTER
HUB TO RELAY    PLEASE RESCHEDULE PATIENT. HER APPOINTMENT WITH DR. COTA ON 5/8 NEEDS RESCHEDULED DUE TO HIM BEING OUT OF THE OFFICE THAT DAY    UNABLE TO LEAVE PATIENT VOICEMAIL DUE TO MAILBOX BEING FULL.

## 2024-06-18 ENCOUNTER — TELEPHONE (OUTPATIENT)
Dept: FAMILY MEDICINE CLINIC | Facility: CLINIC | Age: 69
End: 2024-06-18
Payer: MEDICARE

## 2024-06-18 NOTE — TELEPHONE ENCOUNTER
[facility] HHALLED AND STATED THEY NEEDED AN ORDER FOR A ROLLING WALKER AND WHEELCHAIR       KAELA - [phone]

## 2024-07-16 ENCOUNTER — TELEPHONE (OUTPATIENT)
Dept: FAMILY MEDICINE CLINIC | Facility: CLINIC | Age: 69
End: 2024-07-16
Payer: MEDICARE

## 2024-07-16 NOTE — TELEPHONE ENCOUNTER
YOLA WITH [facility] CALLED WANTING TO MAKE DR. COTA AWARE THEY ARE WANTING TO ORDER PATIENT A ROLLING WALKER AND A WHEELCHAIR. SHE REQUESTED TO BE CONTACTED -[phone]

## 2024-07-17 NOTE — TELEPHONE ENCOUNTER
Andrew Fernandez MD  [facility]21 hours ago (5:10 PM)       Okay--but try to encourage using the walker more so than the wheelchair       Called to inform, no answer, left message to call back.  (Hub to relay)

## 2024-07-29 ENCOUNTER — TELEPHONE (OUTPATIENT)
Dept: FAMILY MEDICINE CLINIC | Facility: CLINIC | Age: 69
End: 2024-07-29

## 2024-07-29 NOTE — TELEPHONE ENCOUNTER
Caller: Harley Sheppard    Relationship to patient: Emergency Contact    Best call back number: [phone]    Chief complaint: TROUBLE BREATHING A LOT OF FLUID ON HIM    Patient directed to call 911 or go to their nearest emergency room.     Patient verbalized understanding: [x] Yes  [] No  If no, why?

## 2024-08-12 ENCOUNTER — APPOINTMENT (OUTPATIENT)
Dept: GENERAL RADIOLOGY | Facility: HOSPITAL | Age: 69
DRG: 291 | End: 2024-08-12
Payer: MEDICARE

## 2024-08-12 ENCOUNTER — APPOINTMENT (OUTPATIENT)
Dept: CT IMAGING | Facility: HOSPITAL | Age: 69
DRG: 291 | End: 2024-08-12
Payer: MEDICARE

## 2024-08-12 ENCOUNTER — HOSPITAL ENCOUNTER (INPATIENT)
Facility: HOSPITAL | Age: 69
LOS: 9 days | Discharge: NURSING, REHAB OR LONG-TERM CARE | DRG: 291 | End: 2024-08-22
Attending: EMERGENCY MEDICINE | Admitting: INTERNAL MEDICINE
Payer: MEDICARE

## 2024-08-12 DIAGNOSIS — R13.12 OROPHARYNGEAL DYSPHAGIA: ICD-10-CM

## 2024-08-12 DIAGNOSIS — G47.33 OSA (OBSTRUCTIVE SLEEP APNEA): ICD-10-CM

## 2024-08-12 DIAGNOSIS — L08.9 CHRONIC WOUND INFECTION OF ABDOMEN, INITIAL ENCOUNTER: ICD-10-CM

## 2024-08-12 DIAGNOSIS — Z95.1 HX OF CABG: ICD-10-CM

## 2024-08-12 DIAGNOSIS — I50.9 ACUTE ON CHRONIC CONGESTIVE HEART FAILURE, UNSPECIFIED HEART FAILURE TYPE: Primary | ICD-10-CM

## 2024-08-12 DIAGNOSIS — Z94.4 HISTORY OF LIVER TRANSPLANT: ICD-10-CM

## 2024-08-12 DIAGNOSIS — R06.02 SOB (SHORTNESS OF BREATH): ICD-10-CM

## 2024-08-12 DIAGNOSIS — F32.5 MAJOR DEPRESSIVE DISORDER WITH SINGLE EPISODE, IN FULL REMISSION: ICD-10-CM

## 2024-08-12 DIAGNOSIS — E87.5 HYPERKALEMIA: ICD-10-CM

## 2024-08-12 DIAGNOSIS — S31.109A CHRONIC WOUND INFECTION OF ABDOMEN, INITIAL ENCOUNTER: ICD-10-CM

## 2024-08-12 DIAGNOSIS — J90 PLEURAL EFFUSION: ICD-10-CM

## 2024-08-12 PROBLEM — I25.810 CORONARY ARTERY DISEASE INVOLVING CORONARY BYPASS GRAFT OF NATIVE HEART WITHOUT ANGINA PECTORIS: Status: ACTIVE | Noted: 2017-11-20

## 2024-08-12 PROBLEM — N17.9 AKI (ACUTE KIDNEY INJURY): Status: ACTIVE | Noted: 2024-08-12

## 2024-08-12 PROBLEM — Z79.4 TYPE 2 DIABETES MELLITUS WITHOUT COMPLICATION, WITH LONG-TERM CURRENT USE OF INSULIN: Status: ACTIVE | Noted: 2017-11-21

## 2024-08-12 LAB
ALBUMIN SERPL-MCNC: 3.9 G/DL (ref 3.5–5.2)
ALBUMIN/GLOB SERPL: 1.1 G/DL
ALP SERPL-CCNC: 171 U/L (ref 39–117)
ALT SERPL W P-5'-P-CCNC: 27 U/L (ref 1–41)
ANION GAP SERPL CALCULATED.3IONS-SCNC: 8 MMOL/L (ref 5–15)
ANISOCYTOSIS BLD QL: NORMAL
ARTERIAL PATENCY WRIST A: ABNORMAL
AST SERPL-CCNC: 18 U/L (ref 1–40)
ATMOSPHERIC PRESS: ABNORMAL MM[HG]
BASE EXCESS BLDA CALC-SCNC: 0.7 MMOL/L (ref 0–2)
BASOPHILS # BLD AUTO: 0.02 10*3/MM3 (ref 0–0.2)
BASOPHILS NFR BLD AUTO: 0.2 % (ref 0–1.5)
BDY SITE: ABNORMAL
BILIRUB SERPL-MCNC: 0.3 MG/DL (ref 0–1.2)
BILIRUB UR QL STRIP: NEGATIVE
BODY TEMPERATURE: 37
BUN SERPL-MCNC: 24 MG/DL (ref 8–23)
BUN/CREAT SERPL: 18.3 (ref 7–25)
CALCIUM SPEC-SCNC: 8.8 MG/DL (ref 8.6–10.5)
CHLORIDE SERPL-SCNC: 100 MMOL/L (ref 98–107)
CLARITY UR: CLEAR
CO2 BLDA-SCNC: 26.6 MMOL/L (ref 22–33)
CO2 SERPL-SCNC: 26 MMOL/L (ref 22–29)
COHGB MFR BLD: 0.7 % (ref 0–2)
COLOR UR: YELLOW
CREAT SERPL-MCNC: 1.31 MG/DL (ref 0.76–1.27)
D-LACTATE SERPL-SCNC: 0.9 MMOL/L (ref 0.5–2)
DEPRECATED RDW RBC AUTO: 69 FL (ref 37–54)
EGFRCR SERPLBLD CKD-EPI 2021: 58.9 ML/MIN/1.73
ELLIPTOCYTES BLD QL SMEAR: NORMAL
EOSINOPHIL # BLD AUTO: 0.14 10*3/MM3 (ref 0–0.4)
EOSINOPHIL NFR BLD AUTO: 1.7 % (ref 0.3–6.2)
EPAP: 0
ERYTHROCYTE [DISTWIDTH] IN BLOOD BY AUTOMATED COUNT: 21.3 % (ref 12.3–15.4)
FLUAV RNA RESP QL NAA+PROBE: NOT DETECTED
FLUBV RNA RESP QL NAA+PROBE: NOT DETECTED
GLOBULIN UR ELPH-MCNC: 3.6 GM/DL
GLUCOSE BLDC GLUCOMTR-MCNC: 93 MG/DL (ref 70–130)
GLUCOSE SERPL-MCNC: 119 MG/DL (ref 65–99)
GLUCOSE UR STRIP-MCNC: NEGATIVE MG/DL
HCO3 BLDA-SCNC: 25.4 MMOL/L (ref 20–26)
HCT VFR BLD AUTO: 37.5 % (ref 37.5–51)
HCT VFR BLD CALC: 32.2 % (ref 38–51)
HGB BLD-MCNC: 11 G/DL (ref 13–17.7)
HGB BLDA-MCNC: 10.5 G/DL (ref 13.5–17.5)
HGB UR QL STRIP.AUTO: NEGATIVE
HOLD SPECIMEN: NORMAL
IMM GRANULOCYTES # BLD AUTO: 0.02 10*3/MM3 (ref 0–0.05)
IMM GRANULOCYTES NFR BLD AUTO: 0.2 % (ref 0–0.5)
INHALED O2 CONCENTRATION: 21 %
IPAP: 0
KETONES UR QL STRIP: NEGATIVE
LEUKOCYTE ESTERASE UR QL STRIP.AUTO: NEGATIVE
LYMPHOCYTES # BLD AUTO: 1.09 10*3/MM3 (ref 0.7–3.1)
LYMPHOCYTES NFR BLD AUTO: 13.2 % (ref 19.6–45.3)
MAGNESIUM SERPL-MCNC: 1.8 MG/DL (ref 1.6–2.4)
MCH RBC QN AUTO: 26.1 PG (ref 26.6–33)
MCHC RBC AUTO-ENTMCNC: 29.3 G/DL (ref 31.5–35.7)
MCV RBC AUTO: 88.9 FL (ref 79–97)
METHGB BLD QL: 0.1 % (ref 0–1.5)
MODALITY: ABNORMAL
MONOCYTES # BLD AUTO: 0.73 10*3/MM3 (ref 0.1–0.9)
MONOCYTES NFR BLD AUTO: 8.9 % (ref 5–12)
NEUTROPHILS NFR BLD AUTO: 6.23 10*3/MM3 (ref 1.7–7)
NEUTROPHILS NFR BLD AUTO: 75.8 % (ref 42.7–76)
NITRITE UR QL STRIP: NEGATIVE
NRBC BLD AUTO-RTO: 0 /100 WBC (ref 0–0.2)
NT-PROBNP SERPL-MCNC: 1567 PG/ML (ref 0–900)
OXYHGB MFR BLDV: 92.2 % (ref 94–99)
PAW @ PEAK INSP FLOW SETTING VENT: 0 CMH2O
PCO2 BLDA: 40.2 MM HG (ref 35–45)
PCO2 TEMP ADJ BLD: 40.2 MM HG (ref 35–48)
PH BLDA: 7.41 PH UNITS (ref 7.35–7.45)
PH UR STRIP.AUTO: 5.5 [PH] (ref 5–8)
PH, TEMP CORRECTED: 7.41 PH UNITS
PLATELET # BLD AUTO: 228 10*3/MM3 (ref 140–450)
PMV BLD AUTO: 8.2 FL (ref 6–12)
PO2 BLDA: 72 MM HG (ref 83–108)
PO2 TEMP ADJ BLD: 72 MM HG (ref 83–108)
POTASSIUM SERPL-SCNC: 5.4 MMOL/L (ref 3.5–5.2)
PROCALCITONIN SERPL-MCNC: 0.1 NG/ML (ref 0–0.25)
PROT SERPL-MCNC: 7.5 G/DL (ref 6–8.5)
PROT UR QL STRIP: NEGATIVE
RBC # BLD AUTO: 4.22 10*6/MM3 (ref 4.14–5.8)
SARS-COV-2 RNA RESP QL NAA+PROBE: NOT DETECTED
SMALL PLATELETS BLD QL SMEAR: ADEQUATE
SODIUM SERPL-SCNC: 134 MMOL/L (ref 136–145)
SP GR UR STRIP: 1.01 (ref 1–1.03)
TOTAL RATE: 0 BREATHS/MINUTE
TROPONIN T SERPL HS-MCNC: 41 NG/L
TROPONIN T SERPL HS-MCNC: 42 NG/L
UROBILINOGEN UR QL STRIP: NORMAL
VENTILATOR MODE: ABNORMAL
WBC MORPH BLD: NORMAL
WBC NRBC COR # BLD AUTO: 8.23 10*3/MM3 (ref 3.4–10.8)
WHOLE BLOOD HOLD COAG: NORMAL
WHOLE BLOOD HOLD SPECIMEN: NORMAL

## 2024-08-12 PROCEDURE — 25010000002 CEFTRIAXONE PER 250 MG: Performed by: PHYSICIAN ASSISTANT

## 2024-08-12 PROCEDURE — 70450 CT HEAD/BRAIN W/O DYE: CPT

## 2024-08-12 PROCEDURE — 71045 X-RAY EXAM CHEST 1 VIEW: CPT

## 2024-08-12 PROCEDURE — 85007 BL SMEAR W/DIFF WBC COUNT: CPT | Performed by: EMERGENCY MEDICINE

## 2024-08-12 PROCEDURE — 63710000001 INSULIN GLARGINE PER 5 UNITS: Performed by: PHYSICIAN ASSISTANT

## 2024-08-12 PROCEDURE — 93005 ELECTROCARDIOGRAM TRACING: CPT

## 2024-08-12 PROCEDURE — 83605 ASSAY OF LACTIC ACID: CPT | Performed by: PHYSICIAN ASSISTANT

## 2024-08-12 PROCEDURE — 83880 ASSAY OF NATRIURETIC PEPTIDE: CPT | Performed by: EMERGENCY MEDICINE

## 2024-08-12 PROCEDURE — 84484 ASSAY OF TROPONIN QUANT: CPT | Performed by: EMERGENCY MEDICINE

## 2024-08-12 PROCEDURE — 85025 COMPLETE CBC W/AUTO DIFF WBC: CPT | Performed by: EMERGENCY MEDICINE

## 2024-08-12 PROCEDURE — G0378 HOSPITAL OBSERVATION PER HR: HCPCS

## 2024-08-12 PROCEDURE — 36600 WITHDRAWAL OF ARTERIAL BLOOD: CPT

## 2024-08-12 PROCEDURE — 82375 ASSAY CARBOXYHB QUANT: CPT

## 2024-08-12 PROCEDURE — 84145 PROCALCITONIN (PCT): CPT | Performed by: PHYSICIAN ASSISTANT

## 2024-08-12 PROCEDURE — 36415 COLL VENOUS BLD VENIPUNCTURE: CPT

## 2024-08-12 PROCEDURE — 82948 REAGENT STRIP/BLOOD GLUCOSE: CPT

## 2024-08-12 PROCEDURE — 25010000002 VANCOMYCIN HCL IN NACL 2-0.9 GM/500ML-% SOLUTION

## 2024-08-12 PROCEDURE — 82805 BLOOD GASES W/O2 SATURATION: CPT

## 2024-08-12 PROCEDURE — 99285 EMERGENCY DEPT VISIT HI MDM: CPT

## 2024-08-12 PROCEDURE — 25010000002 HEPARIN (PORCINE) PER 1000 UNITS: Performed by: PHYSICIAN ASSISTANT

## 2024-08-12 PROCEDURE — 99223 1ST HOSP IP/OBS HIGH 75: CPT | Performed by: FAMILY MEDICINE

## 2024-08-12 PROCEDURE — 81003 URINALYSIS AUTO W/O SCOPE: CPT | Performed by: PHYSICIAN ASSISTANT

## 2024-08-12 PROCEDURE — 25010000002 FUROSEMIDE PER 20 MG: Performed by: PHYSICIAN ASSISTANT

## 2024-08-12 PROCEDURE — 93005 ELECTROCARDIOGRAM TRACING: CPT | Performed by: PHYSICIAN ASSISTANT

## 2024-08-12 PROCEDURE — 83735 ASSAY OF MAGNESIUM: CPT | Performed by: PHYSICIAN ASSISTANT

## 2024-08-12 PROCEDURE — 83050 HGB METHEMOGLOBIN QUAN: CPT

## 2024-08-12 PROCEDURE — 87636 SARSCOV2 & INF A&B AMP PRB: CPT | Performed by: PHYSICIAN ASSISTANT

## 2024-08-12 PROCEDURE — 74176 CT ABD & PELVIS W/O CONTRAST: CPT

## 2024-08-12 PROCEDURE — 80053 COMPREHEN METABOLIC PANEL: CPT | Performed by: EMERGENCY MEDICINE

## 2024-08-12 PROCEDURE — 84484 ASSAY OF TROPONIN QUANT: CPT | Performed by: PHYSICIAN ASSISTANT

## 2024-08-12 PROCEDURE — 93005 ELECTROCARDIOGRAM TRACING: CPT | Performed by: EMERGENCY MEDICINE

## 2024-08-12 PROCEDURE — 87040 BLOOD CULTURE FOR BACTERIA: CPT | Performed by: PHYSICIAN ASSISTANT

## 2024-08-12 RX ORDER — HEPARIN SODIUM 5000 [USP'U]/ML
5000 INJECTION, SOLUTION INTRAVENOUS; SUBCUTANEOUS EVERY 12 HOURS SCHEDULED
Status: DISCONTINUED | OUTPATIENT
Start: 2024-08-12 | End: 2024-08-22 | Stop reason: HOSPADM

## 2024-08-12 RX ORDER — IBUPROFEN 600 MG/1
1 TABLET ORAL
Status: DISCONTINUED | OUTPATIENT
Start: 2024-08-12 | End: 2024-08-22 | Stop reason: HOSPADM

## 2024-08-12 RX ORDER — CLOBETASOL PROPIONATE 0.5 MG/G
1 CREAM TOPICAL 2 TIMES DAILY
COMMUNITY

## 2024-08-12 RX ORDER — INSULIN GLARGINE 100 [IU]/ML
20 INJECTION, SOLUTION SUBCUTANEOUS 2 TIMES DAILY
Status: DISCONTINUED | OUTPATIENT
Start: 2024-08-12 | End: 2024-08-14

## 2024-08-12 RX ORDER — TERAZOSIN 5 MG/1
5 CAPSULE ORAL NIGHTLY
Status: DISCONTINUED | OUTPATIENT
Start: 2024-08-12 | End: 2024-08-13

## 2024-08-12 RX ORDER — GABAPENTIN 100 MG/1
100 CAPSULE ORAL NIGHTLY
COMMUNITY
End: 2024-08-22 | Stop reason: HOSPADM

## 2024-08-12 RX ORDER — METOPROLOL TARTRATE 25 MG/1
25 TABLET, FILM COATED ORAL 2 TIMES DAILY
Status: DISCONTINUED | OUTPATIENT
Start: 2024-08-12 | End: 2024-08-13

## 2024-08-12 RX ORDER — PANTOPRAZOLE SODIUM 40 MG/1
40 TABLET, DELAYED RELEASE ORAL DAILY
Status: DISCONTINUED | OUTPATIENT
Start: 2024-08-13 | End: 2024-08-22 | Stop reason: HOSPADM

## 2024-08-12 RX ORDER — LACTULOSE 10 G/15ML
20 SOLUTION ORAL 2 TIMES DAILY PRN
COMMUNITY

## 2024-08-12 RX ORDER — FUROSEMIDE 10 MG/ML
80 INJECTION INTRAMUSCULAR; INTRAVENOUS ONCE
Status: COMPLETED | OUTPATIENT
Start: 2024-08-12 | End: 2024-08-12

## 2024-08-12 RX ORDER — SODIUM CHLORIDE 0.9 % (FLUSH) 0.9 %
10 SYRINGE (ML) INJECTION AS NEEDED
Status: DISCONTINUED | OUTPATIENT
Start: 2024-08-12 | End: 2024-08-22 | Stop reason: HOSPADM

## 2024-08-12 RX ORDER — QUETIAPINE FUMARATE 25 MG/1
12.5 TABLET, FILM COATED ORAL 2 TIMES DAILY
COMMUNITY
End: 2024-08-22 | Stop reason: HOSPADM

## 2024-08-12 RX ORDER — DEXTROSE MONOHYDRATE 25 G/50ML
25 INJECTION, SOLUTION INTRAVENOUS
Status: DISCONTINUED | OUTPATIENT
Start: 2024-08-12 | End: 2024-08-22 | Stop reason: HOSPADM

## 2024-08-12 RX ORDER — FERROUS SULFATE 325(65) MG
325 TABLET ORAL
COMMUNITY

## 2024-08-12 RX ORDER — SODIUM CHLORIDE 0.9 % (FLUSH) 0.9 %
10 SYRINGE (ML) INJECTION EVERY 12 HOURS SCHEDULED
Status: DISCONTINUED | OUTPATIENT
Start: 2024-08-12 | End: 2024-08-22 | Stop reason: HOSPADM

## 2024-08-12 RX ORDER — VANCOMYCIN 2 GRAM/500 ML IN 0.9 % SODIUM CHLORIDE INTRAVENOUS
20 ONCE
Status: COMPLETED | OUTPATIENT
Start: 2024-08-12 | End: 2024-08-13

## 2024-08-12 RX ORDER — TAMSULOSIN HYDROCHLORIDE 0.4 MG/1
1 CAPSULE ORAL DAILY
COMMUNITY

## 2024-08-12 RX ORDER — INSULIN LISPRO 100 [IU]/ML
3-14 INJECTION, SOLUTION INTRAVENOUS; SUBCUTANEOUS
Status: DISCONTINUED | OUTPATIENT
Start: 2024-08-13 | End: 2024-08-22 | Stop reason: HOSPADM

## 2024-08-12 RX ORDER — BUPROPION HYDROCHLORIDE 150 MG/1
300 TABLET ORAL DAILY
Status: DISCONTINUED | OUTPATIENT
Start: 2024-08-13 | End: 2024-08-22 | Stop reason: HOSPADM

## 2024-08-12 RX ORDER — NICOTINE POLACRILEX 4 MG
15 LOZENGE BUCCAL
Status: DISCONTINUED | OUTPATIENT
Start: 2024-08-12 | End: 2024-08-22 | Stop reason: HOSPADM

## 2024-08-12 RX ORDER — GENTAMICIN SULFATE 1 MG/G
1 CREAM TOPICAL 2 TIMES DAILY
COMMUNITY

## 2024-08-12 RX ORDER — SILVER SULFADIAZINE 10 MG/G
1 CREAM TOPICAL DAILY
COMMUNITY
End: 2024-08-22 | Stop reason: HOSPADM

## 2024-08-12 RX ORDER — SIROLIMUS 1 MG/1
2 TABLET, FILM COATED ORAL DAILY
Status: DISCONTINUED | OUTPATIENT
Start: 2024-08-13 | End: 2024-08-22 | Stop reason: HOSPADM

## 2024-08-12 RX ORDER — ESCITALOPRAM OXALATE 20 MG/1
20 TABLET ORAL DAILY
COMMUNITY

## 2024-08-12 RX ORDER — LORAZEPAM 0.5 MG/1
0.5 TABLET ORAL EVERY 6 HOURS PRN
COMMUNITY
End: 2024-08-22 | Stop reason: HOSPADM

## 2024-08-12 RX ORDER — SPIRONOLACTONE 25 MG/1
25 TABLET ORAL 2 TIMES DAILY
COMMUNITY
End: 2024-08-22 | Stop reason: HOSPADM

## 2024-08-12 RX ORDER — ATORVASTATIN CALCIUM 40 MG/1
80 TABLET, FILM COATED ORAL DAILY
Status: DISCONTINUED | OUTPATIENT
Start: 2024-08-13 | End: 2024-08-22 | Stop reason: HOSPADM

## 2024-08-12 RX ORDER — NITROGLYCERIN 0.4 MG/1
0.4 TABLET SUBLINGUAL
Status: DISCONTINUED | OUTPATIENT
Start: 2024-08-12 | End: 2024-08-22 | Stop reason: HOSPADM

## 2024-08-12 RX ORDER — ASPIRIN 325 MG
325 TABLET, DELAYED RELEASE (ENTERIC COATED) ORAL DAILY
Status: DISCONTINUED | OUTPATIENT
Start: 2024-08-13 | End: 2024-08-22 | Stop reason: HOSPADM

## 2024-08-12 RX ORDER — SODIUM CHLORIDE 9 MG/ML
40 INJECTION, SOLUTION INTRAVENOUS AS NEEDED
Status: DISCONTINUED | OUTPATIENT
Start: 2024-08-12 | End: 2024-08-22 | Stop reason: HOSPADM

## 2024-08-12 RX ADMIN — Medication 5 MG: at 21:42

## 2024-08-12 RX ADMIN — METOPROLOL TARTRATE 25 MG: 25 TABLET, FILM COATED ORAL at 21:42

## 2024-08-12 RX ADMIN — TERAZOSIN HYDROCHLORIDE 5 MG: 5 CAPSULE ORAL at 21:42

## 2024-08-12 RX ADMIN — HEPARIN SODIUM 5000 UNITS: 5000 INJECTION INTRAVENOUS; SUBCUTANEOUS at 21:42

## 2024-08-12 RX ADMIN — INSULIN GLARGINE 20 UNITS: 100 INJECTION, SOLUTION SUBCUTANEOUS at 22:04

## 2024-08-12 RX ADMIN — SODIUM CHLORIDE 1000 MG: 900 INJECTION INTRAVENOUS at 21:42

## 2024-08-12 RX ADMIN — Medication 10 ML: at 21:48

## 2024-08-12 RX ADMIN — Medication 2000 MG: at 22:37

## 2024-08-12 RX ADMIN — FUROSEMIDE 80 MG: 10 INJECTION, SOLUTION INTRAMUSCULAR; INTRAVENOUS at 19:45

## 2024-08-12 NOTE — LETTER
EMS Transport Request  For use at [facility], [place], [place], [place], and [place] only   Patient Name: Quirino Sheppard : 1955   Weight:96.9 kg (213 lb 9.6 oz) Pick-up Location: [facility] BLS/ALS: BLS/ALS: BLS   Insurance: [employer] MEDICARE REPLACEMENT Auth End Date:    Pre-Cert #: D/C Summary complete:    Destination:  [place], [place]   Contact Precautions: None   Equipment (O2, Fluids, etc.): O2, settings 2lpm/NC   Arrive By Date/Time:  by 2100 Stretcher/WC: Stretcher   CM Requesting: Annelise Ladn RN Ext: [phone]   Notes/Medical Necessity:  impaired trunk control;  NWB RLE     ______________________________________________________________________    *Only 2 patient bags OR 1 carry-on size bag are permitted.  Wheelchairs and walkers CANNOT transported with the patient. Acknowledge: Yes

## 2024-08-12 NOTE — ED NOTES
Quirino Sheppard    Nursing Report ED to Floor:  Mental status: A&Ox4  Ambulatory status: wheelchair; assist x2 to transfer  Oxygen Therapy:  2.5L NC  Cardiac Rhythm: 1st degree AVB  Admitted from: facility  Safety Concerns:  weakness, weight gain  Social Issues: none  ED Room #:  33    ED Nurse Phone Extension - [phone] or may call [phone].      HPI:   Chief Complaint   Patient presents with    Shortness of Breath       Past Medical History:  Past Medical History:   Diagnosis Date    Anxiety and depression     BPH associated with nocturia     Chronic kidney disease, stage 2 (mild)     Coronary arteriosclerosis in native artery     3V    Degeneration of lumbar intervertebral disc     Diabetes mellitus     Disorder of sulfur-bearing amino acid metabolism     Gastroesophageal reflux disease without esophagitis     Glaucoma     Hepatitis C     Heterozygous MTHFR mutation [id number]     Heterozygous MTHFR mutation [id number]     High risk medication use     Hyperlipidemia     Hypertension     Liver transplanted     Low back pain     Obstructive sleep apnea syndrome     Recurrent major depression in full remission     Severe obesity     Type 2 diabetes mellitus         Past Surgical History:  Past Surgical History:   Procedure Laterality Date    CORONARY ARTERY BYPASS GRAFT N/A 11/22/2017    Procedure: MEDIAN STERNOTOMY, CORONARY ARTERY BYPASS GRAFT X 3, UTILIZING THE LEFT INTERNAL MAMMARY ARTERY AND EVH USING THE LEFT GREATER SAPHENOUS VEIN;  Surgeon: Naga Guaman MD;  Location: [facility];  Service:     LIVER TRANSPLANTATION  2001        Admitting Doctor:   Lupe Bond DO    Consulting Provider(s):  Consults       No orders found from 7/14/2024 to 8/13/2024.             Admitting Diagnosis:   The primary encounter diagnosis was Acute on chronic congestive heart failure, unspecified heart failure type. Diagnoses of Hyperkalemia, SOB (shortness of breath), AURELIANO (obstructive sleep apnea), Hx of CABG, History of liver  transplant, and Pleural effusion were also pertinent to this visit.    Most Recent Vitals:   Vitals:    08/12/24 1320 08/12/24 1601 08/12/24 1630 08/12/24 1845   BP: 142/73 150/80 138/82 142/81   BP Location: Right arm      Patient Position: Sitting      Pulse: 65 71 73 77   Resp: 20 18 18 18   Temp: 98.3 °F (36.8 °C)      TempSrc: Oral      SpO2: 100% 99% 98% 94%   Weight:       Height:           Active LDAs/IV Access:   Lines, Drains & Airways       Active LDAs       Name Placement date Placement time Site Days    Peripheral IV 08/12/24 1738 Anterior;Right Forearm 08/12/24  1738  Forearm  less than 1                    Labs (abnormal labs have a star):   Labs Reviewed   COMPREHENSIVE METABOLIC PANEL - Abnormal; Notable for the following components:       Result Value    Glucose 119 (*)     BUN 24 (*)     Creatinine 1.31 (*)     Sodium 134 (*)     Potassium 5.4 (*)     Alkaline Phosphatase 171 (*)     eGFR 58.9 (*)     All other components within normal limits    Narrative:     GFR Normal >60  Chronic Kidney Disease <60  Kidney Failure <15     BNP (IN-HOUSE) - Abnormal; Notable for the following components:    proBNP 1,567.0 (*)     All other components within normal limits    Narrative:     This assay is used as an aid in the diagnosis of individuals suspected of having heart failure. It can be used as an aid in the diagnosis of acute decompensated heart failure (ADHF) in patients presenting with signs and symptoms of ADHF to the emergency department (ED). In addition, NT-proBNP of <300 pg/mL indicates ADHF is not likely.    Age Range Result Interpretation  NT-proBNP Concentration (pg/mL:      <50             Positive            >450                   Gray                 300-450                    Negative             <300    50-75           Positive            >900                  Gray                300-900                  Negative            <300      >75             Positive            >1800                   Muniz                300-1800                  Negative            <300   SINGLE HS TROPONIN T - Abnormal; Notable for the following components:    HS Troponin T 41 (*)     All other components within normal limits    Narrative:     High Sensitive Troponin T Reference Range:  <14.0 ng/L- Negative Female for AMI  <22.0 ng/L- Negative Male for AMI  >=14 - Abnormal Female indicating possible myocardial injury.  >=22 - Abnormal Male indicating possible myocardial injury.   Clinicians would have to utilize clinical acumen, EKG, Troponin, and serial changes to determine if it is an Acute Myocardial Infarction or myocardial injury due to an underlying chronic condition.        CBC WITH AUTO DIFFERENTIAL - Abnormal; Notable for the following components:    Hemoglobin 11.0 (*)     MCH 26.1 (*)     MCHC 29.3 (*)     RDW 21.3 (*)     RDW-SD 69.0 (*)     Lymphocyte % 13.2 (*)     All other components within normal limits    Narrative:     Appended report. These results have been appended to a previously verified report.   BLOOD GAS, ARTERIAL W/CO-OXIMETRY - Abnormal; Notable for the following components:    pO2, Arterial 72.0 (*)     Hemoglobin, Blood Gas 10.5 (*)     Hematocrit, Blood Gas 32.2 (*)     Oxyhemoglobin 92.2 (*)     pO2, Temperature Corrected 72.0 (*)     All other components within normal limits   SINGLE HS TROPONIN T - Abnormal; Notable for the following components:    HS Troponin T 42 (*)     All other components within normal limits    Narrative:     High Sensitive Troponin T Reference Range:  <14.0 ng/L- Negative Female for AMI  <22.0 ng/L- Negative Male for AMI  >=14 - Abnormal Female indicating possible myocardial injury.  >=22 - Abnormal Male indicating possible myocardial injury.   Clinicians would have to utilize clinical acumen, EKG, Troponin, and serial changes to determine if it is an Acute Myocardial Infarction or myocardial injury due to an underlying chronic condition.        COVID-19 AND FLU A/B,  "NP SWAB IN TRANSPORT MEDIA 1 HR TAT - Normal    Narrative:     Fact sheet for providers: https://www.fda.gov/media/234034/download    Fact sheet for patients: https://www.fda.gov/media/136508/download    Test performed by PCR.   MAGNESIUM - Normal   URINALYSIS W/ CULTURE IF INDICATED - Normal    Narrative:     In absence of clinical symptoms, the presence of pyuria, bacteria, and/or nitrites on the urinalysis result does not correlate with infection.  Urine microscopic not indicated.   LACTIC ACID, PLASMA - Normal   PROCALCITONIN - Normal    Narrative:     As a Marker for Sepsis (Non-Neonates):    1. <0.5 ng/mL represents a low risk of severe sepsis and/or septic shock.  2. >2 ng/mL represents a high risk of severe sepsis and/or septic shock.    As a Marker for Lower Respiratory Tract Infections that require antibiotic therapy:    PCT on Admission    Antibiotic Therapy       6-12 Hrs later    >0.5                Strongly Recommended  >0.25 - <0.5        Recommended   0.1 - 0.25          Discouraged              Remeasure/reassess PCT  <0.1                Strongly Discouraged     Remeasure/reassess PCT    As 28 day mortality risk marker: \"Change in Procalcitonin Result\" (>80% or <=80%) if Day 0 (or Day 1) and Day 4 values are available. Refer to [email or web address]://[email or web address]pct[facility]cal[email or web address]    Change in PCT <=80%  A decrease of PCT levels below or equal to 80% defines a positive change in PCT test result representing a higher risk for 28-day all-cause mortality of patients diagnosed with severe sepsis for septic shock.    Change in PCT >80%  A decrease of PCT levels of more than 80% defines a negative change in PCT result representing a lower risk for 28-day all-cause mortality of patients diagnosed with severe sepsis or septic shock.      COVID PRE-OP / PRE-PROCEDURE SCREENING ORDER (NO ISOLATION)    Narrative:     The following orders were created for panel order COVID PRE-OP / PRE-PROCEDURE SCREENING ORDER (NO ISOLATION) - " Swab, Nasopharynx.  Procedure                               Abnormality         Status                     ---------                               -----------         ------                     COVID-19 and FLU A/B PCR...[[id number]]  Normal              Final result                 Please view results for these tests on the individual orders.   BLOOD CULTURE   BLOOD CULTURE   RAINBOW DRAW    Narrative:     The following orders were created for panel order [place] Draw.  Procedure                               Abnormality         Status                     ---------                               -----------         ------                     Green Top (Gel)[[id number]]                                  Final result               Lavender Top[[id number]]                                     Final result               Gold Top - SST[[id number]]                                   Final result               Gray Top[[id number]]                                         Final result               Light Blue Top[[id number]]                                   Final result                 Please view results for these tests on the individual orders.   SCAN SLIDE   BLOOD GAS, ARTERIAL   CBC AND DIFFERENTIAL    Narrative:     The following orders were created for panel order CBC & Differential.  Procedure                               Abnormality         Status                     ---------                               -----------         ------                     CBC Auto Differential[[id number]]        Abnormal            Final result               Scan Slide[[id number]]                                       Final result                 Please view results for these tests on the individual orders.   GREEN TOP   LAVENDER TOP   GOLD TOP - SST   GRAY TOP   LIGHT BLUE TOP       Meds Given in ED:   Medications   sodium chloride 0.9 % flush 10 mL (has no administration in time range)   furosemide (LASIX) injection 80 mg (has no  administration in time range)           Last NIH score:                                                          Dysphagia screening results:        Ann Coma Scale:  No data recorded     CIWA:        Restraint Type:            Isolation Status:  No active isolations

## 2024-08-12 NOTE — Clinical Note
Level of Care: Telemetry [5]   Diagnosis: CHF (congestive heart failure) [[id number]]   Admitting Physician: ROGERS ESCALERA [[id number]]   Attending Physician: ROGERS ESCALERA [[id number]]

## 2024-08-12 NOTE — H&P
[facility]  HISTORY AND PHYSICAL    Patient Name: Quirino Sheppard  : 1955  MRN: [id number]  Primary Care Physician: Andrew Fernandez MD  Date of admission: 2024    Subjective   Subjective     Chief Complaint:  SOB, BLE Edema      HPI:  Quirino Sheppard is a 69 y.o. male with a history of CAD s/p CABG, CHF, HTN, HLD, T2DM, CKD, Hx Liver transplant (d/t Chronic Hep C), AURELIANO (CPAP non-compliant), and GERD who presents to [facility] ED for complaint of worsening shortness of breath and bilateral lower extremity edema. Patient resides in an assisted living facility, and reports that for the last week or so, he has felt much more short of breath, as well as worsening lower extremity edema. He denies chest pain, fever, chills, nausea or vomiting. He has a long cardiac history, but denies routinely seeing a cardiologist. Also, patient complains of a poor healing wound on his abdomen. He states that he has had this for almost 2 years now, and has wound care come to his living facility to treat it.         Review of Systems   Constitutional:  Positive for fatigue and unexpected weight change (80 lbs weight gain in over 2 months). Negative for chills and fever.   HENT:  Negative for nosebleeds, sore throat and trouble swallowing.    Eyes:  Negative for photophobia and visual disturbance.   Respiratory:  Positive for cough and shortness of breath. Negative for wheezing.    Cardiovascular:  Positive for leg swelling. Negative for chest pain.   Gastrointestinal:  Positive for abdominal distention. Negative for abdominal pain, diarrhea, nausea and vomiting.   Genitourinary:  Negative for dysuria and hematuria.   Musculoskeletal:  Negative for arthralgias and myalgias.   Skin:  Positive for wound.   Neurological:  Positive for weakness (Generalized). Negative for tremors, syncope and speech difficulty.   Psychiatric/Behavioral:  Negative for confusion. The patient is not  nervous/anxious.               Personal History     Past Medical History:   Diagnosis Date    Anxiety and depression     BPH associated with nocturia     Chronic kidney disease, stage 2 (mild)     Coronary arteriosclerosis in native artery     3V    Degeneration of lumbar intervertebral disc     Diabetes mellitus     Disorder of sulfur-bearing amino acid metabolism     Gastroesophageal reflux disease without esophagitis     Glaucoma     Hepatitis C     Heterozygous MTHFR mutation [id number]     Heterozygous MTHFR mutation [id number]     High risk medication use     Hyperlipidemia     Hypertension     Liver transplanted     Low back pain     Obstructive sleep apnea syndrome     Recurrent major depression in full remission     Severe obesity     Type 2 diabetes mellitus              Past Surgical History:   Procedure Laterality Date    CORONARY ARTERY BYPASS GRAFT N/A 11/22/2017    Procedure: MEDIAN STERNOTOMY, CORONARY ARTERY BYPASS GRAFT X 3, UTILIZING THE LEFT INTERNAL MAMMARY ARTERY AND EVH USING THE LEFT GREATER SAPHENOUS VEIN;  Surgeon: Naga Guaman MD;  Location: [facility];  Service:     LIVER TRANSPLANTATION  2001       Family History:  family history includes Breast cancer in his mother; Diabetes in his paternal grandmother; Heart attack in his father; Heart valve disorder in his father.     Social History:  reports that he has never smoked. He has never used smokeless tobacco. He reports that he does not drink alcohol and does not use drugs.  Social History     Social History Narrative    Not on file       Medications:  Cholecalciferol, Dexcom G6 Sensor, Dexcom G6 Transmitter, Insulin Syringe-Needle U-100, L-Methylfolate-B6-B12, Magnesium Oxide -Mg Supplement, OneTouch Delica Lancets 33G, Saw Palmetto, aspirin, atorvastatin, brimonidine-timolol, buPROPion XL, doxazosin, glucose blood, insulin aspart, insulin glargine, metFORMIN, metoprolol succinate XL, metoprolol tartrate, multivitamin, multivitamin with  minerals, mupirocin, pantoprazole, ramipril, sertraline, sirolimus, vitamin D3, and vitamin E    No Known Allergies    Objective   Objective     Vital Signs:   Temp:  [98.3 °F (36.8 °C)] 98.3 °F (36.8 °C)  Heart Rate:  [65-80] 80  Resp:  [18-20] 18  BP: (130-150)/(56-97) 132/57  Flow (L/min):  [2.5] 2.5    Physical Exam  Constitutional:       General: He is not in acute distress.     Appearance: Normal appearance.   HENT:      Head: Atraumatic.      Right Ear: External ear normal.      Left Ear: External ear normal.      Nose: Nose normal.   Eyes:      Extraocular Movements: Extraocular movements intact.      Conjunctiva/sclera: Conjunctivae normal.      Pupils: Pupils are equal, round, and reactive to light.   Cardiovascular:      Rate and Rhythm: Normal rate and regular rhythm.      Pulses: Normal pulses.      Heart sounds: Normal heart sounds. No murmur heard.  Pulmonary:      Breath sounds: Rales present. No wheezing or rhonchi.      Comments: Currently on 3L NC maintaining adequate O2 sat. Rales heard throughout lung fields bilaterally. Breathing somewhat labored.  Abdominal:      General: Bowel sounds are normal. There is distension.      Tenderness: There is no abdominal tenderness. There is no guarding or rebound.   Musculoskeletal:         General: Normal range of motion.      Cervical back: No rigidity.      Right lower leg: Edema (2+) present.      Left lower leg: Edema (2+) present.   Skin:     Coloration: Skin is not jaundiced.      Findings: Lesion and rash present.      Comments: Multiple large ulcerations on abdomen. Weeping. Slight erythema surrounding wounds.    Neurological:      General: No focal deficit present.      Mental Status: He is alert and oriented to person, place, and time.   Psychiatric:         Attention and Perception: Attention normal.         Mood and Affect: Mood normal.         Behavior: Behavior normal.         Thought Content: Thought content normal.          Result Review:  I  have personally reviewed the results from the time of this admission to 8/12/2024 22:15 EDT and agree with these findings:  [x]  Laboratory list / accordion  []  Microbiology  [x]  Radiology  [x]  EKG/Telemetry   []  Cardiology/Vascular   []  Pathology  [x]  Old records  []  Other:        LAB RESULTS:      Lab 08/12/24  1349   WBC 8.23   HEMOGLOBIN 11.0*   HEMATOCRIT 37.5   PLATELETS 228   NEUTROS ABS 6.23   IMMATURE GRANS (ABS) 0.02   LYMPHS ABS 1.09   MONOS ABS 0.73   EOS ABS 0.14   MCV 88.9   PROCALCITONIN 0.10   LACTATE 0.9         Lab 08/12/24  1349   SODIUM 134*   POTASSIUM 5.4*   CHLORIDE 100   CO2 26.0   ANION GAP 8.0   BUN 24*   CREATININE 1.31*   EGFR 58.9*   GLUCOSE 119*   CALCIUM 8.8   MAGNESIUM 1.8         Lab 08/12/24  1349   TOTAL PROTEIN 7.5   ALBUMIN 3.9   GLOBULIN 3.6   ALT (SGPT) 27   AST (SGOT) 18   BILIRUBIN 0.3   ALK PHOS 171*         Lab 08/12/24  1646 08/12/24  1349   PROBNP  --  1,567.0*   HSTROP T 42* 41*                 Lab 08/12/24  1530   PH, ARTERIAL 7.409   PCO2, ARTERIAL 40.2   PO2 ART 72.0*   FIO2 21   HCO3 ART 25.4   BASE EXCESS ART 0.7   CARBOXYHEMOGLOBIN 0.7     Brief Urine Lab Results  (Last result in the past 365 days)        Color   Clarity   Blood   Leuk Est   Nitrite   Protein   CREAT   Urine HCG        08/12/24 1339 Yellow   Clear   Negative   Negative   Negative   Negative                 Microbiology Results (last 10 days)       Procedure Component Value - Date/Time    COVID PRE-OP / PRE-PROCEDURE SCREENING ORDER (NO ISOLATION) - Swab, Nasopharynx [[id number]]  (Normal) Collected: 08/12/24 1345    Lab Status: Final result Specimen: Swab from Nasopharynx Updated: 08/12/24 1423    Narrative:      The following orders were created for panel order COVID PRE-OP / PRE-PROCEDURE SCREENING ORDER (NO ISOLATION) - Swab, Nasopharynx.  Procedure                               Abnormality         Status                     ---------                               -----------          ------                     COVID-19 and FLU A/B PCR...[[id number]]  Normal              Final result                 Please view results for these tests on the individual orders.    COVID-19 and FLU A/B PCR, 1 HR TAT - Swab, Nasopharynx [[id number]]  (Normal) Collected: 08/12/24 1345    Lab Status: Final result Specimen: Swab from Nasopharynx Updated: 08/12/24 1423     COVID19 Not Detected     Influenza A PCR Not Detected     Influenza B PCR Not Detected    Narrative:      Fact sheet for providers: https://www.fda.gov/media/559918/download    Fact sheet for patients: https://www.fda.gov/media/816473/download    Test performed by PCR.            CT Abdomen Pelvis Without Contrast    Result Date: 8/12/2024  CT ABDOMEN PELVIS WO CONTRAST Date of Exam: 8/12/2024 4:16 PM EDT Indication: confusion, weight gain, acute on chronic wound to abdomen. Comparison: None available. Technique: Axial CT images were obtained of the abdomen and pelvis without the administration of contrast. Reconstructed coronal and sagittal images were also obtained. Automated exposure control and iterative construction methods were used. Findings: Patient history includes previous liver transplant in 2001, chronic kidney disease. Significant recent weight gain. Worsening generalized weakness. Today's study shows at least a moderate right pleural effusion and a small left pleural effusion both of which appear to be free-flowing. There is very mild associated atelectasis and no particular findings to suggest basilar pneumonia. There appears to be relatively mild fatty liver change. Spleen is not enlarged. Pancreas appears mildly atrophic. Gallbladder is not identified either surgically absent or contracted. Adrenal glands appear normal. Kidneys show grossly normal parenchymal thickness and no hydronephrosis or nephrolithiasis is seen. Upper abdominal bowel loops are normal in caliber and appearance. No free air ascites or adenopathy is seen. Regarding  the lower abdomen/pelvis, there is a trace amount of ascites along the lower paracolic gutters. No large or small bowel inflammation is identified. There is no evidence of diverticulosis or diverticulitis. Bladder is moderately distended and normal in appearance. Prostate and seminal vesicles are not enlarged. There is a fat-only containing left inguinal/scrotal hernia with no evidence of strangulation. There is very dense diffuse subcutaneous fat stranding consistent with anasarca, with some symmetric fluid between the muscles the abdominal wall.  History indicates chronic wound to abdomen. There is focal skin thickening to the right and superior of the umbilicus, but only superficial involvement. The underlying subcutaneous fat appears normal. Images 123 through 145 show a rounded cystic area 4 1/2 cm in diameter lateral of the left greater trochanter, with a small amount of apparently contiguous fluid passing along the upper margin of the gluteus medius image 135. This contains a small amount  of dense material and may represent an old hematoma. Bony structures appear to be intact.     Impression: Impression: 1. Very dense diffuse subcutaneous fat stranding consistent with anasarca. Small, symmetric inter-muscular fluid collections of the right and left lower abdominal wall, likely related to anasarca. 2. Approximately 4.5 cm fluid collection lateral of the left greater trochanter, favored to represent old, liquefied hematoma. 3. Superficial mid abdominal wall skin thickening, which may be a superficial cellulitis. No evidence of underlying abscess. No evidence of potential abscess here or elsewhere. 4. Moderate right pleural effusion, and small left effusion. Minimal ascites. 5. Fat-only containing left inguinal/scrotal hernia with no evidence of strangulation. Electronically Signed: Kenan Stacy MD  8/12/2024 4:51 PM EDT  Workstation ID: [id number]    CT Head Without Contrast    Result Date: 8/12/2024  CT HEAD WO  CONTRAST Date of Exam: 8/12/2024 4:16 PM EDT Indication: AMS. Comparison: MRI of the brain performed on October 5, 2023 Technique: Axial CT images were obtained of the head without contrast administration.  Automated exposure control and iterative construction methods were used. Findings: There is no evidence of hemorrhage. There is no mass effect or midline shift. Age-related involutional changes are visualized. There is no extra-axial collections. Ventricles are normal in size and configuration for patient's stated age. Posterior fossa is within normal limits. Calvarium and skull base appear intact. Visualized sinuses show no air fluid levels. The mastoid air cells are clear. Visualized orbits are unremarkable.     Impression: Impression: No acute intracranial process evident. Electronically Signed: Omega Moore MD  8/12/2024 4:29 PM EDT  Workstation ID: [id number]    XR Chest 1 View    Result Date: 8/12/2024  XR CHEST 1 VW Date of Exam: 8/12/2024 2:03 PM EDT Indication: SOA triage protocol Comparison: 11/24/2017 Findings: Sternotomy wires are again noted. The heart shadow appears borderline enlarged but the pulmonary vasculature appears normal. There is mild coarsening of the pulmonary interstitial markings and a few granulomatous calcifications that appears stable from prior exams in 2017. No edema, effusion or pneumothorax is seen.     Impression: Impression: Mild heart shadow enlargement and mild chronic appearing interstitial lung changes. No clearly acute chest disease. Electronically Signed: Kenan Stacy MD  8/12/2024 2:41 PM EDT  Workstation ID: [id number]     Results for orders placed during the hospital encounter of 11/20/17    Adult Transthoracic Echocardiogram Complete    Interpretation Summary  · Left ventricular systolic function is normal. Estimated EF = 60%.  · calcification of the aortic valve  · Trace tricuspid valve regurgitation is present      Assessment & Plan   Assessment & Plan       CHF  (congestive heart failure)    Coronary artery disease involving coronary bypass graft of native heart without angina pectoris    Hx of liver transplant    Essential hypertension    Hyperlipidemia LDL goal <70    Type 2 diabetes mellitus without complication, with long-term current use of insulin    Obstructive sleep apnea syndrome    Wound of abdomen    Hyperkalemia    LUZMARIA (acute kidney injury)      Quirino Sheppard is a 69 y.o. male with a history of CAD s/p CABG, CHF, HTN, HLD, T2DM, CKD, Hx Liver transplant (d/t Chronic Hep C), AURELIANO (CPAP non-compliant), and GERD who presents to [facility] ED for complaint of worsening shortness of breath and bilateral lower extremity edema.     Acute on chronic HFpEF   Bilateral Pleural Effusion (R>L)  -CXR tonight shows enlarged heart with no acute findings. However CT abd/pelvis  also showed a moderate right and small left pleural effusion.   -Last echo on file is from 2017 with normal EF  -Repeat echo in the am  -Cardiology consult for the am  -Received 80mg IV Lasix in the ED. Will reevaluate in the am and may need additional doses.   -Daily weight. Strict I&Os    Chronic wound on abdomen  -CT abd/pelvis tonight also showed superficial mid abdominal wall skin thickening, which may be a superficial cellulitis. No evidence of underlying abscess.  -Wound care to see  -Wound culture  -Start Vanc + Rocephin for now    CAD s/p CABG  HTN  HLD  -Chest pain free.    -Takes Doxazosin, Metoprolol, and Ramipril.   -Hold ACE-I for now and monitor Cr while receiving diuretics   -Continue all other home BP meds.    -Continue statin    T2DM  -Blood glucose 119  -Check A1C  -Fingerstick achs. SSI    Hx Liver Transplant 2001 at [facility]  -Continue home meds    Hyperkalemia  -K+ 5.4  -Appears to have recently started Spironolactone based on [facility].   -Received Lasix in the ED. Will repeat bmp in the am    AURELIANO  -Reports that he is supposed to be wearing a CPAP at night  but has not in over a year due to his not working.     CKD 3  -Baseline creatinine 1-1.5  -1.3 tonight, monitor with diuresis    Chronic debility  -Uses wheelchair, currently at [facility] facility in [place]    History of left foot toe amputation x 4  -s/p amputation in April per patient, continue wound care    DVT prophylaxis:  Heparin subQ    CODE STATUS:  Full Code  Code Status (Patient has no pulse and is not breathing): CPR (Attempt to Resuscitate)  Medical Interventions (Patient has pulse or is breathing): Full Support      Expected Discharge      This note has been completed as part of a split-shared workflow.     Signature: Electronically signed by Vinod Ryder PA-C, 08/12/24, 7:59 PM EDT        Attending   Admission Attestation       I have performed an independent face-to-face diagnostic evaluation including performing an independent physical examination.  I approve of the documented plan of care above that was reviewed and developed with the advanced practice clinician (APC) and take responsibility for that plan along with its associated risks.  I have updated the HPI as appropriate.    Brief HPI    Patient is a 69-year-old white male with past medical history of CHF, CAD status post CABG, hypertension, hyperlipidemia, diabetes mellitus type 2, CKD, history of liver transplant in 2001 at [facility], obstructive sleep apnea, GERD who presented to the ED today due to ongoing shortness of breath and bilateral lower extremity swelling.  Patient states he currently is at [facility] facility in [place].  He has noticed he has been more short of breath over the past several days.  States he has been compliant with his medications.  States that he has oxygen but his tubing is broken so he has not been using it.  He has a chronic abdominal wound that has been there for almost 2 years, follows with wound care.  He endorses weight gain upwards of 80 pounds in the past 2 months.  States his abdomen  feels full.  He is given 80 mg of IV Lasix in the ED with good urine output.  States he does not follow outpatient with cardiology.  Hospital medicine asked to admit.    Attending Physical Exam:  Temp:  [98.3 °F (36.8 °C)] 98.3 °F (36.8 °C)  Heart Rate:  [65-80] 80  Resp:  [18-20] 18  BP: (130-150)/(56-97) 132/57  Flow (L/min):  [2.5] 2.5    Constitutional: Awake, alert no acute distress, nontoxic, chronically ill-appearing/appears older than stated age  Eyes: PERRLA, sclerae anicteric, no conjunctival injection  HENT: NCAT, mucous membranes moist  Neck: Supple, no thyromegaly, no lymphadenopathy, trachea midline  Respiratory: Bibasilar rales, nonlabored respirations on 2-1/2 L nasal cannula  Cardiovascular: RRR, no murmurs, rubs, or gallops, palpable pedal pulses bilaterally  Gastrointestinal: Positive bowel sounds, soft, nontender, nondistended  Musculoskeletal: Left ankle dressed, no clubbing or cyanosis to extremities  Psychiatric: Appropriate affect, cooperative  Neurologic: Focal deficits, speech clear  Skin: Chronic wound on abdomen covered with bandage, left foot also covered with bandage.  Status post toe amputations left foot  Result Review:  I have personally reviewed the results from the time of this admission to 8/12/2024 22:20 EDT and agree with these findings:  [x]  Laboratory list / accordion  []  Microbiology  [x]  Radiology  [x]  EKG/Telemetry   []  Cardiology/Vascular   []  Pathology  []  Old records  []  Other:    Assessment and Plan:  Admit to telemetry.  Received 80 mg of IV Lasix in ED.  Repeat BMP in AM.  If creatinine stable, will give further diuretics.  Repeat echo pending.  Consult cardiology in the a.m.  Obtain wound culture from abdominal wound.  Start IV antibiotics with vancomycin plus Rocephin.  Obtain MRSA PCR.  Blood cultures pending.  PT and OT to see.  See assessment and plan documented by APC above and updated/edited by me as appropriate.    Lupe Bond,   08/12/24

## 2024-08-12 NOTE — ED PROVIDER NOTES
Subjective   History of Present Illness  Pt is a 68 yo male presenting to ED with complaints of SOB and confusion. PMHx significant for CAD, CABG, HTN, HLD, DM (insulin), AURELIANO, Hep C, Liver transplant 2001, CKD, Glaucoma, Anxiety and Depression. Pt and daughter providing hx. She explains patient has been having worsening SOB and confusion over the past few weeks. She found him this morning laying in  bed, confused and gurgling. She reports he has gained 87 lbs in the past 8 weeks. She explains he moved to a new senior living several months ago and has been going down hill. She reports worsening generalized weakness. She explains he has been told he needs CPAP but he hasn't started using yet. He went to [place] [facility] yesterday and was told his Potassium was low. He had several toes amputated back in April and per daughter reports wounds healing and no redness or drainage. He has a chronic abdominal wound for over the past 2 years.     History provided by:  Patient, relative and medical records      Review of Systems   Constitutional:  Positive for fatigue. Negative for chills and fever.   HENT:  Negative for congestion, sore throat and trouble swallowing.    Eyes:  Negative for visual disturbance.   Respiratory:  Positive for shortness of breath. Negative for cough.    Cardiovascular:  Positive for leg swelling. Negative for chest pain.   Gastrointestinal:  Positive for nausea. Negative for abdominal pain, blood in stool, constipation, diarrhea and vomiting.   Genitourinary:  Negative for difficulty urinating, dysuria and flank pain.   Musculoskeletal:  Negative for arthralgias and back pain.   Skin: Negative.  Negative for rash and wound.   Allergic/Immunologic: Negative.    Neurological:  Positive for weakness. Negative for dizziness, syncope, numbness and headaches.   Psychiatric/Behavioral:  Positive for confusion.    All other systems reviewed and are negative.      Past Medical History:   Diagnosis Date     Anxiety and depression     BPH associated with nocturia     Chronic kidney disease, stage 2 (mild)     Coronary arteriosclerosis in native artery     3V    Degeneration of lumbar intervertebral disc     Diabetes mellitus     Disorder of sulfur-bearing amino acid metabolism     Gastroesophageal reflux disease without esophagitis     Glaucoma     Hepatitis C     Heterozygous MTHFR mutation [id number]     Heterozygous MTHFR mutation [id number]     High risk medication use     Hyperlipidemia     Hypertension     Liver transplanted     Low back pain     Obstructive sleep apnea syndrome     Recurrent major depression in full remission     Severe obesity     Type 2 diabetes mellitus        No Known Allergies    Past Surgical History:   Procedure Laterality Date    CORONARY ARTERY BYPASS GRAFT N/A 11/22/2017    Procedure: MEDIAN STERNOTOMY, CORONARY ARTERY BYPASS GRAFT X 3, UTILIZING THE LEFT INTERNAL MAMMARY ARTERY AND EVH USING THE LEFT GREATER SAPHENOUS VEIN;  Surgeon: Naga Guaman MD;  [facility]  Service:     LIVER TRANSPLANTATION  2001       Family History   Problem Relation Age of Onset    Diabetes Paternal Grandmother     Breast cancer Mother     Heart attack Father     Heart valve disorder Father        Social History     Socioeconomic History    Marital status:     Number of children: 3   Tobacco Use    Smoking status: Never    Smokeless tobacco: Never   Vaping Use    Vaping status: Never Used   Substance and Sexual Activity    Alcohol use: No     Comment: PT QUIT DRINKING IN 1994    Drug use: Never    Sexual activity: Not Currently           Objective   Physical Exam  Vitals and nursing note reviewed.   Constitutional:       General: He is not in acute distress.     Appearance: He is well-developed. He is ill-appearing.   HENT:      Head: Atraumatic.      Nose: Nose normal.   Eyes:      General: Lids are normal.      Conjunctiva/sclera: Conjunctivae normal.      Pupils: Pupils are equal, round, and  reactive to light.   Cardiovascular:      Rate and Rhythm: Normal rate and regular rhythm.      Heart sounds: Normal heart sounds.   Pulmonary:      Effort: Pulmonary effort is normal.      Breath sounds: Decreased breath sounds present.   Abdominal:      General: There is no distension.      Palpations: Abdomen is soft.      Tenderness: There is generalized abdominal tenderness. There is no guarding or rebound.      Comments: Wound abdomen - no significant surrounding erythema    Musculoskeletal:         General: No tenderness or deformity. Normal range of motion.      Cervical back: Normal range of motion and neck supple.   Skin:     General: Skin is warm and dry.   Neurological:      Mental Status: He is alert and oriented to person, place, and time.      Sensory: No sensory deficit.   Psychiatric:         Mood and Affect: Mood normal.         Behavior: Behavior normal.         Procedures           ED Course      Recent Results (from the past 24 hour(s))   ECG 12 Lead ED Triage Standing Order; SOA    Collection Time: 08/12/24  1:27 PM   Result Value Ref Range    QT Interval 442 ms    QTC Interval 477 ms   Urinalysis With Culture If Indicated - Urine, Clean Catch    Collection Time: 08/12/24  1:39 PM    Specimen: Urine, Clean Catch   Result Value Ref Range    Color, UA Yellow Yellow, Straw    Appearance, UA Clear Clear    pH, UA 5.5 5.0 - 8.0    Specific Gravity, UA 1.009 1.001 - 1.030    Glucose, UA Negative Negative    Ketones, UA Negative Negative    Bilirubin, UA Negative Negative    Blood, UA Negative Negative    Protein, UA Negative Negative    Leuk Esterase, UA Negative Negative    Nitrite, UA Negative Negative    Urobilinogen, UA 0.2 E.U./dL 0.2 - 1.0 E.U./dL   COVID-19 and FLU A/B PCR, 1 HR TAT - Swab, Nasopharynx    Collection Time: 08/12/24  1:45 PM    Specimen: Nasopharynx; Swab   Result Value Ref Range    COVID19 Not Detected Not Detected - Ref. Range    Influenza A PCR Not Detected Not Detected     Influenza B PCR Not Detected Not Detected   Comprehensive Metabolic Panel    Collection Time: 08/12/24  1:49 PM    Specimen: Blood   Result Value Ref Range    Glucose 119 (H) 65 - 99 mg/dL    BUN 24 (H) 8 - 23 mg/dL    Creatinine 1.31 (H) 0.76 - 1.27 mg/dL    Sodium 134 (L) 136 - 145 mmol/L    Potassium 5.4 (H) 3.5 - 5.2 mmol/L    Chloride 100 98 - 107 mmol/L    CO2 26.0 22.0 - 29.0 mmol/L    Calcium 8.8 8.6 - 10.5 mg/dL    Total Protein 7.5 6.0 - 8.5 g/dL    Albumin 3.9 3.5 - 5.2 g/dL    ALT (SGPT) 27 1 - 41 U/L    AST (SGOT) 18 1 - 40 U/L    Alkaline Phosphatase 171 (H) 39 - 117 U/L    Total Bilirubin 0.3 0.0 - 1.2 mg/dL    Globulin 3.6 gm/dL    A/G Ratio 1.1 g/dL    BUN/Creatinine Ratio 18.3 7.0 - 25.0    Anion Gap 8.0 5.0 - 15.0 mmol/L    eGFR 58.9 (L) >60.0 mL/min/1.73   BNP    Collection Time: 08/12/24  1:49 PM    Specimen: Blood   Result Value Ref Range    proBNP 1,567.0 (H) 0.0 - 900.0 pg/mL   Single High Sensitivity Troponin T    Collection Time: 08/12/24  1:49 PM    Specimen: Blood   Result Value Ref Range    HS Troponin T 41 (H) <22 ng/L   Green Top (Gel)    Collection Time: 08/12/24  1:49 PM   Result Value Ref Range    Extra Tube Hold for add-ons.    Lavender Top    Collection Time: 08/12/24  1:49 PM   Result Value Ref Range    Extra Tube hold for add-on    Gold Top - SST    Collection Time: 08/12/24  1:49 PM   Result Value Ref Range    Extra Tube Hold for add-ons.    Gray Top    Collection Time: 08/12/24  1:49 PM   Result Value Ref Range    Extra Tube Hold for add-ons.    Light Blue Top    Collection Time: 08/12/24  1:49 PM   Result Value Ref Range    Extra Tube Hold for add-ons.    CBC Auto Differential    Collection Time: 08/12/24  1:49 PM    Specimen: Blood   Result Value Ref Range    WBC 8.23 3.40 - 10.80 10*3/mm3    RBC 4.22 4.14 - 5.80 10*6/mm3    Hemoglobin 11.0 (L) 13.0 - 17.7 g/dL    Hematocrit 37.5 37.5 - 51.0 %    MCV 88.9 79.0 - 97.0 fL    MCH 26.1 (L) 26.6 - 33.0 pg    MCHC 29.3 (L) 31.5 -  35.7 g/dL    RDW 21.3 (H) 12.3 - 15.4 %    RDW-SD 69.0 (H) 37.0 - 54.0 fl    MPV 8.2 6.0 - 12.0 fL    Platelets 228 140 - 450 10*3/mm3    Neutrophil % 75.8 42.7 - 76.0 %    Lymphocyte % 13.2 (L) 19.6 - 45.3 %    Monocyte % 8.9 5.0 - 12.0 %    Eosinophil % 1.7 0.3 - 6.2 %    Basophil % 0.2 0.0 - 1.5 %    Immature Grans % 0.2 0.0 - 0.5 %    Neutrophils, Absolute 6.23 1.70 - 7.00 10*3/mm3    Lymphocytes, Absolute 1.09 0.70 - 3.10 10*3/mm3    Monocytes, Absolute 0.73 0.10 - 0.90 10*3/mm3    Eosinophils, Absolute 0.14 0.00 - 0.40 10*3/mm3    Basophils, Absolute 0.02 0.00 - 0.20 10*3/mm3    Immature Grans, Absolute 0.02 0.00 - 0.05 10*3/mm3    nRBC 0.0 0.0 - 0.2 /100 WBC   Magnesium    Collection Time: 08/12/24  1:49 PM    Specimen: Blood   Result Value Ref Range    Magnesium 1.8 1.6 - 2.4 mg/dL   Lactic Acid, Plasma    Collection Time: 08/12/24  1:49 PM    Specimen: Blood   Result Value Ref Range    Lactate 0.9 0.5 - 2.0 mmol/L   Procalcitonin    Collection Time: 08/12/24  1:49 PM    Specimen: Blood   Result Value Ref Range    Procalcitonin 0.10 0.00 - 0.25 ng/mL   Scan Slide    Collection Time: 08/12/24  1:49 PM    Specimen: Blood   Result Value Ref Range    Anisocytosis Slight/1+ None Seen    Elliptocytes Slight/1+ None Seen    WBC Morphology Normal Normal    Platelet Estimate Adequate Normal   Blood Gas, Arterial With Co-Ox    Collection Time: 08/12/24  3:30 PM    Specimen: Arterial Blood   Result Value Ref Range    Site Right Brachial     Jarret's Test N/A     pH, Arterial 7.409 7.350 - 7.450 pH units    pCO2, Arterial 40.2 35.0 - 45.0 mm Hg    pO2, Arterial 72.0 (L) 83.0 - 108.0 mm Hg    HCO3, Arterial 25.4 20.0 - 26.0 mmol/L    Base Excess, Arterial 0.7 0.0 - 2.0 mmol/L    Hemoglobin, Blood Gas 10.5 (L) 13.5 - 17.5 g/dL    Hematocrit, Blood Gas 32.2 (L) 38.0 - 51.0 %    Oxyhemoglobin 92.2 (L) 94 - 99 %    Methemoglobin 0.10 0.00 - 1.50 %    Carboxyhemoglobin 0.7 0 - 2 %    CO2 Content 26.6 22 - 33 mmol/L     Temperature 37.0     Barometric Pressure for Blood Gas      Modality Room Air     FIO2 21 %    Ventilator Mode      Rate 0 Breaths/minute    PIP 0 cmH2O    IPAP 0     EPAP 0     pH, Temp Corrected 7.409 pH Units    pCO2, Temperature Corrected 40.2 35 - 48 mm Hg    pO2, Temperature Corrected 72.0 (L) 83 - 108 mm Hg   ECG 12 Lead Dyspnea    Collection Time: 08/12/24  4:43 PM   Result Value Ref Range    QT Interval 460 ms    QTC Interval 499 ms   Single High Sensitivity Troponin T    Collection Time: 08/12/24  4:46 PM    Specimen: Blood   Result Value Ref Range    HS Troponin T 42 (H) <22 ng/L   POC Glucose Once    Collection Time: 08/12/24  9:34 PM    Specimen: Blood   Result Value Ref Range    Glucose 93 70 - 130 mg/dL     Note: In addition to lab results from this visit, the labs listed above may include labs taken at another facility or during a different encounter within the last 24 hours. Please correlate lab times with ED admission and discharge times for further clarification of the services performed during this visit.    CT Head Without Contrast   Final Result   Impression:   No acute intracranial process evident.            Electronically Signed: Omega Moore MD     8/12/2024 4:29 PM EDT     Workstation ID: [id number]      CT Abdomen Pelvis Without Contrast   Final Result   Impression:      1. Very dense diffuse subcutaneous fat stranding consistent with anasarca. Small, symmetric inter-muscular fluid collections of the right and left lower abdominal wall, likely related to anasarca.      2. Approximately 4.5 cm fluid collection lateral of the left greater trochanter, favored to represent old, liquefied hematoma.      3. Superficial mid abdominal wall skin thickening, which may be a superficial cellulitis. No evidence of underlying abscess. No evidence of potential abscess here or elsewhere.      4. Moderate right pleural effusion, and small left effusion. Minimal ascites.      5. Fat-only containing left  "inguinal/scrotal hernia with no evidence of strangulation.               Electronically Signed: Kenan Stacy MD     8/12/2024 4:51 PM EDT     Workstation ID: [id number]      XR Chest 1 View   Final Result   Impression:   Mild heart shadow enlargement and mild chronic appearing interstitial lung changes. No clearly acute chest disease.         Electronically Signed: Kenan Stacy MD     8/12/2024 2:41 PM EDT     Workstation ID: [id number]        Vitals:    08/12/24 1915 08/12/24 2022 08/12/24 2026 08/12/24 2145   BP: 145/97  130/56 132/57   BP Location:       Patient Position:       Pulse: 77 77 77 80   Resp:       Temp:       TempSrc:       SpO2: 95%  100% 91%   Weight:  103 kg (226 lb)     Height:  167.6 cm (66\")       Medications   sodium chloride 0.9 % flush 10 mL (has no administration in time range)   dextrose (GLUTOSE) oral gel 15 g (has no administration in time range)   dextrose (D50W) (25 g/50 mL) IV injection 25 g (has no administration in time range)   glucagon (GLUCAGEN) injection 1 mg (has no administration in time range)   Insulin Lispro (humaLOG) injection 3-14 Units (has no administration in time range)   aspirin EC tablet 325 mg (has no administration in time range)   atorvastatin (LIPITOR) tablet 80 mg (has no administration in time range)   buPROPion XL (WELLBUTRIN XL) 24 hr tablet 300 mg (has no administration in time range)   terazosin (HYTRIN) capsule 5 mg (5 mg Oral Given 8/12/24 2142)   insulin glargine (LANTUS, SEMGLEE) injection 20 Units (20 Units Subcutaneous Given 8/12/24 2204)   pantoprazole (PROTONIX) EC tablet 40 mg (has no administration in time range)   sertraline (ZOLOFT) tablet 150 mg (has no administration in time range)   metoprolol tartrate (LOPRESSOR) tablet 25 mg (25 mg Oral Given 8/12/24 2142)   sodium chloride 0.9 % flush 10 mL (10 mL Intravenous Given 8/12/24 2148)   sodium chloride 0.9 % flush 10 mL (has no administration in time range)   sodium chloride 0.9 % infusion 40 mL (has " no administration in time range)   sirolimus (RAPAMUNE) tablet 2 mg (has no administration in time range)   heparin (porcine) 5000 UNIT/ML injection 5,000 Units (5,000 Units Subcutaneous Given 8/12/24 2142)   nitroglycerin (NITROSTAT) SL tablet 0.4 mg (has no administration in time range)   melatonin tablet 5 mg (5 mg Oral Given 8/12/24 2142)   cefTRIAXone (ROCEPHIN) 1,000 mg in sodium chloride 0.9 % 100 mL MBP (1,000 mg Intravenous New Bag 8/12/24 2142)   Pharmacy to dose vancomycin (has no administration in time range)   vancomycin IVPB 2000 mg in 0.9% Sodium Chloride 500 mL (2,000 mg Intravenous New Bag 8/12/24 2237)   vancomycin 750 mg in sodium chloride 0.9 % 250 mL IVPB-VTB (has no administration in time range)   furosemide (LASIX) injection 80 mg (80 mg Intravenous Given 8/12/24 1945)     ECG/EMG Results (last 24 hours)       Procedure Component Value Units Date/Time    ECG 12 Lead ED Triage Standing Order; SOA [[id number]] Collected: 08/12/24 1327     Updated: 08/12/24 1358     QT Interval 442 ms      QTC Interval 477 ms     Narrative:      Test Reason : ED Triage Standing Order~  Blood Pressure :   */*   mmHG  Vent. Rate :  70 BPM     Atrial Rate :  70 BPM     P-R Int : 286 ms          QRS Dur :  78 ms      QT Int : 442 ms       P-R-T Axes :  28  26 100 degrees     QTc Int : 477 ms    Sinus rhythm with 1st degree AV block  Nonspecific T wave abnormality  Abnormal ECG  When compared with ECG of 24-NOV-2017 04:19,  MO interval has increased  QRS voltage has decreased  ST no longer elevated in Anterior leads  T wave inversion no longer evident in Inferior leads  Nonspecific T wave abnormality, worse in Anterolateral leads    Referred By: EDMD           Confirmed By:     ECG 12 Lead Dyspnea [[id number]] Collected: 08/12/24 1643     Updated: 08/12/24 1642     QT Interval 460 ms      QTC Interval 499 ms     Narrative:      Test Reason : Dyspnea  Blood Pressure :   */*   mmHG  Vent. Rate :  71 BPM     Atrial Rate :   71 BPM     P-R Int : 270 ms          QRS Dur :  84 ms      QT Int : 460 ms       P-R-T Axes :  50  16 115 degrees     QTc Int : 499 ms    Sinus rhythm with 1st degree AV block  Nonspecific T wave abnormality  Abnormal ECG  When compared with ECG of 12-AUG-2024 13:27, (Unconfirmed)  Nonspecific T wave abnormality, improved in Anterior leads    Referred By: EDMD           Confirmed By:           ECG 12 Lead Dyspnea   Preliminary Result   Test Reason : Dyspnea   Blood Pressure :   */*   mmHG   Vent. Rate :  71 BPM     Atrial Rate :  71 BPM      P-R Int : 270 ms          QRS Dur :  84 ms       QT Int : 460 ms       P-R-T Axes :  50  16 115 degrees      QTc Int : 499 ms      Sinus rhythm with 1st degree AV block   Nonspecific T wave abnormality   Abnormal ECG   When compared with ECG of 12-AUG-2024 13:27, (Unconfirmed)   Nonspecific T wave abnormality, improved in Anterior leads      Referred By: EDMD           Confirmed By:       ECG 12 Lead ED Triage Standing Order; SOA   Preliminary Result   Test Reason : ED Triage Standing Order~   Blood Pressure :   */*   mmHG   Vent. Rate :  70 BPM     Atrial Rate :  70 BPM      P-R Int : 286 ms          QRS Dur :  78 ms       QT Int : 442 ms       P-R-T Axes :  28  26 100 degrees      QTc Int : 477 ms      Sinus rhythm with 1st degree AV block   Nonspecific T wave abnormality   Abnormal ECG   When compared with ECG of 24-NOV-2017 04:19,   WV interval has increased   QRS voltage has decreased   ST no longer elevated in Anterior leads   T wave inversion no longer evident in Inferior leads   Nonspecific T wave abnormality, worse in Anterolateral leads      Referred By: EDMD           Confirmed By:           DISCHARGE    Patient discharged in stable condition.    Reviewed implications of results, diagnosis, meds, responsibility to follow up, warning signs and symptoms of possible worsening, potential complications and reasons to return to ER.    Patient/Family voiced understanding of  above instructions.    Discussed plan for discharge, as there is no emergent indication for admission.  Pt/family is agreeable and understands need for follow up and possible repeat testing.  Pt/family is aware that discharge does not mean that nothing is wrong but that it indicates no emergency is currently present that requires admission and they must continue care with follow-up as given below or with a physician of their choice.     FOLLOW-UP  No follow-up provider specified.       Medication List      No changes were made to your prescriptions during this visit.                                              Medical Decision Making  Pt is a 68 yo male presenting to ED with daughter with reports of gaining over 80 lbs in the past 2 months. He has edema to legs and distension to abdomen. He has chronic wounds to feet / abdomen. Hx of non compliance with CPAP. Labs in ED notable for WBC 8, Lactic 0.9, Procal 0.1, Cr 1.31, Glucose 119, K 5.4, HS Trop 41, BNP 1,567 and UA unremarkable. CXR mild chronic lung changes. CT abd/pelvis with findings of anasarca, superficial wall skin wall thickening but no underlying abscess, moderate right pleural effusion. CT head no acute findings. Discussed results and tx plan regarding admission.     Discussed patient with Dr. Mcmahon who is agreeable with ED course and tx plan.   Discussed admission with hospitalist Dr. Bond.     DDx  Covid, Flu, pneumonia, CHF, ACS, Sepsis, Wound infection, Cirrhosis, LUZMARIA, UTI, CVA    Problems Addressed:  Acute on chronic congestive heart failure, unspecified heart failure type: complicated acute illness or injury  History of liver transplant: complicated acute illness or injury  Hx of CABG: complicated acute illness or injury  Hyperkalemia: complicated acute illness or injury  AURELIANO (obstructive sleep apnea): complicated acute illness or injury  Pleural effusion: complicated acute illness or injury  SOB (shortness of breath): complicated acute  illness or injury    Amount and/or Complexity of Data Reviewed  Independent Historian:      Details: daughter  External Data Reviewed: notes.     Details: Reviewed previous non ED visits including prior labs, imaging, available notes, medications, allergies and surgical hx.     Labs: ordered. Decision-making details documented in ED Course.  Radiology: ordered. Decision-making details documented in ED Course.  ECG/medicine tests: ordered. Decision-making details documented in ED Course.    Risk  Prescription drug management.  Decision regarding hospitalization.        Final diagnoses:   Acute on chronic congestive heart failure, unspecified heart failure type   Hyperkalemia   SOB (shortness of breath)   AURELIANO (obstructive sleep apnea)   Hx of CABG   History of liver transplant   Pleural effusion   Chronic wound infection of abdomen, initial encounter       ED Disposition  ED Disposition       ED Disposition   Decision to Admit    Condition   --    Comment   Level of Care: Telemetry [5]   Diagnosis: CHF (congestive heart failure) [371043]   Admitting Physician: ROGERS ESCALERA [[id number]]   Attending Physician: ROGERS ESCALERA [[id number]]                 No follow-up provider specified.       Medication List      No changes were made to your prescriptions during this visit.            Laney Card PA  08/12/24 0541

## 2024-08-13 ENCOUNTER — APPOINTMENT (OUTPATIENT)
Dept: CARDIOLOGY | Facility: HOSPITAL | Age: 69
DRG: 291 | End: 2024-08-13
Payer: MEDICARE

## 2024-08-13 LAB
ANION GAP SERPL CALCULATED.3IONS-SCNC: 6 MMOL/L (ref 5–15)
BASOPHILS # BLD AUTO: 0.03 10*3/MM3 (ref 0–0.2)
BASOPHILS NFR BLD AUTO: 0.4 % (ref 0–1.5)
BH CV ECHO MEAS - AO MAX PG: 10.3 MMHG
BH CV ECHO MEAS - AO MEAN PG: 5.7 MMHG
BH CV ECHO MEAS - AO ROOT DIAM: 3.5 CM
BH CV ECHO MEAS - AO V2 MAX: 160.3 CM/SEC
BH CV ECHO MEAS - AO V2 VTI: 34.6 CM
BH CV ECHO MEAS - AVA(I,D): 2.09 CM2
BH CV ECHO MEAS - EDV(CUBED): 140.6 ML
BH CV ECHO MEAS - EDV(MOD-SP2): 93.7 ML
BH CV ECHO MEAS - EDV(MOD-SP4): 92.6 ML
BH CV ECHO MEAS - EF(MOD-BP): 52.6 %
BH CV ECHO MEAS - EF(MOD-SP2): 58.6 %
BH CV ECHO MEAS - EF(MOD-SP4): 51.5 %
BH CV ECHO MEAS - ESV(CUBED): 42.9 ML
BH CV ECHO MEAS - ESV(MOD-SP2): 38.8 ML
BH CV ECHO MEAS - ESV(MOD-SP4): 44.9 ML
BH CV ECHO MEAS - FS: 32.7 %
BH CV ECHO MEAS - IVS/LVPW: 1.1 CM
BH CV ECHO MEAS - IVSD: 1.1 CM
BH CV ECHO MEAS - LA DIMENSION: 4.3 CM
BH CV ECHO MEAS - LAT PEAK E' VEL: 10.9 CM/SEC
BH CV ECHO MEAS - LV MASS(C)D: 207.3 GRAMS
BH CV ECHO MEAS - LV MAX PG: 5.1 MMHG
BH CV ECHO MEAS - LV MEAN PG: 2.7 MMHG
BH CV ECHO MEAS - LV V1 MAX: 112.7 CM/SEC
BH CV ECHO MEAS - LV V1 VTI: 23 CM
BH CV ECHO MEAS - LVIDD: 5.2 CM
BH CV ECHO MEAS - LVIDS: 3.5 CM
BH CV ECHO MEAS - LVOT AREA: 3.1 CM2
BH CV ECHO MEAS - LVOT DIAM: 2 CM
BH CV ECHO MEAS - LVPWD: 1 CM
BH CV ECHO MEAS - MED PEAK E' VEL: 9.8 CM/SEC
BH CV ECHO MEAS - MR MAX PG: 65.7 MMHG
BH CV ECHO MEAS - MR MAX VEL: 405 CM/SEC
BH CV ECHO MEAS - MR MEAN PG: 45.5 MMHG
BH CV ECHO MEAS - MR MEAN VEL: 318 CM/SEC
BH CV ECHO MEAS - MR VTI: 138 CM
BH CV ECHO MEAS - MV A MAX VEL: 71.9 CM/SEC
BH CV ECHO MEAS - MV DEC SLOPE: 330 CM/SEC2
BH CV ECHO MEAS - MV DEC TIME: 0.21 SEC
BH CV ECHO MEAS - MV E MAX VEL: 109 CM/SEC
BH CV ECHO MEAS - MV E/A: 1.52
BH CV ECHO MEAS - MV MAX PG: 6.3 MMHG
BH CV ECHO MEAS - MV MEAN PG: 2 MMHG
BH CV ECHO MEAS - MV P1/2T: 110.1 MSEC
BH CV ECHO MEAS - MV V2 VTI: 38.9 CM
BH CV ECHO MEAS - MVA(P1/2T): 2 CM2
BH CV ECHO MEAS - MVA(VTI): 1.86 CM2
BH CV ECHO MEAS - PA ACC TIME: 0.11 SEC
BH CV ECHO MEAS - RAP SYSTOLE: 8 MMHG
BH CV ECHO MEAS - RVSP: 36 MMHG
BH CV ECHO MEAS - SV(LVOT): 72.4 ML
BH CV ECHO MEAS - SV(MOD-SP2): 54.9 ML
BH CV ECHO MEAS - SV(MOD-SP4): 47.7 ML
BH CV ECHO MEAS - TAPSE (>1.6): 1.68 CM
BH CV ECHO MEAS - TR MAX PG: 28 MMHG
BH CV ECHO MEAS - TR MAX VEL: 262.5 CM/SEC
BH CV ECHO MEASUREMENTS AVERAGE E/E' RATIO: 10.53
BH CV VAS BP LEFT ARM: NORMAL MMHG
BH CV XLRA - RV BASE: 3.8 CM
BH CV XLRA - RV LENGTH: 8.1 CM
BH CV XLRA - RV MID: 3.4 CM
BH CV XLRA - TDI S': 8.8 CM/SEC
BUN SERPL-MCNC: 24 MG/DL (ref 8–23)
BUN/CREAT SERPL: 19.5 (ref 7–25)
CALCIUM SPEC-SCNC: 8.3 MG/DL (ref 8.6–10.5)
CHLORIDE SERPL-SCNC: 104 MMOL/L (ref 98–107)
CO2 SERPL-SCNC: 26 MMOL/L (ref 22–29)
CREAT SERPL-MCNC: 1.23 MG/DL (ref 0.76–1.27)
DEPRECATED RDW RBC AUTO: 66.1 FL (ref 37–54)
EGFRCR SERPLBLD CKD-EPI 2021: 63.6 ML/MIN/1.73
EOSINOPHIL # BLD AUTO: 0.08 10*3/MM3 (ref 0–0.4)
EOSINOPHIL NFR BLD AUTO: 1.1 % (ref 0.3–6.2)
ERYTHROCYTE [DISTWIDTH] IN BLOOD BY AUTOMATED COUNT: 21 % (ref 12.3–15.4)
GLUCOSE BLDC GLUCOMTR-MCNC: 109 MG/DL (ref 70–130)
GLUCOSE BLDC GLUCOMTR-MCNC: 120 MG/DL (ref 70–130)
GLUCOSE BLDC GLUCOMTR-MCNC: 132 MG/DL (ref 70–130)
GLUCOSE BLDC GLUCOMTR-MCNC: 142 MG/DL (ref 70–130)
GLUCOSE SERPL-MCNC: 184 MG/DL (ref 65–99)
HBA1C MFR BLD: 7.6 % (ref 4.8–5.6)
HCT VFR BLD AUTO: 30.9 % (ref 37.5–51)
HGB BLD-MCNC: 9.4 G/DL (ref 13–17.7)
IMM GRANULOCYTES # BLD AUTO: 0.02 10*3/MM3 (ref 0–0.05)
IMM GRANULOCYTES NFR BLD AUTO: 0.3 % (ref 0–0.5)
IVRT: 10.9 MS
LEFT ATRIUM VOLUME INDEX: 30.6 ML/M2
LEFT ATRIUM VOLUME: 67.1 ML
LYMPHOCYTES # BLD AUTO: 0.94 10*3/MM3 (ref 0.7–3.1)
LYMPHOCYTES NFR BLD AUTO: 12.9 % (ref 19.6–45.3)
MCH RBC QN AUTO: 26.3 PG (ref 26.6–33)
MCHC RBC AUTO-ENTMCNC: 30.4 G/DL (ref 31.5–35.7)
MCV RBC AUTO: 86.3 FL (ref 79–97)
MONOCYTES # BLD AUTO: 0.62 10*3/MM3 (ref 0.1–0.9)
MONOCYTES NFR BLD AUTO: 8.5 % (ref 5–12)
NEUTROPHILS NFR BLD AUTO: 5.62 10*3/MM3 (ref 1.7–7)
NEUTROPHILS NFR BLD AUTO: 76.8 % (ref 42.7–76)
NRBC BLD AUTO-RTO: 0 /100 WBC (ref 0–0.2)
PLATELET # BLD AUTO: 181 10*3/MM3 (ref 140–450)
PMV BLD AUTO: 8.1 FL (ref 6–12)
POTASSIUM SERPL-SCNC: 5.1 MMOL/L (ref 3.5–5.2)
RBC # BLD AUTO: 3.58 10*6/MM3 (ref 4.14–5.8)
SODIUM SERPL-SCNC: 136 MMOL/L (ref 136–145)
WBC NRBC COR # BLD AUTO: 7.31 10*3/MM3 (ref 3.4–10.8)

## 2024-08-13 PROCEDURE — 82948 REAGENT STRIP/BLOOD GLUCOSE: CPT

## 2024-08-13 PROCEDURE — 25010000002 FUROSEMIDE PER 20 MG: Performed by: INTERNAL MEDICINE

## 2024-08-13 PROCEDURE — 83036 HEMOGLOBIN GLYCOSYLATED A1C: CPT | Performed by: PHYSICIAN ASSISTANT

## 2024-08-13 PROCEDURE — 99222 1ST HOSP IP/OBS MODERATE 55: CPT | Performed by: INTERNAL MEDICINE

## 2024-08-13 PROCEDURE — 87205 SMEAR GRAM STAIN: CPT | Performed by: PHYSICIAN ASSISTANT

## 2024-08-13 PROCEDURE — 87147 CULTURE TYPE IMMUNOLOGIC: CPT | Performed by: INTERNAL MEDICINE

## 2024-08-13 PROCEDURE — 87070 CULTURE OTHR SPECIMN AEROBIC: CPT | Performed by: PHYSICIAN ASSISTANT

## 2024-08-13 PROCEDURE — 85025 COMPLETE CBC W/AUTO DIFF WBC: CPT | Performed by: PHYSICIAN ASSISTANT

## 2024-08-13 PROCEDURE — 97535 SELF CARE MNGMENT TRAINING: CPT

## 2024-08-13 PROCEDURE — 99233 SBSQ HOSP IP/OBS HIGH 50: CPT | Performed by: INTERNAL MEDICINE

## 2024-08-13 PROCEDURE — 63710000001 INSULIN GLARGINE PER 5 UNITS: Performed by: PHYSICIAN ASSISTANT

## 2024-08-13 PROCEDURE — 25010000002 HEPARIN (PORCINE) PER 1000 UNITS: Performed by: PHYSICIAN ASSISTANT

## 2024-08-13 PROCEDURE — 25810000003 SODIUM CHLORIDE 0.9 % SOLUTION 250 ML FLEX CONT

## 2024-08-13 PROCEDURE — 25010000002 VANCOMYCIN 750 MG RECONSTITUTED SOLUTION 1 EACH VIAL

## 2024-08-13 PROCEDURE — 80048 BASIC METABOLIC PNL TOTAL CA: CPT | Performed by: PHYSICIAN ASSISTANT

## 2024-08-13 PROCEDURE — 87147 CULTURE TYPE IMMUNOLOGIC: CPT | Performed by: PHYSICIAN ASSISTANT

## 2024-08-13 PROCEDURE — 97166 OT EVAL MOD COMPLEX 45 MIN: CPT

## 2024-08-13 PROCEDURE — 93306 TTE W/DOPPLER COMPLETE: CPT

## 2024-08-13 PROCEDURE — 93306 TTE W/DOPPLER COMPLETE: CPT | Performed by: INTERNAL MEDICINE

## 2024-08-13 PROCEDURE — 87077 CULTURE AEROBIC IDENTIFY: CPT | Performed by: INTERNAL MEDICINE

## 2024-08-13 PROCEDURE — 97530 THERAPEUTIC ACTIVITIES: CPT

## 2024-08-13 PROCEDURE — 87070 CULTURE OTHR SPECIMN AEROBIC: CPT | Performed by: INTERNAL MEDICINE

## 2024-08-13 PROCEDURE — 87186 SC STD MICRODIL/AGAR DIL: CPT | Performed by: INTERNAL MEDICINE

## 2024-08-13 PROCEDURE — 87205 SMEAR GRAM STAIN: CPT | Performed by: INTERNAL MEDICINE

## 2024-08-13 PROCEDURE — 97162 PT EVAL MOD COMPLEX 30 MIN: CPT

## 2024-08-13 PROCEDURE — 63710000001 SIROLIMUS PER 1 MG: Performed by: PHYSICIAN ASSISTANT

## 2024-08-13 RX ORDER — DOXYCYCLINE 100 MG/1
100 CAPSULE ORAL EVERY 12 HOURS SCHEDULED
Status: DISCONTINUED | OUTPATIENT
Start: 2024-08-13 | End: 2024-08-14

## 2024-08-13 RX ORDER — QUETIAPINE FUMARATE 25 MG/1
25 TABLET, FILM COATED ORAL 2 TIMES DAILY
Status: DISCONTINUED | OUTPATIENT
Start: 2024-08-13 | End: 2024-08-13

## 2024-08-13 RX ORDER — TAMSULOSIN HYDROCHLORIDE 0.4 MG/1
0.4 CAPSULE ORAL DAILY
Status: DISCONTINUED | OUTPATIENT
Start: 2024-08-13 | End: 2024-08-22 | Stop reason: HOSPADM

## 2024-08-13 RX ORDER — FERROUS SULFATE 325(65) MG
325 TABLET ORAL
Status: DISCONTINUED | OUTPATIENT
Start: 2024-08-13 | End: 2024-08-22 | Stop reason: HOSPADM

## 2024-08-13 RX ORDER — LORAZEPAM 0.5 MG/1
0.5 TABLET ORAL EVERY 6 HOURS PRN
Status: DISCONTINUED | OUTPATIENT
Start: 2024-08-13 | End: 2024-08-15

## 2024-08-13 RX ORDER — GABAPENTIN 100 MG/1
100 CAPSULE ORAL NIGHTLY
Status: DISCONTINUED | OUTPATIENT
Start: 2024-08-13 | End: 2024-08-18

## 2024-08-13 RX ORDER — FUROSEMIDE 10 MG/ML
40 INJECTION INTRAMUSCULAR; INTRAVENOUS EVERY 12 HOURS
Status: DISCONTINUED | OUTPATIENT
Start: 2024-08-13 | End: 2024-08-15

## 2024-08-13 RX ORDER — ESCITALOPRAM OXALATE 20 MG/1
20 TABLET ORAL DAILY
Status: DISCONTINUED | OUTPATIENT
Start: 2024-08-13 | End: 2024-08-22 | Stop reason: HOSPADM

## 2024-08-13 RX ORDER — HYDROCODONE BITARTRATE AND ACETAMINOPHEN 5; 325 MG/1; MG/1
1 TABLET ORAL EVERY 6 HOURS PRN
Status: DISPENSED | OUTPATIENT
Start: 2024-08-13 | End: 2024-08-18

## 2024-08-13 RX ORDER — QUETIAPINE FUMARATE 25 MG/1
12.5 TABLET, FILM COATED ORAL 2 TIMES DAILY
Status: DISCONTINUED | OUTPATIENT
Start: 2024-08-13 | End: 2024-08-17

## 2024-08-13 RX ADMIN — PANTOPRAZOLE SODIUM 40 MG: 40 TABLET, DELAYED RELEASE ORAL at 09:57

## 2024-08-13 RX ADMIN — TAMSULOSIN HYDROCHLORIDE 0.4 MG: 0.4 CAPSULE ORAL at 09:57

## 2024-08-13 RX ADMIN — FUROSEMIDE 40 MG: 10 INJECTION, SOLUTION INTRAMUSCULAR; INTRAVENOUS at 20:55

## 2024-08-13 RX ADMIN — HEPARIN SODIUM 5000 UNITS: 5000 INJECTION INTRAVENOUS; SUBCUTANEOUS at 20:55

## 2024-08-13 RX ADMIN — HEPARIN SODIUM 5000 UNITS: 5000 INJECTION INTRAVENOUS; SUBCUTANEOUS at 09:56

## 2024-08-13 RX ADMIN — QUETIAPINE FUMARATE 12.5 MG: 25 TABLET ORAL at 09:57

## 2024-08-13 RX ADMIN — ATORVASTATIN CALCIUM 80 MG: 40 TABLET, FILM COATED ORAL at 09:57

## 2024-08-13 RX ADMIN — FERROUS SULFATE TAB 325 MG (65 MG ELEMENTAL FE) 325 MG: 325 (65 FE) TAB at 09:57

## 2024-08-13 RX ADMIN — Medication 10 ML: at 09:59

## 2024-08-13 RX ADMIN — BUPROPION HYDROCHLORIDE 300 MG: 150 TABLET, EXTENDED RELEASE ORAL at 09:57

## 2024-08-13 RX ADMIN — Medication 10 ML: at 20:57

## 2024-08-13 RX ADMIN — ESCITALOPRAM OXALATE 20 MG: 20 TABLET, FILM COATED ORAL at 09:57

## 2024-08-13 RX ADMIN — DOXYCYCLINE 100 MG: 100 CAPSULE ORAL at 20:56

## 2024-08-13 RX ADMIN — GABAPENTIN 100 MG: 100 CAPSULE ORAL at 20:55

## 2024-08-13 RX ADMIN — INSULIN GLARGINE 20 UNITS: 100 INJECTION, SOLUTION SUBCUTANEOUS at 09:56

## 2024-08-13 RX ADMIN — QUETIAPINE FUMARATE 12.5 MG: 25 TABLET ORAL at 20:55

## 2024-08-13 RX ADMIN — ASPIRIN 325 MG: 325 TABLET, COATED ORAL at 09:57

## 2024-08-13 RX ADMIN — HYDROCODONE BITARTRATE AND ACETAMINOPHEN 1 TABLET: 5; 325 TABLET ORAL at 20:55

## 2024-08-13 RX ADMIN — METOPROLOL TARTRATE 25 MG: 25 TABLET, FILM COATED ORAL at 09:57

## 2024-08-13 RX ADMIN — INSULIN GLARGINE 20 UNITS: 100 INJECTION, SOLUTION SUBCUTANEOUS at 20:56

## 2024-08-13 RX ADMIN — Medication 5 MG: at 20:56

## 2024-08-13 RX ADMIN — VANCOMYCIN HYDROCHLORIDE 750 MG: 750 INJECTION, POWDER, LYOPHILIZED, FOR SOLUTION INTRAVENOUS at 09:54

## 2024-08-13 RX ADMIN — FUROSEMIDE 40 MG: 10 INJECTION, SOLUTION INTRAMUSCULAR; INTRAVENOUS at 09:55

## 2024-08-13 RX ADMIN — SIROLIMUS 2 MG: 1 TABLET ORAL at 09:58

## 2024-08-13 RX ADMIN — HYDROCODONE BITARTRATE AND ACETAMINOPHEN 1 TABLET: 5; 325 TABLET ORAL at 00:40

## 2024-08-13 NOTE — PAYOR COMM NOTE
"Quirino Sheppard (69 y.o. Male)     [id number]     Kasey Briceño, RN  Utilization Review  Tuvoo-[phone]  Lpx-[phone]        Date of Birth   1955    Social Security Number       Address   [facility] [place] [address]    Home Phone   [phone]    MRN   [id number]       Adventism   [employer]    Marital Status                               Admission Date   8/12/24    Admission Type   Emergency    Admitting Provider   Adina Nicholas MD    Attending Provider   Adina Nicholas MD    Department, Room/Bed   [address], S[address]/1       Discharge Date       Discharge Disposition       Discharge Destination                                 Attending Provider: Adina Nicholas MD    Allergies: No Known Allergies    Isolation: None   Infection: None   Code Status: CPR    Ht: 167.6 cm (66\")   Wt: 104 kg (229 lb 12.8 oz)    Admission Cmt: None   Principal Problem: CHF (congestive heart failure) [I50.9]                   Active Insurance as of 8/12/2024       Primary Coverage       Payor Plan Insurance Group Employer/Plan Group    AETNA MEDICARE REPLACEMENT AETNA MEDICARE REPLACEMENT [id number]       Payor Plan Address Payor Plan Phone Number Payor Plan Fax Number Effective Dates    [address] [phone]  1/1/2022 - None Entered    [facility] [id number]         Subscriber Name Subscriber Birth Date Member ID       QUIRINO SHEPPARD 1955 [id number]                     Emergency Contacts        (Rel.) Home Phone Work Phone Mobile Phone    KVNG OTOOLE (Friend) [phone] -- [phone]    Harley Sheppard (Daughter) [phone] -- [phone]    Kassie Xie -- -- [phone]    Ankit Sheppard (Brother) [phone] -- [phone]                 History & Physical        Lupe Bond DO at 08/12/24 1908              [facility] Medicine Services  HISTORY AND PHYSICAL    Patient Name: Quirino MARTINEZ " Valarie  : 1955  MRN: [id number]  Primary Care Physician: Andrew Fernandez MD  Date of admission: 2024    Subjective  Subjective     Chief Complaint:  SOB, BLE Edema      HPI:  Quirino Sheppard is a 69 y.o. male with a history of CAD s/p CABG, CHF, HTN, HLD, T2DM, CKD, Hx Liver transplant (d/t Chronic Hep C), AURELIANO (CPAP non-compliant), and GERD who presents to [facility] ED for complaint of worsening shortness of breath and bilateral lower extremity edema. Patient resides in an assisted living facility, and reports that for the last week or so, he has felt much more short of breath, as well as worsening lower extremity edema. He denies chest pain, fever, chills, nausea or vomiting. He has a long cardiac history, but denies routinely seeing a cardiologist. Also, patient complains of a poor healing wound on his abdomen. He states that he has had this for almost 2 years now, and has wound care come to his living facility to treat it.         Review of Systems   Constitutional:  Positive for fatigue and unexpected weight change (80 lbs weight gain in over 2 months). Negative for chills and fever.   HENT:  Negative for nosebleeds, sore throat and trouble swallowing.    Eyes:  Negative for photophobia and visual disturbance.   Respiratory:  Positive for cough and shortness of breath. Negative for wheezing.    Cardiovascular:  Positive for leg swelling. Negative for chest pain.   Gastrointestinal:  Positive for abdominal distention. Negative for abdominal pain, diarrhea, nausea and vomiting.   Genitourinary:  Negative for dysuria and hematuria.   Musculoskeletal:  Negative for arthralgias and myalgias.   Skin:  Positive for wound.   Neurological:  Positive for weakness (Generalized). Negative for tremors, syncope and speech difficulty.   Psychiatric/Behavioral:  Negative for confusion. The patient is not nervous/anxious.               Personal History     Past Medical History:   Diagnosis Date    Anxiety and  depression     BPH associated with nocturia     Chronic kidney disease, stage 2 (mild)     Coronary arteriosclerosis in native artery     3V    Degeneration of lumbar intervertebral disc     Diabetes mellitus     Disorder of sulfur-bearing amino acid metabolism     Gastroesophageal reflux disease without esophagitis     Glaucoma     Hepatitis C     Heterozygous MTHFR mutation [id number]     Heterozygous MTHFR mutation C677T     High risk medication use     Hyperlipidemia     Hypertension     Liver transplanted     Low back pain     Obstructive sleep apnea syndrome     Recurrent major depression in full remission     Severe obesity     Type 2 diabetes mellitus              Past Surgical History:   Procedure Laterality Date    CORONARY ARTERY BYPASS GRAFT N/A 11/22/2017    Procedure: MEDIAN STERNOTOMY, CORONARY ARTERY BYPASS GRAFT X 3, UTILIZING THE LEFT INTERNAL MAMMARY ARTERY AND EVH USING THE LEFT GREATER SAPHENOUS VEIN;  Surgeon: Naga Guaman MD;  Location: [facility];  Service:     LIVER TRANSPLANTATION  2001       Family History:  family history includes Breast cancer in his mother; Diabetes in his paternal grandmother; Heart attack in his father; Heart valve disorder in his father.     Social History:  reports that he has never smoked. He has never used smokeless tobacco. He reports that he does not drink alcohol and does not use drugs.  Social History     Social History Narrative    Not on file       Medications:  Cholecalciferol, Dexcom G6 Sensor, Dexcom G6 Transmitter, Insulin Syringe-Needle U-100, L-Methylfolate-B6-B12, Magnesium Oxide -Mg Supplement, OneTouch Delica Lancets 33G, Saw Palmetto, aspirin, atorvastatin, brimonidine-timolol, buPROPion XL, doxazosin, glucose blood, insulin aspart, insulin glargine, metFORMIN, metoprolol succinate XL, metoprolol tartrate, multivitamin, multivitamin with minerals, mupirocin, pantoprazole, ramipril, sertraline, sirolimus, vitamin D3, and vitamin E    No Known  Allergies    Objective  Objective     Vital Signs:   Temp:  [98.3 °F (36.8 °C)] 98.3 °F (36.8 °C)  Heart Rate:  [65-80] 80  Resp:  [18-20] 18  BP: (130-150)/(56-97) 132/57  Flow (L/min):  [2.5] 2.5    Physical Exam  Constitutional:       General: He is not in acute distress.     Appearance: Normal appearance.   HENT:      Head: Atraumatic.      Right Ear: External ear normal.      Left Ear: External ear normal.      Nose: Nose normal.   Eyes:      Extraocular Movements: Extraocular movements intact.      Conjunctiva/sclera: Conjunctivae normal.      Pupils: Pupils are equal, round, and reactive to light.   Cardiovascular:      Rate and Rhythm: Normal rate and regular rhythm.      Pulses: Normal pulses.      Heart sounds: Normal heart sounds. No murmur heard.  Pulmonary:      Breath sounds: Rales present. No wheezing or rhonchi.      Comments: Currently on 3L NC maintaining adequate O2 sat. Rales heard throughout lung fields bilaterally. Breathing somewhat labored.  Abdominal:      General: Bowel sounds are normal. There is distension.      Tenderness: There is no abdominal tenderness. There is no guarding or rebound.   Musculoskeletal:         General: Normal range of motion.      Cervical back: No rigidity.      Right lower leg: Edema (2+) present.      Left lower leg: Edema (2+) present.   Skin:     Coloration: Skin is not jaundiced.      Findings: Lesion and rash present.      Comments: Multiple large ulcerations on abdomen. Weeping. Slight erythema surrounding wounds.    Neurological:      General: No focal deficit present.      Mental Status: He is alert and oriented to person, place, and time.   Psychiatric:         Attention and Perception: Attention normal.         Mood and Affect: Mood normal.         Behavior: Behavior normal.         Thought Content: Thought content normal.          Result Review:  I have personally reviewed the results from the time of this admission to 8/12/2024 22:15 EDT and agree with  these findings:  [x]  Laboratory list / accordion  []  Microbiology  [x]  Radiology  [x]  EKG/Telemetry   []  Cardiology/Vascular   []  Pathology  [x]  Old records  []  Other:        LAB RESULTS:      Lab 08/12/24  1349   WBC 8.23   HEMOGLOBIN 11.0*   HEMATOCRIT 37.5   PLATELETS 228   NEUTROS ABS 6.23   IMMATURE GRANS (ABS) 0.02   LYMPHS ABS 1.09   MONOS ABS 0.73   EOS ABS 0.14   MCV 88.9   PROCALCITONIN 0.10   LACTATE 0.9         Lab 08/12/24  1349   SODIUM 134*   POTASSIUM 5.4*   CHLORIDE 100   CO2 26.0   ANION GAP 8.0   BUN 24*   CREATININE 1.31*   EGFR 58.9*   GLUCOSE 119*   CALCIUM 8.8   MAGNESIUM 1.8         Lab 08/12/24  1349   TOTAL PROTEIN 7.5   ALBUMIN 3.9   GLOBULIN 3.6   ALT (SGPT) 27   AST (SGOT) 18   BILIRUBIN 0.3   ALK PHOS 171*         Lab 08/12/24  1646 08/12/24  1349   PROBNP  --  1,567.0*   HSTROP T 42* 41*                 Lab 08/12/24  1530   PH, ARTERIAL 7.409   PCO2, ARTERIAL 40.2   PO2 ART 72.0*   FIO2 21   HCO3 ART 25.4   BASE EXCESS ART 0.7   CARBOXYHEMOGLOBIN 0.7     Brief Urine Lab Results  (Last result in the past 365 days)        Color   Clarity   Blood   Leuk Est   Nitrite   Protein   CREAT   Urine HCG        08/12/24 1339 Yellow   Clear   Negative   Negative   Negative   Negative                 Microbiology Results (last 10 days)       Procedure Component Value - Date/Time    COVID PRE-OP / PRE-PROCEDURE SCREENING ORDER (NO ISOLATION) - Swab, Nasopharynx [[id number]]  (Normal) Collected: 08/12/24 1345    Lab Status: Final result Specimen: Swab from Nasopharynx Updated: 08/12/24 1423    Narrative:      The following orders were created for panel order COVID PRE-OP / PRE-PROCEDURE SCREENING ORDER (NO ISOLATION) - Swab, Nasopharynx.  Procedure                               Abnormality         Status                     ---------                               -----------         ------                     COVID-19 and FLU A/B PCR...[[id number]]  Normal              Final result                  Please view results for these tests on the individual orders.    COVID-19 and FLU A/B PCR, 1 HR TAT - Swab, Nasopharynx [[id number]]  (Normal) Collected: 08/12/24 1345    Lab Status: Final result Specimen: Swab from Nasopharynx Updated: 08/12/24 1423     COVID19 Not Detected     Influenza A PCR Not Detected     Influenza B PCR Not Detected    Narrative:      Fact sheet for providers: https://www.fda.gov/media/973522/download    Fact sheet for patients: https://www.fda.gov/media/743470/download    Test performed by PCR.            CT Abdomen Pelvis Without Contrast    Result Date: 8/12/2024  CT ABDOMEN PELVIS WO CONTRAST Date of Exam: 8/12/2024 4:16 PM EDT Indication: confusion, weight gain, acute on chronic wound to abdomen. Comparison: None available. Technique: Axial CT images were obtained of the abdomen and pelvis without the administration of contrast. Reconstructed coronal and sagittal images were also obtained. Automated exposure control and iterative construction methods were used. Findings: Patient history includes previous liver transplant in 2001, chronic kidney disease. Significant recent weight gain. Worsening generalized weakness. Today's study shows at least a moderate right pleural effusion and a small left pleural effusion both of which appear to be free-flowing. There is very mild associated atelectasis and no particular findings to suggest basilar pneumonia. There appears to be relatively mild fatty liver change. Spleen is not enlarged. Pancreas appears mildly atrophic. Gallbladder is not identified either surgically absent or contracted. Adrenal glands appear normal. Kidneys show grossly normal parenchymal thickness and no hydronephrosis or nephrolithiasis is seen. Upper abdominal bowel loops are normal in caliber and appearance. No free air ascites or adenopathy is seen. Regarding the lower abdomen/pelvis, there is a trace amount of ascites along the lower paracolic gutters. No large or small  bowel inflammation is identified. There is no evidence of diverticulosis or diverticulitis. Bladder is moderately distended and normal in appearance. Prostate and seminal vesicles are not enlarged. There is a fat-only containing left inguinal/scrotal hernia with no evidence of strangulation. There is very dense diffuse subcutaneous fat stranding consistent with anasarca, with some symmetric fluid between the muscles the abdominal wall.  History indicates chronic wound to abdomen. There is focal skin thickening to the right and superior of the umbilicus, but only superficial involvement. The underlying subcutaneous fat appears normal. Images 123 through 145 show a rounded cystic area 4 1/2 cm in diameter lateral of the left greater trochanter, with a small amount of apparently contiguous fluid passing along the upper margin of the gluteus medius image 135. This contains a small amount  of dense material and may represent an old hematoma. Bony structures appear to be intact.     Impression: Impression: 1. Very dense diffuse subcutaneous fat stranding consistent with anasarca. Small, symmetric inter-muscular fluid collections of the right and left lower abdominal wall, likely related to anasarca. 2. Approximately 4.5 cm fluid collection lateral of the left greater trochanter, favored to represent old, liquefied hematoma. 3. Superficial mid abdominal wall skin thickening, which may be a superficial cellulitis. No evidence of underlying abscess. No evidence of potential abscess here or elsewhere. 4. Moderate right pleural effusion, and small left effusion. Minimal ascites. 5. Fat-only containing left inguinal/scrotal hernia with no evidence of strangulation. Electronically Signed: Kenan Stacy MD  8/12/2024 4:51 PM EDT  Workstation ID: [id number]    CT Head Without Contrast    Result Date: 8/12/2024  CT HEAD WO CONTRAST Date of Exam: 8/12/2024 4:16 PM EDT Indication: AMS. Comparison: MRI of the brain performed on October 5,  2023 Technique: Axial CT images were obtained of the head without contrast administration.  Automated exposure control and iterative construction methods were used. Findings: There is no evidence of hemorrhage. There is no mass effect or midline shift. Age-related involutional changes are visualized. There is no extra-axial collections. Ventricles are normal in size and configuration for patient's stated age. Posterior fossa is within normal limits. Calvarium and skull base appear intact. Visualized sinuses show no air fluid levels. The mastoid air cells are clear. Visualized orbits are unremarkable.     Impression: Impression: No acute intracranial process evident. Electronically Signed: Omega Moore MD  8/12/2024 4:29 PM EDT  Workstation ID: [id number]    XR Chest 1 View    Result Date: 8/12/2024  XR CHEST 1 VW Date of Exam: 8/12/2024 2:03 PM EDT Indication: SOA triage protocol Comparison: 11/24/2017 Findings: Sternotomy wires are again noted. The heart shadow appears borderline enlarged but the pulmonary vasculature appears normal. There is mild coarsening of the pulmonary interstitial markings and a few granulomatous calcifications that appears stable from prior exams in 2017. No edema, effusion or pneumothorax is seen.     Impression: Impression: Mild heart shadow enlargement and mild chronic appearing interstitial lung changes. No clearly acute chest disease. Electronically Signed: Kenan Stacy MD  8/12/2024 2:41 PM EDT  Workstation ID: [id number]     Results for orders placed during the hospital encounter of 11/20/17    Adult Transthoracic Echocardiogram Complete    Interpretation Summary  · Left ventricular systolic function is normal. Estimated EF = 60%.  · calcification of the aortic valve  · Trace tricuspid valve regurgitation is present      Assessment & Plan  Assessment & Plan       CHF (congestive heart failure)    Coronary artery disease involving coronary bypass graft of native heart without angina  pectoris    Hx of liver transplant    Essential hypertension    Hyperlipidemia LDL goal <70    Type 2 diabetes mellitus without complication, with long-term current use of insulin    Obstructive sleep apnea syndrome    Wound of abdomen    Hyperkalemia    LUZMARIA (acute kidney injury)      Quirino Sheppard is a 69 y.o. male with a history of CAD s/p CABG, CHF, HTN, HLD, T2DM, CKD, Hx Liver transplant (d/t Chronic Hep C), AURELIANO (CPAP non-compliant), and GERD who presents to [facility] ED for complaint of worsening shortness of breath and bilateral lower extremity edema.     Acute on chronic HFpEF   Bilateral Pleural Effusion (R>L)  -CXR tonight shows enlarged heart with no acute findings. However CT abd/pelvis  also showed a moderate right and small left pleural effusion.   -Last echo on file is from 2017 with normal EF  -Repeat echo in the am  -Cardiology consult for the am  -Received 80mg IV Lasix in the ED. Will reevaluate in the am and may need additional doses.   -Daily weight. Strict I&Os    Chronic wound on abdomen  -CT abd/pelvis tonight also showed superficial mid abdominal wall skin thickening, which may be a superficial cellulitis. No evidence of underlying abscess.  -Wound care to see  -Wound culture  -Start Vanc + Rocephin for now    CAD s/p CABG  HTN  HLD  -Chest pain free.    -Takes Doxazosin, Metoprolol, and Ramipril.   -Hold ACE-I for now and monitor Cr while receiving diuretics   -Continue all other home BP meds.    -Continue statin    T2DM  -Blood glucose 119  -Check A1C  -Fingerstick achs. SSI    Hx Liver Transplant 2001 at [facility]  -Continue home meds    Hyperkalemia  -K+ 5.4  -Appears to have recently started Spironolactone based on [facility].   -Received Lasix in the ED. Will repeat bmp in the am    AURELINAO  -Reports that he is supposed to be wearing a CPAP at night but has not in over a year due to his not working.     CKD 3  -Baseline creatinine 1-1.5  -1.3 tonight, monitor with  diuresis    Chronic debility  -Uses wheelchair, currently at [facility] facility in [place]    History of left foot toe amputation x 4  -s/p amputation in April per patient, continue wound care    DVT prophylaxis:  Heparin subQ    CODE STATUS:  Full Code  Code Status (Patient has no pulse and is not breathing): CPR (Attempt to Resuscitate)  Medical Interventions (Patient has pulse or is breathing): Full Support      Expected Discharge      This note has been completed as part of a split-shared workflow.     Signature: Electronically signed by Vinod Ryder PA-C, 08/12/24, 7:59 PM EDT        Attending   Admission Attestation       I have performed an independent face-to-face diagnostic evaluation including performing an independent physical examination.  I approve of the documented plan of care above that was reviewed and developed with the advanced practice clinician (APC) and take responsibility for that plan along with its associated risks.  I have updated the HPI as appropriate.    Brief HPI    Patient is a 69-year-old white male with past medical history of CHF, CAD status post CABG, hypertension, hyperlipidemia, diabetes mellitus type 2, CKD, history of liver transplant in 2001 at [facility], obstructive sleep apnea, GERD who presented to the ED today due to ongoing shortness of breath and bilateral lower extremity swelling.  Patient states he currently is at [facility] in [place].  He has noticed he has been more short of breath over the past several days.  States he has been compliant with his medications.  States that he has oxygen but his tubing is broken so he has not been using it.  He has a chronic abdominal wound that has been there for almost 2 years, follows with wound care.  He endorses weight gain upwards of 80 pounds in the past 2 months.  States his abdomen feels full.  He is given 80 mg of IV Lasix in the ED with good urine output.  States he does not follow outpatient  with cardiology.  Hospital medicine asked to admit.    Attending Physical Exam:  Temp:  [98.3 °F (36.8 °C)] 98.3 °F (36.8 °C)  Heart Rate:  [65-80] 80  Resp:  [18-20] 18  BP: (130-150)/(56-97) 132/57  Flow (L/min):  [2.5] 2.5    Constitutional: Awake, alert no acute distress, nontoxic, chronically ill-appearing/appears older than stated age  Eyes: PERRLA, sclerae anicteric, no conjunctival injection  HENT: NCAT, mucous membranes moist  Neck: Supple, no thyromegaly, no lymphadenopathy, trachea midline  Respiratory: Bibasilar rales, nonlabored respirations on 2-1/2 L nasal cannula  Cardiovascular: RRR, no murmurs, rubs, or gallops, palpable pedal pulses bilaterally  Gastrointestinal: Positive bowel sounds, soft, nontender, nondistended  Musculoskeletal: Left ankle dressed, no clubbing or cyanosis to extremities  Psychiatric: Appropriate affect, cooperative  Neurologic: Focal deficits, speech clear  Skin: Chronic wound on abdomen covered with bandage, left foot also covered with bandage.  Status post toe amputations left foot  Result Review:  I have personally reviewed the results from the time of this admission to 8/12/2024 22:20 EDT and agree with these findings:  [x]  Laboratory list / accordion  []  Microbiology  [x]  Radiology  [x]  EKG/Telemetry   []  Cardiology/Vascular   []  Pathology  []  Old records  []  Other:    Assessment and Plan:  Admit to telemetry.  Received 80 mg of IV Lasix in ED.  Repeat BMP in AM.  If creatinine stable, will give further diuretics.  Repeat echo pending.  Consult cardiology in the a.m.  Obtain wound culture from abdominal wound.  Start IV antibiotics with vancomycin plus Rocephin.  Obtain MRSA PCR.  Blood cultures pending.  PT and OT to see.  See assessment and plan documented by APC above and updated/edited by me as appropriate.    Lupe Bond DO  08/12/24                     Electronically signed by Lupe Bond DO at 08/12/24 6623          Emergency Department Notes         Cha Redman, RN at 08/12/24 3167           Quirino Sheppard    Nursing Report ED to Floor:  Mental status: A&Ox4  Ambulatory status: wheelchair; assist x2 to transfer  Oxygen Therapy:  2.5L NC  Cardiac Rhythm: 1st degree AVB  Admitted from: facility  Safety Concerns:  weakness, weight gain  Social Issues: none  ED Room #:  33    ED Nurse Phone Extension - [phone] or may call [phone].      HPI:   Chief Complaint   Patient presents with    Shortness of Breath       Past Medical History:  Past Medical History:   Diagnosis Date    Anxiety and depression     BPH associated with nocturia     Chronic kidney disease, stage 2 (mild)     Coronary arteriosclerosis in native artery     3V    Degeneration of lumbar intervertebral disc     Diabetes mellitus     Disorder of sulfur-bearing amino acid metabolism     Gastroesophageal reflux disease without esophagitis     Glaucoma     Hepatitis C     Heterozygous MTHFR mutation [id number]     Heterozygous MTHFR mutation C677T     High risk medication use     Hyperlipidemia     Hypertension     Liver transplanted     Low back pain     Obstructive sleep apnea syndrome     Recurrent major depression in full remission     Severe obesity     Type 2 diabetes mellitus         Past Surgical History:  Past Surgical History:   Procedure Laterality Date    CORONARY ARTERY BYPASS GRAFT N/A 11/22/2017    Procedure: MEDIAN STERNOTOMY, CORONARY ARTERY BYPASS GRAFT X 3, UTILIZING THE LEFT INTERNAL MAMMARY ARTERY AND EVH USING THE LEFT GREATER SAPHENOUS VEIN;  Surgeon: Naga Guaman MD;  Location: [facility];  Service:     LIVER TRANSPLANTATION  2001        Admitting Doctor:   Lupe Bond DO    Consulting Provider(s):  Consults       No orders found from 7/14/2024 to 8/13/2024.             Admitting Diagnosis:   The primary encounter diagnosis was Acute on chronic congestive heart failure, unspecified heart failure type. Diagnoses of Hyperkalemia, SOB (shortness of breath), AURELIANO  (obstructive sleep apnea), Hx of CABG, History of liver transplant, and Pleural effusion were also pertinent to this visit.    Most Recent Vitals:   Vitals:    08/12/24 1320 08/12/24 1601 08/12/24 1630 08/12/24 1845   BP: 142/73 150/80 138/82 142/81   BP Location: Right arm      Patient Position: Sitting      Pulse: 65 71 73 77   Resp: 20 18 18 18   Temp: 98.3 °F (36.8 °C)      TempSrc: Oral      SpO2: 100% 99% 98% 94%   Weight:       Height:           Active LDAs/IV Access:   Lines, Drains & Airways       Active LDAs       Name Placement date Placement time Site Days    Peripheral IV 08/12/24 1738 Anterior;Right Forearm 08/12/24  1738  Forearm  less than 1                    Labs (abnormal labs have a star):   Labs Reviewed   COMPREHENSIVE METABOLIC PANEL - Abnormal; Notable for the following components:       Result Value    Glucose 119 (*)     BUN 24 (*)     Creatinine 1.31 (*)     Sodium 134 (*)     Potassium 5.4 (*)     Alkaline Phosphatase 171 (*)     eGFR 58.9 (*)     All other components within normal limits    Narrative:     GFR Normal >60  Chronic Kidney Disease <60  Kidney Failure <15     BNP (IN-HOUSE) - Abnormal; Notable for the following components:    proBNP 1,567.0 (*)     All other components within normal limits    Narrative:     This assay is used as an aid in the diagnosis of individuals suspected of having heart failure. It can be used as an aid in the diagnosis of acute decompensated heart failure (ADHF) in patients presenting with signs and symptoms of ADHF to the emergency department (ED). In addition, NT-proBNP of <300 pg/mL indicates ADHF is not likely.    Age Range Result Interpretation  NT-proBNP Concentration (pg/mL:      <50             Positive            >450                   Gray                 300-450                    Negative             <300    50-75           Positive            >900                  Gray                300-900                  Negative             <300      >75             Positive            >1800                  Gray                300-1800                  Negative            <300   SINGLE HS TROPONIN T - Abnormal; Notable for the following components:    HS Troponin T 41 (*)     All other components within normal limits    Narrative:     High Sensitive Troponin T Reference Range:  <14.0 ng/L- Negative Female for AMI  <22.0 ng/L- Negative Male for AMI  >=14 - Abnormal Female indicating possible myocardial injury.  >=22 - Abnormal Male indicating possible myocardial injury.   Clinicians would have to utilize clinical acumen, EKG, Troponin, and serial changes to determine if it is an Acute Myocardial Infarction or myocardial injury due to an underlying chronic condition.        CBC WITH AUTO DIFFERENTIAL - Abnormal; Notable for the following components:    Hemoglobin 11.0 (*)     MCH 26.1 (*)     MCHC 29.3 (*)     RDW 21.3 (*)     RDW-SD 69.0 (*)     Lymphocyte % 13.2 (*)     All other components within normal limits    Narrative:     Appended report. These results have been appended to a previously verified report.   BLOOD GAS, ARTERIAL W/CO-OXIMETRY - Abnormal; Notable for the following components:    pO2, Arterial 72.0 (*)     Hemoglobin, Blood Gas 10.5 (*)     Hematocrit, Blood Gas 32.2 (*)     Oxyhemoglobin 92.2 (*)     pO2, Temperature Corrected 72.0 (*)     All other components within normal limits   SINGLE HS TROPONIN T - Abnormal; Notable for the following components:    HS Troponin T 42 (*)     All other components within normal limits    Narrative:     High Sensitive Troponin T Reference Range:  <14.0 ng/L- Negative Female for AMI  <22.0 ng/L- Negative Male for AMI  >=14 - Abnormal Female indicating possible myocardial injury.  >=22 - Abnormal Male indicating possible myocardial injury.   Clinicians would have to utilize clinical acumen, EKG, Troponin, and serial changes to determine if it is an Acute Myocardial Infarction or myocardial injury  "due to an underlying chronic condition.        COVID-19 AND FLU A/B, NP SWAB IN TRANSPORT MEDIA 1 HR TAT - Normal    Narrative:     Fact sheet for providers: https://www.fda.gov/media/803330/download    Fact sheet for patients: https://www.fda.gov/media/749748/download    Test performed by PCR.   MAGNESIUM - Normal   URINALYSIS W/ CULTURE IF INDICATED - Normal    Narrative:     In absence of clinical symptoms, the presence of pyuria, bacteria, and/or nitrites on the urinalysis result does not correlate with infection.  Urine microscopic not indicated.   LACTIC ACID, PLASMA - Normal   PROCALCITONIN - Normal    Narrative:     As a Marker for Sepsis (Non-Neonates):    1. <0.5 ng/mL represents a low risk of severe sepsis and/or septic shock.  2. >2 ng/mL represents a high risk of severe sepsis and/or septic shock.    As a Marker for Lower Respiratory Tract Infections that require antibiotic therapy:    PCT on Admission    Antibiotic Therapy       6-12 Hrs later    >0.5                Strongly Recommended  >0.25 - <0.5        Recommended   0.1 - 0.25          Discouraged              Remeasure/reassess PCT  <0.1                Strongly Discouraged     Remeasure/reassess PCT    As 28 day mortality risk marker: \"Change in Procalcitonin Result\" (>80% or <=80%) if Day 0 (or Day 1) and Day 4 values are available. Refer to http://[email or web address]-[email or web address]-[email or web address]    Change in PCT <=80%  A decrease of PCT levels below or equal to 80% defines a positive change in PCT test result representing a higher risk for 28-day all-cause mortality of patients diagnosed with severe sepsis for septic shock.    Change in PCT >80%  A decrease of PCT levels of more than 80% defines a negative change in PCT result representing a lower risk for 28-day all-cause mortality of patients diagnosed with severe sepsis or septic shock.      COVID PRE-OP / PRE-PROCEDURE SCREENING ORDER (NO ISOLATION)    Narrative:     The following orders were created for " panel order COVID PRE-OP / PRE-PROCEDURE SCREENING ORDER (NO ISOLATION) - Swab, Nasopharynx.  Procedure                               Abnormality         Status                     ---------                               -----------         ------                     COVID-19 and FLU A/B PCR...[[id number]]  Normal              Final result                 Please view results for these tests on the individual orders.   BLOOD CULTURE   BLOOD CULTURE   RAINBOW DRAW    Narrative:     The following orders were created for panel order [place] Draw.  Procedure                               Abnormality         Status                     ---------                               -----------         ------                     Green Top (Gel)[[id number]]                                  Final result               Lavender Top[[id number]]                                     Final result               Gold Top - SST[[id number]]                                   Final result               Gray Top[[id number]]                                         Final result               Light Blue Top[[id number]]                                   Final result                 Please view results for these tests on the individual orders.   SCAN SLIDE   BLOOD GAS, ARTERIAL   CBC AND DIFFERENTIAL    Narrative:     The following orders were created for panel order CBC & Differential.  Procedure                               Abnormality         Status                     ---------                               -----------         ------                     CBC Auto Differential[[id number]]        Abnormal            Final result               Scan Slide[[id number]]                                       Final result                 Please view results for these tests on the individual orders.   GREEN TOP   LAVENDER TOP   GOLD TOP - SST   GRAY TOP   LIGHT BLUE TOP       Meds Given in ED:   Medications   sodium chloride 0.9 % flush 10 mL (has no administration  in time range)   furosemide (LASIX) injection 80 mg (has no administration in time range)           Last NIH score:                                                          Dysphagia screening results:        Ann Coma Scale:  No data recorded     CIWA:        Restraint Type:            Isolation Status:  No active isolations           Electronically signed by Cha Redman RN at 08/12/24 1949       aLney Card PA at 08/12/24 1328       Attestation signed by Cory Mcmahon MD at 08/13/24 1320        SUPERVISE: For this patient encounter, I reviewed the APC's documentation, treatment plan, and medical decision making.  Cory Mcmahon MD 8/13/2024 13:20 EDT                         Subjective   History of Present Illness  Pt is a 70 yo male presenting to ED with complaints of SOB and confusion. PMHx significant for CAD, CABG, HTN, HLD, DM (insulin), AURELIANO, Hep C, Liver transplant 2001, CKD, Glaucoma, Anxiety and Depression. Pt and daughter providing hx. She explains patient has been having worsening SOB and confusion over the past few weeks. She found him this morning laying in  bed, confused and gurgling. She reports he has gained 87 lbs in the past 8 weeks. She explains he moved to a new senior living several months ago and has been going down hill. She reports worsening generalized weakness. She explains he has been told he needs CPAP but he hasn't started using yet. He went to [place] ED yesterday and was told his Potassium was low. He had several toes amputated back in April and per daughter reports wounds healing and no redness or drainage. He has a chronic abdominal wound for over the past 2 years.     History provided by:  Patient, relative and medical records      Review of Systems   Constitutional:  Positive for fatigue. Negative for chills and fever.   HENT:  Negative for congestion, sore throat and trouble swallowing.    Eyes:  Negative for visual disturbance.   Respiratory:  Positive for  shortness of breath. Negative for cough.    Cardiovascular:  Positive for leg swelling. Negative for chest pain.   Gastrointestinal:  Positive for nausea. Negative for abdominal pain, blood in stool, constipation, diarrhea and vomiting.   Genitourinary:  Negative for difficulty urinating, dysuria and flank pain.   Musculoskeletal:  Negative for arthralgias and back pain.   Skin: Negative.  Negative for rash and wound.   Allergic/Immunologic: Negative.    Neurological:  Positive for weakness. Negative for dizziness, syncope, numbness and headaches.   Psychiatric/Behavioral:  Positive for confusion.    All other systems reviewed and are negative.      Past Medical History:   Diagnosis Date    Anxiety and depression     BPH associated with nocturia     Chronic kidney disease, stage 2 (mild)     Coronary arteriosclerosis in native artery     3V    Degeneration of lumbar intervertebral disc     Diabetes mellitus     Disorder of sulfur-bearing amino acid metabolism     Gastroesophageal reflux disease without esophagitis     Glaucoma     Hepatitis C     Heterozygous MTHFR mutation [id number]     Heterozygous MTHFR mutation [id number]     High risk medication use     Hyperlipidemia     Hypertension     Liver transplanted     Low back pain     Obstructive sleep apnea syndrome     Recurrent major depression in full remission     Severe obesity     Type 2 diabetes mellitus        No Known Allergies    Past Surgical History:   Procedure Laterality Date    CORONARY ARTERY BYPASS GRAFT N/A 11/22/2017    Procedure: MEDIAN STERNOTOMY, CORONARY ARTERY BYPASS GRAFT X 3, UTILIZING THE LEFT INTERNAL MAMMARY ARTERY AND EVH USING THE LEFT GREATER SAPHENOUS VEIN;  Surgeon: Naga Guaman MD;  [facility];  Service:     LIVER TRANSPLANTATION  2001       Family History   Problem Relation Age of Onset    Diabetes Paternal Grandmother     Breast cancer Mother     Heart attack Father     Heart valve disorder Father        Social History      Socioeconomic History    Marital status:     Number of children: 3   Tobacco Use    Smoking status: Never    Smokeless tobacco: Never   Vaping Use    Vaping status: Never Used   Substance and Sexual Activity    Alcohol use: No     Comment: PT QUIT DRINKING IN 1994    Drug use: Never    Sexual activity: Not Currently           Objective   Physical Exam  Vitals and nursing note reviewed.   Constitutional:       General: He is not in acute distress.     Appearance: He is well-developed. He is ill-appearing.   HENT:      Head: Atraumatic.      Nose: Nose normal.   Eyes:      General: Lids are normal.      Conjunctiva/sclera: Conjunctivae normal.      Pupils: Pupils are equal, round, and reactive to light.   Cardiovascular:      Rate and Rhythm: Normal rate and regular rhythm.      Heart sounds: Normal heart sounds.   Pulmonary:      Effort: Pulmonary effort is normal.      Breath sounds: Decreased breath sounds present.   Abdominal:      General: There is no distension.      Palpations: Abdomen is soft.      Tenderness: There is generalized abdominal tenderness. There is no guarding or rebound.      Comments: Wound abdomen - no significant surrounding erythema    Musculoskeletal:         General: No tenderness or deformity. Normal range of motion.      Cervical back: Normal range of motion and neck supple.   Skin:     General: Skin is warm and dry.   Neurological:      Mental Status: He is alert and oriented to person, place, and time.      Sensory: No sensory deficit.   Psychiatric:         Mood and Affect: Mood normal.         Behavior: Behavior normal.         Procedures          ED Course      Recent Results (from the past 24 hour(s))   ECG 12 Lead ED Triage Standing Order; SOA    Collection Time: 08/12/24  1:27 PM   Result Value Ref Range    QT Interval 442 ms    QTC Interval 477 ms   Urinalysis With Culture If Indicated - Urine, Clean Catch    Collection Time: 08/12/24  1:39 PM    Specimen: Urine,  Clean Catch   Result Value Ref Range    Color, UA Yellow Yellow, Straw    Appearance, UA Clear Clear    pH, UA 5.5 5.0 - 8.0    Specific Gravity, UA 1.009 1.001 - 1.030    Glucose, UA Negative Negative    Ketones, UA Negative Negative    Bilirubin, UA Negative Negative    Blood, UA Negative Negative    Protein, UA Negative Negative    Leuk Esterase, UA Negative Negative    Nitrite, UA Negative Negative    Urobilinogen, UA 0.2 E.U./dL 0.2 - 1.0 E.U./dL   COVID-19 and FLU A/B PCR, 1 HR TAT - Swab, Nasopharynx    Collection Time: 08/12/24  1:45 PM    Specimen: Nasopharynx; Swab   Result Value Ref Range    COVID19 Not Detected Not Detected - Ref. Range    Influenza A PCR Not Detected Not Detected    Influenza B PCR Not Detected Not Detected   Comprehensive Metabolic Panel    Collection Time: 08/12/24  1:49 PM    Specimen: Blood   Result Value Ref Range    Glucose 119 (H) 65 - 99 mg/dL    BUN 24 (H) 8 - 23 mg/dL    Creatinine 1.31 (H) 0.76 - 1.27 mg/dL    Sodium 134 (L) 136 - 145 mmol/L    Potassium 5.4 (H) 3.5 - 5.2 mmol/L    Chloride 100 98 - 107 mmol/L    CO2 26.0 22.0 - 29.0 mmol/L    Calcium 8.8 8.6 - 10.5 mg/dL    Total Protein 7.5 6.0 - 8.5 g/dL    Albumin 3.9 3.5 - 5.2 g/dL    ALT (SGPT) 27 1 - 41 U/L    AST (SGOT) 18 1 - 40 U/L    Alkaline Phosphatase 171 (H) 39 - 117 U/L    Total Bilirubin 0.3 0.0 - 1.2 mg/dL    Globulin 3.6 gm/dL    A/G Ratio 1.1 g/dL    BUN/Creatinine Ratio 18.3 7.0 - 25.0    Anion Gap 8.0 5.0 - 15.0 mmol/L    eGFR 58.9 (L) >60.0 mL/min/1.73   BNP    Collection Time: 08/12/24  1:49 PM    Specimen: Blood   Result Value Ref Range    proBNP 1,567.0 (H) 0.0 - 900.0 pg/mL   Single High Sensitivity Troponin T    Collection Time: 08/12/24  1:49 PM    Specimen: Blood   Result Value Ref Range    HS Troponin T 41 (H) <22 ng/L   Green Top (Gel)    Collection Time: 08/12/24  1:49 PM   Result Value Ref Range    Extra Tube Hold for add-ons.    Lavender Top    Collection Time: 08/12/24  1:49 PM   Result  Value Ref Range    Extra Tube hold for add-on    Gold Top - SST    Collection Time: 08/12/24  1:49 PM   Result Value Ref Range    Extra Tube Hold for add-ons.    Gray Top    Collection Time: 08/12/24  1:49 PM   Result Value Ref Range    Extra Tube Hold for add-ons.    Light Blue Top    Collection Time: 08/12/24  1:49 PM   Result Value Ref Range    Extra Tube Hold for add-ons.    CBC Auto Differential    Collection Time: 08/12/24  1:49 PM    Specimen: Blood   Result Value Ref Range    WBC 8.23 3.40 - 10.80 10*3/mm3    RBC 4.22 4.14 - 5.80 10*6/mm3    Hemoglobin 11.0 (L) 13.0 - 17.7 g/dL    Hematocrit 37.5 37.5 - 51.0 %    MCV 88.9 79.0 - 97.0 fL    MCH 26.1 (L) 26.6 - 33.0 pg    MCHC 29.3 (L) 31.5 - 35.7 g/dL    RDW 21.3 (H) 12.3 - 15.4 %    RDW-SD 69.0 (H) 37.0 - 54.0 fl    MPV 8.2 6.0 - 12.0 fL    Platelets 228 140 - 450 10*3/mm3    Neutrophil % 75.8 42.7 - 76.0 %    Lymphocyte % 13.2 (L) 19.6 - 45.3 %    Monocyte % 8.9 5.0 - 12.0 %    Eosinophil % 1.7 0.3 - 6.2 %    Basophil % 0.2 0.0 - 1.5 %    Immature Grans % 0.2 0.0 - 0.5 %    Neutrophils, Absolute 6.23 1.70 - 7.00 10*3/mm3    Lymphocytes, Absolute 1.09 0.70 - 3.10 10*3/mm3    Monocytes, Absolute 0.73 0.10 - 0.90 10*3/mm3    Eosinophils, Absolute 0.14 0.00 - 0.40 10*3/mm3    Basophils, Absolute 0.02 0.00 - 0.20 10*3/mm3    Immature Grans, Absolute 0.02 0.00 - 0.05 10*3/mm3    nRBC 0.0 0.0 - 0.2 /100 WBC   Magnesium    Collection Time: 08/12/24  1:49 PM    Specimen: Blood   Result Value Ref Range    Magnesium 1.8 1.6 - 2.4 mg/dL   Lactic Acid, Plasma    Collection Time: 08/12/24  1:49 PM    Specimen: Blood   Result Value Ref Range    Lactate 0.9 0.5 - 2.0 mmol/L   Procalcitonin    Collection Time: 08/12/24  1:49 PM    Specimen: Blood   Result Value Ref Range    Procalcitonin 0.10 0.00 - 0.25 ng/mL   Scan Slide    Collection Time: 08/12/24  1:49 PM    Specimen: Blood   Result Value Ref Range    Anisocytosis Slight/1+ None Seen    Elliptocytes Slight/1+ None Seen     WBC Morphology Normal Normal    Platelet Estimate Adequate Normal   Blood Gas, Arterial With Co-Ox    Collection Time: 08/12/24  3:30 PM    Specimen: Arterial Blood   Result Value Ref Range    Site Right Brachial     Jarret's Test N/A     pH, Arterial 7.409 7.350 - 7.450 pH units    pCO2, Arterial 40.2 35.0 - 45.0 mm Hg    pO2, Arterial 72.0 (L) 83.0 - 108.0 mm Hg    HCO3, Arterial 25.4 20.0 - 26.0 mmol/L    Base Excess, Arterial 0.7 0.0 - 2.0 mmol/L    Hemoglobin, Blood Gas 10.5 (L) 13.5 - 17.5 g/dL    Hematocrit, Blood Gas 32.2 (L) 38.0 - 51.0 %    Oxyhemoglobin 92.2 (L) 94 - 99 %    Methemoglobin 0.10 0.00 - 1.50 %    Carboxyhemoglobin 0.7 0 - 2 %    CO2 Content 26.6 22 - 33 mmol/L    Temperature 37.0     Barometric Pressure for Blood Gas      Modality Room Air     FIO2 21 %    Ventilator Mode      Rate 0 Breaths/minute    PIP 0 cmH2O    IPAP 0     EPAP 0     pH, Temp Corrected 7.409 pH Units    pCO2, Temperature Corrected 40.2 35 - 48 mm Hg    pO2, Temperature Corrected 72.0 (L) 83 - 108 mm Hg   ECG 12 Lead Dyspnea    Collection Time: 08/12/24  4:43 PM   Result Value Ref Range    QT Interval 460 ms    QTC Interval 499 ms   Single High Sensitivity Troponin T    Collection Time: 08/12/24  4:46 PM    Specimen: Blood   Result Value Ref Range    HS Troponin T 42 (H) <22 ng/L   POC Glucose Once    Collection Time: 08/12/24  9:34 PM    Specimen: Blood   Result Value Ref Range    Glucose 93 70 - 130 mg/dL     Note: In addition to lab results from this visit, the labs listed above may include labs taken at another facility or during a different encounter within the last 24 hours. Please correlate lab times with ED admission and discharge times for further clarification of the services performed during this visit.    CT Head Without Contrast   Final Result   Impression:   No acute intracranial process evident.            Electronically Signed: Omega Moore MD     8/12/2024 4:29 PM EDT     Workstation ID: [id number]     "  CT Abdomen Pelvis Without Contrast   Final Result   Impression:      1. Very dense diffuse subcutaneous fat stranding consistent with anasarca. Small, symmetric inter-muscular fluid collections of the right and left lower abdominal wall, likely related to anasarca.      2. Approximately 4.5 cm fluid collection lateral of the left greater trochanter, favored to represent old, liquefied hematoma.      3. Superficial mid abdominal wall skin thickening, which may be a superficial cellulitis. No evidence of underlying abscess. No evidence of potential abscess here or elsewhere.      4. Moderate right pleural effusion, and small left effusion. Minimal ascites.      5. Fat-only containing left inguinal/scrotal hernia with no evidence of strangulation.               Electronically Signed: Kenan Stacy MD     8/12/2024 4:51 PM EDT     Workstation ID: [id number]      XR Chest 1 View   Final Result   Impression:   Mild heart shadow enlargement and mild chronic appearing interstitial lung changes. No clearly acute chest disease.         Electronically Signed: Kenan Stacy MD     8/12/2024 2:41 PM EDT     Workstation ID: [id number]        Vitals:    08/12/24 1915 08/12/24 2022 08/12/24 2026 08/12/24 2145   BP: 145/97  130/56 132/57   BP Location:       Patient Position:       Pulse: 77 77 77 80   Resp:       Temp:       TempSrc:       SpO2: 95%  100% 91%   Weight:  103 kg (226 lb)     Height:  167.6 cm (66\")       Medications   sodium chloride 0.9 % flush 10 mL (has no administration in time range)   dextrose (GLUTOSE) oral gel 15 g (has no administration in time range)   dextrose (D50W) (25 g/50 mL) IV injection 25 g (has no administration in time range)   glucagon (GLUCAGEN) injection 1 mg (has no administration in time range)   Insulin Lispro (humaLOG) injection 3-14 Units (has no administration in time range)   aspirin EC tablet 325 mg (has no administration in time range)   atorvastatin (LIPITOR) tablet 80 mg (has no " administration in time range)   buPROPion XL (WELLBUTRIN XL) 24 hr tablet 300 mg (has no administration in time range)   terazosin (HYTRIN) capsule 5 mg (5 mg Oral Given 8/12/24 2142)   insulin glargine (LANTUS, SEMGLEE) injection 20 Units (20 Units Subcutaneous Given 8/12/24 2204)   pantoprazole (PROTONIX) EC tablet 40 mg (has no administration in time range)   sertraline (ZOLOFT) tablet 150 mg (has no administration in time range)   metoprolol tartrate (LOPRESSOR) tablet 25 mg (25 mg Oral Given 8/12/24 2142)   sodium chloride 0.9 % flush 10 mL (10 mL Intravenous Given 8/12/24 2148)   sodium chloride 0.9 % flush 10 mL (has no administration in time range)   sodium chloride 0.9 % infusion 40 mL (has no administration in time range)   sirolimus (RAPAMUNE) tablet 2 mg (has no administration in time range)   heparin (porcine) 5000 UNIT/ML injection 5,000 Units (5,000 Units Subcutaneous Given 8/12/24 2142)   nitroglycerin (NITROSTAT) SL tablet 0.4 mg (has no administration in time range)   melatonin tablet 5 mg (5 mg Oral Given 8/12/24 2142)   cefTRIAXone (ROCEPHIN) 1,000 mg in sodium chloride 0.9 % 100 mL MBP (1,000 mg Intravenous New Bag 8/12/24 2142)   Pharmacy to dose vancomycin (has no administration in time range)   vancomycin IVPB 2000 mg in 0.9% Sodium Chloride 500 mL (2,000 mg Intravenous New Bag 8/12/24 2237)   vancomycin 750 mg in sodium chloride 0.9 % 250 mL IVPB-VTB (has no administration in time range)   furosemide (LASIX) injection 80 mg (80 mg Intravenous Given 8/12/24 1945)     ECG/EMG Results (last 24 hours)       Procedure Component Value Units Date/Time    ECG 12 Lead ED Triage Standing Order; SOA [[id number]] Collected: 08/12/24 1327     Updated: 08/12/24 1358     QT Interval 442 ms      QTC Interval 477 ms     Narrative:      Test Reason : ED Triage Standing Order~  Blood Pressure :   */*   mmHG  Vent. Rate :  70 BPM     Atrial Rate :  70 BPM     P-R Int : 286 ms          QRS Dur :  78 ms      QT  Int : 442 ms       P-R-T Axes :  28  26 100 degrees     QTc Int : 477 ms    Sinus rhythm with 1st degree AV block  Nonspecific T wave abnormality  Abnormal ECG  When compared with ECG of 24-NOV-2017 04:19,  AL interval has increased  QRS voltage has decreased  ST no longer elevated in Anterior leads  T wave inversion no longer evident in Inferior leads  Nonspecific T wave abnormality, worse in Anterolateral leads    Referred By: EDMD           Confirmed By:     ECG 12 Lead Dyspnea [[id number]] Collected: 08/12/24 1643     Updated: 08/12/24 1642     QT Interval 460 ms      QTC Interval 499 ms     Narrative:      Test Reason : Dyspnea  Blood Pressure :   */*   mmHG  Vent. Rate :  71 BPM     Atrial Rate :  71 BPM     P-R Int : 270 ms          QRS Dur :  84 ms      QT Int : 460 ms       P-R-T Axes :  50  16 115 degrees     QTc Int : 499 ms    Sinus rhythm with 1st degree AV block  Nonspecific T wave abnormality  Abnormal ECG  When compared with ECG of 12-AUG-2024 13:27, (Unconfirmed)  Nonspecific T wave abnormality, improved in Anterior leads    Referred By: EDMD           Confirmed By:           ECG 12 Lead Dyspnea   Preliminary Result   Test Reason : Dyspnea   Blood Pressure :   */*   mmHG   Vent. Rate :  71 BPM     Atrial Rate :  71 BPM      P-R Int : 270 ms          QRS Dur :  84 ms       QT Int : 460 ms       P-R-T Axes :  50  16 115 degrees      QTc Int : 499 ms      Sinus rhythm with 1st degree AV block   Nonspecific T wave abnormality   Abnormal ECG   When compared with ECG of 12-AUG-2024 13:27, (Unconfirmed)   Nonspecific T wave abnormality, improved in Anterior leads      Referred By: EDMD           Confirmed By:       ECG 12 Lead ED Triage Standing Order; SOA   Preliminary Result   Test Reason : ED Triage Standing Order~   Blood Pressure :   */*   mmHG   Vent. Rate :  70 BPM     Atrial Rate :  70 BPM      P-R Int : 286 ms          QRS Dur :  78 ms       QT Int : 442 ms       P-R-T Axes :  28  26 100 degrees       QTc Int : 477 ms      Sinus rhythm with 1st degree AV block   Nonspecific T wave abnormality   Abnormal ECG   When compared with ECG of 24-NOV-2017 04:19,   WA interval has increased   QRS voltage has decreased   ST no longer elevated in Anterior leads   T wave inversion no longer evident in Inferior leads   Nonspecific T wave abnormality, worse in Anterolateral leads      Referred By: EDMD           Confirmed By:           DISCHARGE    Patient discharged in stable condition.    Reviewed implications of results, diagnosis, meds, responsibility to follow up, warning signs and symptoms of possible worsening, potential complications and reasons to return to ER.    Patient/Family voiced understanding of above instructions.    Discussed plan for discharge, as there is no emergent indication for admission.  Pt/family is agreeable and understands need for follow up and possible repeat testing.  Pt/family is aware that discharge does not mean that nothing is wrong but that it indicates no emergency is currently present that requires admission and they must continue care with follow-up as given below or with a physician of their choice.     FOLLOW-UP  No follow-up provider specified.       Medication List      No changes were made to your prescriptions during this visit.                                              Medical Decision Making  Pt is a 68 yo male presenting to ED with daughter with reports of gaining over 80 lbs in the past 2 months. He has edema to legs and distension to abdomen. He has chronic wounds to feet / abdomen. Hx of non compliance with CPAP. Labs in ED notable for WBC 8, Lactic 0.9, Procal 0.1, Cr 1.31, Glucose 119, K 5.4, HS Trop 41, BNP 1,567 and UA unremarkable. CXR mild chronic lung changes. CT abd/pelvis with findings of anasarca, superficial wall skin wall thickening but no underlying abscess, moderate right pleural effusion. CT head no acute findings. Discussed results and tx plan regarding  admission.     Discussed patient with Dr. Mcmahon who is agreeable with ED course and tx plan.   Discussed admission with hospitalist Dr. Escalera.     DDx  Covid, Flu, pneumonia, CHF, ACS, Sepsis, Wound infection, Cirrhosis, LUZMARIA, UTI, CVA    Problems Addressed:  Acute on chronic congestive heart failure, unspecified heart failure type: complicated acute illness or injury  History of liver transplant: complicated acute illness or injury  Hx of CABG: complicated acute illness or injury  Hyperkalemia: complicated acute illness or injury  AURELIANO (obstructive sleep apnea): complicated acute illness or injury  Pleural effusion: complicated acute illness or injury  SOB (shortness of breath): complicated acute illness or injury    Amount and/or Complexity of Data Reviewed  Independent Historian:      Details: daughter  External Data Reviewed: notes.     Details: Reviewed previous non ED visits including prior labs, imaging, available notes, medications, allergies and surgical hx.     Labs: ordered. Decision-making details documented in ED Course.  Radiology: ordered. Decision-making details documented in ED Course.  ECG/medicine tests: ordered. Decision-making details documented in ED Course.    Risk  Prescription drug management.  Decision regarding hospitalization.        Final diagnoses:   Acute on chronic congestive heart failure, unspecified heart failure type   Hyperkalemia   SOB (shortness of breath)   AURELIANO (obstructive sleep apnea)   Hx of CABG   History of liver transplant   Pleural effusion   Chronic wound infection of abdomen, initial encounter       ED Disposition  ED Disposition       ED Disposition   Decision to Admit    Condition   --    Comment   Level of Care: Telemetry [5]   Diagnosis: CHF (congestive heart failure) [197293]   Admitting Physician: ROGERS ESCALERA [[id number]]   Attending Physician: ROGERS ESCALERA [[id number]]                 No follow-up provider specified.       Medication List      No changes  were made to your prescriptions during this visit.            Laney Card PA  08/12/24 2236      Electronically signed by oCry Mcmahon MD at 08/13/24 1320       Vital Signs (last day)       Date/Time Temp Temp src Pulse Resp BP Patient Position SpO2    08/13/24 1000 -- -- 72 -- -- -- 94    08/13/24 0800 -- -- 68 -- -- -- 94    08/13/24 0735 98.1 (36.7) Oral 69 18 103/69 Lying --    08/13/24 0600 -- -- 69 -- -- -- 90    08/13/24 0442 98.3 (36.8) Axillary 69 18 95/79 Lying --    08/13/24 0400 -- -- 68 -- -- -- 90    08/13/24 0200 -- -- 77 -- -- -- 94    08/13/24 0011 99 (37.2) Oral 81 16 115/55 Lying 92    08/12/24 2200 -- -- 78 -- -- -- 100    08/12/24 2145 -- -- 80 -- 132/57 -- 91    08/12/24 2026 -- -- 77 -- 130/56 -- 100    08/12/24 2022 -- -- 77 -- -- -- --    08/12/24 1915 -- -- 77 -- 145/97 -- 95    08/12/24 1845 -- -- 77 18 142/81 -- 94    08/12/24 1630 -- -- 73 18 138/82 -- 98    08/12/24 1601 -- -- 71 18 150/80 -- 99    08/12/24 1320 98.3 (36.8) Oral 65 20 142/73 Sitting 100          Oxygen Therapy (last day)       Date/Time SpO2 Device (Oxygen Therapy) Flow (L/min) Oxygen Concentration (%) ETCO2 (mmHg)    08/13/24 1000 94 nasal cannula 2 -- --    08/13/24 0800 94 nasal cannula 2 -- --    08/13/24 0600 90 nasal cannula 2.5 -- --    08/13/24 0400 90 nasal cannula 2.5 -- --    08/13/24 0200 94 nasal cannula 2.5 -- --    08/13/24 0011 92 nasal cannula 2.5 -- --    08/12/24 2200 100 nasal cannula 2.5 -- --    08/12/24 2145 91 nasal cannula 2.5 -- --    08/12/24 2026 100 nasal cannula 2.5 -- --    08/12/24 1915 95 nasal cannula 2.5 -- --    08/12/24 1845 94 room air -- -- --    08/12/24 1630 98 room air -- -- --    08/12/24 1601 99 -- -- -- --    08/12/24 1320 100 room air -- -- --          Lines, Drains & Airways       Active LDAs       Name Placement date Placement time Site Days    Peripheral IV 08/12/24 1738 Anterior;Right Forearm 08/12/24  1738  Forearm  less than 1    External Urinary Catheter  08/12/24 2023  --  less than 1                  Current Facility-Administered Medications   Medication Dose Route Frequency Provider Last Rate Last Admin    aspirin EC tablet 325 mg  325 mg Oral Daily Vinod Ryder PA-C   325 mg at 08/13/24 0957    atorvastatin (LIPITOR) tablet 80 mg  80 mg Oral Daily Vinod Ryder PA-C   80 mg at 08/13/24 0957    buPROPion XL (WELLBUTRIN XL) 24 hr tablet 300 mg  300 mg Oral Daily Vinod Ryder PA-C   300 mg at 08/13/24 0957    dextrose (D50W) (25 g/50 mL) IV injection 25 g  25 g Intravenous Q15 Min PRN Vinod Ryder PA-C        dextrose (GLUTOSE) oral gel 15 g  15 g Oral Q15 Min PRN Vinod Ryder PA-C        doxycycline (MONODOX) capsule 100 mg  100 mg Oral Q12H Adina Nicholas MD        escitalopram (LEXAPRO) tablet 20 mg  20 mg Oral Daily Adina Nicholas MD   20 mg at 08/13/24 0957    ferrous sulfate tablet 325 mg  325 mg Oral Daily With Breakfast Adina Nicholas MD   325 mg at 08/13/24 0957    furosemide (LASIX) injection 40 mg  40 mg Intravenous Q12H Adina Nicholas MD   40 mg at 08/13/24 0955    gabapentin (NEURONTIN) capsule 100 mg  100 mg Oral Nightly Adina Nicholas MD        glucagon (GLUCAGEN) injection 1 mg  1 mg Intramuscular Q15 Min PRN Vinod Ryder PA-C        heparin (porcine) 5000 UNIT/ML injection 5,000 Units  5,000 Units Subcutaneous Q12H Vinod Ryder PA-C   5,000 Units at 08/13/24 0956    HYDROcodone-acetaminophen (NORCO) 5-325 MG per tablet 1 tablet  1 tablet Oral Q6H PRN Lupe Bond DO   1 tablet at 08/13/24 0040    insulin glargine (LANTUS, SEMGLEE) injection 20 Units  20 Units Subcutaneous BID Vinod Ryder PA-C   20 Units at 08/13/24 0956    Insulin Lispro (humaLOG) injection 3-14 Units  3-14 Units Subcutaneous TID With Meals Vinod Ryder PA-C        LORazepam (ATIVAN) tablet 0.5 mg  0.5 mg Oral Q6H PRN Cristopher  Adina Hensley MD        melatonin tablet 5 mg  5 mg Oral Nightly PRN Vinod Ryder PA-C   5 mg at 08/12/24 2142    metoprolol tartrate (LOPRESSOR) tablet 25 mg  25 mg Oral BID Vinod Ryder PA-C   25 mg at 08/13/24 0957    nitroglycerin (NITROSTAT) SL tablet 0.4 mg  0.4 mg Sublingual Q5 Min PRN Vinod Ryder PA-C        pantoprazole (PROTONIX) EC tablet 40 mg  40 mg Oral Daily Vinod Ryder PA-C   40 mg at 08/13/24 0957    Pharmacy Consult - MTM   Does not apply Daily Ronal Brown, [facility]        QUEtiapine (SEROquel) tablet 12.5 mg  12.5 mg Oral BID Adina Nicholas MD   12.5 mg at 08/13/24 0957    sirolimus (RAPAMUNE) tablet 2 mg  2 mg Oral Daily Vinod Ryder PA-C   2 mg at 08/13/24 0958    sodium chloride 0.9 % flush 10 mL  10 mL Intravenous PRN Cory Mcmahon MD        sodium chloride 0.9 % flush 10 mL  10 mL Intravenous Q12H Vinod Ryder PA-C   10 mL at 08/13/24 0959    sodium chloride 0.9 % flush 10 mL  10 mL Intravenous PRN Vinod Ryder PA-C        sodium chloride 0.9 % infusion 40 mL  40 mL Intravenous PRN Vinod Ryder PA-C        tamsulosin (FLOMAX) 24 hr capsule 0.4 mg  0.4 mg Oral Daily Adina Nicholas MD   0.4 mg at 08/13/24 0957     Lab Results (last 24 hours)       Procedure Component Value Units Date/Time    POC Glucose Once [[id number]]  (Abnormal) Collected: 08/13/24 1127    Specimen: Blood Updated: 08/13/24 1138     Glucose 142 mg/dL     Wound Culture - Wound, Foot, Left [[id number]] Collected: 08/13/24 1008    Specimen: Wound from Foot, Left Updated: 08/13/24 1128     Gram Stain Rare (1+) WBCs seen      No organisms seen    Wound Culture - Wound, Abdominal Wall [[id number]] Collected: 08/13/24 1008    Specimen: Wound from Abdominal Wall Updated: 08/13/24 1123     Gram Stain No WBCs or organisms seen    POC Glucose Once [[id number]]  (Abnormal) Collected: 08/13/24 0736    Specimen: Blood Updated:  08/13/24 0747     Glucose 132 mg/dL     Hemoglobin A1c [[id number]]  (Abnormal) Collected: 08/13/24 0338    Specimen: Blood Updated: 08/13/24 0505     Hemoglobin A1C 7.60 %     Narrative:      Hemoglobin A1C Ranges:    Increased Risk for Diabetes  5.7% to 6.4%  Diabetes                     >= 6.5%  Diabetic Goal                < 7.0%    Basic Metabolic Panel [[id number]]  (Abnormal) Collected: 08/13/24 0338    Specimen: Blood Updated: 08/13/24 0432     Glucose 184 mg/dL      BUN 24 mg/dL      Creatinine 1.23 mg/dL      Sodium 136 mmol/L      Potassium 5.1 mmol/L      Chloride 104 mmol/L      CO2 26.0 mmol/L      Calcium 8.3 mg/dL      BUN/Creatinine Ratio 19.5     Anion Gap 6.0 mmol/L      eGFR 63.6 mL/min/1.73     Narrative:      GFR Normal >60  Chronic Kidney Disease <60  Kidney Failure <15      CBC & Differential [[id number]]  (Abnormal) Collected: 08/13/24 0338    Specimen: Blood Updated: 08/13/24 0415    Narrative:      The following orders were created for panel order CBC & Differential.  Procedure                               Abnormality         Status                     ---------                               -----------         ------                     CBC Auto Differential[[id number]]        Abnormal            Final result               Scan Slide[[id number]]                                                                    Please view results for these tests on the individual orders.    CBC Auto Differential [[id number]]  (Abnormal) Collected: 08/13/24 0338    Specimen: Blood Updated: 08/13/24 0415     WBC 7.31 10*3/mm3      RBC 3.58 10*6/mm3      Hemoglobin 9.4 g/dL      Hematocrit 30.9 %      MCV 86.3 fL      MCH 26.3 pg      MCHC 30.4 g/dL      RDW 21.0 %      RDW-SD 66.1 fl      MPV 8.1 fL      Platelets 181 10*3/mm3      Neutrophil % 76.8 %      Lymphocyte % 12.9 %      Monocyte % 8.5 %      Eosinophil % 1.1 %      Basophil % 0.4 %      Immature Grans % 0.3 %      Neutrophils, Absolute 5.62 10*3/mm3       Lymphocytes, Absolute 0.94 10*3/mm3      Monocytes, Absolute 0.62 10*3/mm3      Eosinophils, Absolute 0.08 10*3/mm3      Basophils, Absolute 0.03 10*3/mm3      Immature Grans, Absolute 0.02 10*3/mm3      nRBC 0.0 /100 WBC     POC Glucose Once [[id number]]  (Normal) Collected: 08/12/24 2134    Specimen: Blood Updated: 08/12/24 2134     Glucose 93 mg/dL     Single High Sensitivity Troponin T [[id number]]  (Abnormal) Collected: 08/12/24 1646    Specimen: Blood Updated: 08/12/24 1709     HS Troponin T 42 ng/L     Narrative:      High Sensitive Troponin T Reference Range:  <14.0 ng/L- Negative Female for AMI  <22.0 ng/L- Negative Male for AMI  >=14 - Abnormal Female indicating possible myocardial injury.  >=22 - Abnormal Male indicating possible myocardial injury.   Clinicians would have to utilize clinical acumen, EKG, Troponin, and serial changes to determine if it is an Acute Myocardial Infarction or myocardial injury due to an underlying chronic condition.         Blood Gas, Arterial With Co-Ox [[id number]]  (Abnormal) Collected: 08/12/24 1530    Specimen: Arterial Blood Updated: 08/12/24 1530     Site Right Brachial     Jarret's Test N/A     pH, Arterial 7.409 pH units      pCO2, Arterial 40.2 mm Hg      pO2, Arterial 72.0 mm Hg      Comment: 84 Value below reference range        HCO3, Arterial 25.4 mmol/L      Base Excess, Arterial 0.7 mmol/L      Hemoglobin, Blood Gas 10.5 g/dL      Comment: 84 Value below reference range        Hematocrit, Blood Gas 32.2 %      Oxyhemoglobin 92.2 %      Comment: 84 Value below reference range        Methemoglobin 0.10 %      Carboxyhemoglobin 0.7 %      CO2 Content 26.6 mmol/L      Temperature 37.0     Barometric Pressure for Blood Gas --     Comment: N/A        Modality Room Air     FIO2 21 %      Ventilator Mode --     Rate 0 Breaths/minute      PIP 0 cmH2O      Comment: Meter: [id number]     :  [id number]        IPAP 0     EPAP 0     pH, Temp Corrected 7.409 pH  Units      pCO2, Temperature Corrected 40.2 mm Hg      pO2, Temperature Corrected 72.0 mm Hg     CBC & Differential [[id number]]  (Abnormal) Collected: 08/12/24 1349    Specimen: Blood Updated: 08/12/24 1443    Narrative:      The following orders were created for panel order CBC & Differential.  Procedure                               Abnormality         Status                     ---------                               -----------         ------                     CBC Auto Differential[[id number]]        Abnormal            Final result               Scan Slide[[id number]]                                       Final result                 Please view results for these tests on the individual orders.    CBC Auto Differential [[id number]]  (Abnormal) Collected: 08/12/24 1349    Specimen: Blood Updated: 08/12/24 1443     WBC 8.23 10*3/mm3      RBC 4.22 10*6/mm3      Hemoglobin 11.0 g/dL      Hematocrit 37.5 %      MCV 88.9 fL      MCH 26.1 pg      MCHC 29.3 g/dL      RDW 21.3 %      RDW-SD 69.0 fl      MPV 8.2 fL      Platelets 228 10*3/mm3      Neutrophil % 75.8 %      Lymphocyte % 13.2 %      Monocyte % 8.9 %      Eosinophil % 1.7 %      Basophil % 0.2 %      Immature Grans % 0.2 %      Neutrophils, Absolute 6.23 10*3/mm3      Lymphocytes, Absolute 1.09 10*3/mm3      Monocytes, Absolute 0.73 10*3/mm3      Eosinophils, Absolute 0.14 10*3/mm3      Basophils, Absolute 0.02 10*3/mm3      Immature Grans, Absolute 0.02 10*3/mm3      nRBC 0.0 /100 WBC     Narrative:      Appended report. These results have been appended to a previously verified report.    Scan Slide [[id number]] Collected: 08/12/24 1349    Specimen: Blood Updated: 08/12/24 1443     Anisocytosis Slight/1+     Elliptocytes Slight/1+     WBC Morphology Normal     Platelet Estimate Adequate    Procalcitonin [[id number]]  (Normal) Collected: 08/12/24 1349    Specimen: Blood Updated: 08/12/24 1426     Procalcitonin 0.10 ng/mL     Narrative:      As a Marker for  "Sepsis (Non-Neonates):    1. <0.5 ng/mL represents a low risk of severe sepsis and/or septic shock.  2. >2 ng/mL represents a high risk of severe sepsis and/or septic shock.    As a Marker for Lower Respiratory Tract Infections that require antibiotic therapy:    PCT on Admission    Antibiotic Therapy       6-12 Hrs later    >0.5                Strongly Recommended  >0.25 - <0.5        Recommended   0.1 - 0.25          Discouraged              Remeasure/reassess PCT  <0.1                Strongly Discouraged     Remeasure/reassess PCT    As 28 day mortality risk marker: \"Change in Procalcitonin Result\" (>80% or <=80%) if Day 0 (or Day 1) and Day 4 values are available. Refer to http://www.[facility]ter[facility] Montreuilpct-[email or web address]    Change in PCT <=80%  A decrease of PCT levels below or equal to 80% defines a positive change in PCT test result representing a higher risk for 28-day all-cause mortality of patients diagnosed with severe sepsis for septic shock.    Change in PCT >80%  A decrease of PCT levels of more than 80% defines a negative change in PCT result representing a lower risk for 28-day all-cause mortality of patients diagnosed with severe sepsis or septic shock.       COVID PRE-OP / PRE-PROCEDURE SCREENING ORDER (NO ISOLATION) - Swab, Nasopharynx [[id number]]  (Normal) Collected: 08/12/24 1345    Specimen: Swab from Nasopharynx Updated: 08/12/24 1423    Narrative:      The following orders were created for panel order COVID PRE-OP / PRE-PROCEDURE SCREENING ORDER (NO ISOLATION) - Swab, Nasopharynx.  Procedure                               Abnormality         Status                     ---------                               -----------         ------                     COVID-19 and FLU A/B PCR...[[id number]]  Normal              Final result                 Please view results for these tests on the individual orders.    COVID-19 and FLU A/B PCR, 1 HR TAT - Swab, Nasopharynx [[id number]]  (Normal) Collected: 08/12/24 9743 "    Specimen: Swab from Nasopharynx Updated: 08/12/24 1423     COVID19 Not Detected     Influenza A PCR Not Detected     Influenza B PCR Not Detected    Narrative:      Fact sheet for providers: https://www.fda.gov/media/357895/download    Fact sheet for patients: https://www.fda.gov/media/459147/download    Test performed by PCR.    Comprehensive Metabolic Panel [[id number]]  (Abnormal) Collected: 08/12/24 1349    Specimen: Blood Updated: 08/12/24 1421     Glucose 119 mg/dL      BUN 24 mg/dL      Creatinine 1.31 mg/dL      Sodium 134 mmol/L      Potassium 5.4 mmol/L      Chloride 100 mmol/L      CO2 26.0 mmol/L      Calcium 8.8 mg/dL      Total Protein 7.5 g/dL      Albumin 3.9 g/dL      ALT (SGPT) 27 U/L      AST (SGOT) 18 U/L      Alkaline Phosphatase 171 U/L      Total Bilirubin 0.3 mg/dL      Globulin 3.6 gm/dL      Comment: Calculated Result        A/G Ratio 1.1 g/dL      BUN/Creatinine Ratio 18.3     Anion Gap 8.0 mmol/L      eGFR 58.9 mL/min/1.73     Narrative:      GFR Normal >60  Chronic Kidney Disease <60  Kidney Failure <15      Magnesium [[id number]]  (Normal) Collected: 08/12/24 1349    Specimen: Blood Updated: 08/12/24 1421     Magnesium 1.8 mg/dL     BNP [[id number]]  (Abnormal) Collected: 08/12/24 1349    Specimen: Blood Updated: 08/12/24 1421     proBNP 1,567.0 pg/mL     Narrative:      This assay is used as an aid in the diagnosis of individuals suspected of having heart failure. It can be used as an aid in the diagnosis of acute decompensated heart failure (ADHF) in patients presenting with signs and symptoms of ADHF to the emergency department (ED). In addition, NT-proBNP of <300 pg/mL indicates ADHF is not likely.    Age Range Result Interpretation  NT-proBNP Concentration (pg/mL:      <50             Positive            >450                   Gray                 300-450                    Negative             <300    50-75           Positive            >900                  Muniz                 300-900                  Negative            <300      >75             Positive            >1800                  Gray                300-1800                  Negative            <300    Single High Sensitivity Troponin T [[id number]]  (Abnormal) Collected: 08/12/24 1349    Specimen: Blood Updated: 08/12/24 1421     HS Troponin T 41 ng/L     Narrative:      High Sensitive Troponin T Reference Range:  <14.0 ng/L- Negative Female for AMI  <22.0 ng/L- Negative Male for AMI  >=14 - Abnormal Female indicating possible myocardial injury.  >=22 - Abnormal Male indicating possible myocardial injury.   Clinicians would have to utilize clinical acumen, EKG, Troponin, and serial changes to determine if it is an Acute Myocardial Infarction or myocardial injury due to an underlying chronic condition.         Lactic Acid, Plasma [[id number]]  (Normal) Collected: 08/12/24 1349    Specimen: Blood Updated: 08/12/24 1413     Lactate 0.9 mmol/L      Comment: Falsely depressed results may occur on samples drawn from patients receiving N-Acetylcysteine (NAC) or Metamizole.       Urinalysis With Culture If Indicated - Urine, Clean Catch [[id number]]  (Normal) Collected: 08/12/24 1339    Specimen: Urine, Clean Catch Updated: 08/12/24 1410     Color, UA Yellow     Appearance, UA Clear     pH, UA 5.5     Specific Gravity, UA 1.009     Glucose, UA Negative     Ketones, UA Negative     Bilirubin, UA Negative     Blood, UA Negative     Protein, UA Negative     Leuk Esterase, UA Negative     Nitrite, UA Negative     Urobilinogen, UA 0.2 E.U./dL    Narrative:      In absence of clinical symptoms, the presence of pyuria, bacteria, and/or nitrites on the urinalysis result does not correlate with infection.  Urine microscopic not indicated.    Blood Culture - Blood, Arm, Left [[id number]] Collected: 08/12/24 1349    Specimen: Blood from Arm, Left Updated: 08/12/24 1405    Blood Culture - Blood, Arm, Right [[id number]] Collected: 08/12/24 1349     Specimen: Blood from Arm, Right Updated: 08/12/24 1405    [facility] Draw [[id number]] Collected: 08/12/24 1349    Specimen: Blood Updated: 08/12/24 1400    Narrative:      The following orders were created for panel order [facility] Draw.  Procedure                               Abnormality         Status                     ---------                               -----------         ------                     Green Top (Gel)[[id number]]                                  Final result               Lavender Top[[id number]]                                     Final result               Gold Top - SST[[id number]]                                   Final result               Gray Top[[id number]]                                         Final result               Light Blue Top[[id number]]                                   Final result                 Please view results for these tests on the individual orders.    Green Top (Gel) [[id number]] Collected: 08/12/24 1349    Specimen: Blood Updated: 08/12/24 1400     Extra Tube Hold for add-ons.     Comment: Auto resulted.       Light Blue Top [[id number]] Collected: 08/12/24 1349    Specimen: Blood Updated: 08/12/24 1400     Extra Tube Hold for add-ons.     Comment: Auto resulted       Lavender Top [[id number]] Collected: 08/12/24 1349    Specimen: Blood Updated: 08/12/24 1400     Extra Tube hold for add-on     Comment: Auto resulted       Gold Top - SST [[id number]] Collected: 08/12/24 1349    Specimen: Blood Updated: 08/12/24 1400     Extra Tube Hold for add-ons.     Comment: Auto resulted.       Muniz Top [[id number]] Collected: 08/12/24 1349    Specimen: Blood Updated: 08/12/24 1400     Extra Tube Hold for add-ons.     Comment: Auto resulted.             Imaging Results (Last 24 Hours)       Procedure Component Value Units Date/Time    CT Abdomen Pelvis Without Contrast [[id number]] Collected: 08/12/24 1638     Updated: 08/12/24 1654    Narrative:      CT ABDOMEN PELVIS WO CONTRAST    Date  of Exam: 8/12/2024 4:16 PM EDT    Indication: confusion, weight gain, acute on chronic wound to abdomen.    Comparison: None available.    Technique: Axial CT images were obtained of the abdomen and pelvis without the administration of contrast. Reconstructed coronal and sagittal images were also obtained. Automated exposure control and iterative construction methods were used.      Findings:  Patient history includes previous liver transplant in 2001, chronic kidney disease. Significant recent weight gain. Worsening generalized weakness.    Today's study shows at least a moderate right pleural effusion and a small left pleural effusion both of which appear to be free-flowing. There is very mild associated atelectasis and no particular findings to suggest basilar pneumonia. There appears to   be relatively mild fatty liver change. Spleen is not enlarged. Pancreas appears mildly atrophic. Gallbladder is not identified either surgically absent or contracted. Adrenal glands appear normal. Kidneys show grossly normal parenchymal thickness and no   hydronephrosis or nephrolithiasis is seen. Upper abdominal bowel loops are normal in caliber and appearance. No free air ascites or adenopathy is seen.    Regarding the lower abdomen/pelvis, there is a trace amount of ascites along the lower paracolic gutters. No large or small bowel inflammation is identified. There is no evidence of diverticulosis or diverticulitis. Bladder is moderately distended and   normal in appearance. Prostate and seminal vesicles are not enlarged. There is a fat-only containing left inguinal/scrotal hernia with no evidence of strangulation.    There is very dense diffuse subcutaneous fat stranding consistent with anasarca, with some symmetric fluid between the muscles the abdominal wall.     History indicates chronic wound to abdomen. There is focal skin thickening to the right and superior of the umbilicus, but only superficial involvement. The  underlying subcutaneous fat appears normal.    Images 123 through 145 show a rounded cystic area 4 1/2 cm in diameter lateral of the left greater trochanter, with a small amount of apparently contiguous fluid passing along the upper margin of the gluteus medius image 135. This contains a small amount   of dense material and may represent an old hematoma.    Bony structures appear to be intact.      Impression:      Impression:    1. Very dense diffuse subcutaneous fat stranding consistent with anasarca. Small, symmetric inter-muscular fluid collections of the right and left lower abdominal wall, likely related to anasarca.    2. Approximately 4.5 cm fluid collection lateral of the left greater trochanter, favored to represent old, liquefied hematoma.    3. Superficial mid abdominal wall skin thickening, which may be a superficial cellulitis. No evidence of underlying abscess. No evidence of potential abscess here or elsewhere.    4. Moderate right pleural effusion, and small left effusion. Minimal ascites.    5. Fat-only containing left inguinal/scrotal hernia with no evidence of strangulation.          Electronically Signed: Kenan Stacy MD    8/12/2024 4:51 PM EDT    Workstation ID: [id number]    CT Head Without Contrast [[id number]] Collected: 08/12/24 1627     Updated: 08/12/24 1632    Narrative:      CT HEAD WO CONTRAST    Date of Exam: 8/12/2024 4:16 PM EDT    Indication: AMS.    Comparison: MRI of the brain performed on October 5, 2023    Technique: Axial CT images were obtained of the head without contrast administration.  Automated exposure control and iterative construction methods were used.      Findings:  There is no evidence of hemorrhage. There is no mass effect or midline shift. Age-related involutional changes are visualized.    There is no extra-axial collections.    Ventricles are normal in size and configuration for patient's stated age.    Posterior fossa is within normal limits.    Calvarium and  skull base appear intact. Visualized sinuses show no air fluid levels. The mastoid air cells are clear. Visualized orbits are unremarkable.        Impression:      Impression:  No acute intracranial process evident.        Electronically Signed: Omega Moore MD    8/12/2024 4:29 PM EDT    Workstation ID: [id number]    XR Chest 1 View [[id number]] Collected: 08/12/24 1440     Updated: 08/12/24 1444    Narrative:      XR CHEST 1 VW    Date of Exam: 8/12/2024 2:03 PM EDT    Indication: SOA triage protocol    Comparison: 11/24/2017    Findings:  Sternotomy wires are again noted. The heart shadow appears borderline enlarged but the pulmonary vasculature appears normal. There is mild coarsening of the pulmonary interstitial markings and a few granulomatous calcifications that appears stable from   prior exams in 2017. No edema, effusion or pneumothorax is seen.      Impression:      Impression:  Mild heart shadow enlargement and mild chronic appearing interstitial lung changes. No clearly acute chest disease.      Electronically Signed: Kenan Stacy MD    8/12/2024 2:41 PM EDT    Workstation ID: [id number]          ECG/EMG Results (last 24 hours)       Procedure Component Value Units Date/Time    ECG 12 Lead ED Triage Standing Order; SOA [[id number]] Collected: 08/12/24 1327     Updated: 08/12/24 1358     QT Interval 442 ms      QTC Interval 477 ms     Narrative:      Test Reason : ED Triage Standing Order~  Blood Pressure :   */*   mmHG  Vent. Rate :  70 BPM     Atrial Rate :  70 BPM     P-R Int : 286 ms          QRS Dur :  78 ms      QT Int : 442 ms       P-R-T Axes :  28  26 100 degrees     QTc Int : 477 ms    Sinus rhythm with 1st degree AV block  Nonspecific T wave abnormality  Abnormal ECG  When compared with ECG of 24-NOV-2017 04:19,  MN interval has increased  QRS voltage has decreased  ST no longer elevated in Anterior leads  T wave inversion no longer evident in Inferior leads  Nonspecific T wave abnormality, worse in  "Anterolateral leads    Referred By: EDMD           Confirmed By:     ECG 12 Lead Dyspnea [[id number]] Collected: 24 1643     Updated: 24 0901     QT Interval 460 ms      QTC Interval 499 ms     Narrative:      Test Reason : Dyspnea  Blood Pressure :   */*   mmHG  Vent. Rate :  71 BPM     Atrial Rate :  71 BPM     P-R Int : 270 ms          QRS Dur :  84 ms      QT Int : 460 ms       P-R-T Axes :  50  16 115 degrees     QTc Int : 499 ms    Sinus rhythm with 1st degree AV block  Nonspecific T wave abnormality  Abnormal ECG  When compared with ECG of 12-AUG-2024 13:27, (Unconfirmed)  Nonspecific T wave abnormality, improved in Anterior leads    Referred By: EDMD           Confirmed By:              Physician Progress Notes (last 24 hours)        Adina Nicholas MD at 24 0517              [facility] Medicine Services  PROGRESS NOTE    Patient Name: Quirino Sheppard  : 1955  MRN: [id number]    Date of Admission: 2024  Primary Care Physician: Andrew Fernandez MD    Subjective   Subjective     CC:  SOA    HPI:  Lethargic this am, but wakes to ask me how \"do I sound\" then falls asleep again.       Objective   Objective     Vital Signs:   Temp:  [98.3 °F (36.8 °C)-99 °F (37.2 °C)] 98.3 °F (36.8 °C)  Heart Rate:  [65-81] 69  Resp:  [16-20] 18  BP: ()/(55-97) 95/79  Flow (L/min):  [2.5] 2.5     Physical Exam:  Constitutional: No acute distress, sleeping, wakes briefly but falls asleep nearly immediately. Malodorous and appears older than stated age.  HENT: NCAT, mucous membranes moist, poor dentition   Respiratory: difficult to hear due to pt snoring   Cardiovascular: RRR, no murmurs, rubs, or gallops  Gastrointestinal: Positive bowel sounds, soft, nontender, nondistended  Musculoskeletal: No bilateral ankle edema s/p toe amputations of L foot with bandage in place  Neurologic: lethargic, strength symmetric in all extremities, Cranial Nerves grossly intact to " confrontation, speech clear  Skin: No rashes, chronic wound on abdomen covered with bandage     Results Reviewed:  LAB RESULTS:      Lab 08/13/24  0338 08/12/24  1349   WBC 7.31 8.23   HEMOGLOBIN 9.4* 11.0*   HEMATOCRIT 30.9* 37.5   PLATELETS 181 228   NEUTROS ABS 5.62 6.23   IMMATURE GRANS (ABS) 0.02 0.02   LYMPHS ABS 0.94 1.09   MONOS ABS 0.62 0.73   EOS ABS 0.08 0.14   MCV 86.3 88.9   PROCALCITONIN  --  0.10   LACTATE  --  0.9         Lab 08/13/24  0338 08/12/24  1349   SODIUM 136 134*   POTASSIUM 5.1 5.4*   CHLORIDE 104 100   CO2 26.0 26.0   ANION GAP 6.0 8.0   BUN 24* 24*   CREATININE 1.23 1.31*   EGFR 63.6 58.9*   GLUCOSE 184* 119*   CALCIUM 8.3* 8.8   MAGNESIUM  --  1.8   HEMOGLOBIN A1C 7.60*  --          Lab 08/12/24  1349   TOTAL PROTEIN 7.5   ALBUMIN 3.9   GLOBULIN 3.6   ALT (SGPT) 27   AST (SGOT) 18   BILIRUBIN 0.3   ALK PHOS 171*         Lab 08/12/24  1646 08/12/24  1349   PROBNP  --  1,567.0*   HSTROP T 42* 41*                 Lab 08/12/24  1530   PH, ARTERIAL 7.409   PCO2, ARTERIAL 40.2   PO2 ART 72.0*   FIO2 21   HCO3 ART 25.4   BASE EXCESS ART 0.7   CARBOXYHEMOGLOBIN 0.7     Brief Urine Lab Results  (Last result in the past 365 days)        Color   Clarity   Blood   Leuk Est   Nitrite   Protein   CREAT   Urine HCG        08/12/24 1339 Yellow   Clear   Negative   Negative   Negative   Negative                   Microbiology Results Abnormal       Procedure Component Value - Date/Time    COVID PRE-OP / PRE-PROCEDURE SCREENING ORDER (NO ISOLATION) - Swab, Nasopharynx [[id number]]  (Normal) Collected: 08/12/24 1345    Lab Status: Final result Specimen: Swab from Nasopharynx Updated: 08/12/24 1332    Narrative:      The following orders were created for panel order COVID PRE-OP / PRE-PROCEDURE SCREENING ORDER (NO ISOLATION) - Swab, Nasopharynx.  Procedure                               Abnormality         Status                     ---------                               -----------         ------                      COVID-19 and FLU A/B PCR...[[id number]]  Normal              Final result                 Please view results for these tests on the individual orders.    COVID-19 and FLU A/B PCR, 1 HR TAT - Swab, Nasopharynx [[id number]]  (Normal) Collected: 08/12/24 1345    Lab Status: Final result Specimen: Swab from Nasopharynx Updated: 08/12/24 1423     COVID19 Not Detected     Influenza A PCR Not Detected     Influenza B PCR Not Detected    Narrative:      Fact sheet for providers: https://www.fda.gov/media/012920/download    Fact sheet for patients: https://www.fda.gov/media/247517/download    Test performed by PCR.            CT Abdomen Pelvis Without Contrast    Result Date: 8/12/2024  CT ABDOMEN PELVIS WO CONTRAST Date of Exam: 8/12/2024 4:16 PM EDT Indication: confusion, weight gain, acute on chronic wound to abdomen. Comparison: None available. Technique: Axial CT images were obtained of the abdomen and pelvis without the administration of contrast. Reconstructed coronal and sagittal images were also obtained. Automated exposure control and iterative construction methods were used. Findings: Patient history includes previous liver transplant in 2001, chronic kidney disease. Significant recent weight gain. Worsening generalized weakness. Today's study shows at least a moderate right pleural effusion and a small left pleural effusion both of which appear to be free-flowing. There is very mild associated atelectasis and no particular findings to suggest basilar pneumonia. There appears to be relatively mild fatty liver change. Spleen is not enlarged. Pancreas appears mildly atrophic. Gallbladder is not identified either surgically absent or contracted. Adrenal glands appear normal. Kidneys show grossly normal parenchymal thickness and no hydronephrosis or nephrolithiasis is seen. Upper abdominal bowel loops are normal in caliber and appearance. No free air ascites or adenopathy is seen. Regarding the lower  abdomen/pelvis, there is a trace amount of ascites along the lower paracolic gutters. No large or small bowel inflammation is identified. There is no evidence of diverticulosis or diverticulitis. Bladder is moderately distended and normal in appearance. Prostate and seminal vesicles are not enlarged. There is a fat-only containing left inguinal/scrotal hernia with no evidence of strangulation. There is very dense diffuse subcutaneous fat stranding consistent with anasarca, with some symmetric fluid between the muscles the abdominal wall.  History indicates chronic wound to abdomen. There is focal skin thickening to the right and superior of the umbilicus, but only superficial involvement. The underlying subcutaneous fat appears normal. Images 123 through 145 show a rounded cystic area 4 1/2 cm in diameter lateral of the left greater trochanter, with a small amount of apparently contiguous fluid passing along the upper margin of the gluteus medius image 135. This contains a small amount  of dense material and may represent an old hematoma. Bony structures appear to be intact.     Impression: Impression: 1. Very dense diffuse subcutaneous fat stranding consistent with anasarca. Small, symmetric inter-muscular fluid collections of the right and left lower abdominal wall, likely related to anasarca. 2. Approximately 4.5 cm fluid collection lateral of the left greater trochanter, favored to represent old, liquefied hematoma. 3. Superficial mid abdominal wall skin thickening, which may be a superficial cellulitis. No evidence of underlying abscess. No evidence of potential abscess here or elsewhere. 4. Moderate right pleural effusion, and small left effusion. Minimal ascites. 5. Fat-only containing left inguinal/scrotal hernia with no evidence of strangulation. Electronically Signed: Kenan Stacy MD  8/12/2024 4:51 PM EDT  Workstation ID: [id number]    CT Head Without Contrast    Result Date: 8/12/2024  CT HEAD WO CONTRAST  Date of Exam: 8/12/2024 4:16 PM EDT Indication: AMS. Comparison: MRI of the brain performed on October 5, 2023 Technique: Axial CT images were obtained of the head without contrast administration.  Automated exposure control and iterative construction methods were used. Findings: There is no evidence of hemorrhage. There is no mass effect or midline shift. Age-related involutional changes are visualized. There is no extra-axial collections. Ventricles are normal in size and configuration for patient's stated age. Posterior fossa is within normal limits. Calvarium and skull base appear intact. Visualized sinuses show no air fluid levels. The mastoid air cells are clear. Visualized orbits are unremarkable.     Impression: Impression: No acute intracranial process evident. Electronically Signed: Omega Moore MD  8/12/2024 4:29 PM EDT  Workstation ID: [id number]    XR Chest 1 View    Result Date: 8/12/2024  XR CHEST 1 VW Date of Exam: 8/12/2024 2:03 PM EDT Indication: SOA triage protocol Comparison: 11/24/2017 Findings: Sternotomy wires are again noted. The heart shadow appears borderline enlarged but the pulmonary vasculature appears normal. There is mild coarsening of the pulmonary interstitial markings and a few granulomatous calcifications that appears stable from prior exams in 2017. No edema, effusion or pneumothorax is seen.     Impression: Impression: Mild heart shadow enlargement and mild chronic appearing interstitial lung changes. No clearly acute chest disease. Electronically Signed: Kenan Stacy MD  8/12/2024 2:41 PM EDT  Workstation ID: [id number]     Results for orders placed during the hospital encounter of 11/20/17    Adult Transthoracic Echocardiogram Complete    Interpretation Summary  · Left ventricular systolic function is normal. Estimated EF = 60%.  · calcification of the aortic valve  · Trace tricuspid valve regurgitation is present      Current medications:  Scheduled Meds:aspirin, 325 mg, Oral,  Daily  atorvastatin, 80 mg, Oral, Daily  buPROPion XL, 300 mg, Oral, Daily  cefTRIAXone, 1,000 mg, Intravenous, Q24H  escitalopram, 20 mg, Oral, Daily  ferrous sulfate, 325 mg, Oral, Daily With Breakfast  furosemide, 40 mg, Intravenous, Q12H  gabapentin, 100 mg, Oral, Nightly  heparin (porcine), 5,000 Units, Subcutaneous, Q12H  insulin glargine, 20 Units, Subcutaneous, BID  insulin lispro, 3-14 Units, Subcutaneous, TID With Meals  metoprolol tartrate, 25 mg, Oral, BID  pantoprazole, 40 mg, Oral, Daily  pharmacy consult - MTM, , Does not apply, Daily  QUEtiapine, 12.5 mg, Oral, BID  sirolimus, 2 mg, Oral, Daily  sodium chloride, 10 mL, Intravenous, Q12H  tamsulosin, 0.4 mg, Oral, Daily  vancomycin, 750 mg, Intravenous, Q12H      Continuous Infusions:Pharmacy to dose vancomycin,       PRN Meds:.  dextrose    dextrose    glucagon (human recombinant)    HYDROcodone-acetaminophen    melatonin    nitroglycerin    Pharmacy to dose vancomycin    sodium chloride    sodium chloride    sodium chloride    Assessment & Plan   Assessment & Plan     Active Hospital Problems    Diagnosis  POA    **CHF (congestive heart failure) [I50.9]  Yes    Wound of abdomen [S31.109A]  Yes    Hyperkalemia [E87.5]  Yes    LUZMARIA (acute kidney injury) [N17.9]  Yes    Obstructive sleep apnea syndrome [G47.33]  Yes    Essential hypertension [I10]  Yes    Hyperlipidemia LDL goal <70 [E78.5]  Yes    Hx of liver transplant [Z94.4]  Not Applicable    Type 2 diabetes mellitus without complication, with long-term current use of insulin [E11.9, Z79.4]  Not Applicable    Coronary artery disease involving coronary bypass graft of native heart without angina pectoris [I25.810]  Yes      Resolved Hospital Problems   No resolved problems to display.        Brief Hospital Course to date:  Quirino Sheppard is a 69 y.o. male with a history of CAD s/p CABG, CHF, HTN, HLD, T2DM, CKD, Hx Liver transplant (d/t Chronic Hep C), AURELIANO (CPAP non-compliant), and GERD who  presented to [facility] ED for complaint of worsening shortness of breath and bilateral lower extremity edema.      Acute on chronic HFpEF   Bilateral Pleural Effusion (R>L)  -CXR shows enlarged heart with no acute findings. However CT abd/pelvis  also showed a moderate right and small left pleural effusion.   -Last echo on file is from 2017 with normal EF  -Repeat echo PENDING  -Cardiology consulted  -Received 80mg IV Lasix in the ED. Net negative 1140 for 24 hours  -Daily weight. Strict I&Os  -- continue with IV Lasix 40 mg BID or as per Cardiology      Chronic wound on abdomen  -CT abd/pelvis showed superficial mid abdominal wall skin thickening, which may be a superficial cellulitis. No evidence of underlying abscess.  -Wound care to see  -Wound culture  -Has been on Vanc + Rocephin- de-escalate to solely PO Doxycycline     CAD s/p CABG  HTN  HLD  -Chest pain free.    -Takes Doxazosin, Metoprolol, and Ramipril.   -Hold ACE-I for now and monitor Cr while receiving diuretics   -Continue all other home BP meds.    -Continue statin     T2DM  -A1C 7.6%  -Fingerstick achs. SSI     Hx Liver Transplant 2001 at [facility]  -Continue home meds     Hyperkalemia  -K+ 5.4 on admission   -Appears to have recently started Spironolactone based on [facility] History.   -Improved to 5.2 this am      AURELIANO  -Reports that he is supposed to be wearing a CPAP at night but has not in over a year due to unemployment      CKD 3  -Baseline creatinine 1-1.5  -1.3 on admission, monitor with diuresis     Chronic debility  -Uses wheelchair, currently at [facility] facility in [place]     History of left  toe amputation x 4  -s/p amputation in April per patient, continue wound care    Total time spent: Time Spent: Time Spent: 35 minutes  Time spent includes time reviewing chart, face-to-face time, counseling patient/family/caregiver, ordering medications/tests/procedures, communicating with other health care professionals,  documenting clinical information in the electronic health record, and coordination of care.      Expected Discharge Location and Transportation: back to facility in [place]  Expected Discharge   Expected Discharge Date: 8/15/2024; Expected Discharge Time:      VTE Prophylaxis:  Pharmacologic VTE prophylaxis orders are present.         AM-PAC 6 Clicks Score (PT): 10 (08/12/24 2023)    CODE STATUS:   Code Status and Medical Interventions: CPR (Attempt to Resuscitate); Full Support   Ordered at: 08/12/24 2010     Code Status (Patient has no pulse and is not breathing):    CPR (Attempt to Resuscitate)     Medical Interventions (Patient has pulse or is breathing):    Full Support       Adina Nicholas MD  08/13/24       Electronically signed by Adina Nicholas MD at 08/13/24 1215       Consult Notes (last 24 hours)  Notes from 08/12/24 1334 through 08/13/24 1334   No notes of this type exist for this encounter.

## 2024-08-13 NOTE — CASE MANAGEMENT/SOCIAL WORK
Discharge Planning Assessment  [facility]     Patient Name: Quirino Sheppard  MRN: [id number]  Today's Date: 8/13/2024    Admit Date: 8/12/2024    Plan: ONGOING   Discharge Needs Assessment    No documentation.                  Discharge Plan       Row Name 08/13/24 1602       Plan    Plan ONGOING    Patient/Family in Agreement with Plan yes    Plan Comments Met with pt at bedside for DCP.  He lives in PCU at [facility] in [facility].  They only assist with meds.  At baseline (prior to April 2024), pt is independent with ADLs, however, has had a functional decline since having toes amputated in April 2024.  Following amputation, he spent approx 2 months at [facility] for STR from May-June 2024.  Daughter advised that pt is not walking well and is currently using a transport chair for mobility (used a RW prior to amputations).  Add'l DME:  Shower chair, grab bars, broken CPAP).  Pt is current with [facility]"[employer] (SN/PT/OT;  Dx abd wall wound, T2DM, HTN).  Confirmed he has [employer] Medicare with Rx coverage.  CM will cont to follow for any DC needs.                  Continued Care and Services - Admitted Since 8/12/2024    No active coordination exists for this encounter.       Expected Discharge Date and Time       Expected Discharge Date Expected Discharge Time    Aug 15, 2024            Demographic Summary    No documentation.                  Functional Status    No documentation.                  Psychosocial    No documentation.                  Abuse/Neglect    No documentation.                  Legal    No documentation.                  Substance Abuse    No documentation.                  Patient Forms    No documentation.                     Annelise Land RN

## 2024-08-13 NOTE — PLAN OF CARE
Goal Outcome Evaluation:  Plan of Care Reviewed With: patient           Outcome Evaluation: OT evaluation completed. Pt presents with significant weakness, decreased tolerance to upright positioning, edema, and pain impacting ADL's. Pt MaxAx2 for bed mobility, able to sit unsupported briefly at EOB before returned to supine. Pt washed face with CRUZ, Dep for oral care, expressed pain with all repositioning. Therapy awaiting clarification on LLE weight bearing status.  IP OT services warranted. Recommend IPR at discharge to support return to PLOF.      Anticipated Discharge Disposition (OT): skilled nursing facility

## 2024-08-13 NOTE — PROGRESS NOTES
"          Clinical Nutrition Assessment     Patient Name: Quirino Sheppard  YOB: 1955  MRN: [id number]  Date of Encounter: 08/13/24 15:28 EDT  Admission date: 8/12/2024  Reason for Visit: Follow-up protocol    Assessment   Nutrition Assessment   Admission Diagnosis:  CHF (congestive heart failure) [I50.9]    Problem List:    CHF (congestive heart failure)    Coronary artery disease involving coronary bypass graft of native heart without angina pectoris    Hx of liver transplant    Essential hypertension    Hyperlipidemia LDL goal <70    Type 2 diabetes mellitus without complication, with long-term current use of insulin    Obstructive sleep apnea syndrome    Wound of abdomen    Hyperkalemia    LUZMARIA (acute kidney injury)      PMH:   He  has a past medical history of Anxiety and depression, BPH associated with nocturia, Chronic kidney disease, stage 2 (mild), Coronary arteriosclerosis in native artery, Degeneration of lumbar intervertebral disc, Diabetes mellitus, Disorder of sulfur-bearing amino acid metabolism, Gastroesophageal reflux disease without esophagitis, Glaucoma, Hepatitis C, Heterozygous MTHFR mutation [id number], Heterozygous MTHFR mutation C677T, High risk medication use, Hyperlipidemia, Hypertension, Liver transplanted, Low back pain, Obstructive sleep apnea syndrome, Recurrent major depression in full remission, Severe obesity, and Type 2 diabetes mellitus.    PSH:  He  has a past surgical history that includes Liver transplantation (2001) and Coronary artery bypass graft (N/A, 11/22/2017).    Applicable Nutrition History:   Hx L foot toe amputation x4  Abdominal wound (had for 2 years per patient)- cultured per wound care    Anthropometrics     Height: Height: 167.6 cm (66\")  Last Filed Weight: Weight: 104 kg (229 lb 12.8 oz) (08/13/24 0600)  Method: Weight Method: Bed scale  BMI: BMI (Calculated): 37.1    UBW:  WILLIE  Weight change:   Per MD note, spoke w/ daughter - reports pt dry wt of " 170-180#,  up to 230#     Weight       Weight (kg) Weight (lbs) Weight Method Visit Report   2/16/2023 87.998 kg  194 lb   --    4/13/2023 92.534 kg  204 lb   --    9/6/2023 83.915 kg  185 lb   --    3/28/2024 78.472 kg  173 lb   --    4/19/2024 81.647 kg  180 lb   --    8/12/2024 107.502 kg  237 lb  Stated      102.513 kg  226 lb  Bed scale     8/13/2024 104.237 kg  229 lb 12.8 oz  Bed scale       Wt fluctuations likely 2/2 to fluid status  Edema noted per MD    Nutrition Focused Physical Exam    Date:  8/13       Unable to perform due to Pt unable to participate at time of visit     Subjective   Reported/Observed/Food/Nutrition Related History:     8/16) Per MD notes patient is very sleepy and has to be aroused by nursing to eat.  Was able to speak with daughter, she reports patient was at an assisted living facility, states pt is confused often, and believed patient had a regular diet. Daughter denies pt has any chewing/swallowing difficulties, despite patient having poor dentition. Thinks patient will be fine with regular consistencies. NKFA.    Spoke with RN, states patient eats all of his meals, drinks some of Boost.    8/13) Attempted to see patient x2, spoke with RN. States patient has been eating well. Lunch tray observed upon visit; 100% consumed.    Current Nutrition Prescription   PO: Diet: Cardiac, Diabetic; Healthy Heart (2-3 Na+); Consistent Carbohydrate; Fluid Consistency: Thin (IDDSI 0)  Oral Nutrition Supplement: N/A  Intake: 2 Days: 100% x 3 meals    Assessment & Plan   Nutrition Diagnosis   Date:  8/13            Updated:    Problem Increased nutrient needs (protein)   Etiology Demand for wound healing   Signs/Symptoms Delayed wound healing   Status: New    Goal:   Nutrition to support treatment and Maintain intake      Nutrition Intervention      Follow treatment progress, Care plan reviewed, Supplement provided    Boost GC BID w/ breakfast and dinner  Will perform NFPE when  feasible    Monitoring/Evaluation:   Per protocol, I&O, PO intake, Supplement intake, Weight, Skin status, Symptoms, POC/GOC    Sydni Ramirez RD eligible  Time Spent: 25 min

## 2024-08-13 NOTE — NURSING NOTE
Ingrid contacted today to ascertain activity level on left foot. I was informed that pt can bear weight, but on heel only.

## 2024-08-13 NOTE — THERAPY EVALUATION
Patient Name: Quirino Sheppard  : 1955    MRN: [id number]                              Today's Date: 2024       Admit Date: 2024    Visit Dx:     ICD-10-CM ICD-9-CM   1. Acute on chronic congestive heart failure, unspecified heart failure type  I50.9 428.0   2. Hyperkalemia  E87.5 276.7   3. SOB (shortness of breath)  R06.02 786.05   4. AURELIANO (obstructive sleep apnea)  G47.33 327.23   5. Hx of CABG  Z95.1 V45.81   6. History of liver transplant  Z94.4 V42.7   7. Pleural effusion  J90 511.9   8. Chronic wound infection of abdomen, initial encounter  S31.109A 958.3    L08.9      Patient Active Problem List   Diagnosis    Coronary artery disease involving coronary bypass graft of native heart without angina pectoris    Hepatitis C    Hx of liver transplant    Essential hypertension    Hyperlipidemia LDL goal <70    Type 2 diabetes mellitus without complication, with long-term current use of insulin    S/P CABG x 3    BPH associated with nocturia    Degeneration of lumbar intervertebral disc    Disorder of sulfur-bearing amino acid metabolism    Gastroesophageal reflux disease without esophagitis    Obstructive sleep apnea syndrome    Presence of aortocoronary bypass graft    Recurrent major depression in full remission    Severe obesity    Cellulitis of abdominal wall    CHF (congestive heart failure)    Wound of abdomen    Hyperkalemia    LUZMARIA (acute kidney injury)     Past Medical History:   Diagnosis Date    Anxiety and depression     BPH associated with nocturia     Chronic kidney disease, stage 2 (mild)     Coronary arteriosclerosis in native artery     3V    Degeneration of lumbar intervertebral disc     Diabetes mellitus     Disorder of sulfur-bearing amino acid metabolism     Gastroesophageal reflux disease without esophagitis     Glaucoma     Hepatitis C     Heterozygous MTHFR mutation [id number]     Heterozygous MTHFR mutation C677T     High risk medication use     Hyperlipidemia     Hypertension      Liver transplanted     Low back pain     Obstructive sleep apnea syndrome     Recurrent major depression in full remission     Severe obesity     Type 2 diabetes mellitus      Past Surgical History:   Procedure Laterality Date    CORONARY ARTERY BYPASS GRAFT N/A 11/22/2017    Procedure: MEDIAN STERNOTOMY, CORONARY ARTERY BYPASS GRAFT X 3, UTILIZING THE LEFT INTERNAL MAMMARY ARTERY AND EVH USING THE LEFT GREATER SAPHENOUS VEIN;  Surgeon: Naga Guaman MD;  Location: [facility];  Service:     LIVER TRANSPLANTATION  2001      General Information       Row Name 08/13/24 0830          OT Time and Intention    Document Type evaluation  -CARLA     Mode of Treatment occupational therapy  -CARLA       Row Name 08/13/24 0830          General Information    Patient Profile Reviewed yes  -CARLA     Prior Level of Function min assist:;ADL's;independent:;bed mobility;transfer;w/c or scooter;dependent:;home management  Pt limited historian, unable to clarify understanding of LLE weightbearing precautions (toe amputations April 2024); L hand dominant  -CARLA     Existing Precautions/Restrictions fall;other (see comments)  BLE edema, abdominal distention; chronic  abd wound; skin very sensitive to touch during repositioning with pt unable to specify  -CARLA     Barriers to Rehab medically complex;previous functional deficit;cognitive status  -CARLA       Row Name 08/13/24 0830          Occupational Profile    Environmental Supports and Barriers (Occupational Profile) Recently moved into custodial in [place], reports getting around by w/c, has assist for bathing and LBD; hx liver transplant in 2017  -CARLA       Row Name 08/13/24 0830          Living Environment    People in Home facility resident  -CARLA       Row Name 08/13/24 0830          Home Main Entrance    Number of Stairs, Main Entrance none  -CARLA       Row Name 08/13/24 0830          Cognition    Orientation Status (Cognition) oriented to;person;place;time;verbal cues/prompts needed for  orientation;other (see comments)  cues for month, able to self-correct name of facility  -       Row Name 08/13/24 0830          Safety Issues, Functional Mobility    Safety Issues Affecting Function (Mobility) ability to follow commands;awareness of need for assistance;friction/shear risk;insight into deficits/self-awareness;judgment;problem-solving;safety precaution awareness;safety precautions follow-through/compliance;sequencing abilities  -CARLA     Impairments Affecting Function (Mobility) balance;coordination;endurance/activity tolerance;motor planning;pain;postural/trunk control;range of motion (ROM);strength;cognition  -CARLA     Cognitive Impairments, Mobility Safety/Performance awareness, need for assistance;insight into deficits/self-awareness;judgment;problem-solving/reasoning;safety precaution awareness;safety precaution follow-through;sequencing abilities  -     Comment, Safety Issues/Impairments (Mobility) demonstrated decreased body awareness throughout  -               User Key  (r) = Recorded By, (t) = Taken By, (c) = Cosigned By      Initials Name Provider Type    CARLA Cha Fritz OT Occupational Therapist                     Mobility/ADL's       Row Name 08/13/24 0830          Bed Mobility    Bed Mobility rolling left;rolling right;supine-sit;sit-supine  -CARLA     Rolling Left [place] (Bed Mobility) maximum assist (25% patient effort);2 person assist;verbal cues;nonverbal cues (demo/gesture)  -CARLA     Rolling Right [place] (Bed Mobility) maximum assist (25% patient effort);2 person assist;verbal cues;nonverbal cues (demo/gesture)  -CARLA     Supine-Sit [place] (Bed Mobility) maximum assist (25% patient effort);2 person assist;verbal cues;nonverbal cues (demo/gesture)  -CARLA     Sit-Supine [place] (Bed Mobility) maximum assist (25% patient effort);2 person assist;verbal cues;nonverbal cues (demo/gesture)  -CARLA     Bed Mobility, Safety Issues cognitive deficits limit  understanding;decreased use of legs for bridging/pushing;decreased use of arms for pushing/pulling;impaired trunk control for bed mobility  -CARLA     Assistive Device (Bed Mobility) bed rails;draw sheet;head of bed elevated  -CARLA     Comment, (Bed Mobility) Dep for scooting out to EOB; pt rolled L/R for linen change, cried out in pain during all transitions  -CARLA       Row Name 08/13/24 0830          Transfers    Comment, (Transfers) Pt declined transfer to recliner chair; awaiting clarification on LLE weight bearing status  -CARLA       Row Name 08/13/24 0830          Functional Mobility    Functional Mobility- Ind. Level not tested  -CARLA       Row Name 08/13/24 0830          Activities of Daily Living    BADL Assessment/Intervention lower body dressing;grooming;toileting  -[facility] Name 08/13/24 0830          Lower Body Dressing Assessment/Training    [place] Level (Lower Body Dressing) don;socks;dependent (less than 25% patient effort)  -CARLA     Position (Lower Body Dressing) supine  -CARLA       Row Name 08/13/24 0830          Grooming Assessment/Training    [place] Level (Grooming) oral care regimen;wash face, hands;moderate assist (50% patient effort)  -CARLA     Oral Care teeth brushed - regular toothbrush  -CARLA     Position (Grooming) sitting up in bed  -CARLA     Comment, (Grooming) Pt washed face; required assist to complete oral care with regular toothbrush, pt able to complete thorough swish and spit  -CARLA       Row Name 08/13/24 0830          Toileting Assessment/Training    [place] Level (Toileting) perform perineal hygiene;dependent (less than 25% patient effort)  -CARLA     Position (Toileting) supine  -CARLA     Comment, (Toileting) following bowel incontinence  -CARLA               User Key  (r) = Recorded By, (t) = Taken By, (c) = Cosigned By      Initials Name Provider Type    Cha Garrett OT Occupational Therapist                   Obj/Interventions       Row Name 08/13/24 0830          Sensory  Assessment (Somatosensory)    Sensory Assessment (Somatosensory) UE sensation intact  -       Row Name 08/13/24 0830          Vision Assessment/Intervention    Visual Impairment/Limitations unable/difficult to assess;other (see comments)  reports recent cataract removal from one of his eyes  -CARLA       Row Name 08/13/24 0830          Range of Motion Comprehensive    General Range of Motion bilateral upper extremity ROM WFL  -CARLA       Row Name 08/13/24 0830          Strength Comprehensive (MMT)    Comment, General Manual Muscle Testing (MMT) Assessment BUE's grossly 3+/5  -CARLA       Row Name 08/13/24 0830          Balance    Balance Assessment sitting static balance;sitting dynamic balance  -CARLA     Static Sitting Balance supervision;other (see comments)  MaxA initially, progressing to SUP  -CARLA     Dynamic Sitting Balance dependent  -CARLA     Position, Sitting Balance unsupported;sitting edge of bed  -CARLA     Balance Interventions sitting;occupation based/functional task  -CARLA     Comment, Balance Dep for positioning at EOB; MaxA initially, progressing to SUP with very limited tolerance to upright position  -CARLA               User Key  (r) = Recorded By, (t) = Taken By, (c) = Cosigned By      Initials Name Provider Type    CARLA Cha Fritz, OT Occupational Therapist                   Goals/Plan       [facility] Name 08/13/24 1145          Transfer Goal 1 (OT)    Activity/Assistive Device (Transfer Goal 1, OT) toilet;wheelchair transfer  -CARLA     [place] Level/Cues Needed (Transfer Goal 1, OT) minimum assist (75% or more patient effort)  -CARLA     Time Frame (Transfer Goal 1, OT) long term goal (LTG);10 days  -CARLA     Progress/Outcome (Transfer Goal 1, OT) new goal  -       Row Name 08/13/24 1145          Toileting Goal 1 (OT)    Activity/Device (Toileting Goal 1, OT) adjust/manage clothing;perform perineal hygiene;commode  -CARLA     [place] Level/Cues Needed (Toileting Goal 1, OT) moderate assist (50-74% patient effort)   -CARLA     Time Frame (Toileting Goal 1, OT) 10 days;short term goal (STG);1 week  -CARLA     Progress/Outcome (Toileting Goal 1, OT) new goal  -CARLA       Row Name 08/13/24 1145          Grooming Goal 1 (OT)    Activity/Device (Grooming Goal 1, OT) hair care;oral care;wash face, hands  -CARLA     [place] (Grooming Goal 1, OT) tactile cues required  -CARLA     Time Frame (Grooming Goal 1, OT) short term goal (STG);1 week  -CARLA     Strategies/Barriers (Grooming Goal 1, OT) at sink at w/c level  -CARLA     Progress/Outcome (Grooming Goal 1, OT) new goal  -CARLA       Row Name 08/13/24 1145          Therapy Assessment/Plan (OT)    Planned Therapy Interventions (OT) activity tolerance training;BADL retraining;edema control/reduction;functional balance retraining;occupation/activity based interventions;patient/caregiver education/training;ROM/therapeutic exercise;strengthening exercise;transfer/mobility retraining  -CARLA               User Key  (r) = Recorded By, (t) = Taken By, (c) = Cosigned By      Initials Name Provider Type    CARLA Cha Fritz, OT Occupational Therapist                   Clinical Impression       Row Name 08/13/24 0895          Pain Assessment    Pain Intervention(s) Ambulation/increased activity;Repositioned  -CARLA     Additional Documentation Pain Scale: FACES Pre/Post-Treatment (Group)  -       Row Name 08/13/24 0830          Pain Scale: FACES Pre/Post-Treatment    Pain: FACES Scale, Pretreatment 8-->hurts whole lot  -CARLA     Posttreatment Pain Rating 6-->hurts even more  -CARLA     Pain Location generalized  -CARLA     Pre/Posttreatment Pain Comment Tolerated; RN made aware  -       Row Name 08/13/24 0896          Plan of Care Review    Plan of Care Reviewed With patient  -CARLA     Outcome Evaluation OT evaluation completed. Pt presents with significant weakness, decreased tolerance to upright positioning, edema, and pain impacting ADL's. Pt MaxAx2 for bed mobility, able to sit unsupported briefly at EOB before returned  to supine. Pt washed face with CRUZ, Dep for oral care, expressed pain with all repositioning. Therapy awaiting clarification on LLE weight bearing status.  IP OT services warranted. Recommend IPR at discharge to support return to PLOF.  -CARLA       Row Name 08/13/24 0830          Therapy Assessment/Plan (OT)    Patient/Family Therapy Goal Statement (OT) To get stronger  -CARLA     Rehab Potential (OT) good, to achieve stated therapy goals  -CARLA     Criteria for Skilled Therapeutic Interventions Met (OT) yes;meets criteria;skilled treatment is necessary  -CARLA     Therapy Frequency (OT) daily  -CARLA     Predicted Duration of Therapy Intervention (OT) 10 days  -CARLA       Row Name 08/13/24 0830          Therapy Plan Review/Discharge Plan (OT)    Anticipated Discharge Disposition (OT) skilled nursing facility  -CARLA       Row Name 08/13/24 0830          Vital Signs    Pre Systolic BP Rehab 103  -CARLA     Pre Treatment Diastolic BP 69  -CARLA     Post Systolic BP Rehab 126  -CARLA     Post Treatment Diastolic BP 70  -CARLA     Pretreatment Heart Rate (beats/min) 69  -CARLA     Posttreatment Heart Rate (beats/min) 70  -CARLA     Pre SpO2 (%) 95  -CARLA     O2 Delivery Pre Treatment room air  -CARLA     O2 Delivery Intra Treatment room air  -CARLA     Post SpO2 (%) 93  -CARLA     O2 Delivery Post Treatment room air  -CARLA     Pre Patient Position Supine  -CARLA     Intra Patient Position Sitting  -CARLA     Post Patient Position Supine  -CARLA       Row Name 08/13/24 0830          Positioning and Restraints    Pre-Treatment Position in bed  -CARLA     Post Treatment Position bed  -CARLA     In Bed notified nsg;fowlers;call light within reach;encouraged to call for assist;exit alarm on;RUE elevated;LUE elevated;legs elevated;heels elevated  -CARLA               User Key  (r) = Recorded By, (t) = Taken By, (c) = Cosigned By      Initials Name Provider Type    Cha Garrett, OT Occupational Therapist                   Outcome Measures       Row Name 08/13/24 3694          How much  help from another is currently needed...    Putting on and taking off regular lower body clothing? 1  -CARLA     Bathing (including washing, rinsing, and drying) 1  -CARLA     Toileting (which includes using toilet bed pan or urinal) 1  -CARLA     Putting on and taking off regular upper body clothing 2  -CARLA     Taking care of personal grooming (such as brushing teeth) 2  -CARLA     Eating meals 3  -CARLA     AM-PAC 6 Clicks Score (OT) 10  -CARLA       Row Name 08/13/24 1147          Functional Assessment    Outcome Measure Options AM-PAC 6 Clicks Daily Activity (OT)  -CARLA               User Key  (r) = Recorded By, (t) = Taken By, (c) = Cosigned By      Initials Name Provider Type    Cha Garrett OT Occupational Therapist                    Occupational Therapy Education       Title: PT OT SLP Therapies (In Progress)       Topic: Occupational Therapy (In Progress)       Point: ADL training (In Progress)       Description:   Instruct learner(s) on proper safety adaptation and remediation techniques during self care or transfers.   Instruct in proper use of assistive devices.                  Learning Progress Summary             Patient Acceptance, E, NL,NR by CARLA at 8/13/2024 1147    Comment: OT POC; bed mobility                         Point: Home exercise program (Not Started)       Description:   Instruct learner(s) on appropriate technique for monitoring, assisting and/or progressing therapeutic exercises/activities.                  Learner Progress:  Not documented in this visit.              Point: Precautions (In Progress)       Description:   Instruct learner(s) on prescribed precautions during self-care and functional transfers.                  Learning Progress Summary             Patient Acceptance, E, NL,NR by CARLA at 8/13/2024 1147    Comment: OT POC; bed mobility                         Point: Body mechanics (Not Started)       Description:   Instruct learner(s) on proper positioning and spine alignment during  self-care, functional mobility activities and/or exercises.                  Learner Progress:  Not documented in this visit.                              User Key       Initials Effective Dates Name Provider Type Discipline    CARLA 06/16/21 -  Cha Fritz OT Occupational Therapist OT                  OT Recommendation and Plan  Planned Therapy Interventions (OT): activity tolerance training, BADL retraining, edema control/reduction, functional balance retraining, occupation/activity based interventions, patient/caregiver education/training, ROM/therapeutic exercise, strengthening exercise, transfer/mobility retraining  Therapy Frequency (OT): daily  Plan of Care Review  Plan of Care Reviewed With: patient  Outcome Evaluation: OT evaluation completed. Pt presents with significant weakness, decreased tolerance to upright positioning, edema, and pain impacting ADL's. Pt MaxAx2 for bed mobility, able to sit unsupported briefly at EOB before returned to supine. Pt washed face with CRUZ, Dep for oral care, expressed pain with all repositioning. Therapy awaiting clarification on LLE weight bearing status.  IP OT services warranted. Recommend IPR at discharge to support return to PLOF.     Time Calculation:   Evaluation Complexity (OT)  Review Occupational Profile/Medical/Therapy History Complexity: expanded/moderate complexity  Assessment, Occupational Performance/Identification of Deficit Complexity: 3-5 performance deficits  Clinical Decision Making Complexity (OT): detailed assessment/moderate complexity  Overall Complexity of Evaluation (OT): moderate complexity     Time Calculation- OT       Row Name 08/13/24 0830             Time Calculation- OT    OT Start Time 0830  -CARLA      OT Received On 08/13/24  -CARLA      OT Goal Re-Cert Due Date 08/23/24  -CARLA                User Key  (r) = Recorded By, (t) = Taken By, (c) = Cosigned By      Initials Name Provider Type    Cha Garrett OT Occupational Therapist                   Therapy Charges for Today       Code Description Service Date Service Provider Modifiers Qty    [id number] HC OT EVAL MOD COMPLEXITY 4 8/13/2024 Cha Fritz OT GO 1                 Cha Fritz OT  8/13/2024

## 2024-08-13 NOTE — CONSULTS
[facility]   Consult Note    Patient Name: Quirino Sheppard  : 1955  MRN: [id number]  Primary Care Physician:  Andrew Fernandez MD  Referring Physician: No ref. provider found  Date of admission: 2024    Consults  Subjective   Subjective     Problem List  -CABG X 3 17, SVG to OM, lateral branch SVG, LIMA to LAD  -HFpEF, pro bnp 1.5k  -liver transplant from hep c,  [facility]  -AURELIANO  -CKD  -DM  -HTN  -HLD  -Chronic abdomen wound  -chronic debility  -EKG QT borderline    Mr. Sheppard is a  69 year old with above hx.  Being seen for CHF.  He is confused and difficult to wake when I saw him.  Admitted with CHF.  Edema seems to be improving with diuretic.  Hx from chart.  ROS unable to be performed.          Review of Systems   Respiratory:  Positive for shortness of breath.         Personal History     Past Medical History:   Diagnosis Date    Anxiety and depression     BPH associated with nocturia     Chronic kidney disease, stage 2 (mild)     Coronary arteriosclerosis in native artery     3V    Degeneration of lumbar intervertebral disc     Diabetes mellitus     Disorder of sulfur-bearing amino acid metabolism     Gastroesophageal reflux disease without esophagitis     Glaucoma     Hepatitis C     Heterozygous MTHFR mutation [id number]     Heterozygous MTHFR mutation [id number]     High risk medication use     Hyperlipidemia     Hypertension     Liver transplanted     Low back pain     Obstructive sleep apnea syndrome     Recurrent major depression in full remission     Severe obesity     Type 2 diabetes mellitus        Past Surgical History:   Procedure Laterality Date    CORONARY ARTERY BYPASS GRAFT N/A 2017    Procedure: MEDIAN STERNOTOMY, CORONARY ARTERY BYPASS GRAFT X 3, UTILIZING THE LEFT INTERNAL MAMMARY ARTERY AND EVH USING THE LEFT GREATER SAPHENOUS VEIN;  Surgeon: Naga Guaman MD;  Location: [facility];  Service:     LIVER TRANSPLANTATION         Family History: family history  includes Breast cancer in his mother; Diabetes in his paternal grandmother; Heart attack in his father; Heart valve disorder in his father. Otherwise pertinent FHx was reviewed and not pertinent to current issue.    Social History:  reports that he has never smoked. He has never used smokeless tobacco. He reports that he does not drink alcohol and does not use drugs.    Home Medications:   Cholecalciferol, Dexcom G6 Sensor, Dexcom G6 Transmitter, Insulin Syringe-Needle U-100, L-Methylfolate-B6-B12, LORazepam, Magnesium Oxide -Mg Supplement, OneTouch Delica Lancets 33G, QUEtiapine, SITagliptin, Saw Palmetto, aspirin, atorvastatin, brimonidine-timolol, buPROPion XL, clobetasol propionate, doxazosin, escitalopram, ferrous sulfate, gabapentin, gentamicin, glucose blood, insulin aspart, insulin glargine, lactulose, metFORMIN, metoprolol succinate XL, metoprolol tartrate, multivitamin, multivitamin with minerals, mupirocin, pantoprazole, ramipril, sertraline, silver sulfadiazine, sirolimus, spironolactone, tamsulosin, vitamin D3, and vitamin E    Allergies:  No Known Allergies    Objective    Objective     Vitals:  Temp:  [98.1 °F (36.7 °C)-99 °F (37.2 °C)] 98.1 °F (36.7 °C)  Heart Rate:  [68-81] 72  Resp:  [16-18] 18  BP: ()/(55-97) 103/69  Flow (L/min):  [2-2.5] 2    Physical Exam  HENT:      Head: Normocephalic.   Eyes:      Extraocular Movements: Extraocular movements intact.   Cardiovascular:      Rate and Rhythm: Normal rate and regular rhythm.      Heart sounds: No murmur heard.     No gallop.   Pulmonary:      Breath sounds: Normal breath sounds.   Abdominal:      Palpations: Abdomen is soft.   Musculoskeletal:      Right lower leg: Edema present.      Left lower leg: Edema present.   Skin:     General: Skin is warm and dry.   Neurological:      General: No focal deficit present.      Mental Status: He is alert. He is disoriented.   Psychiatric:         Mood and Affect: Mood normal.         Result Review     Result Review:  I have personally reviewed the results from the time of this admission to 8/13/2024 13:44 EDT and agree with these findings:  []  Laboratory list / accordion  []  Microbiology  []  Radiology  []  EKG/Telemetry   []  Cardiology/Vascular   []  Pathology  []  Old records  []  Other:        Assessment & Plan   Assessment / Plan     Assessment  -CABG X 3 11/22/17, SVG to OM, lateral branch SVG, LIMA to LAD  -HFpEF, pro bnp 1.5k  -liver transplant from hep c, [facility]  -AURELIANO  -CKD  -DM  -HTN  -HLD  -Chronic abdomen wound  -chronic debility  -EKG QT borderline    Plan:   -Agreew with aspirin, statin  -Cont. Diuretic  -agree with repeat echo  -caution with any QT prolonging meds.    -DC BB, likely contributing to CHF with HFpEF      Agus De Jesus MD

## 2024-08-13 NOTE — NURSING NOTE
WOC consulted for chronic abdominal wound and wound to left foot status post amputation of toes 2 through 5.    Etiology of chronic abdominal wound unclear.  Wound culture obtained. wound edges are irregular and the wound bed itself is dry.  Scattered scabs noted around the wound.  Cleansed with a pH balance cleanser.  Applied multilayer Xeroform to the abdominal wound covered with an ABD pad.  Secure with tape.        Left foot dressing removed.  Moderate amount of purulent, tannish exudate noted on dressing.  Wound culture obtained from amputation site.  Cleansed with pH balanced cleanser.  Vashe moistened 4 x 4 gauze applied and wrapped with rolled gauze.        Keep patient turned and offloaded. Apply Allevyn sacral and heel dressings. Apply waffle overlay mattress to bed.     Sign off.    Estuardo Peres RN, BSN, CCRN, CWOCN  Wound, Ostomy and Continence (WOC) Department  [facility]

## 2024-08-13 NOTE — PROGRESS NOTES
"    [facility] Medicine Services  PROGRESS NOTE    Patient Name: Quirino Sheppard  : 1955  MRN: [id number]    Date of Admission: 2024  Primary Care Physician: Andrew Fernandez MD    Subjective   Subjective     CC:  SOA    HPI:  Lethargic this am, but wakes to ask me how \"do I sound\" then falls asleep again.       Objective   Objective     Vital Signs:   Temp:  [98.3 °F (36.8 °C)-99 °F (37.2 °C)] 98.3 °F (36.8 °C)  Heart Rate:  [65-81] 69  Resp:  [16-20] 18  BP: ()/(55-97) 95/79  Flow (L/min):  [2.5] 2.5     Physical Exam:  Constitutional: No acute distress, sleeping, wakes briefly but falls asleep nearly immediately. Malodorous and appears older than stated age.  HENT: NCAT, mucous membranes moist, poor dentition   Respiratory: difficult to hear due to pt snoring   Cardiovascular: RRR, no murmurs, rubs, or gallops  Gastrointestinal: Positive bowel sounds, soft, nontender, nondistended  Musculoskeletal: No bilateral ankle edema s/p toe amputations of L foot with bandage in place  Neurologic: lethargic, strength symmetric in all extremities, Cranial Nerves grossly intact to confrontation, speech clear  Skin: No rashes, chronic wound on abdomen covered with bandage     Results Reviewed:  LAB RESULTS:      Lab 24  0338 24  1349   WBC 7.31 8.23   HEMOGLOBIN 9.4* 11.0*   HEMATOCRIT 30.9* 37.5   PLATELETS 181 228   NEUTROS ABS 5.62 6.23   IMMATURE GRANS (ABS) 0.02 0.02   LYMPHS ABS 0.94 1.09   MONOS ABS 0.62 0.73   EOS ABS 0.08 0.14   MCV 86.3 88.9   PROCALCITONIN  --  0.10   LACTATE  --  0.9         Lab 24  0338 24  1349   SODIUM 136 134*   POTASSIUM 5.1 5.4*   CHLORIDE 104 100   CO2 26.0 26.0   ANION GAP 6.0 8.0   BUN 24* 24*   CREATININE 1.23 1.31*   EGFR 63.6 58.9*   GLUCOSE 184* 119*   CALCIUM 8.3* 8.8   MAGNESIUM  --  1.8   HEMOGLOBIN A1C 7.60*  --          Lab 24  1349   TOTAL PROTEIN 7.5   ALBUMIN 3.9   GLOBULIN 3.6   ALT (SGPT) 27   AST (SGOT) 18 "   BILIRUBIN 0.3   ALK PHOS 171*         Lab 08/12/24  1646 08/12/24  1349   PROBNP  --  1,567.0*   HSTROP T 42* 41*                 Lab 08/12/24  1530   PH, ARTERIAL 7.409   PCO2, ARTERIAL 40.2   PO2 ART 72.0*   FIO2 21   HCO3 ART 25.4   BASE EXCESS ART 0.7   CARBOXYHEMOGLOBIN 0.7     Brief Urine Lab Results  (Last result in the past 365 days)        Color   Clarity   Blood   Leuk Est   Nitrite   Protein   CREAT   Urine HCG        08/12/24 1339 Yellow   Clear   Negative   Negative   Negative   Negative                   Microbiology Results Abnormal       Procedure Component Value - Date/Time    COVID PRE-OP / PRE-PROCEDURE SCREENING ORDER (NO ISOLATION) - Swab, Nasopharynx [[id number]]  (Normal) Collected: 08/12/24 1345    Lab Status: Final result Specimen: Swab from Nasopharynx Updated: 08/12/24 1423    Narrative:      The following orders were created for panel order COVID PRE-OP / PRE-PROCEDURE SCREENING ORDER (NO ISOLATION) - Swab, Nasopharynx.  Procedure                               Abnormality         Status                     ---------                               -----------         ------                     COVID-19 and FLU A/B PCR...[[id number]]  Normal              Final result                 Please view results for these tests on the individual orders.    COVID-19 and FLU A/B PCR, 1 HR TAT - Swab, Nasopharynx [[id number]]  (Normal) Collected: 08/12/24 1345    Lab Status: Final result Specimen: Swab from Nasopharynx Updated: 08/12/24 1423     COVID19 Not Detected     Influenza A PCR Not Detected     Influenza B PCR Not Detected    Narrative:      Fact sheet for providers: https://www.fda.gov/media/522140/download    Fact sheet for patients: https://www.fda.gov/media/021029/download    Test performed by PCR.            CT Abdomen Pelvis Without Contrast    Result Date: 8/12/2024  CT ABDOMEN PELVIS WO CONTRAST Date of Exam: 8/12/2024 4:16 PM EDT Indication: confusion, weight gain, acute on chronic wound  to abdomen. Comparison: None available. Technique: Axial CT images were obtained of the abdomen and pelvis without the administration of contrast. Reconstructed coronal and sagittal images were also obtained. Automated exposure control and iterative construction methods were used. Findings: Patient history includes previous liver transplant in 2001, chronic kidney disease. Significant recent weight gain. Worsening generalized weakness. Today's study shows at least a moderate right pleural effusion and a small left pleural effusion both of which appear to be free-flowing. There is very mild associated atelectasis and no particular findings to suggest basilar pneumonia. There appears to be relatively mild fatty liver change. Spleen is not enlarged. Pancreas appears mildly atrophic. Gallbladder is not identified either surgically absent or contracted. Adrenal glands appear normal. Kidneys show grossly normal parenchymal thickness and no hydronephrosis or nephrolithiasis is seen. Upper abdominal bowel loops are normal in caliber and appearance. No free air ascites or adenopathy is seen. Regarding the lower abdomen/pelvis, there is a trace amount of ascites along the lower paracolic gutters. No large or small bowel inflammation is identified. There is no evidence of diverticulosis or diverticulitis. Bladder is moderately distended and normal in appearance. Prostate and seminal vesicles are not enlarged. There is a fat-only containing left inguinal/scrotal hernia with no evidence of strangulation. There is very dense diffuse subcutaneous fat stranding consistent with anasarca, with some symmetric fluid between the muscles the abdominal wall.  History indicates chronic wound to abdomen. There is focal skin thickening to the right and superior of the umbilicus, but only superficial involvement. The underlying subcutaneous fat appears normal. Images 123 through 145 show a rounded cystic area 4 1/2 cm in diameter lateral of  the left greater trochanter, with a small amount of apparently contiguous fluid passing along the upper margin of the gluteus medius image 135. This contains a small amount  of dense material and may represent an old hematoma. Bony structures appear to be intact.     Impression: Impression: 1. Very dense diffuse subcutaneous fat stranding consistent with anasarca. Small, symmetric inter-muscular fluid collections of the right and left lower abdominal wall, likely related to anasarca. 2. Approximately 4.5 cm fluid collection lateral of the left greater trochanter, favored to represent old, liquefied hematoma. 3. Superficial mid abdominal wall skin thickening, which may be a superficial cellulitis. No evidence of underlying abscess. No evidence of potential abscess here or elsewhere. 4. Moderate right pleural effusion, and small left effusion. Minimal ascites. 5. Fat-only containing left inguinal/scrotal hernia with no evidence of strangulation. Electronically Signed: Kenan Stacy MD  8/12/2024 4:51 PM EDT  Workstation ID: [id number]    CT Head Without Contrast    Result Date: 8/12/2024  CT HEAD WO CONTRAST Date of Exam: 8/12/2024 4:16 PM EDT Indication: AMS. Comparison: MRI of the brain performed on October 5, 2023 Technique: Axial CT images were obtained of the head without contrast administration.  Automated exposure control and iterative construction methods were used. Findings: There is no evidence of hemorrhage. There is no mass effect or midline shift. Age-related involutional changes are visualized. There is no extra-axial collections. Ventricles are normal in size and configuration for patient's stated age. Posterior fossa is within normal limits. Calvarium and skull base appear intact. Visualized sinuses show no air fluid levels. The mastoid air cells are clear. Visualized orbits are unremarkable.     Impression: Impression: No acute intracranial process evident. Electronically Signed: Omega Moore MD   8/12/2024 4:29 PM EDT  Workstation ID: [id number]    XR Chest 1 View    Result Date: 8/12/2024  XR CHEST 1 VW Date of Exam: 8/12/2024 2:03 PM EDT Indication: SOA triage protocol Comparison: 11/24/2017 Findings: Sternotomy wires are again noted. The heart shadow appears borderline enlarged but the pulmonary vasculature appears normal. There is mild coarsening of the pulmonary interstitial markings and a few granulomatous calcifications that appears stable from prior exams in 2017. No edema, effusion or pneumothorax is seen.     Impression: Impression: Mild heart shadow enlargement and mild chronic appearing interstitial lung changes. No clearly acute chest disease. Electronically Signed: Kenan Stacy MD  8/12/2024 2:41 PM EDT  Workstation ID: [id number]     Results for orders placed during the hospital encounter of 11/20/17    Adult Transthoracic Echocardiogram Complete    Interpretation Summary  · Left ventricular systolic function is normal. Estimated EF = 60%.  · calcification of the aortic valve  · Trace tricuspid valve regurgitation is present      Current medications:  Scheduled Meds:aspirin, 325 mg, Oral, Daily  atorvastatin, 80 mg, Oral, Daily  buPROPion XL, 300 mg, Oral, Daily  cefTRIAXone, 1,000 mg, Intravenous, Q24H  escitalopram, 20 mg, Oral, Daily  ferrous sulfate, 325 mg, Oral, Daily With Breakfast  furosemide, 40 mg, Intravenous, Q12H  gabapentin, 100 mg, Oral, Nightly  heparin (porcine), 5,000 Units, Subcutaneous, Q12H  insulin glargine, 20 Units, Subcutaneous, BID  insulin lispro, 3-14 Units, Subcutaneous, TID With Meals  metoprolol tartrate, 25 mg, Oral, BID  pantoprazole, 40 mg, Oral, Daily  pharmacy consult - MTM, , Does not apply, Daily  QUEtiapine, 12.5 mg, Oral, BID  sirolimus, 2 mg, Oral, Daily  sodium chloride, 10 mL, Intravenous, Q12H  tamsulosin, 0.4 mg, Oral, Daily  vancomycin, 750 mg, Intravenous, Q12H      Continuous Infusions:Pharmacy to dose vancomycin,       PRN Meds:.  dextrose     dextrose    glucagon (human recombinant)    HYDROcodone-acetaminophen    melatonin    nitroglycerin    Pharmacy to dose vancomycin    sodium chloride    sodium chloride    sodium chloride    Assessment & Plan   Assessment & Plan     Active Hospital Problems    Diagnosis  POA    **CHF (congestive heart failure) [I50.9]  Yes    Wound of abdomen [S31.109A]  Yes    Hyperkalemia [E87.5]  Yes    LUZMARIA (acute kidney injury) [N17.9]  Yes    Obstructive sleep apnea syndrome [G47.33]  Yes    Essential hypertension [I10]  Yes    Hyperlipidemia LDL goal <70 [E78.5]  Yes    Hx of liver transplant [Z94.4]  Not Applicable    Type 2 diabetes mellitus without complication, with long-term current use of insulin [E11.9, Z79.4]  Not Applicable    Coronary artery disease involving coronary bypass graft of native heart without angina pectoris [I25.810]  Yes      Resolved Hospital Problems   No resolved problems to display.        Brief Hospital Course to date:  Quirino Sheppard is a 69 y.o. male with a history of CAD s/p CABG, CHF, HTN, HLD, T2DM, CKD, Hx Liver transplant (d/t Chronic Hep C), AURELIANO (CPAP non-compliant), and GERD who presented to [facility] ED for complaint of worsening shortness of breath and bilateral lower extremity edema.      Acute on chronic HFpEF   Bilateral Pleural Effusion (R>L)  -CXR shows enlarged heart with no acute findings. However CT abd/pelvis  also showed a moderate right and small left pleural effusion.   -Last echo on file is from 2017 with normal EF  -Repeat echo PENDING  -Cardiology consulted  -Received 80mg IV Lasix in the ED. Net negative 1140 for 24 hours  -Daily weight. Strict I&Os  -- continue with IV Lasix 40 mg BID or as per Cardiology      Chronic wound on abdomen  -CT abd/pelvis showed superficial mid abdominal wall skin thickening, which may be a superficial cellulitis. No evidence of underlying abscess.  -Wound care to see  -Wound culture  -Has been on Vanc + Rocephin- de-escalate to solely  PO Doxycycline     CAD s/p CABG  HTN  HLD  -Chest pain free.    -Takes Doxazosin, Metoprolol, and Ramipril.   -Hold ACE-I for now and monitor Cr while receiving diuretics   -Continue all other home BP meds.    -Continue statin     T2DM  -A1C 7.6%  -Fingerstick achs. SSI     Hx Liver Transplant 2001 at [facility]  -Continue home meds     Hyperkalemia  -K+ 5.4 on admission   -Appears to have recently started Spironolactone based on Med [facility] History.   -Improved to 5.2 this am      AURELIANO  -Reports that he is supposed to be wearing a CPAP at night but has not in over a year due to unemployment      CKD 3  -Baseline creatinine 1-1.5  -1.3 on admission, monitor with diuresis     Chronic debility  -Uses wheelchair, currently at [facility] facility in [place]     History of left  toe amputation x 4  -s/p amputation in April per patient, continue wound care    Total time spent: Time Spent: Time Spent: 35 minutes  Time spent includes time reviewing chart, face-to-face time, counseling patient/family/caregiver, ordering medications/tests/procedures, communicating with other health care professionals, documenting clinical information in the electronic health record, and coordination of care.      Expected Discharge Location and Transportation: back to facility in [place]  Expected Discharge   Expected Discharge Date: 8/15/2024; Expected Discharge Time:      VTE Prophylaxis:  Pharmacologic VTE prophylaxis orders are present.         AM-PAC 6 Clicks Score (PT): 10 (08/12/24 2023)    CODE STATUS:   Code Status and Medical Interventions: CPR (Attempt to Resuscitate); Full Support   Ordered at: 08/12/24 2010     Code Status (Patient has no pulse and is not breathing):    CPR (Attempt to Resuscitate)     Medical Interventions (Patient has pulse or is breathing):    Full Support       Adina Nicholas MD  08/13/24

## 2024-08-13 NOTE — THERAPY EVALUATION
Patient Name: Quirino Sheppard  : 1955    MRN: [id number]                              Today's Date: 2024       Admit Date: 2024    Visit Dx:     ICD-10-CM ICD-9-CM   1. Acute on chronic congestive heart failure, unspecified heart failure type  I50.9 428.0   2. Hyperkalemia  E87.5 276.7   3. SOB (shortness of breath)  R06.02 786.05   4. AURELIANO (obstructive sleep apnea)  G47.33 327.23   5. Hx of CABG  Z95.1 V45.81   6. History of liver transplant  Z94.4 V42.7   7. Pleural effusion  J90 511.9   8. Chronic wound infection of abdomen, initial encounter  S31.109A 958.3    L08.9      Patient Active Problem List   Diagnosis    Coronary artery disease involving coronary bypass graft of native heart without angina pectoris    Hepatitis C    Hx of liver transplant    Essential hypertension    Hyperlipidemia LDL goal <70    Type 2 diabetes mellitus without complication, with long-term current use of insulin    S/P CABG x 3    BPH associated with nocturia    Degeneration of lumbar intervertebral disc    Disorder of sulfur-bearing amino acid metabolism    Gastroesophageal reflux disease without esophagitis    Obstructive sleep apnea syndrome    Presence of aortocoronary bypass graft    Recurrent major depression in full remission    Severe obesity    Cellulitis of abdominal wall    CHF (congestive heart failure)    Wound of abdomen    Hyperkalemia    LUZMARIA (acute kidney injury)     Past Medical History:   Diagnosis Date    Anxiety and depression     BPH associated with nocturia     Chronic kidney disease, stage 2 (mild)     Coronary arteriosclerosis in native artery     3V    Degeneration of lumbar intervertebral disc     Diabetes mellitus     Disorder of sulfur-bearing amino acid metabolism     Gastroesophageal reflux disease without esophagitis     Glaucoma     Hepatitis C     Heterozygous MTHFR mutation [id number]     Heterozygous MTHFR mutation [id number]     High risk medication use     Hyperlipidemia     Hypertension      Liver transplanted     Low back pain     Obstructive sleep apnea syndrome     Recurrent major depression in full remission     Severe obesity     Type 2 diabetes mellitus      Past Surgical History:   Procedure Laterality Date    CORONARY ARTERY BYPASS GRAFT N/A 11/22/2017    Procedure: MEDIAN STERNOTOMY, CORONARY ARTERY BYPASS GRAFT X 3, UTILIZING THE LEFT INTERNAL MAMMARY ARTERY AND EVH USING THE LEFT GREATER SAPHENOUS VEIN;  Surgeon: Naga Guaman MD;  Location: [facility];  Service:     LIVER TRANSPLANTATION  2001      General Information       Row Name 08/13/24 1138          Physical Therapy Time and Intention    Document Type evaluation  -     Mode of Treatment physical therapy  -       Row Name 08/13/24 1138          General Information    Patient Profile Reviewed yes  -BA     Prior Level of Function independent:;all household mobility;transfer;w/c or scooter;bed mobility;gait;min assist:;ADL's;dependent:;home management  Pt limited historian. Reported he primarily used a w/c for mobility; self-propels w/c with BUE. Uses w/c to go down to dining room for meals at facility. Occasionally walks short distances w/in home with walker. Hx falls, with last fall ~1 wk ago.  -BA     Existing Precautions/Restrictions fall;other (see comments)  remote L foot 2-5 toe amputations April 2024 (unsure of pt's WB'ing status-awaiting clarification from MD-will assume NWB LLE until receive clarification); BLE edema; chronic abdominal wound; sensitive skin-hypersensitivity to touch  -     Barriers to Rehab medically complex;previous functional deficit;cognitive status  -       Row Name 08/13/24 1138          Living Environment    People in Home facility resident  Per pt and medical chart, reported lives at [facility] in [place], [place].  -       Row Name 08/13/24 1138          Home Main Entrance    Number of Stairs, Main Entrance none  -       Row Name 08/13/24 1138          Stairs Within Home, Primary     Number of Stairs, Within Home, Primary none  -       Row Name 08/13/24 1138          Cognition    Orientation Status (Cognition) oriented to;person;place;time;verbal cues/prompts needed for orientation;other (see comments)  Reported he was in the hospital, but needed cues for name of hospital.  Oriented to year, but reported it was month of July.  Appeared to exhibit some situational confusion throughout.  -       Row Name 08/13/24 1138          Safety Issues, Functional Mobility    Safety Issues Affecting Function (Mobility) awareness of need for assistance;insight into deficits/self-awareness;judgment;problem-solving;safety precaution awareness;safety precautions follow-through/compliance;sequencing abilities;friction/shear risk;ability to follow commands  -     Impairments Affecting Function (Mobility) balance;coordination;endurance/activity tolerance;motor planning;pain;postural/trunk control;range of motion (ROM);sensation/sensory awareness;strength;motor control;cognition  -     Cognitive Impairments, Mobility Safety/Performance awareness, need for assistance;insight into deficits/self-awareness;judgment;problem-solving/reasoning;safety precaution awareness;safety precaution follow-through;sequencing abilities;attention  -     Comment, Safety Issues/Impairments (Mobility) Lethargic throughout session; tended to keep eyes closed majority of time.  Frequent VCs/TCs for improved alertness and to keep eyes open.  Demo'd intermittent bouts of improved alertness as session progressed and with participation in mobility.  Decreased processing with slow responses throughout.  -               User Key  (r) = Recorded By, (t) = Taken By, (c) = Cosigned By      Initials Name Provider Type     Ness Jerez, PT Physical Therapist                   Mobility       Row Name 08/13/24 1204          Bed Mobility    Bed Mobility rolling right;rolling left;sidelying-sit;sit-sidelying;scooting/bridging  -      Rolling Left Esmeralda (Bed Mobility) maximum assist (25% patient effort);2 person assist;verbal cues;nonverbal cues (demo/gesture)  -BA     Rolling Right Esmeralda (Bed Mobility) maximum assist (25% patient effort);2 person assist;verbal cues;nonverbal cues (demo/gesture)  -BA     Scooting/Bridging Esmeralda (Bed Mobility) dependent (less than 25% patient effort);2 person assist;verbal cues;nonverbal cues (demo/gesture)  -BA     Sidelying-Sit Esmeralda (Bed Mobility) maximum assist (25% patient effort);2 person assist;verbal cues;nonverbal cues (demo/gesture)  -BA     Sit-Sidelying Esmeralda (Bed Mobility) maximum assist (25% patient effort);2 person assist;verbal cues;nonverbal cues (demo/gesture)  -     Assistive Device (Bed Mobility) bed rails;draw sheet;head of bed elevated  -     Comment, (Bed Mobility) Increased time and effort.  Rolling in bed to R side x 2 reps and L side x 1 rep for linen change.  Yelled out in pain with initial rolling toward R side; also appeared somewhat fearful and hesitant.  Pillows placed alongside bedrails with subsequent rolling to L/R side and pt with improved toleration.  Transitioned sidelying to sitting EOB and required increased assist to manage BLE and trunk.  Initial increased stiffness noted in BLE and trunk requiring initial increased assist to maintain sitting EOB; improved as time progressed and with repositioning.  Reported mild dizziness upon sitting EOB; BP stable.  VCs/TCs for sequencing and hand placement throughout.  -       Row Name 08/13/24 0693          Transfers    Comment, (Transfers) STS deferred d/t pt declining attempt 2/2 reported fatigue and decreased activity tolerance; STS also deferred d/t awaiting clarification from MD on pt's LLE WB'ing status.  OOB transfer from bed to chair via mechanical lift deferred d/t no mechanical lift slings currently available.  -               User Key  (r) = Recorded By, (t) = Taken By, (c) =  Cosigned By      Initials Name Provider Type     Ness Jerez, PT Physical Therapist                   Obj/Interventions       Row Name 08/13/24 1214          Range of Motion Comprehensive    General Range of Motion lower extremity range of motion deficits identified  -     Comment, General Range of Motion Generalized decreased AROM BLE d/t weakness, stiffness, and pain.  AAROM B hip and knee WFL.  Decreased AAROM B ankle DF to ~slightly less than neutral to neutral.  -       Row Name 08/13/24 1214          Strength Comprehensive (MMT)    General Manual Muscle Testing (MMT) Assessment lower extremity strength deficits identified  -     Comment, General Manual Muscle Testing (MMT) Assessment Grossly B hip and knee 3+/5, L ankle 2+/5, R ankle 3-/5.  -[facility] Name 08/13/24 1214          Motor Skills    Motor Skills coordination;functional endurance  -     Coordination gross motor deficit;bilateral;lower extremity;moderate impairment  -     Functional Endurance Decreased functional endurance.  Easily fatigues with activity.  -       Row Name 08/13/24 1214          Balance    Balance Assessment sitting static balance;sitting dynamic balance  -     Static Sitting Balance maximum assist;standby assist;verbal cues;non-verbal cues (demo/gesture);other (see comments)  fluctuating levels of assist  -     Dynamic Sitting Balance dependent;2-person assist;verbal cues  -     Position, Sitting Balance unsupported;sitting edge of bed  -     Balance Interventions sitting;static;occupation based/functional task  -     Comment, Balance Initial increased unsteadiness with sitting EOB requiring initial maxAx1-2 to maintain d/t posterior and L lateral lean.  Following repositioning and increased VCs/TCs for postural correction and midline orientation able to progress to SBA.  Decreased toleration to unsupported sitting EOB to ~3-5 min interval d/t fatigue.  -       Row Name 08/13/24 1211           Sensory Assessment (Somatosensory)    Sensory Assessment (Somatosensory) bilateral LE  -BA     Bilateral LE Sensory Assessment light touch awareness;impaired;other (see comments)  Reported N/T in B feet.  -BA               User Key  (r) = Recorded By, (t) = Taken By, (c) = Cosigned By      Initials Name Provider Type    Ness Conrad, PT Physical Therapist                   Goals/Plan       Row Name 08/13/24 1241          Bed Mobility Goal 1 (PT)    Activity/Assistive Device (Bed Mobility Goal 1, PT) rolling to left;rolling to right;sit to supine/supine to sit  -BA     [place] Level/Cues Needed (Bed Mobility Goal 1, PT) maximum assist (25-49% patient effort)  -BA     Time Frame (Bed Mobility Goal 1, PT) short term goal (STG);5 days  -BA       Row Name 08/13/24 1241          Transfer Goal 1 (PT)    Activity/Assistive Device (Transfer Goal 1, PT) sit-to-stand/stand-to-sit;bed-to-chair/chair-to-bed;wheelchair transfer  -BA     [place] Level/Cues Needed (Transfer Goal 1, PT) maximum assist (25-49% patient effort)  -BA     Time Frame (Transfer Goal 1, PT) long term goal (LTG);10 days  -BA       Row Name 08/13/24 1241          Problem Specific Goal 1 (PT)    Problem Specific Goal 1 (PT) Pt will be able to self-propel w/c 75ft with CGA and good obstacle navigation.  -BA     Time Frame (Problem Specific Goal 1, PT) long-term goal (LTG);2 weeks  -BA       Row Name 08/13/24 1241          Patient Education Goal (PT)    Activity (Patient Education Goal, PT) BLE HEP  -BA     [place]/Cues/Accuracy (Memory Goal 2, PT) demonstrates adequately;independent  -BA     Time Frame (Patient Education Goal, PT) long term goal (LTG);10 days  -BA       Row Name 08/13/24 1241          Therapy Assessment/Plan (PT)    Planned Therapy Interventions (PT) balance training;bed mobility training;gait training;home exercise program;motor coordination training;neuromuscular re-education;patient/family education;postural  re-education;ROM (range of motion);strengthening;stretching;transfer training;wheelchair management/propulsion training  -               User Key  (r) = Recorded By, (t) = Taken By, (c) = Cosigned By      Initials Name Provider Type    Ness Conrad, PT Physical Therapist                   Clinical Impression       Row Name 08/13/24 1224          Pain    Pain Intervention(s) Ambulation/increased activity;Repositioned;Rest  -     Additional Documentation Pain Scale: FACES Pre/Post-Treatment (Group)  -       Row Name 08/13/24 1224          Pain Scale: FACES Pre/Post-Treatment    Pain: FACES Scale, Pretreatment 8-->hurts whole lot  -     Posttreatment Pain Rating 6-->hurts even more  -     Pain Location generalized  -BA     Pre/Posttreatment Pain Comment Reported hypersensitivity to touch to skin.  Also reported c/o L foot/LE pain and RUE/elbow pain with mobility.  Tolerated activity; RN aware and managing.  -[facility] Name 08/13/24 1224          Plan of Care Review    Plan of Care Reviewed With patient  -BA     Outcome Evaluation PT initial Eval completed.  Pt presents with generalized weakness, generalized pain, BLE stiffness/ROM impairments, balance deficits, decreased functional endurance, and decreased indep and safety with functional mobility compared to baseline.  Supine to sit and return to supine with maxAx2.  Awaiting clarification on LLE weightbearing status from MD.  Pt will benefit from skilled IP PT to improve indep and safety with mobility and promote return to PLOF.  Rec SNF upon d/c for best fxl outcome.  -       Row Name 08/13/24 1224          Therapy Assessment/Plan (PT)    Patient/Family Therapy Goals Statement (PT) to return home and to PLOF  -     Rehab Potential (PT) good, to achieve stated therapy goals  -     Criteria for Skilled Interventions Met (PT) yes;meets criteria;skilled treatment is necessary  -     Therapy Frequency (PT) daily  -BA     Predicted Duration  of Therapy Intervention (PT) 10 days  -BA       Row Name 08/13/24 1224          Vital Signs    Pre Systolic BP Rehab 103  -BA     Pre Treatment Diastolic BP 69  -BA     Intra Systolic BP Rehab 126  -BA     Intra Treatment Diastolic BP 70  -BA     Pretreatment Heart Rate (beats/min) 72  -BA     Posttreatment Heart Rate (beats/min) 71  -BA     Pre SpO2 (%) 97  -BA     O2 Delivery Pre Treatment room air  -BA     O2 Delivery Intra Treatment room air  -BA     Post SpO2 (%) 96  -BA     O2 Delivery Post Treatment room air  -BA     Pre Patient Position Supine  -BA     Intra Patient Position Sitting  -BA     Post Patient Position Supine  -BA       Row Name 08/13/24 1224          Positioning and Restraints    Pre-Treatment Position in bed  -BA     Post Treatment Position bed  -BA     In Bed notified nsg;fowlers;call light within reach;encouraged to call for assist;exit alarm on;side rails up x3;RUE elevated;LUE elevated;legs elevated;heels elevated  -BA               User Key  (r) = Recorded By, (t) = Taken By, (c) = Cosigned By      Initials Name Provider Type     Ness Jerez, PT Physical Therapist                   Outcome Measures       Row Name 08/13/24 1243          How much help from another person do you currently need...    Turning from your back to your side while in flat bed without using bedrails? 2  -BA     Moving from lying on back to sitting on the side of a flat bed without bedrails? 2  -BA     Moving to and from a bed to a chair (including a wheelchair)? 1  -BA     Standing up from a chair using your arms (e.g., wheelchair, bedside chair)? 2  -BA     Climbing 3-5 steps with a railing? 1  -BA     To walk in hospital room? 1  -BA     AM-PAC 6 Clicks Score (PT) 9  -BA     Highest Level of Mobility Goal 3 --> Sit at edge of bed  -BA       Row Name 08/13/24 1243 08/13/24 1147       Functional Assessment    Outcome Measure Options AM-PAC 6 Clicks Basic Mobility (PT)  -BA AM-PAC 6 Clicks Daily Activity (OT)   -CARLA              User Key  (r) = Recorded By, (t) = Taken By, (c) = Cosigned By      Initials Name Provider Type    Cha Garrett, OT Occupational Therapist    Ness Conrad, CIERA Physical Therapist                                 Physical Therapy Education       Title: PT OT SLP Therapies (In Progress)       Topic: Physical Therapy (In Progress)       Point: Mobility training (Done)       Learning Progress Summary             Patient Acceptance, E, VU,NR by JEREMY at 8/13/2024 1246                         Point: Home exercise program (Not Started)       Learner Progress:  Not documented in this visit.              Point: Body mechanics (Done)       Learning Progress Summary             Patient Acceptance, E, VU,NR by JEREMY at 8/13/2024 1246                         Point: Precautions (Done)       Learning Progress Summary             Patient Acceptance, E, VU,NR by JEREMY at 8/13/2024 1246                                         User Key       Initials Effective Dates Name Provider Type [facility] 09/21/21 -  Ness Jerez, PT Physical Therapist PT                  PT Recommendation and Plan  Planned Therapy Interventions (PT): balance training, bed mobility training, gait training, home exercise program, motor coordination training, neuromuscular re-education, patient/family education, postural re-education, ROM (range of motion), strengthening, stretching, transfer training, wheelchair management/propulsion training  Plan of Care Reviewed With: patient  Outcome Evaluation: PT initial Eval completed.  Pt presents with generalized weakness, generalized pain, BLE stiffness/ROM impairments, balance deficits, decreased functional endurance, and decreased indep and safety with functional mobility compared to baseline.  Supine to sit and return to supine with maxAx2.  Awaiting clarification on LLE weightbearing status from MD.  Pt will benefit from skilled IP PT to improve indep and safety with mobility and  promote return to PLOF.  Rec SNF upon d/c for best fxl outcome.     Time Calculation:   PT Evaluation Complexity  History, PT Evaluation Complexity: 3 or more personal factors and/or comorbidities  Examination of Body Systems (PT Eval Complexity): total of 3 or more elements  Clinical Presentation (PT Evaluation Complexity): evolving  Clinical Decision Making (PT Evaluation Complexity): moderate complexity  Overall Complexity (PT Evaluation Complexity): moderate complexity     PT Charges       Row Name 08/13/24 1247             Time Calculation    Start Time 0832  -BA      PT Received On 08/13/24  -BA      PT Goal Re-Cert Due Date 08/23/24  -BA         Time Calculation- PT    Total Timed Code Minutes- PT 12 minute(s)  -BA         Timed Charges    14220 - PT Therapeutic Activity Minutes 12  -BA         Untimed Charges    PT Eval/Re-eval Minutes 74  -BA         Total Minutes    Timed Charges Total Minutes 12  -BA      Untimed Charges Total Minutes 74  -BA       Total Minutes 86  -BA                User Key  (r) = Recorded By, (t) = Taken By, (c) = Cosigned By      Initials Name Provider Type     Ness Jerez, PT Physical Therapist                  Therapy Charges for Today       Code Description Service Date Service Provider Modifiers Qty    13142592623 HC PT THERAPEUTIC ACT EA 15 MIN 8/13/2024 Ness Jerez, PT GP 1    53510998301 HC PT EVAL MOD COMPLEXITY 4 8/13/2024 Ness Jerez, PT GP 1            PT G-Codes  Outcome Measure Options: AM-PAC 6 Clicks Basic Mobility (PT)  AM-PAC 6 Clicks Score (PT): 9  AM-PAC 6 Clicks Score (OT): 10  PT Discharge Summary  Anticipated Discharge Disposition (PT): skilled nursing facility    Ness Jerez PT  8/13/2024

## 2024-08-13 NOTE — PLAN OF CARE
Goal Outcome Evaluation:  Plan of Care Reviewed With: patient           Outcome Evaluation: PT initial Eval completed.  Pt presents with generalized weakness, generalized pain, BLE stiffness/ROM impairments, balance deficits, decreased functional endurance, and decreased indep and safety with functional mobility compared to baseline.  Supine to sit and return to supine with maxAx2.  Awaiting clarification on LLE weightbearing status from MD.  Pt will benefit from skilled IP PT to improve indep and safety with mobility and promote return to PLOF.  Rec SNF upon d/c for best fxl outcome.      Anticipated Discharge Disposition (PT): skilled nursing facility

## 2024-08-13 NOTE — PLAN OF CARE
Goal Outcome Evaluation:      VSS overnight. Pt seems to be intermittently confused while sleeping by pulling/removing tele leads, oxygen, and abd drsg. Pt still able to answer orientations questions appropriately. Pt requesting to wear a CPAP at night. Provider notified. WOC consult ordered to assess chronic abd wound and left foot toe amputations.

## 2024-08-13 NOTE — PROGRESS NOTES
"Pharmacy Consult-Vancomycin Dosing  Quirino Sheppard is a  69 y.o. male receiving vancomycin therapy.     Indication: SSTI  Consulting Provider: hospitalist  ID Consult: No    Goal AUC: 400 - 600 mg/L*hr    Current Antimicrobial Therapy  Anti-Infectives (From admission, onward)      Ordered     Dose/Rate Route Frequency Start Stop    08/12/24 2035  vancomycin IVPB 2000 mg in 0.9% Sodium Chloride 500 mL        Ordering Provider: Deb Michael RPH    20 mg/kg × 103 kg  250 mL/hr over 120 Minutes Intravenous Once 08/12/24 2130 08/12/24 2011  cefTRIAXone (ROCEPHIN) 1,000 mg in sodium chloride 0.9 % 100 mL MBP        Ordering Provider: Vinod Ryder PA-C    1,000 mg  200 mL/hr over 30 Minutes Intravenous Every 24 Hours 08/12/24 2100 08/17/24 2059 08/12/24 2034  Pharmacy to dose vancomycin        Ordering Provider: Vinod Ryder PA-C     Does not apply Continuous PRN 08/12/24 2034 08/17/24 2033            Allergies  Allergies as of 08/12/2024    (No Known Allergies)       Labs    Results from last 7 days   Lab Units 08/12/24  1349   BUN mg/dL 24*   CREATININE mg/dL 1.31*       Results from last 7 days   Lab Units 08/12/24  1349   WBC 10*3/mm3 8.23       Evaluation of Dosing     Last Dose Received in the ED/Outside Facility: No  Is Patient on Dialysis or Renal Replacement: No    Ht - 167.6 cm (66\")  Wt - 103 kg (226 lb)    Estimated Creatinine Clearance: 59.8 mL/min (A) (by C-G formula based on SCr of 1.31 mg/dL (H)).    No intake/output data recorded.    Microbiology and Radiology  Microbiology Results (last 10 days)       Procedure Component Value - Date/Time    COVID PRE-OP / PRE-PROCEDURE SCREENING ORDER (NO ISOLATION) - Swab, Nasopharynx [[id number]]  (Normal) Collected: 08/12/24 1345    Lab Status: Final result Specimen: Swab from Nasopharynx Updated: 08/12/24 1423    Narrative:      The following orders were created for panel order COVID PRE-OP / PRE-PROCEDURE SCREENING ORDER (NO " ISOLATION) - Swab, Nasopharynx.  Procedure                               Abnormality         Status                     ---------                               -----------         ------                     COVID-19 and FLU A/B PCR...[[id number]]  Normal              Final result                 Please view results for these tests on the individual orders.    COVID-19 and FLU A/B PCR, 1 HR TAT - Swab, Nasopharynx [[id number]]  (Normal) Collected: 08/12/24 1345    Lab Status: Final result Specimen: Swab from Nasopharynx Updated: 08/12/24 1423     COVID19 Not Detected     Influenza A PCR Not Detected     Influenza B PCR Not Detected    Narrative:      Fact sheet for providers: https://www.fda.gov/media/182402/download    Fact sheet for patients: https://www.fda.gov/media/206389/download    Test performed by PCR.            Reported Vancomycin Levels                         [employer] AUC Calculation:    Current AUC:   --- mg/L*hr    Predicted Steady State AUC on Current Dose: --- mg/L*hr  _________________________________    Predicted Steady State AUC on New Dose:   467 mg/L*hr    Assessment/Plan:   Pharmacy consulted to dose vancomycin for SSTI, goal -600 mg/L*hr.  Patient loaded with vancomycin 2000 mg ( ~ 19 mg/kg)  BC x 2 in process, wound culture to be collected.  Patient remains afebrile, serum creatinine is 1.31, BUN is 24, and WBC is 8.23.  Based on evaluation, initiate a maintenance regimen of vancomycin 750 mg ( ~ 7 mg/kg) every 12 hours.  A vancomycin trough will be assessed on 8/14 @ 0600.  Will continue to follow and adjust vancomycin dose as needed based on renal function, cultures, and patient clinical status.    Thank you,    Deb Michael RPH  8/12/2024  20:38 EDT

## 2024-08-14 LAB
ANION GAP SERPL CALCULATED.3IONS-SCNC: 10 MMOL/L (ref 5–15)
BASOPHILS # BLD AUTO: 0.03 10*3/MM3 (ref 0–0.2)
BASOPHILS NFR BLD AUTO: 0.5 % (ref 0–1.5)
BUN SERPL-MCNC: 22 MG/DL (ref 8–23)
BUN/CREAT SERPL: 17.5 (ref 7–25)
CALCIUM SPEC-SCNC: 8.5 MG/DL (ref 8.6–10.5)
CHLORIDE SERPL-SCNC: 100 MMOL/L (ref 98–107)
CO2 SERPL-SCNC: 25 MMOL/L (ref 22–29)
CREAT SERPL-MCNC: 1.26 MG/DL (ref 0.76–1.27)
DEPRECATED RDW RBC AUTO: 64 FL (ref 37–54)
EGFRCR SERPLBLD CKD-EPI 2021: 61.7 ML/MIN/1.73
EOSINOPHIL # BLD AUTO: 0.16 10*3/MM3 (ref 0–0.4)
EOSINOPHIL NFR BLD AUTO: 2.4 % (ref 0.3–6.2)
ERYTHROCYTE [DISTWIDTH] IN BLOOD BY AUTOMATED COUNT: 20.5 % (ref 12.3–15.4)
GLUCOSE BLDC GLUCOMTR-MCNC: 141 MG/DL (ref 70–130)
GLUCOSE BLDC GLUCOMTR-MCNC: 160 MG/DL (ref 70–130)
GLUCOSE BLDC GLUCOMTR-MCNC: 183 MG/DL (ref 70–130)
GLUCOSE BLDC GLUCOMTR-MCNC: 87 MG/DL (ref 70–130)
GLUCOSE SERPL-MCNC: 111 MG/DL (ref 65–99)
HCT VFR BLD AUTO: 31.7 % (ref 37.5–51)
HGB BLD-MCNC: 9.6 G/DL (ref 13–17.7)
IMM GRANULOCYTES # BLD AUTO: 0.02 10*3/MM3 (ref 0–0.05)
IMM GRANULOCYTES NFR BLD AUTO: 0.3 % (ref 0–0.5)
LYMPHOCYTES # BLD AUTO: 1.3 10*3/MM3 (ref 0.7–3.1)
LYMPHOCYTES NFR BLD AUTO: 19.6 % (ref 19.6–45.3)
MCH RBC QN AUTO: 25.7 PG (ref 26.6–33)
MCHC RBC AUTO-ENTMCNC: 30.3 G/DL (ref 31.5–35.7)
MCV RBC AUTO: 85 FL (ref 79–97)
MONOCYTES # BLD AUTO: 0.66 10*3/MM3 (ref 0.1–0.9)
MONOCYTES NFR BLD AUTO: 10 % (ref 5–12)
NEUTROPHILS NFR BLD AUTO: 4.45 10*3/MM3 (ref 1.7–7)
NEUTROPHILS NFR BLD AUTO: 67.2 % (ref 42.7–76)
NRBC BLD AUTO-RTO: 0 /100 WBC (ref 0–0.2)
PLATELET # BLD AUTO: 198 10*3/MM3 (ref 140–450)
PMV BLD AUTO: 8.1 FL (ref 6–12)
POTASSIUM SERPL-SCNC: 4.8 MMOL/L (ref 3.5–5.2)
RBC # BLD AUTO: 3.73 10*6/MM3 (ref 4.14–5.8)
SODIUM SERPL-SCNC: 135 MMOL/L (ref 136–145)
WBC NRBC COR # BLD AUTO: 6.62 10*3/MM3 (ref 3.4–10.8)

## 2024-08-14 PROCEDURE — 80048 BASIC METABOLIC PNL TOTAL CA: CPT | Performed by: PHYSICIAN ASSISTANT

## 2024-08-14 PROCEDURE — 94660 CPAP INITIATION&MGMT: CPT

## 2024-08-14 PROCEDURE — 85025 COMPLETE CBC W/AUTO DIFF WBC: CPT | Performed by: PHYSICIAN ASSISTANT

## 2024-08-14 PROCEDURE — 99233 SBSQ HOSP IP/OBS HIGH 50: CPT | Performed by: INTERNAL MEDICINE

## 2024-08-14 PROCEDURE — 25010000002 FUROSEMIDE PER 20 MG: Performed by: INTERNAL MEDICINE

## 2024-08-14 PROCEDURE — 99232 SBSQ HOSP IP/OBS MODERATE 35: CPT | Performed by: INTERNAL MEDICINE

## 2024-08-14 PROCEDURE — 63710000001 INSULIN GLARGINE PER 5 UNITS: Performed by: INTERNAL MEDICINE

## 2024-08-14 PROCEDURE — 94799 UNLISTED PULMONARY SVC/PX: CPT

## 2024-08-14 PROCEDURE — 63710000001 SIROLIMUS PER 1 MG: Performed by: PHYSICIAN ASSISTANT

## 2024-08-14 PROCEDURE — 25010000002 HEPARIN (PORCINE) PER 1000 UNITS: Performed by: PHYSICIAN ASSISTANT

## 2024-08-14 PROCEDURE — 63710000001 INSULIN LISPRO (HUMAN) PER 5 UNITS: Performed by: PHYSICIAN ASSISTANT

## 2024-08-14 PROCEDURE — 82948 REAGENT STRIP/BLOOD GLUCOSE: CPT

## 2024-08-14 RX ORDER — ACETAMINOPHEN 500 MG
1000 TABLET ORAL 2 TIMES DAILY
COMMUNITY
End: 2024-08-22 | Stop reason: HOSPADM

## 2024-08-14 RX ORDER — TRAZODONE HYDROCHLORIDE 50 MG/1
25 TABLET, FILM COATED ORAL 2 TIMES DAILY
COMMUNITY
End: 2024-08-22 | Stop reason: HOSPADM

## 2024-08-14 RX ORDER — HUMAN INSULIN 100 [IU]/ML
12 INJECTION, SOLUTION SUBCUTANEOUS
COMMUNITY

## 2024-08-14 RX ORDER — HUMAN INSULIN 100 [IU]/ML
12 INJECTION, SOLUTION SUBCUTANEOUS
Status: ON HOLD | COMMUNITY
Start: 2024-05-23 | End: 2024-08-14

## 2024-08-14 RX ORDER — NYSTATIN 100000 [USP'U]/G
1 POWDER TOPICAL 2 TIMES DAILY
COMMUNITY

## 2024-08-14 RX ORDER — ATORVASTATIN CALCIUM 80 MG/1
80 TABLET, FILM COATED ORAL NIGHTLY
Status: ON HOLD | COMMUNITY
Start: 2024-03-23 | End: 2024-08-14

## 2024-08-14 RX ORDER — ZINC SULFATE 50(220)MG
1 CAPSULE ORAL DAILY
COMMUNITY

## 2024-08-14 RX ORDER — MULTIVIT-MIN/IRON/FOLIC ACID/K 18-600-40
1 CAPSULE ORAL DAILY
COMMUNITY

## 2024-08-14 RX ORDER — LACTULOSE 10 G/15ML
30 SOLUTION ORAL 2 TIMES DAILY
Status: DISCONTINUED | OUTPATIENT
Start: 2024-08-14 | End: 2024-08-15

## 2024-08-14 RX ORDER — INSULIN GLARGINE 100 [IU]/ML
15 INJECTION, SOLUTION SUBCUTANEOUS 2 TIMES DAILY
Status: DISCONTINUED | OUTPATIENT
Start: 2024-08-14 | End: 2024-08-20

## 2024-08-14 RX ADMIN — INSULIN LISPRO 3 UNITS: 100 INJECTION, SOLUTION INTRAVENOUS; SUBCUTANEOUS at 12:08

## 2024-08-14 RX ADMIN — LORAZEPAM 0.5 MG: 0.5 TABLET ORAL at 21:43

## 2024-08-14 RX ADMIN — ATORVASTATIN CALCIUM 80 MG: 40 TABLET, FILM COATED ORAL at 09:16

## 2024-08-14 RX ADMIN — TAMSULOSIN HYDROCHLORIDE 0.4 MG: 0.4 CAPSULE ORAL at 09:16

## 2024-08-14 RX ADMIN — INSULIN GLARGINE 15 UNITS: 100 INJECTION, SOLUTION SUBCUTANEOUS at 21:38

## 2024-08-14 RX ADMIN — ASPIRIN 325 MG: 325 TABLET, COATED ORAL at 09:17

## 2024-08-14 RX ADMIN — FUROSEMIDE 40 MG: 10 INJECTION, SOLUTION INTRAMUSCULAR; INTRAVENOUS at 09:16

## 2024-08-14 RX ADMIN — QUETIAPINE FUMARATE 12.5 MG: 25 TABLET ORAL at 09:17

## 2024-08-14 RX ADMIN — PANTOPRAZOLE SODIUM 40 MG: 40 TABLET, DELAYED RELEASE ORAL at 09:16

## 2024-08-14 RX ADMIN — LACTULOSE 30 ML: 20 SOLUTION ORAL at 21:38

## 2024-08-14 RX ADMIN — FERROUS SULFATE TAB 325 MG (65 MG ELEMENTAL FE) 325 MG: 325 (65 FE) TAB at 09:17

## 2024-08-14 RX ADMIN — HEPARIN SODIUM 5000 UNITS: 5000 INJECTION INTRAVENOUS; SUBCUTANEOUS at 21:38

## 2024-08-14 RX ADMIN — SIROLIMUS 2 MG: 1 TABLET ORAL at 09:17

## 2024-08-14 RX ADMIN — HEPARIN SODIUM 5000 UNITS: 5000 INJECTION INTRAVENOUS; SUBCUTANEOUS at 09:16

## 2024-08-14 RX ADMIN — DOXYCYCLINE 100 MG: 100 CAPSULE ORAL at 09:16

## 2024-08-14 RX ADMIN — INSULIN GLARGINE 15 UNITS: 100 INJECTION, SOLUTION SUBCUTANEOUS at 09:16

## 2024-08-14 RX ADMIN — LACTULOSE 30 ML: 20 SOLUTION ORAL at 12:08

## 2024-08-14 RX ADMIN — ESCITALOPRAM OXALATE 20 MG: 20 TABLET, FILM COATED ORAL at 09:17

## 2024-08-14 RX ADMIN — QUETIAPINE FUMARATE 12.5 MG: 25 TABLET ORAL at 21:38

## 2024-08-14 RX ADMIN — GABAPENTIN 100 MG: 100 CAPSULE ORAL at 21:38

## 2024-08-14 RX ADMIN — BUPROPION HYDROCHLORIDE 300 MG: 150 TABLET, EXTENDED RELEASE ORAL at 09:17

## 2024-08-14 NOTE — PROGRESS NOTES
Subjective:     Encounter Date:08/12/2024      Patient ID: Quirino Sheppard is a 69 y.o. male.    Chief Complaint:  Shortness of Breath      Update 8/14/24  Edema has resolved.  More alert.      Review of Systems   Respiratory:  Positive for shortness of breath.        Procedures       Objective:     Constitutional:       Appearance: Healthy appearance. Not in distress.   Neck:      Vascular: No JVR. JVD normal.   Pulmonary:      Effort: Pulmonary effort is normal.      Breath sounds: Normal breath sounds. No wheezing. No rhonchi. No rales.   Chest:      Chest wall: Not tender to palpatation.   Cardiovascular:      PMI at left midclavicular line. Normal rate. Regular rhythm. Normal S1. Normal S2.       Murmurs: There is no murmur.      No gallop.  No click. No rub.   Pulses:     Intact distal pulses.   Edema:     Peripheral edema absent.   Abdominal:      Tenderness: There is no abdominal tenderness.      Comments: bandaged   Musculoskeletal: Normal range of motion.         General: No tenderness. Skin:     General: Skin is warm and dry.   Neurological:      General: No focal deficit present.      Mental Status: Alert.         Lab Review:       Assessment:         -CABG X 3 11/22/17, SVG to OM, lateral branch SVG, LIMA to LAD  -HFpEF, pro bnp 1.5k  -liver transplant from hep c, [facility]  -AURELIANO  -CKD  -DM  -HTN  -HLD  -Chronic abdomen wound  -chronic debility  -EKG QT borderline         Plan:       -Agree with aspirin, statin  -edema has resolved.  And sodium is touch low.  Hold iv diuretics.   Can likely transition to orals diuretics tomorrow.    -caution with any QT prolonging meds.    -DC BB, likely contributing to CHF with HFpEF  -will follow peripherally.      Reuben De Jesus MD

## 2024-08-14 NOTE — PLAN OF CARE
Problem: Fall Injury Risk  Goal: Absence of Fall and Fall-Related Injury  Outcome: Ongoing, Progressing  Intervention: Identify and Manage Contributors  Recent Flowsheet Documentation  Taken 8/13/2024 2055 by Saige Chávez RN  Medication Review/Management: medications reviewed  Self-Care Promotion:   independence encouraged   BADL personal objects within reach  Intervention: Promote Injury-Free Environment  Recent Flowsheet Documentation  Taken 8/14/2024 0400 by Saige Chávez RN  Safety Promotion/Fall Prevention: safety round/check completed  Taken 8/14/2024 0200 by Saige Chávez RN  Safety Promotion/Fall Prevention: safety round/check completed  Taken 8/14/2024 0100 by Saige Chávez RN  Safety Promotion/Fall Prevention: safety round/check completed  Taken 8/13/2024 2200 by Saige Chávez RN  Safety Promotion/Fall Prevention: safety round/check completed  Taken 8/13/2024 2055 by Saige Chávez RN  Safety Promotion/Fall Prevention: safety round/check completed     Problem: Adult Inpatient Plan of Care  Goal: Plan of Care Review  Outcome: Ongoing, Progressing  Goal: Patient-Specific Goal (Individualized)  Outcome: Ongoing, Progressing  Goal: Absence of Hospital-Acquired Illness or Injury  Outcome: Ongoing, Progressing  Intervention: Identify and Manage Fall Risk  Recent Flowsheet Documentation  Taken 8/14/2024 0400 by Saige Chávez RN  Safety Promotion/Fall Prevention: safety round/check completed  Taken 8/14/2024 0200 by Saige Chávez RN  Safety Promotion/Fall Prevention: safety round/check completed  Taken 8/14/2024 0100 by Saige Chávez RN  Safety Promotion/Fall Prevention: safety round/check completed  Taken 8/13/2024 2200 by Saige Chávez RN  Safety Promotion/Fall Prevention: safety round/check completed  Taken 8/13/2024 2055 by Saige Chávez RN  Safety Promotion/Fall Prevention: safety round/check completed  Intervention: Prevent Skin Injury  Recent Flowsheet Documentation  Taken  8/13/2024 2055 by Saige Chávez RN  Skin Protection:   adhesive use limited   tubing/devices free from skin contact  Intervention: Prevent and Manage VTE (Venous Thromboembolism) Risk  Recent Flowsheet Documentation  Taken 8/13/2024 2055 by Saige Chávez RN  Activity Management: activity encouraged  VTE Prevention/Management: (see MAR) other (see comments)  Range of Motion: active ROM (range of motion) encouraged  Intervention: Prevent Infection  Recent Flowsheet Documentation  Taken 8/13/2024 2000 by Saige Chávez RN  Infection Prevention:   environmental surveillance performed   hand hygiene promoted  Goal: Optimal Comfort and Wellbeing  Outcome: Ongoing, Progressing  Intervention: Monitor Pain and Promote Comfort  Recent Flowsheet Documentation  Taken 8/13/2024 2055 by Saige Chávez RN  Pain Management Interventions:   see MAR   quiet environment facilitated   pillow support provided   position adjusted  Intervention: Provide Person-Centered Care  Recent Flowsheet Documentation  Taken 8/13/2024 2055 by Saige Chávez RN  Trust Relationship/Rapport:   care explained   choices provided   emotional support provided   empathic listening provided   questions answered   questions encouraged   reassurance provided   thoughts/feelings acknowledged  Goal: Readiness for Transition of Care  Outcome: Ongoing, Progressing     Problem: Skin Injury Risk Increased  Goal: Skin Health and Integrity  Outcome: Ongoing, Progressing  Intervention: Optimize Skin Protection  Recent Flowsheet Documentation  Taken 8/13/2024 2055 by Saige Chávez RN  Pressure Reduction Techniques:   frequent weight shift encouraged   heels elevated off bed   weight shift assistance provided  Pressure Reduction Devices:   pressure-redistributing mattress utilized   positioning supports utilized   heel offloading device utilized  Skin Protection:   adhesive use limited   tubing/devices free from skin contact   Goal Outcome Evaluation:

## 2024-08-14 NOTE — PROGRESS NOTES
[facility] Medicine Services  PROGRESS NOTE    Patient Name: Quirino Sheppard  : 1955  MRN: [id number]    Date of Admission: 2024  Primary Care Physician: Andrew Fernandez MD    Subjective   Subjective     CC: CHFpEF exacerbation    HPI:  D/w daughter by phone -  -she reports dry weight 170-180 lbs, up to 230 lbs  -toe amputation in April, wheelchair bound, could transfer 1 month ago  -known dx of vascular dementia, recognizes daughter, couldn't remember basics including date, forgot about finances  -prior diagnosis of AURELIANO, working on OP cpap. Daughter reports he falls asleep all the time during the day (which is c/w my current exam)    Picks at abdomen, goes back to sleep quickly. Appears without complaint otherwise, on room air    Objective   Objective     Vital Signs:   Temp:  [97.1 °F (36.2 °C)-97.6 °F (36.4 °C)] 97.6 °F (36.4 °C)  Heart Rate:  [69-77] 77  Resp:  [18-20] 18  BP: (116-153)/(70-96) 153/96  Flow (L/min):  [2] 2     Physical Exam:  Constitutional: No acute distress, awake, alert sleepy male lying in bed  HENT: NCAT, mucous membranes moist  Respiratory: Clear to auscultation bilaterally, respiratory effort normal   Cardiovascular: RRR, no murmurs, rubs, or gallops  Gastrointestinal: Soft, nontender, nondistended  Musculoskeletal: Left foot wound not examined on this date, will follow-up  Psychiatric: Sleepy, wakes and discusses simply things  Neurologic: Alert, pleasant in simple conversation then back to sleep, facial movements symmetric and spontaneous movement of all 4 extremities grossly equal bilaterally, speech clear  Skin: No rashes    Results Reviewed:  LAB RESULTS:      Lab 24  0413 24  0338 24  1349   WBC 6.62 7.31 8.23   HEMOGLOBIN 9.6* 9.4* 11.0*   HEMATOCRIT 31.7* 30.9* 37.5   PLATELETS 198 181 228   NEUTROS ABS 4.45 5.62 6.23   IMMATURE GRANS (ABS) 0.02 0.02 0.02   LYMPHS ABS 1.30 0.94 1.09   MONOS ABS 0.66 0.62 0.73   EOS ABS 0.16  0.08 0.14   MCV 85.0 86.3 88.9   PROCALCITONIN  --   --  0.10   LACTATE  --   --  0.9         Lab 08/14/24  0413 08/13/24  0338 08/12/24  1349   SODIUM 135* 136 134*   POTASSIUM 4.8 5.1 5.4*   CHLORIDE 100 104 100   CO2 25.0 26.0 26.0   ANION GAP 10.0 6.0 8.0   BUN 22 24* 24*   CREATININE 1.26 1.23 1.31*   EGFR 61.7 63.6 58.9*   GLUCOSE 111* 184* 119*   CALCIUM 8.5* 8.3* 8.8   MAGNESIUM  --   --  1.8   HEMOGLOBIN A1C  --  7.60*  --          Lab 08/12/24  1349   TOTAL PROTEIN 7.5   ALBUMIN 3.9   GLOBULIN 3.6   ALT (SGPT) 27   AST (SGOT) 18   BILIRUBIN 0.3   ALK PHOS 171*         Lab 08/12/24  1646 08/12/24  1349   PROBNP  --  1,567.0*   HSTROP T 42* 41*                 Lab 08/12/24  1530   PH, ARTERIAL 7.409   PCO2, ARTERIAL 40.2   PO2 ART 72.0*   FIO2 21   HCO3 ART 25.4   BASE EXCESS ART 0.7   CARBOXYHEMOGLOBIN 0.7     Brief Urine Lab Results  (Last result in the past 365 days)        Color   Clarity   Blood   Leuk Est   Nitrite   Protein   CREAT   Urine HCG        08/12/24 1339 Yellow   Clear   Negative   Negative   Negative   Negative                   Microbiology Results Abnormal       Procedure Component Value - Date/Time    Blood Culture - Blood, Arm, Right [[id number]]  (Normal) Collected: 08/12/24 1349    Lab Status: Preliminary result Specimen: Blood from Arm, Right Updated: 08/14/24 1415     Blood Culture No growth at 2 days    Blood Culture - Blood, Arm, Left [[id number]]  (Normal) Collected: 08/12/24 1349    Lab Status: Preliminary result Specimen: Blood from Arm, Left Updated: 08/14/24 1415     Blood Culture No growth at 2 days    COVID PRE-OP / PRE-PROCEDURE SCREENING ORDER (NO ISOLATION) - Swab, Nasopharynx [[id number]]  (Normal) Collected: 08/12/24 1345    Lab Status: Final result Specimen: Swab from Nasopharynx Updated: 08/12/24 6723    Narrative:      The following orders were created for panel order COVID PRE-OP / PRE-PROCEDURE SCREENING ORDER (NO ISOLATION) - Swab, Nasopharynx.  Procedure                                Abnormality         Status                     ---------                               -----------         ------                     COVID-19 and FLU A/B PCR...[[id number]]  Normal              Final result                 Please view results for these tests on the individual orders.    COVID-19 and FLU A/B PCR, 1 HR TAT - Swab, Nasopharynx [[id number]]  (Normal) Collected: 08/12/24 1345    Lab Status: Final result Specimen: Swab from Nasopharynx Updated: 08/12/24 1423     COVID19 Not Detected     Influenza A PCR Not Detected     Influenza B PCR Not Detected    Narrative:      Fact sheet for providers: https://www.fda.gov/media/472204/download    Fact sheet for patients: https://www.fda.gov/media/838898/download    Test performed by PCR.            Adult Transthoracic Echo Complete W/ Cont if Necessary Per Protocol    Result Date: 8/13/2024    Left ventricular systolic function is normal. Left ventricular ejection fraction appears to be 51 - 55%.   Left ventricular diastolic function was normal.   The left atrial cavity is mildly dilated.   The right atrial cavity is mildly  dilated.   There is severe calcification of the aortic valve.   Moderate mitral valve regurgitation is present.   Estimated right ventricular systolic pressure from tricuspid regurgitation is mildly elevated (35-45 mmHg). Mitral valve regurgitation is worsened since 2017 but otherwise unchanged.      Results for orders placed during the hospital encounter of 08/12/24    Adult Transthoracic Echo Complete W/ Cont if Necessary Per Protocol    Interpretation Summary    Left ventricular systolic function is normal. Left ventricular ejection fraction appears to be 51 - 55%.    Left ventricular diastolic function was normal.    The left atrial cavity is mildly dilated.    The right atrial cavity is mildly  dilated.    There is severe calcification of the aortic valve.    Moderate mitral valve regurgitation is present.    Estimated  right ventricular systolic pressure from tricuspid regurgitation is mildly elevated (35-45 mmHg).    Mitral valve regurgitation is worsened since 2017 but otherwise unchanged.      Current medications:  Scheduled Meds:aspirin, 325 mg, Oral, Daily  atorvastatin, 80 mg, Oral, Daily  buPROPion XL, 300 mg, Oral, Daily  escitalopram, 20 mg, Oral, Daily  ferrous sulfate, 325 mg, Oral, Daily With Breakfast  [Held by provider] furosemide, 40 mg, Intravenous, Q12H  gabapentin, 100 mg, Oral, Nightly  heparin (porcine), 5,000 Units, Subcutaneous, Q12H  insulin glargine, 15 Units, Subcutaneous, BID  insulin lispro, 3-14 Units, Subcutaneous, TID With Meals  lactulose, 30 mL, Oral, BID  pantoprazole, 40 mg, Oral, Daily  pharmacy consult - MTM, , Does not apply, Daily  QUEtiapine, 12.5 mg, Oral, BID  sirolimus, 2 mg, Oral, Daily  sodium chloride, 10 mL, Intravenous, Q12H  tamsulosin, 0.4 mg, Oral, Daily      Continuous Infusions:   PRN Meds:.  dextrose    dextrose    glucagon (human recombinant)    HYDROcodone-acetaminophen    LORazepam    melatonin    nitroglycerin    sodium chloride    sodium chloride    sodium chloride    Assessment & Plan   Assessment & Plan     Active Hospital Problems    Diagnosis  POA    **CHF (congestive heart failure) [I50.9]  Yes    Wound of abdomen [S31.109A]  Yes    Hyperkalemia [E87.5]  Yes    LUZMARIA (acute kidney injury) [N17.9]  Yes    Obstructive sleep apnea syndrome [G47.33]  Yes    Essential hypertension [I10]  Yes    Hyperlipidemia LDL goal <70 [E78.5]  Yes    Hx of liver transplant [Z94.4]  Not Applicable    Type 2 diabetes mellitus without complication, with long-term current use of insulin [E11.9, Z79.4]  Not Applicable    Coronary artery disease involving coronary bypass graft of native heart without angina pectoris [I25.810]  Yes      Resolved Hospital Problems   No resolved problems to display.        Brief Hospital Course to date:  Quirino Sheppard is a 69 y.o. male w CAD s/p CABG, DMII, h/o  liver transplant, AURELIANO without home CPAP, decline since 4/2024 toe amputation, chronic wounds, vascular dementia per family report who presents w 30-40 lb weight gain and shortness of breath.     Acute on chronic CHFpEF  -only on home spironolactone it appears, ECHO stable from 2017  -per family dry weight ~180 lbs, now up to 230 lbs  -diuresed well with IV diuretics to now  -PO diuretic start 8/15, will need follow-up    Left chronic foot wound -follows biweekly w OP wound clinic, will follow-up with them on status of wound and follow-up wound tmrw. Wound culture w MRSA of unclear significance given no reported acute worsening/infectious sx. D/w OP wound clinic + repeat exam tmrw    Chronic abdominal wound - does not appear infected on my review, will f/u    Vascular dementia, appears worsened in recent months    Untreated AURELIANO - working on OP CPAP, clear evidence here, sleepiness throughout day related to this, blood gas wnl    DMII - on basal insulin BID w good control here  H/o liver transplant  Chronic debility - worry patient is moving toward [facility] level of care  BMI 37    Expected Discharge Location and Transportation: TBD  Expected Discharge medically ready likely 8/16  Expected Discharge Date: 8/16/2024; Expected Discharge Time:      VTE Prophylaxis:  Pharmacologic VTE prophylaxis orders are present.         AM-PAC 6 Clicks Score (PT): 9 (08/13/24 2055)    CODE STATUS:   Code Status and Medical Interventions: CPR (Attempt to Resuscitate); Full Support   Ordered at: 08/12/24 2010     Code Status (Patient has no pulse and is not breathing):    CPR (Attempt to Resuscitate)     Medical Interventions (Patient has pulse or is breathing):    Full Support       Marilyn Pham MD  08/14/24    Total time spent: Time Spent: Time Spent: 55 minutes  Time spent includes time reviewing chart, face-to-face time, counseling patient/family/caregiver, ordering medications/tests/procedures, communicating with other health care  professionals, documenting clinical information in the electronic health record, and coordination of care.   D/w daughter by phone, review of records + swabs, repeat exam of patient

## 2024-08-14 NOTE — CASE MANAGEMENT/SOCIAL WORK
Discharge Planning Assessment  [facility]     Patient Name: Quirino Sheppard  MRN: [id number]  Today's Date: 8/14/2024    Admit Date: 8/12/2024    Plan: SNF   Discharge Needs Assessment    No documentation.                  Discharge Plan       Row Name 08/14/24 1323       Plan    Plan SNF    Patient/Family in Agreement with Plan yes    Plan Comments Met with pt at bedside to f/u DCP.  Discussed therapy recs for SNF.  He is agreeable.  Pt deferred to his daughter for facility preferences.  Per request, CM called referral to [facility].  Of note pt is in co-pay days (approx $204/d) until is OOP max is met ($6k).  CM discussed this with pt's POA, Wilver, who advised that SNF remains a financially feasible option.  Insurance precert to be initiated today.  CM will cont to follow for determination.    Final Discharge Disposition Code 03 - skilled nursing facility (SNF)                  Continued Care and Services - Admitted Since 8/12/2024       Destination Coordination complete.      Service Provider Request Status Selected Services Address Phone Fax Patient Preferred    [facility]  Selected Skilled Nursing [address] [place] [phone] --                  Expected Discharge Date and Time       Expected Discharge Date Expected Discharge Time    Aug 15, 2024            Demographic Summary    No documentation.                  Functional Status    No documentation.                  Psychosocial    No documentation.                  Abuse/Neglect    No documentation.                  Legal    No documentation.                  Substance Abuse    No documentation.                  Patient Forms    No documentation.                     Annelise Land, RN

## 2024-08-15 LAB
ANION GAP SERPL CALCULATED.3IONS-SCNC: 8 MMOL/L (ref 5–15)
ATMOSPHERIC PRESS: ABNORMAL MM[HG]
BACTERIA SPEC AEROBE CULT: ABNORMAL
BASE EXCESS BLDV CALC-SCNC: 4 MMOL/L (ref -2–2)
BASOPHILS # BLD AUTO: 0.03 10*3/MM3 (ref 0–0.2)
BASOPHILS NFR BLD AUTO: 0.4 % (ref 0–1.5)
BDY SITE: ABNORMAL
BODY TEMPERATURE: 37
BUN SERPL-MCNC: 21 MG/DL (ref 8–23)
BUN/CREAT SERPL: 17.1 (ref 7–25)
CALCIUM SPEC-SCNC: 8.6 MG/DL (ref 8.6–10.5)
CHLORIDE SERPL-SCNC: 99 MMOL/L (ref 98–107)
CO2 BLDA-SCNC: 29.6 MMOL/L (ref 22–33)
CO2 SERPL-SCNC: 28 MMOL/L (ref 22–29)
COHGB MFR BLD: 0.9 %
CREAT SERPL-MCNC: 1.23 MG/DL (ref 0.76–1.27)
DEPRECATED RDW RBC AUTO: 63.2 FL (ref 37–54)
EGFRCR SERPLBLD CKD-EPI 2021: 63.6 ML/MIN/1.73
EOSINOPHIL # BLD AUTO: 0.13 10*3/MM3 (ref 0–0.4)
EOSINOPHIL NFR BLD AUTO: 1.5 % (ref 0.3–6.2)
EPAP: 0
ERYTHROCYTE [DISTWIDTH] IN BLOOD BY AUTOMATED COUNT: 20.1 % (ref 12.3–15.4)
GLUCOSE BLDC GLUCOMTR-MCNC: 138 MG/DL (ref 70–130)
GLUCOSE BLDC GLUCOMTR-MCNC: 148 MG/DL (ref 70–130)
GLUCOSE BLDC GLUCOMTR-MCNC: 215 MG/DL (ref 70–130)
GLUCOSE BLDC GLUCOMTR-MCNC: 226 MG/DL (ref 70–130)
GLUCOSE SERPL-MCNC: 111 MG/DL (ref 65–99)
GRAM STN SPEC: ABNORMAL
HCO3 BLDV-SCNC: 28.3 MMOL/L (ref 22–28)
HCT VFR BLD AUTO: 31.6 % (ref 37.5–51)
HGB BLD-MCNC: 9.6 G/DL (ref 13–17.7)
HGB BLDA-MCNC: 10.2 G/DL (ref 13.5–17.5)
IMM GRANULOCYTES # BLD AUTO: 0.03 10*3/MM3 (ref 0–0.05)
IMM GRANULOCYTES NFR BLD AUTO: 0.4 % (ref 0–0.5)
INHALED O2 CONCENTRATION: 21 %
IPAP: 0
LYMPHOCYTES # BLD AUTO: 1.06 10*3/MM3 (ref 0.7–3.1)
LYMPHOCYTES NFR BLD AUTO: 12.4 % (ref 19.6–45.3)
MCH RBC QN AUTO: 25.9 PG (ref 26.6–33)
MCHC RBC AUTO-ENTMCNC: 30.4 G/DL (ref 31.5–35.7)
MCV RBC AUTO: 85.4 FL (ref 79–97)
METHGB BLD QL: 0.2 %
MODALITY: ABNORMAL
MONOCYTES # BLD AUTO: 0.83 10*3/MM3 (ref 0.1–0.9)
MONOCYTES NFR BLD AUTO: 9.7 % (ref 5–12)
NEUTROPHILS NFR BLD AUTO: 6.45 10*3/MM3 (ref 1.7–7)
NEUTROPHILS NFR BLD AUTO: 75.6 % (ref 42.7–76)
NRBC BLD AUTO-RTO: 0 /100 WBC (ref 0–0.2)
NT-PROBNP SERPL-MCNC: 1234 PG/ML (ref 0–900)
OXYHGB MFR BLDV: 92 %
PAW @ PEAK INSP FLOW SETTING VENT: 0 CMH2O
PCO2 BLDV: 40.7 MM HG (ref 41–51)
PH BLDV: 7.45 PH UNITS (ref 7.31–7.41)
PLATELET # BLD AUTO: 200 10*3/MM3 (ref 140–450)
PMV BLD AUTO: 8.4 FL (ref 6–12)
PO2 BLDV: 68.9 MM HG (ref 27–53)
POTASSIUM SERPL-SCNC: 4.8 MMOL/L (ref 3.5–5.2)
QT INTERVAL: 436 MS
QTC INTERVAL: 499 MS
RBC # BLD AUTO: 3.7 10*6/MM3 (ref 4.14–5.8)
SODIUM SERPL-SCNC: 135 MMOL/L (ref 136–145)
T4 FREE SERPL-MCNC: 1.05 NG/DL (ref 0.92–1.68)
TOTAL RATE: 0 BREATHS/MINUTE
TSH SERPL DL<=0.05 MIU/L-ACNC: 5.23 UIU/ML (ref 0.27–4.2)
VENTILATOR MODE: ABNORMAL
WBC NRBC COR # BLD AUTO: 8.53 10*3/MM3 (ref 3.4–10.8)

## 2024-08-15 PROCEDURE — 99233 SBSQ HOSP IP/OBS HIGH 50: CPT | Performed by: INTERNAL MEDICINE

## 2024-08-15 PROCEDURE — 82805 BLOOD GASES W/O2 SATURATION: CPT

## 2024-08-15 PROCEDURE — 83880 ASSAY OF NATRIURETIC PEPTIDE: CPT | Performed by: INTERNAL MEDICINE

## 2024-08-15 PROCEDURE — 63710000001 SIROLIMUS PER 1 MG: Performed by: PHYSICIAN ASSISTANT

## 2024-08-15 PROCEDURE — 80048 BASIC METABOLIC PNL TOTAL CA: CPT | Performed by: PHYSICIAN ASSISTANT

## 2024-08-15 PROCEDURE — 63710000001 INSULIN LISPRO (HUMAN) PER 5 UNITS: Performed by: PHYSICIAN ASSISTANT

## 2024-08-15 PROCEDURE — 93005 ELECTROCARDIOGRAM TRACING: CPT | Performed by: INTERNAL MEDICINE

## 2024-08-15 PROCEDURE — 25010000002 HEPARIN (PORCINE) PER 1000 UNITS: Performed by: PHYSICIAN ASSISTANT

## 2024-08-15 PROCEDURE — 84439 ASSAY OF FREE THYROXINE: CPT | Performed by: INTERNAL MEDICINE

## 2024-08-15 PROCEDURE — 85025 COMPLETE CBC W/AUTO DIFF WBC: CPT | Performed by: PHYSICIAN ASSISTANT

## 2024-08-15 PROCEDURE — 82948 REAGENT STRIP/BLOOD GLUCOSE: CPT

## 2024-08-15 PROCEDURE — 93010 ELECTROCARDIOGRAM REPORT: CPT | Performed by: STUDENT IN AN ORGANIZED HEALTH CARE EDUCATION/TRAINING PROGRAM

## 2024-08-15 PROCEDURE — 82607 VITAMIN B-12: CPT | Performed by: INTERNAL MEDICINE

## 2024-08-15 PROCEDURE — 84443 ASSAY THYROID STIM HORMONE: CPT | Performed by: INTERNAL MEDICINE

## 2024-08-15 PROCEDURE — 63710000001 INSULIN GLARGINE PER 5 UNITS: Performed by: INTERNAL MEDICINE

## 2024-08-15 RX ORDER — LACTULOSE 10 G/15ML
30 SOLUTION ORAL 3 TIMES DAILY
Status: DISCONTINUED | OUTPATIENT
Start: 2024-08-15 | End: 2024-08-22 | Stop reason: HOSPADM

## 2024-08-15 RX ORDER — FUROSEMIDE 40 MG
40 TABLET ORAL DAILY
Status: DISCONTINUED | OUTPATIENT
Start: 2024-08-15 | End: 2024-08-22 | Stop reason: HOSPADM

## 2024-08-15 RX ADMIN — SIROLIMUS 2 MG: 1 TABLET ORAL at 10:03

## 2024-08-15 RX ADMIN — INSULIN GLARGINE 15 UNITS: 100 INJECTION, SOLUTION SUBCUTANEOUS at 21:48

## 2024-08-15 RX ADMIN — FUROSEMIDE 40 MG: 40 TABLET ORAL at 18:40

## 2024-08-15 RX ADMIN — Medication 10 ML: at 21:48

## 2024-08-15 RX ADMIN — ATORVASTATIN CALCIUM 80 MG: 40 TABLET, FILM COATED ORAL at 10:03

## 2024-08-15 RX ADMIN — LACTULOSE 30 ML: 20 SOLUTION ORAL at 10:03

## 2024-08-15 RX ADMIN — ESCITALOPRAM OXALATE 20 MG: 20 TABLET, FILM COATED ORAL at 10:04

## 2024-08-15 RX ADMIN — LACTULOSE 15 G: 10 SOLUTION ORAL at 21:52

## 2024-08-15 RX ADMIN — PANTOPRAZOLE SODIUM 40 MG: 40 TABLET, DELAYED RELEASE ORAL at 10:03

## 2024-08-15 RX ADMIN — ASPIRIN 325 MG: 325 TABLET, COATED ORAL at 10:18

## 2024-08-15 RX ADMIN — INSULIN LISPRO 5 UNITS: 100 INJECTION, SOLUTION INTRAVENOUS; SUBCUTANEOUS at 18:40

## 2024-08-15 RX ADMIN — INSULIN GLARGINE 15 UNITS: 100 INJECTION, SOLUTION SUBCUTANEOUS at 10:02

## 2024-08-15 RX ADMIN — QUETIAPINE FUMARATE 12.5 MG: 25 TABLET ORAL at 10:03

## 2024-08-15 RX ADMIN — TAMSULOSIN HYDROCHLORIDE 0.4 MG: 0.4 CAPSULE ORAL at 10:03

## 2024-08-15 RX ADMIN — HEPARIN SODIUM 5000 UNITS: 5000 INJECTION INTRAVENOUS; SUBCUTANEOUS at 10:04

## 2024-08-15 RX ADMIN — BUPROPION HYDROCHLORIDE 300 MG: 150 TABLET, EXTENDED RELEASE ORAL at 10:18

## 2024-08-15 RX ADMIN — FERROUS SULFATE TAB 325 MG (65 MG ELEMENTAL FE) 325 MG: 325 (65 FE) TAB at 10:03

## 2024-08-15 RX ADMIN — HEPARIN SODIUM 5000 UNITS: 5000 INJECTION INTRAVENOUS; SUBCUTANEOUS at 21:48

## 2024-08-15 NOTE — PROGRESS NOTES
[facility]  PROGRESS NOTE    Patient Name: Quirino Sheppard  : 1955  MRN: [id number]    Date of Admission: 2024  Primary Care Physician: Andrew Fernandez MD    Subjective   Subjective     CC: CHFpEF exacerbation    HPI:  Continued sleepiness - needs aroused by nursing to eat trays but does complete them  Reportedly takes off CPAP at night and refuses it    I d/w patient's POA by phone who also reports this is how he has been x 6 weeks. Appears rather acute in onset, 2-3 months ago was going out to restaurants with him. This is quite dramatic of a timeline on review with them. They are agreeable to coming in tomorrow to evaluate him alongside me. Reports no prior work-up of this other than sleep testing they were working to arrange for AURELIANO    Objective   Objective     Vital Signs:   Temp:  [98.1 °F (36.7 °C)-98.7 °F (37.1 °C)] 98.6 °F (37 °C)  Heart Rate:  [75-82] 78  Resp:  [16-21] 18  BP: (134-151)/(66-96) 147/69  Flow (L/min):  [2] 2     Physical Exam:  Constitutional: No acute distress, awake, alert sleepy male lying in bed  HENT: NCAT, mucous membranes moist  Respiratory: Clear to auscultation bilaterally, respiratory effort normal   Cardiovascular: RRR, no murmurs, rubs, or gallops  Gastrointestinal: Soft, nontender, nondistended  Musculoskeletal: Left foot wound examined - does not appear acutely infected on review of it  Psychiatric: Sleepy, wakes and answers location and name and asks me to leave him alone  Neurologic: Alert, pleasant in simple conversation then back to sleep, facial movements symmetric and spontaneous movement of all 4 extremities grossly equal bilaterally, speech clear  Skin: No rashes    Results Reviewed:  LAB RESULTS:      Lab 08/15/24  0455 24  0413 24  0338 24  1349   WBC 8.53 6.62 7.31 8.23   HEMOGLOBIN 9.6* 9.6* 9.4* 11.0*   HEMATOCRIT 31.6* 31.7* 30.9* 37.5   PLATELETS 200 198 181 228   NEUTROS ABS 6.45 4.45  5.62 6.23   IMMATURE GRANS (ABS) 0.03 0.02 0.02 0.02   LYMPHS ABS 1.06 1.30 0.94 1.09   MONOS ABS 0.83 0.66 0.62 0.73   EOS ABS 0.13 0.16 0.08 0.14   MCV 85.4 85.0 86.3 88.9   PROCALCITONIN  --   --   --  0.10   LACTATE  --   --   --  0.9         Lab 08/15/24  0455 08/14/24  0413 08/13/24  0338 08/12/24  1349   SODIUM 135* 135* 136 134*   POTASSIUM 4.8 4.8 5.1 5.4*   CHLORIDE 99 100 104 100   CO2 28.0 25.0 26.0 26.0   ANION GAP 8.0 10.0 6.0 8.0   BUN 21 22 24* 24*   CREATININE 1.23 1.26 1.23 1.31*   EGFR 63.6 61.7 63.6 58.9*   GLUCOSE 111* 111* 184* 119*   CALCIUM 8.6 8.5* 8.3* 8.8   MAGNESIUM  --   --   --  1.8   HEMOGLOBIN A1C  --   --  7.60*  --          Lab 08/12/24  1349   TOTAL PROTEIN 7.5   ALBUMIN 3.9   GLOBULIN 3.6   ALT (SGPT) 27   AST (SGOT) 18   BILIRUBIN 0.3   ALK PHOS 171*         Lab 08/15/24  0455 08/12/24  1646 08/12/24  1349   PROBNP 1,234.0*  --  1,567.0*   HSTROP T  --  42* 41*                 Lab 08/15/24  1428 08/12/24  1530   PH, ARTERIAL  --  7.409   PCO2, ARTERIAL  --  40.2   PO2 ART  --  72.0*   FIO2 21 21   HCO3 ART  --  25.4   BASE EXCESS ART  --  0.7   CARBOXYHEMOGLOBIN  --  0.7   CARBOXYHEMOGLOBIN (VENOUS) 0.9  --      Brief Urine Lab Results  (Last result in the past 365 days)        Color   Clarity   Blood   Leuk Est   Nitrite   Protein   CREAT   Urine HCG        08/12/24 1339 Yellow   Clear   Negative   Negative   Negative   Negative                   Microbiology Results Abnormal       Procedure Component Value - Date/Time    Blood Culture - Blood, Arm, Right [[id number]]  (Normal) Collected: 08/12/24 1349    Lab Status: Preliminary result Specimen: Blood from Arm, Right Updated: 08/15/24 1415     Blood Culture No growth at 3 days    Blood Culture - Blood, Arm, Left [[id number]]  (Normal) Collected: 08/12/24 1349    Lab Status: Preliminary result Specimen: Blood from Arm, Left Updated: 08/15/24 1415     Blood Culture No growth at 3 days    COVID PRE-OP / PRE-PROCEDURE SCREENING ORDER  (NO ISOLATION) - Swab, Nasopharynx [[id number]]  (Normal) Collected: 08/12/24 1345    Lab Status: Final result Specimen: Swab from Nasopharynx Updated: 08/12/24 1423    Narrative:      The following orders were created for panel order COVID PRE-OP / PRE-PROCEDURE SCREENING ORDER (NO ISOLATION) - Swab, Nasopharynx.  Procedure                               Abnormality         Status                     ---------                               -----------         ------                     COVID-19 and FLU A/B PCR...[[id number]]  Normal              Final result                 Please view results for these tests on the individual orders.    COVID-19 and FLU A/B PCR, 1 HR TAT - Swab, Nasopharynx [[id number]]  (Normal) Collected: 08/12/24 1345    Lab Status: Final result Specimen: Swab from Nasopharynx Updated: 08/12/24 1423     COVID19 Not Detected     Influenza A PCR Not Detected     Influenza B PCR Not Detected    Narrative:      Fact sheet for providers: https://www.fda.gov/media/823054/download    Fact sheet for patients: https://www.fda.gov/media/815398/download    Test performed by PCR.            No radiology results from the last 24 hrs    Results for orders placed during the hospital encounter of 08/12/24    Adult Transthoracic Echo Complete W/ Cont if Necessary Per Protocol    Interpretation Summary    Left ventricular systolic function is normal. Left ventricular ejection fraction appears to be 51 - 55%.    Left ventricular diastolic function was normal.    The left atrial cavity is mildly dilated.    The right atrial cavity is mildly  dilated.    There is severe calcification of the aortic valve.    Moderate mitral valve regurgitation is present.    Estimated right ventricular systolic pressure from tricuspid regurgitation is mildly elevated (35-45 mmHg).    Mitral valve regurgitation is worsened since 2017 but otherwise unchanged.      Current medications:  Scheduled Meds:aspirin, 325 mg, Oral,  Daily  atorvastatin, 80 mg, Oral, Daily  buPROPion XL, 300 mg, Oral, Daily  escitalopram, 20 mg, Oral, Daily  ferrous sulfate, 325 mg, Oral, Daily With Breakfast  furosemide, 40 mg, Oral, Daily  [Held by provider] gabapentin, 100 mg, Oral, Nightly  heparin (porcine), 5,000 Units, Subcutaneous, Q12H  insulin glargine, 15 Units, Subcutaneous, BID  insulin lispro, 3-14 Units, Subcutaneous, TID With Meals  lactulose, 30 mL, Oral, BID  pantoprazole, 40 mg, Oral, Daily  pharmacy consult - MTM, , Does not apply, Daily  [Held by provider] QUEtiapine, 12.5 mg, Oral, BID  sirolimus, 2 mg, Oral, Daily  sodium chloride, 10 mL, Intravenous, Q12H  tamsulosin, 0.4 mg, Oral, Daily      Continuous Infusions:   PRN Meds:.  dextrose    dextrose    glucagon (human recombinant)    HYDROcodone-acetaminophen    melatonin    nitroglycerin    sodium chloride    sodium chloride    sodium chloride    Assessment & Plan   Assessment & Plan     Active Hospital Problems    Diagnosis  POA    **CHF (congestive heart failure) [I50.9]  Yes    Wound of abdomen [S31.109A]  Yes    Hyperkalemia [E87.5]  Yes    LUZMARIA (acute kidney injury) [N17.9]  Yes    Obstructive sleep apnea syndrome [G47.33]  Yes    Essential hypertension [I10]  Yes    Hyperlipidemia LDL goal <70 [E78.5]  Yes    Hx of liver transplant [Z94.4]  Not Applicable    Type 2 diabetes mellitus without complication, with long-term current use of insulin [E11.9, Z79.4]  Not Applicable    Coronary artery disease involving coronary bypass graft of native heart without angina pectoris [I25.810]  Yes      Resolved Hospital Problems   No resolved problems to display.        Brief Hospital Course to date:  Quirino Sheppard is a 69 y.o. male w CAD s/p CABG, DMII, h/o liver transplant, AURELIANO without home CPAP, decline since 4/2024 toe amputation, chronic wounds, vascular dementia per family report who presents w 30-40 lb weight gain and shortness of breath.   Stay c/b very excessive sleepiness. Per family  this has been baseline for last 6 weeks but is acute change from prior to that. Refusing CPAP but sleepiness out of proportion it appears to uncontrolled AURELIANO alone.    Excessive sleepiness  -waking up to eat trays w nursing but w significant prompting. Appears more sleepy than H&P but by next morning's exam hospitalist is documenting similar interactions to mine w patient. Family reporting this x6 weeks  -vbg normal, prior blood gas also normal  -appears to have untreated AURELIANO but this feels out of proportion w that. CPAP as tolerated  -will obtain MRI, spot EEG, TSH, B12 level  -given h/o liver transplant, increase lactulose (but already 2 BM's today), check CMP/INR in AM, check sirolimus level  -consider modafinil trial if no etiology identified  -family to examine patient w me bedside 8/16: they report this has been baseline x 6 weeks but will work to confirm w them given it is quite eccentric. They tell me s/p [place] ED visits without work-up of this to now    Untreated AURELIANO - working on OP CPAP, clear evidence here, sleepiness throughout day related to this    Acute on chronic CHFpEF  -only on home spironolactone it appears, ECHO stable from 2017  -per family dry weight ~180 lbs, now up to 230 lbs  -diuresed well with IV diuretics to now  -PO diuretic start 8/15, will need follow-up    Left chronic foot wound   -follows biweekly w OP wound clinic  -does not appear infected on visualization. Therefore will not treat culture as likely colonization and not wound infection on review. OP close f/u     Chronic abdominal wound - does not appear infected on my review, will f/u  Vascular dementia, appears worsened in recent months  DMII - on basal insulin BID w good control here  H/o liver transplant  Chronic debility - worry patient is moving toward [facility] level of care  BMI 37    Expected Discharge Location and Transportation: TBD  Expected Discharge 8/20 (Discharge date is tentative pending patient's medical condition  and is subject to change)  Expected Discharge Date: 8/20/2024; Expected Discharge Time:      VTE Prophylaxis:  Pharmacologic VTE prophylaxis orders are present.         AM-PAC 6 Clicks Score (PT): 9 (08/14/24 9677)    CODE STATUS:   Code Status and Medical Interventions: CPR (Attempt to Resuscitate); Full Support   Ordered at: 08/12/24 2010     Code Status (Patient has no pulse and is not breathing):    CPR (Attempt to Resuscitate)     Medical Interventions (Patient has pulse or is breathing):    Full Support       Marilyn Pham MD  08/15/24    Total time spent: Time Spent: Time Spent: 60 minutes  Time spent includes time reviewing chart, face-to-face time, counseling patient/family/caregiver, ordering medications/tests/procedures, communicating with other health care professionals, documenting clinical information in the electronic health record, and coordination of care.

## 2024-08-16 ENCOUNTER — APPOINTMENT (OUTPATIENT)
Dept: NEUROLOGY | Facility: HOSPITAL | Age: 69
DRG: 291 | End: 2024-08-16
Payer: MEDICARE

## 2024-08-16 LAB
ALBUMIN SERPL-MCNC: 3.6 G/DL (ref 3.5–5.2)
ALBUMIN/GLOB SERPL: 1.1 G/DL
ALP SERPL-CCNC: 137 U/L (ref 39–117)
ALT SERPL W P-5'-P-CCNC: 21 U/L (ref 1–41)
AMMONIA BLD-SCNC: 22 UMOL/L (ref 16–60)
ANION GAP SERPL CALCULATED.3IONS-SCNC: 9 MMOL/L (ref 5–15)
AST SERPL-CCNC: 21 U/L (ref 1–40)
BILIRUB SERPL-MCNC: 0.4 MG/DL (ref 0–1.2)
BUN SERPL-MCNC: 22 MG/DL (ref 8–23)
BUN/CREAT SERPL: 19.1 (ref 7–25)
CALCIUM SPEC-SCNC: 8.7 MG/DL (ref 8.6–10.5)
CHLORIDE SERPL-SCNC: 98 MMOL/L (ref 98–107)
CO2 SERPL-SCNC: 28 MMOL/L (ref 22–29)
CREAT SERPL-MCNC: 1.15 MG/DL (ref 0.76–1.27)
DEPRECATED RDW RBC AUTO: 62.3 FL (ref 37–54)
EGFRCR SERPLBLD CKD-EPI 2021: 68.9 ML/MIN/1.73
ERYTHROCYTE [DISTWIDTH] IN BLOOD BY AUTOMATED COUNT: 20 % (ref 12.3–15.4)
GLOBULIN UR ELPH-MCNC: 3.2 GM/DL
GLUCOSE BLDC GLUCOMTR-MCNC: 157 MG/DL (ref 70–130)
GLUCOSE BLDC GLUCOMTR-MCNC: 192 MG/DL (ref 70–130)
GLUCOSE BLDC GLUCOMTR-MCNC: 211 MG/DL (ref 70–130)
GLUCOSE BLDC GLUCOMTR-MCNC: 83 MG/DL (ref 70–130)
GLUCOSE SERPL-MCNC: 138 MG/DL (ref 65–99)
HCT VFR BLD AUTO: 32.8 % (ref 37.5–51)
HGB BLD-MCNC: 10 G/DL (ref 13–17.7)
INR PPP: 1.11 (ref 0.89–1.12)
MCH RBC QN AUTO: 25.9 PG (ref 26.6–33)
MCHC RBC AUTO-ENTMCNC: 30.5 G/DL (ref 31.5–35.7)
MCV RBC AUTO: 85 FL (ref 79–97)
PLATELET # BLD AUTO: 219 10*3/MM3 (ref 140–450)
PMV BLD AUTO: 8.4 FL (ref 6–12)
POTASSIUM SERPL-SCNC: 5 MMOL/L (ref 3.5–5.2)
PROT SERPL-MCNC: 6.8 G/DL (ref 6–8.5)
PROTHROMBIN TIME: 14.4 SECONDS (ref 12.2–14.5)
RBC # BLD AUTO: 3.86 10*6/MM3 (ref 4.14–5.8)
SODIUM SERPL-SCNC: 135 MMOL/L (ref 136–145)
VIT B12 BLD-MCNC: 577 PG/ML (ref 211–946)
WBC NRBC COR # BLD AUTO: 8.32 10*3/MM3 (ref 3.4–10.8)

## 2024-08-16 PROCEDURE — 80195 ASSAY OF SIROLIMUS: CPT | Performed by: INTERNAL MEDICINE

## 2024-08-16 PROCEDURE — 82948 REAGENT STRIP/BLOOD GLUCOSE: CPT

## 2024-08-16 PROCEDURE — 99232 SBSQ HOSP IP/OBS MODERATE 35: CPT | Performed by: INTERNAL MEDICINE

## 2024-08-16 PROCEDURE — 95816 EEG AWAKE AND DROWSY: CPT

## 2024-08-16 PROCEDURE — 95816 EEG AWAKE AND DROWSY: CPT | Performed by: PSYCHIATRY & NEUROLOGY

## 2024-08-16 PROCEDURE — 85610 PROTHROMBIN TIME: CPT | Performed by: INTERNAL MEDICINE

## 2024-08-16 PROCEDURE — 63710000001 SIROLIMUS PER 1 MG: Performed by: PHYSICIAN ASSISTANT

## 2024-08-16 PROCEDURE — 82140 ASSAY OF AMMONIA: CPT | Performed by: INTERNAL MEDICINE

## 2024-08-16 PROCEDURE — 80053 COMPREHEN METABOLIC PANEL: CPT | Performed by: INTERNAL MEDICINE

## 2024-08-16 PROCEDURE — 94799 UNLISTED PULMONARY SVC/PX: CPT

## 2024-08-16 PROCEDURE — 85027 COMPLETE CBC AUTOMATED: CPT | Performed by: INTERNAL MEDICINE

## 2024-08-16 PROCEDURE — 63710000001 INSULIN LISPRO (HUMAN) PER 5 UNITS: Performed by: PHYSICIAN ASSISTANT

## 2024-08-16 PROCEDURE — 63710000001 INSULIN GLARGINE PER 5 UNITS: Performed by: INTERNAL MEDICINE

## 2024-08-16 PROCEDURE — 25010000002 HEPARIN (PORCINE) PER 1000 UNITS: Performed by: PHYSICIAN ASSISTANT

## 2024-08-16 RX ADMIN — INSULIN LISPRO 5 UNITS: 100 INJECTION, SOLUTION INTRAVENOUS; SUBCUTANEOUS at 12:03

## 2024-08-16 RX ADMIN — SIROLIMUS 2 MG: 1 TABLET ORAL at 08:47

## 2024-08-16 RX ADMIN — INSULIN LISPRO 3 UNITS: 100 INJECTION, SOLUTION INTRAVENOUS; SUBCUTANEOUS at 08:52

## 2024-08-16 RX ADMIN — HEPARIN SODIUM 5000 UNITS: 5000 INJECTION INTRAVENOUS; SUBCUTANEOUS at 20:37

## 2024-08-16 RX ADMIN — INSULIN GLARGINE 15 UNITS: 100 INJECTION, SOLUTION SUBCUTANEOUS at 20:37

## 2024-08-16 RX ADMIN — ASPIRIN 325 MG: 325 TABLET, COATED ORAL at 08:51

## 2024-08-16 RX ADMIN — LACTULOSE 30 G: 10 SOLUTION ORAL at 17:11

## 2024-08-16 RX ADMIN — TAMSULOSIN HYDROCHLORIDE 0.4 MG: 0.4 CAPSULE ORAL at 08:52

## 2024-08-16 RX ADMIN — LACTULOSE 30 G: 10 SOLUTION ORAL at 08:57

## 2024-08-16 RX ADMIN — HEPARIN SODIUM 5000 UNITS: 5000 INJECTION INTRAVENOUS; SUBCUTANEOUS at 08:47

## 2024-08-16 RX ADMIN — ESCITALOPRAM OXALATE 20 MG: 20 TABLET, FILM COATED ORAL at 08:51

## 2024-08-16 RX ADMIN — Medication 10 ML: at 20:38

## 2024-08-16 RX ADMIN — BUPROPION HYDROCHLORIDE 300 MG: 150 TABLET, EXTENDED RELEASE ORAL at 08:51

## 2024-08-16 RX ADMIN — FERROUS SULFATE TAB 325 MG (65 MG ELEMENTAL FE) 325 MG: 325 (65 FE) TAB at 08:47

## 2024-08-16 RX ADMIN — FUROSEMIDE 40 MG: 40 TABLET ORAL at 08:51

## 2024-08-16 RX ADMIN — LACTULOSE 30 G: 10 SOLUTION ORAL at 20:37

## 2024-08-16 RX ADMIN — INSULIN GLARGINE 15 UNITS: 100 INJECTION, SOLUTION SUBCUTANEOUS at 08:52

## 2024-08-16 RX ADMIN — PANTOPRAZOLE SODIUM 40 MG: 40 TABLET, DELAYED RELEASE ORAL at 08:53

## 2024-08-16 RX ADMIN — ATORVASTATIN CALCIUM 80 MG: 40 TABLET, FILM COATED ORAL at 08:51

## 2024-08-16 NOTE — CASE MANAGEMENT/SOCIAL WORK
Discharge Planning Assessment  [facility]     Patient Name: Quirino Sheppard  MRN: [id number]  Today's Date: 8/16/2024    Admit Date: 8/12/2024    Plan: SNF   Discharge Needs Assessment    No documentation.                  Discharge Plan       Row Name 08/16/24 1128       Plan    Plan SNF    Plan Comments Spoke with Derick at [facility].  Insurance precert is still pending at [facility].  CM will cont to follow for determination.    Final Discharge Disposition Code 03 - skilled nursing facility (SNF)                  Continued Care and Services - Admitted Since 8/12/2024       Destination Coordination complete.      Service Provider Request Status Selected Services Address Phone Fax Patient Preferred    [facility]  Selected Skilled Nursing [address] [place] [id number] [phone] --                  Expected Discharge Date and Time       Expected Discharge Date Expected Discharge Time    Aug 20, 2024            Demographic Summary    No documentation.                  Functional Status    No documentation.                  Psychosocial    No documentation.                  Abuse/Neglect    No documentation.                  Legal    No documentation.                  Substance Abuse    No documentation.                  Patient Forms    No documentation.                     Annelise Land RN

## 2024-08-16 NOTE — PROGRESS NOTES
[facility] Medicine Services  PROGRESS NOTE    Patient Name: Quirino Sheppard  : 1955  MRN: [id number]    Date of Admission: 2024  Primary Care Physician: Andrew Fernandez MD    Subjective   Subjective     CC: CHFpEF exacerbation    HPI:  Dramatically more awake and alert today, night and day difference from last 2 days with patient  Up and watching TV, talking to his friend in room and conversational  Wore cpap for only a couple of hours overnight as couldn't tolerate it. Only other change was holding low-dose gabapentin and low dose seroquel last night  Would not tolerate MRI so friend/POA and I agree to hold    Objective   Objective     Vital Signs:   Temp:  [97.5 °F (36.4 °C)-98.3 °F (36.8 °C)] 98.3 °F (36.8 °C)  Heart Rate:  [76-83] 83  Resp:  [18] 18  BP: (147-156)/(72-84) 156/78  Flow (L/min):  [2] 2     Physical Exam:  Constitutional: No acute distress, awake, alert male sitting up in bed, chatty  HENT: NCAT, mucous membranes moist  Respiratory: Clear to auscultation bilaterally, respiratory effort normal   Cardiovascular: RRR, no murmurs, rubs, or gallops  Gastrointestinal: Soft, nontender, nondistended  Musculoskeletal: Left foot wound wrapped, sp amputation toes on that foot  Psychiatric: Awake and alert, conversational calm and pleasant  Neurologic: Alert, awake and conversational. Dramatic improvement from last 48 hours  Skin: Abdominal rash covered w bandage, left foot wound covered as well    Results Reviewed:  LAB RESULTS:      Lab 24  0547 08/15/24  0455 24  0413 24  0338 24  1349   WBC 8.32 8.53 6.62 7.31 8.23   HEMOGLOBIN 10.0* 9.6* 9.6* 9.4* 11.0*   HEMATOCRIT 32.8* 31.6* 31.7* 30.9* 37.5   PLATELETS 219 200 198 181 228   NEUTROS ABS  --  6.45 4.45 5.62 6.23   IMMATURE GRANS (ABS)  --  0.03 0.02 0.02 0.02   LYMPHS ABS  --  1.06 1.30 0.94 1.09   MONOS ABS  --  0.83 0.66 0.62 0.73   EOS ABS  --  0.13 0.16 0.08 0.14   MCV 85.0 85.4 85.0  86.3 88.9   PROCALCITONIN  --   --   --   --  0.10   LACTATE  --   --   --   --  0.9   PROTIME 14.4  --   --   --   --          Lab 08/16/24  0547 08/15/24  0455 08/14/24  0413 08/13/24  0338 08/12/24  1349   SODIUM 135* 135* 135* 136 134*   POTASSIUM 5.0 4.8 4.8 5.1 5.4*   CHLORIDE 98 99 100 104 100   CO2 28.0 28.0 25.0 26.0 26.0   ANION GAP 9.0 8.0 10.0 6.0 8.0   BUN 22 21 22 24* 24*   CREATININE 1.15 1.23 1.26 1.23 1.31*   EGFR 68.9 63.6 61.7 63.6 58.9*   GLUCOSE 138* 111* 111* 184* 119*   CALCIUM 8.7 8.6 8.5* 8.3* 8.8   MAGNESIUM  --   --   --   --  1.8   HEMOGLOBIN A1C  --   --   --  7.60*  --    TSH  --  5.230*  --   --   --          Lab 08/16/24  0547 08/12/24  1349   TOTAL PROTEIN 6.8 7.5   ALBUMIN 3.6 3.9   GLOBULIN 3.2 3.6   ALT (SGPT) 21 27   AST (SGOT) 21 18   BILIRUBIN 0.4 0.3   ALK PHOS 137* 171*         Lab 08/16/24  0547 08/15/24  0455 08/12/24  1646 08/12/24  1349   PROBNP  --  1,234.0*  --  1,567.0*   HSTROP T  --   --  42* 41*   PROTIME 14.4  --   --   --    INR 1.11  --   --   --              Lab 08/15/24  0455   VITAMIN B 12 577         Lab 08/15/24  1428 08/12/24  1530   PH, ARTERIAL  --  7.409   PCO2, ARTERIAL  --  40.2   PO2 ART  --  72.0*   FIO2 21 21   HCO3 ART  --  25.4   BASE EXCESS ART  --  0.7   CARBOXYHEMOGLOBIN  --  0.7   CARBOXYHEMOGLOBIN (VENOUS) 0.9  --      Brief Urine Lab Results  (Last result in the past 365 days)        Color   Clarity   Blood   Leuk Est   Nitrite   Protein   CREAT   Urine HCG        08/12/24 1339 Yellow   Clear   Negative   Negative   Negative   Negative                   Microbiology Results Abnormal       Procedure Component Value - Date/Time    Blood Culture - Blood, Arm, Right [[id number]]  (Normal) Collected: 08/12/24 1349    Lab Status: Preliminary result Specimen: Blood from Arm, Right Updated: 08/16/24 1415     Blood Culture No growth at 4 days    Blood Culture - Blood, Arm, Left [[id number]]  (Normal) Collected: 08/12/24 1349    Lab Status: Preliminary  result Specimen: Blood from Arm, Left Updated: 08/16/24 1415     Blood Culture No growth at 4 days    COVID PRE-OP / PRE-PROCEDURE SCREENING ORDER (NO ISOLATION) - Swab, Nasopharynx [[id number]]  (Normal) Collected: 08/12/24 1345    Lab Status: Final result Specimen: Swab from Nasopharynx Updated: 08/12/24 1423    Narrative:      The following orders were created for panel order COVID PRE-OP / PRE-PROCEDURE SCREENING ORDER (NO ISOLATION) - Swab, Nasopharynx.  Procedure                               Abnormality         Status                     ---------                               -----------         ------                     COVID-19 and FLU A/B PCR...[[id number]]  Normal              Final result                 Please view results for these tests on the individual orders.    COVID-19 and FLU A/B PCR, 1 HR TAT - Swab, Nasopharynx [[id number]]  (Normal) Collected: 08/12/24 1345    Lab Status: Final result Specimen: Swab from Nasopharynx Updated: 08/12/24 1423     COVID19 Not Detected     Influenza A PCR Not Detected     Influenza B PCR Not Detected    Narrative:      Fact sheet for providers: https://www.fda.gov/media/804358/download    Fact sheet for patients: https://www.fda.gov/media/239912/download    Test performed by PCR.            EEG    Result Date: 8/16/2024  History: 69 y old male Procedure: A 21 Channel digital electroencephalogram was performed in the Clinical Neurophysiology Laboratory. The 10/20 International System of electrode placement was used and both Bipolar and referential electrode montages were monitored. EEG description: This EEG is continuous and symmetric. During awake state the back Background  consists if  generalized delta activity with intermixed  alpha and theta activity. During the awake state with the eyes closed, there is a posterior dominant rhythm of  7-8 Hz that is symmetrical and reacts appropriately to eye opening. Anterior to posterior amplitude frequency gradient is  preserved. Photic stimulation using a step wise increase in photic  frequency showed no driving or activation of any epileptiform activity. EEG  Interpretation: This EEG is abnormal due to slow background activity Clinical correlation : This EEG suggest diagnosis of encephalopathy of unspecific etiology.  Clinical correlation is recommended      Results for orders placed during the hospital encounter of 08/12/24    Adult Transthoracic Echo Complete W/ Cont if Necessary Per Protocol    Interpretation Summary    Left ventricular systolic function is normal. Left ventricular ejection fraction appears to be 51 - 55%.    Left ventricular diastolic function was normal.    The left atrial cavity is mildly dilated.    The right atrial cavity is mildly  dilated.    There is severe calcification of the aortic valve.    Moderate mitral valve regurgitation is present.    Estimated right ventricular systolic pressure from tricuspid regurgitation is mildly elevated (35-45 mmHg).    Mitral valve regurgitation is worsened since 2017 but otherwise unchanged.      Current medications:  Scheduled Meds:aspirin, 325 mg, Oral, Daily  atorvastatin, 80 mg, Oral, Daily  buPROPion XL, 300 mg, Oral, Daily  escitalopram, 20 mg, Oral, Daily  ferrous sulfate, 325 mg, Oral, Daily With Breakfast  furosemide, 40 mg, Oral, Daily  [Held by provider] gabapentin, 100 mg, Oral, Nightly  heparin (porcine), 5,000 Units, Subcutaneous, Q12H  insulin glargine, 15 Units, Subcutaneous, BID  insulin lispro, 3-14 Units, Subcutaneous, TID With Meals  lactulose, 30 g, Oral, TID  pantoprazole, 40 mg, Oral, Daily  pharmacy consult - MTM, , Does not apply, Daily  [Held by provider] QUEtiapine, 12.5 mg, Oral, BID  sirolimus, 2 mg, Oral, Daily  sodium chloride, 10 mL, Intravenous, Q12H  tamsulosin, 0.4 mg, Oral, Daily      Continuous Infusions:   PRN Meds:.  dextrose    dextrose    glucagon (human recombinant)    [Held by provider] HYDROcodone-acetaminophen     melatonin    nitroglycerin    sodium chloride    sodium chloride    sodium chloride    Assessment & Plan   Assessment & Plan     Active Hospital Problems    Diagnosis  POA    **CHF (congestive heart failure) [I50.9]  Yes    Wound of abdomen [S31.109A]  Yes    Hyperkalemia [E87.5]  Yes    LUZMARIA (acute kidney injury) [N17.9]  Yes    Obstructive sleep apnea syndrome [G47.33]  Yes    Essential hypertension [I10]  Yes    Hyperlipidemia LDL goal <70 [E78.5]  Yes    Hx of liver transplant [Z94.4]  Not Applicable    Type 2 diabetes mellitus without complication, with long-term current use of insulin [E11.9, Z79.4]  Not Applicable    Coronary artery disease involving coronary bypass graft of native heart without angina pectoris [I25.810]  Yes      Resolved Hospital Problems   No resolved problems to display.        Brief Hospital Course to date:  Quirino Sheppard is a 69 y.o. male w CAD s/p CABG, DMII, h/o liver transplant, AURELIANO without home CPAP, decline since 4/2024 toe amputation, chronic wounds, vascular dementia per family report who presents w 30-40 lb weight gain and shortness of breath.   Stay c/b very excessive sleepiness 8/13-8/15 w complete difference 8/16 w only holding small doses home gabapentin + seroquel and 1-2 hours on cpap. Unclear if causative v spontaneous improvement. Work-up for excessive sleepiness to now unrevealing.  Previously lived at senior living w home health/wound support and wheelchair bound state, help from family    Excessive sleepiness - resolved 8/16  -vbg normal, prior blood gas also normal, free T4 normal, B12 wnl, EEG w encephalopathy only  -CT head wnl, MRI could not tolerate now awake/alert  -unclear etiology: uncontrolled AURELIANO v med effect (though doses appear so small?)  -will monitor for next couple of days with repeat PT/OT assessment as if improved could likely return to senior living and now mental status so much improved    Untreated AURELIANO - working on OP CPAP, tolerating hospital  machine minimally    Acute on chronic CHFpEF  -only on home spironolactone it appears, ECHO stable from 2017  -per family dry weight ~180 lbs, now up to 230 lbs  -diuresed well with IV diuretics  -transitioned to PO, will f/u weight + I&Os for effectiveness. Will take significant time for total improvement    Left chronic foot wound   Chronic abdominal wound, appears 2/2 excoriation   -follows biweekly w OP wound clinic  -does not appear infected on visualization. Therefore will not treat culture as likely colonization and not wound infection on review. OP close f/u     Vascular dementia, appears worsened in recent months  DMII - on basal insulin BID w good control here  H/o liver transplant  Chronic debility - worry patient is moving toward [facility] level of care  BMI 37    Expected Discharge Location and Transportation: TBD  Expected Discharge 8/20 (Discharge date is tentative pending patient's medical condition and is subject to change)  Expected Discharge Date: 8/20/2024; Expected Discharge Time:      VTE Prophylaxis:  Pharmacologic VTE prophylaxis orders are present.         AM-PAC 6 Clicks Score (PT): 9 (08/15/24 2100)    CODE STATUS:   Code Status and Medical Interventions: CPR (Attempt to Resuscitate); Full Support   Ordered at: 08/12/24 2010     Code Status (Patient has no pulse and is not breathing):    CPR (Attempt to Resuscitate)     Medical Interventions (Patient has pulse or is breathing):    Full Support       Marilyn Pham MD  08/16/24

## 2024-08-17 LAB
BACTERIA SPEC AEROBE CULT: NORMAL
BACTERIA SPEC AEROBE CULT: NORMAL
GLUCOSE BLDC GLUCOMTR-MCNC: 115 MG/DL (ref 70–130)
GLUCOSE BLDC GLUCOMTR-MCNC: 120 MG/DL (ref 70–130)
GLUCOSE BLDC GLUCOMTR-MCNC: 260 MG/DL (ref 70–130)
GLUCOSE BLDC GLUCOMTR-MCNC: 282 MG/DL (ref 70–130)
QT INTERVAL: 442 MS
QT INTERVAL: 460 MS
QTC INTERVAL: 477 MS
QTC INTERVAL: 499 MS

## 2024-08-17 PROCEDURE — 94799 UNLISTED PULMONARY SVC/PX: CPT

## 2024-08-17 PROCEDURE — 94660 CPAP INITIATION&MGMT: CPT

## 2024-08-17 PROCEDURE — 63710000001 INSULIN LISPRO (HUMAN) PER 5 UNITS: Performed by: PHYSICIAN ASSISTANT

## 2024-08-17 PROCEDURE — 99232 SBSQ HOSP IP/OBS MODERATE 35: CPT | Performed by: INTERNAL MEDICINE

## 2024-08-17 PROCEDURE — 82948 REAGENT STRIP/BLOOD GLUCOSE: CPT

## 2024-08-17 PROCEDURE — 63710000001 SIROLIMUS PER 1 MG: Performed by: PHYSICIAN ASSISTANT

## 2024-08-17 PROCEDURE — 63710000001 INSULIN GLARGINE PER 5 UNITS: Performed by: INTERNAL MEDICINE

## 2024-08-17 PROCEDURE — 25010000002 HEPARIN (PORCINE) PER 1000 UNITS: Performed by: PHYSICIAN ASSISTANT

## 2024-08-17 RX ORDER — QUETIAPINE FUMARATE 25 MG/1
12.5 TABLET, FILM COATED ORAL NIGHTLY
Status: DISCONTINUED | OUTPATIENT
Start: 2024-08-17 | End: 2024-08-18

## 2024-08-17 RX ORDER — NYSTATIN 100000 [USP'U]/G
POWDER TOPICAL EVERY 12 HOURS SCHEDULED
Status: DISCONTINUED | OUTPATIENT
Start: 2024-08-17 | End: 2024-08-22 | Stop reason: HOSPADM

## 2024-08-17 RX ADMIN — Medication 10 ML: at 08:43

## 2024-08-17 RX ADMIN — ESCITALOPRAM OXALATE 20 MG: 20 TABLET, FILM COATED ORAL at 08:41

## 2024-08-17 RX ADMIN — HEPARIN SODIUM 5000 UNITS: 5000 INJECTION INTRAVENOUS; SUBCUTANEOUS at 08:40

## 2024-08-17 RX ADMIN — FUROSEMIDE 40 MG: 40 TABLET ORAL at 08:40

## 2024-08-17 RX ADMIN — INSULIN GLARGINE 15 UNITS: 100 INJECTION, SOLUTION SUBCUTANEOUS at 20:04

## 2024-08-17 RX ADMIN — LACTULOSE 30 G: 10 SOLUTION ORAL at 08:40

## 2024-08-17 RX ADMIN — INSULIN GLARGINE 15 UNITS: 100 INJECTION, SOLUTION SUBCUTANEOUS at 08:40

## 2024-08-17 RX ADMIN — NYSTATIN: 100000 POWDER TOPICAL at 20:04

## 2024-08-17 RX ADMIN — Medication 5 MG: at 20:04

## 2024-08-17 RX ADMIN — Medication 10 ML: at 20:05

## 2024-08-17 RX ADMIN — PANTOPRAZOLE SODIUM 40 MG: 40 TABLET, DELAYED RELEASE ORAL at 08:41

## 2024-08-17 RX ADMIN — FERROUS SULFATE TAB 325 MG (65 MG ELEMENTAL FE) 325 MG: 325 (65 FE) TAB at 08:41

## 2024-08-17 RX ADMIN — ATORVASTATIN CALCIUM 80 MG: 40 TABLET, FILM COATED ORAL at 08:41

## 2024-08-17 RX ADMIN — LACTULOSE 30 G: 10 SOLUTION ORAL at 20:04

## 2024-08-17 RX ADMIN — BUPROPION HYDROCHLORIDE 300 MG: 150 TABLET, EXTENDED RELEASE ORAL at 08:40

## 2024-08-17 RX ADMIN — HEPARIN SODIUM 5000 UNITS: 5000 INJECTION INTRAVENOUS; SUBCUTANEOUS at 20:04

## 2024-08-17 RX ADMIN — INSULIN LISPRO 8 UNITS: 100 INJECTION, SOLUTION INTRAVENOUS; SUBCUTANEOUS at 11:54

## 2024-08-17 RX ADMIN — TAMSULOSIN HYDROCHLORIDE 0.4 MG: 0.4 CAPSULE ORAL at 08:40

## 2024-08-17 RX ADMIN — QUETIAPINE FUMARATE 12.5 MG: 25 TABLET ORAL at 20:04

## 2024-08-17 RX ADMIN — ASPIRIN 325 MG: 325 TABLET, COATED ORAL at 08:41

## 2024-08-17 RX ADMIN — SIROLIMUS 2 MG: 1 TABLET ORAL at 08:40

## 2024-08-17 NOTE — PLAN OF CARE
Goal Outcome Evaluation:      VSS overnight with no complaints of pain or discomfort. Patient remained confused (as comparable with baseline and previous assessments). Patient remained on RA-2L NC overnight and attempts to wear CPAP were made several times, with patient removing it each time shortly after putting it back on him. Purewick and pads used to determine urinary output. Patient regularly restless and requires frequent check-ins. Left foot wound care performed. Call light left continuously with patient and bed alarm used constantly when nurse or tech is not in patient's room. No further concerns at this time, will continue to monitor.       Problem: Fall Injury Risk  Goal: Absence of Fall and Fall-Related Injury  Outcome: Ongoing, Progressing  Intervention: Identify and Manage Contributors  Recent Flowsheet Documentation  Taken 8/17/2024 0400 by Zeina Moore RN  Medication Review/Management: medications reviewed  Self-Care Promotion:   independence encouraged   BADL personal objects within reach  Taken 8/17/2024 0200 by Zeina Moore RN  Medication Review/Management: medications reviewed  Taken 8/17/2024 0000 by Zeina Moore RN  Medication Review/Management: medications reviewed  Self-Care Promotion:   independence encouraged   BADL personal objects within reach  Taken 8/16/2024 2200 by Zeina Moore RN  Medication Review/Management: medications reviewed  Taken 8/16/2024 2000 by Zeina Moore RN  Medication Review/Management: medications reviewed  Self-Care Promotion:   independence encouraged   BADL personal objects within reach  Intervention: Promote Injury-Free Environment  Recent Flowsheet Documentation  Taken 8/17/2024 0400 by Zeina Moore, RN  Safety Promotion/Fall Prevention:   toileting scheduled   safety round/check completed   room organization consistent   nonskid shoes/slippers when out of bed   fall prevention program maintained   clutter free environment  maintained  Taken 8/17/2024 0200 by Zeina Moore RN  Safety Promotion/Fall Prevention:   toileting scheduled   safety round/check completed   room organization consistent   nonskid shoes/slippers when out of bed   fall prevention program maintained   clutter free environment maintained  Taken 8/17/2024 0000 by Zeina Moore RN  Safety Promotion/Fall Prevention:   toileting scheduled   safety round/check completed   room organization consistent   nonskid shoes/slippers when out of bed   fall prevention program maintained   clutter free environment maintained  Taken 8/16/2024 2200 by Zeina Moore RN  Safety Promotion/Fall Prevention:   toileting scheduled   safety round/check completed   room organization consistent   nonskid shoes/slippers when out of bed   fall prevention program maintained   clutter free environment maintained  Taken 8/16/2024 2000 by Zeina Moore RN  Safety Promotion/Fall Prevention:   toileting scheduled   safety round/check completed   room organization consistent   nonskid shoes/slippers when out of bed   fall prevention program maintained   clutter free environment maintained     Problem: Adult Inpatient Plan of Care  Goal: Plan of Care Review  Outcome: Ongoing, Progressing  Goal: Patient-Specific Goal (Individualized)  Outcome: Ongoing, Progressing  Goal: Absence of Hospital-Acquired Illness or Injury  Outcome: Ongoing, Progressing  Intervention: Identify and Manage Fall Risk  Recent Flowsheet Documentation  Taken 8/17/2024 0400 by Zeina Moore RN  Safety Promotion/Fall Prevention:   toileting scheduled   safety round/check completed   room organization consistent   nonskid shoes/slippers when out of bed   fall prevention program maintained   clutter free environment maintained  Taken 8/17/2024 0200 by Zeina Moore RN  Safety Promotion/Fall Prevention:   toileting scheduled   safety round/check completed   room organization consistent   nonskid shoes/slippers when  out of bed   fall prevention program maintained   clutter free environment maintained  Taken 8/17/2024 0000 by Zeina Moore RN  Safety Promotion/Fall Prevention:   toileting scheduled   safety round/check completed   room organization consistent   nonskid shoes/slippers when out of bed   fall prevention program maintained   clutter free environment maintained  Taken 8/16/2024 2200 by Zeina Moore RN  Safety Promotion/Fall Prevention:   toileting scheduled   safety round/check completed   room organization consistent   nonskid shoes/slippers when out of bed   fall prevention program maintained   clutter free environment maintained  Taken 8/16/2024 2000 by Zeina Moore RN  Safety Promotion/Fall Prevention:   toileting scheduled   safety round/check completed   room organization consistent   nonskid shoes/slippers when out of bed   fall prevention program maintained   clutter free environment maintained  Intervention: Prevent Skin Injury  Recent Flowsheet Documentation  Taken 8/17/2024 0400 by Zeina Moore RN  Body Position:   weight shifting   upper extremity elevated   lower extremity elevated   position changed independently  Skin Protection:   adhesive use limited   tubing/devices free from skin contact   transparent dressing maintained  Taken 8/17/2024 0200 by Zeina Moore RN  Body Position:   weight shifting   upper extremity elevated   lower extremity elevated   position changed independently  Skin Protection:   adhesive use limited   tubing/devices free from skin contact   transparent dressing maintained  Taken 8/17/2024 0000 by Zeina Moore RN  Body Position:   weight shifting   upper extremity elevated   lower extremity elevated   position changed independently  Skin Protection:   adhesive use limited   tubing/devices free from skin contact   transparent dressing maintained  Taken 8/16/2024 2200 by Zeina Moore RN  Body Position:   weight shifting   upper extremity  elevated   lower extremity elevated   position changed independently  Skin Protection:   adhesive use limited   tubing/devices free from skin contact   transparent dressing maintained  Taken 8/16/2024 2000 by Zeina Moore RN  Body Position:   weight shifting   upper extremity elevated   lower extremity elevated   position changed independently  Skin Protection:   adhesive use limited   tubing/devices free from skin contact   transparent dressing maintained  Intervention: Prevent and Manage VTE (Venous Thromboembolism) Risk  Recent Flowsheet Documentation  Taken 8/17/2024 0400 by Zeina Moore RN  Activity Management:   activity encouraged   activity minimized  Taken 8/17/2024 0200 by Zeina Moore RN  Activity Management:   activity encouraged   activity minimized  Taken 8/17/2024 0000 by Zeina Moore RN  Activity Management:   activity encouraged   activity minimized  Taken 8/16/2024 2200 by Zeina Moore RN  Activity Management:   activity encouraged   activity minimized  Taken 8/16/2024 2000 by Zenia Moore RN  Activity Management:   activity encouraged   activity minimized  VTE Prevention/Management: (see MAR) other (see comments)  Range of Motion:   active ROM (range of motion) encouraged   ROM (range of motion) performed  Intervention: Prevent Infection  Recent Flowsheet Documentation  Taken 8/17/2024 0400 by Zeina Moore RN  Infection Prevention:   environmental surveillance performed   hand hygiene promoted   personal protective equipment utilized   rest/sleep promoted   single patient room provided  Taken 8/17/2024 0200 by Zeina Moore RN  Infection Prevention:   environmental surveillance performed   hand hygiene promoted   personal protective equipment utilized   rest/sleep promoted   single patient room provided  Taken 8/17/2024 0000 by Zeina Moore RN  Infection Prevention:   environmental surveillance performed   hand hygiene promoted   personal protective  equipment utilized   rest/sleep promoted   single patient room provided  Taken 8/16/2024 2200 by Zeina Moore RN  Infection Prevention:   environmental surveillance performed   hand hygiene promoted   personal protective equipment utilized   rest/sleep promoted   single patient room provided  Taken 8/16/2024 2000 by Zeina Moore RN  Infection Prevention:   environmental surveillance performed   hand hygiene promoted   personal protective equipment utilized   rest/sleep promoted   single patient room provided  Goal: Optimal Comfort and Wellbeing  Outcome: Ongoing, Progressing  Intervention: Provide Person-Centered Care  Recent Flowsheet Documentation  Taken 8/17/2024 0400 by Zeina Moore RN  Trust Relationship/Rapport:   care explained   choices provided   emotional support provided   empathic listening provided   questions answered   questions encouraged   reassurance provided   thoughts/feelings acknowledged  Taken 8/17/2024 0000 by Zeina Moore RN  Trust Relationship/Rapport:   care explained   choices provided   emotional support provided   questions encouraged   questions answered   empathic listening provided   reassurance provided   thoughts/feelings acknowledged  Taken 8/16/2024 2000 by Zeina Moore RN  Trust Relationship/Rapport:   care explained   choices provided   emotional support provided   empathic listening provided   questions answered   thoughts/feelings acknowledged   reassurance provided   questions encouraged  Goal: Readiness for Transition of Care  Outcome: Ongoing, Progressing     Problem: Skin Injury Risk Increased  Goal: Skin Health and Integrity  Outcome: Ongoing, Progressing  Intervention: Promote and Optimize Oral Intake  Recent Flowsheet Documentation  Taken 8/16/2024 2000 by Zeina Moore RN  Oral Nutrition Promotion: rest periods promoted  Intervention: Optimize Skin Protection  Recent Flowsheet Documentation  Taken 8/17/2024 0400 by Zeina Moore  RN  Pressure Reduction Techniques:   frequent weight shift encouraged   heels elevated off bed   rest period provided between sit times   weight shift assistance provided  Head of Bed ([facility] [place]) Positioning: [facility] [place] elevated  Pressure Reduction Devices:   positioning supports utilized   heel offloading device utilized   foam padding utilized  Skin Protection:   adhesive use limited   tubing/devices free from skin contact   transparent dressing maintained  Taken 8/17/2024 0200 by Ziena Moore RN  Pressure Reduction Techniques:   frequent weight shift encouraged   heels elevated off bed   rest period provided between sit times   weight shift assistance provided  Head of Bed ([facility] [place]) Positioning: [facility] [place] elevated  Pressure Reduction Devices:   positioning supports utilized   heel offloading device utilized   foam padding utilized  Skin Protection:   adhesive use limited   tubing/devices free from skin contact   transparent dressing maintained  Taken 8/17/2024 0000 by Zeina Moore RN  Pressure Reduction Techniques:   frequent weight shift encouraged   heels elevated off bed   rest period provided between sit times   weight shift assistance provided  Head of Bed ([facility] [place]) Positioning: [facility] [place] elevated  Pressure Reduction Devices:   positioning supports utilized   heel offloading device utilized   foam padding utilized  Skin Protection:   adhesive use limited   tubing/devices free from skin contact   transparent dressing maintained  Taken 8/16/2024 2200 by Zeina Moore RN  Pressure Reduction Techniques:   frequent weight shift encouraged   heels elevated off bed   rest period provided between sit times   weight shift assistance provided  Head of Bed ([facility] [place]) Positioning: [facility] [place] elevated  Pressure Reduction Devices:   positioning supports utilized   heel offloading device utilized   foam padding utilized  Skin Protection:   adhesive use limited   tubing/devices free from skin contact   transparent dressing maintained  Taken 8/16/2024  2000 by Zeina Moore RN  Pressure Reduction Techniques:   frequent weight shift encouraged   heels elevated off bed   rest period provided between sit times   weight shift assistance provided  Head of Bed (HOB) Positioning: HOB elevated  Pressure Reduction Devices:   positioning supports utilized   heel offloading device utilized   foam padding utilized  Skin Protection:   adhesive use limited   tubing/devices free from skin contact   transparent dressing maintained

## 2024-08-17 NOTE — PROGRESS NOTES
[facility] Medicine Services  PROGRESS NOTE    Patient Name: Quirino Sheppard  : 1955  MRN: [id number]    Date of Admission: 2024  Primary Care Physician: Andrew Fernandez MD    Subjective   Subjective     CC: CHFpEF exacerbation    HPI:  Up and awake - poor sleep overnight  Not able to sleep well or tolerate CPAP  No complaints this morning  Hopeful for back to senior living if able    Objective   Objective     Vital Signs:   Temp:  [97.8 °F (36.6 °C)-98.5 °F (36.9 °C)] 98.5 °F (36.9 °C)  Heart Rate:  [76-82] 82  Resp:  [18] 18  BP: (134-154)/(70-87) 147/87  Flow (L/min):  [2] 2     Physical Exam:  Constitutional: No acute distress, awake, alert male sitting up in bed, chatty  HENT: NCAT, mucous membranes moist, bilateral eye discharge without conjunctivitis reassuringly  Respiratory: Clear to auscultation bilaterally, respiratory effort normal   Cardiovascular: RRR, no murmurs, rubs, or gallops  Gastrointestinal: Soft, nontender, nondistended  Musculoskeletal: Left foot wound wrapped, sp amputation toes on that foot  Psychiatric: Awake and alert, conversational calm and pleasant  Neurologic: Alert, awake and conversational. Dramatic improvement from last 48 hours  Skin: Abdominal rash covered w bandage, left foot wound covered as well    Results Reviewed:  LAB RESULTS:      Lab 24  0547 08/15/24  0455 24  0413 24  0338 24  1349   WBC 8.32 8.53 6.62 7.31 8.23   HEMOGLOBIN 10.0* 9.6* 9.6* 9.4* 11.0*   HEMATOCRIT 32.8* 31.6* 31.7* 30.9* 37.5   PLATELETS 219 200 198 181 228   NEUTROS ABS  --  6.45 4.45 5.62 6.23   IMMATURE GRANS (ABS)  --  0.03 0.02 0.02 0.02   LYMPHS ABS  --  1.06 1.30 0.94 1.09   MONOS ABS  --  0.83 0.66 0.62 0.73   EOS ABS  --  0.13 0.16 0.08 0.14   MCV 85.0 85.4 85.0 86.3 88.9   PROCALCITONIN  --   --   --   --  0.10   LACTATE  --   --   --   --  0.9   PROTIME 14.4  --   --   --   --          Lab 24  0547 08/15/24  0455  08/14/24  0413 08/13/24  0338 08/12/24  1349   SODIUM 135* 135* 135* 136 134*   POTASSIUM 5.0 4.8 4.8 5.1 5.4*   CHLORIDE 98 99 100 104 100   CO2 28.0 28.0 25.0 26.0 26.0   ANION GAP 9.0 8.0 10.0 6.0 8.0   BUN 22 21 22 24* 24*   CREATININE 1.15 1.23 1.26 1.23 1.31*   EGFR 68.9 63.6 61.7 63.6 58.9*   GLUCOSE 138* 111* 111* 184* 119*   CALCIUM 8.7 8.6 8.5* 8.3* 8.8   MAGNESIUM  --   --   --   --  1.8   HEMOGLOBIN A1C  --   --   --  7.60*  --    TSH  --  5.230*  --   --   --          Lab 08/16/24  0547 08/12/24  1349   TOTAL PROTEIN 6.8 7.5   ALBUMIN 3.6 3.9   GLOBULIN 3.2 3.6   ALT (SGPT) 21 27   AST (SGOT) 21 18   BILIRUBIN 0.4 0.3   ALK PHOS 137* 171*         Lab 08/16/24  0547 08/15/24  0455 08/12/24  1646 08/12/24  1349   PROBNP  --  1,234.0*  --  1,567.0*   HSTROP T  --   --  42* 41*   PROTIME 14.4  --   --   --    INR 1.11  --   --   --              Lab 08/15/24  0455   VITAMIN B 12 577         Lab 08/15/24  1428 08/12/24  1530   PH, ARTERIAL  --  7.409   PCO2, ARTERIAL  --  40.2   PO2 ART  --  72.0*   FIO2 21 21   HCO3 ART  --  25.4   BASE EXCESS ART  --  0.7   CARBOXYHEMOGLOBIN  --  0.7   CARBOXYHEMOGLOBIN (VENOUS) 0.9  --      Brief Urine Lab Results  (Last result in the past 365 days)        Color   Clarity   Blood   Leuk Est   Nitrite   Protein   CREAT   Urine HCG        08/12/24 1339 Yellow   Clear   Negative   Negative   Negative   Negative                   Microbiology Results Abnormal       Procedure Component Value - Date/Time    Blood Culture - Blood, Arm, Right [[id number]]  (Normal) Collected: 08/12/24 1349    Lab Status: Preliminary result Specimen: Blood from Arm, Right Updated: 08/16/24 1415     Blood Culture No growth at 4 days    Blood Culture - Blood, Arm, Left [[id number]]  (Normal) Collected: 08/12/24 1349    Lab Status: Preliminary result Specimen: Blood from Arm, Left Updated: 08/16/24 1415     Blood Culture No growth at 4 days    COVID PRE-OP / PRE-PROCEDURE SCREENING ORDER (NO ISOLATION)  - Swab, Nasopharynx [[id number]]  (Normal) Collected: 08/12/24 1345    Lab Status: Final result Specimen: Swab from Nasopharynx Updated: 08/12/24 1423    Narrative:      The following orders were created for panel order COVID PRE-OP / PRE-PROCEDURE SCREENING ORDER (NO ISOLATION) - Swab, Nasopharynx.  Procedure                               Abnormality         Status                     ---------                               -----------         ------                     COVID-19 and FLU A/B PCR...[[id number]]  Normal              Final result                 Please view results for these tests on the individual orders.    COVID-19 and FLU A/B PCR, 1 HR TAT - Swab, Nasopharynx [[id number]]  (Normal) Collected: 08/12/24 1345    Lab Status: Final result Specimen: Swab from Nasopharynx Updated: 08/12/24 1423     COVID19 Not Detected     Influenza A PCR Not Detected     Influenza B PCR Not Detected    Narrative:      Fact sheet for providers: https://www.fda.gov/media/810761/download    Fact sheet for patients: https://www.fda.gov/media/251114/download    Test performed by PCR.            EEG    Result Date: 8/16/2024  History: 69 y old male Procedure: A 21 Channel digital electroencephalogram was performed in the Clinical Neurophysiology Laboratory. The 10/20 International System of electrode placement was used and both Bipolar and referential electrode montages were monitored. EEG description: This EEG is continuous and symmetric. During awake state the back Background  consists if  generalized delta activity with intermixed  alpha and theta activity. During the awake state with the eyes closed, there is a posterior dominant rhythm of  7-8 Hz that is symmetrical and reacts appropriately to eye opening. Anterior to posterior amplitude frequency gradient is preserved. Photic stimulation using a step wise increase in photic  frequency showed no driving or activation of any epileptiform activity. EEG  Interpretation: This  EEG is abnormal due to slow background activity Clinical correlation : This EEG suggest diagnosis of encephalopathy of unspecific etiology.  Clinical correlation is recommended      Results for orders placed during the hospital encounter of 08/12/24    Adult Transthoracic Echo Complete W/ Cont if Necessary Per Protocol    Interpretation Summary    Left ventricular systolic function is normal. Left ventricular ejection fraction appears to be 51 - 55%.    Left ventricular diastolic function was normal.    The left atrial cavity is mildly dilated.    The right atrial cavity is mildly  dilated.    There is severe calcification of the aortic valve.    Moderate mitral valve regurgitation is present.    Estimated right ventricular systolic pressure from tricuspid regurgitation is mildly elevated (35-45 mmHg).    Mitral valve regurgitation is worsened since 2017 but otherwise unchanged.      Current medications:  Scheduled Meds:aspirin, 325 mg, Oral, Daily  atorvastatin, 80 mg, Oral, Daily  buPROPion XL, 300 mg, Oral, Daily  escitalopram, 20 mg, Oral, Daily  ferrous sulfate, 325 mg, Oral, Daily With Breakfast  furosemide, 40 mg, Oral, Daily  [Held by provider] gabapentin, 100 mg, Oral, Nightly  heparin (porcine), 5,000 Units, Subcutaneous, Q12H  insulin glargine, 15 Units, Subcutaneous, BID  insulin lispro, 3-14 Units, Subcutaneous, TID With Meals  lactulose, 30 g, Oral, TID  pantoprazole, 40 mg, Oral, Daily  pharmacy consult - MTM, , Does not apply, Daily  [Held by provider] QUEtiapine, 12.5 mg, Oral, BID  sirolimus, 2 mg, Oral, Daily  sodium chloride, 10 mL, Intravenous, Q12H  tamsulosin, 0.4 mg, Oral, Daily      Continuous Infusions:   PRN Meds:.  dextrose    dextrose    glucagon (human recombinant)    [Held by provider] HYDROcodone-acetaminophen    melatonin    nitroglycerin    sodium chloride    sodium chloride    sodium chloride    Assessment & Plan   Assessment & Plan     Active Hospital Problems    Diagnosis  POA     **CHF (congestive heart failure) [I50.9]  Yes    Wound of abdomen [S31.109A]  Yes    Hyperkalemia [E87.5]  Yes    LUZMARIA (acute kidney injury) [N17.9]  Yes    Obstructive sleep apnea syndrome [G47.33]  Yes    Essential hypertension [I10]  Yes    Hyperlipidemia LDL goal <70 [E78.5]  Yes    Hx of liver transplant [Z94.4]  Not Applicable    Type 2 diabetes mellitus without complication, with long-term current use of insulin [E11.9, Z79.4]  Not Applicable    Coronary artery disease involving coronary bypass graft of native heart without angina pectoris [I25.810]  Yes      Resolved Hospital Problems   No resolved problems to display.        Brief Hospital Course to date:  Quirino Sheppard is a 69 y.o. male w CAD s/p CABG, DMII, h/o liver transplant, AURELIANO without home CPAP, decline since 4/2024 toe amputation, chronic wounds, vascular dementia per family report who presents w 30-40 lb weight gain and shortness of breath.   Stay c/b very excessive sleepiness 8/13-8/15 w complete difference 8/16 w only holding small doses home gabapentin + seroquel and 1-2 hours on cpap. Unclear if causative v spontaneous improvement. Work-up for excessive sleepiness to now unrevealing.  Previously lived at senior living w home health/wound support and wheelchair bound state, help from family    Excessive sleepiness - resolved 8/16 and 8/17  -vbg normal, prior blood gas also normal, free T4 normal, B12 wnl, EEG w encephalopathy only  -CT head wnl, MRI could not tolerate now awake/alert  -unclear etiology: uncontrolled AURELIANO v med effect (though doses appear so small?)  -restart low dose seroquel at night. Continue holding day time dose and gabapentin  -repeat PT/OT assessment since improved to apparent baseline given improvement    Untreated AURELIANO - working on OP CPAP, tolerating hospital machine minimally    Acute on chronic CHFpEF - improving  -only on home spironolactone it appears, ECHO stable from 2017  -per family dry weight ~180 lbs,  up to  230 lbs on admission w improvement w diuresis  -diuresed well with IV diuretics  -transitioned to PO diuretics which are effective - continue OP    Left chronic foot wound   Chronic abdominal wound, appears 2/2 excoriation   -follows biweekly w OP wound clinic w senior facility  -does not appear infected on visualization. Therefore will not treat culture as likely colonization and not wound infection on review. OP close f/u     Vascular dementia, appears worsened in recent months  DMII - on basal insulin BID w good control here  H/o liver transplant  Chronic debility - worry patient is moving toward [facility] level of care  BMI 37    Expected Discharge Location and Transportation: if senior living will allow to return now back at baseline, ideally there  Expected Discharge 8/20 (Discharge date is tentative pending patient's medical condition and is subject to change)  Expected Discharge Date: 8/20/2024; Expected Discharge Time:      VTE Prophylaxis:  Pharmacologic VTE prophylaxis orders are present.         AM-PAC 6 Clicks Score (PT): 9 (08/16/24 2000)    CODE STATUS:   Code Status and Medical Interventions: CPR (Attempt to Resuscitate); Full Support   Ordered at: 08/12/24 2010     Code Status (Patient has no pulse and is not breathing):    CPR (Attempt to Resuscitate)     Medical Interventions (Patient has pulse or is breathing):    Full Support       Marilyn Pham MD  08/17/24

## 2024-08-18 LAB
GLUCOSE BLDC GLUCOMTR-MCNC: 140 MG/DL (ref 70–130)
GLUCOSE BLDC GLUCOMTR-MCNC: 159 MG/DL (ref 70–130)
GLUCOSE BLDC GLUCOMTR-MCNC: 226 MG/DL (ref 70–130)
GLUCOSE BLDC GLUCOMTR-MCNC: 95 MG/DL (ref 70–130)

## 2024-08-18 PROCEDURE — 25010000002 HEPARIN (PORCINE) PER 1000 UNITS: Performed by: PHYSICIAN ASSISTANT

## 2024-08-18 PROCEDURE — 99232 SBSQ HOSP IP/OBS MODERATE 35: CPT | Performed by: INTERNAL MEDICINE

## 2024-08-18 PROCEDURE — 63710000001 INSULIN GLARGINE PER 5 UNITS: Performed by: INTERNAL MEDICINE

## 2024-08-18 PROCEDURE — 63710000001 SIROLIMUS PER 1 MG: Performed by: PHYSICIAN ASSISTANT

## 2024-08-18 PROCEDURE — 82948 REAGENT STRIP/BLOOD GLUCOSE: CPT

## 2024-08-18 PROCEDURE — 63710000001 INSULIN LISPRO (HUMAN) PER 5 UNITS: Performed by: PHYSICIAN ASSISTANT

## 2024-08-18 RX ADMIN — FERROUS SULFATE TAB 325 MG (65 MG ELEMENTAL FE) 325 MG: 325 (65 FE) TAB at 09:00

## 2024-08-18 RX ADMIN — NYSTATIN: 100000 POWDER TOPICAL at 09:00

## 2024-08-18 RX ADMIN — NYSTATIN: 100000 POWDER TOPICAL at 21:00

## 2024-08-18 RX ADMIN — INSULIN LISPRO 3 UNITS: 100 INJECTION, SOLUTION INTRAVENOUS; SUBCUTANEOUS at 12:30

## 2024-08-18 RX ADMIN — INSULIN GLARGINE 15 UNITS: 100 INJECTION, SOLUTION SUBCUTANEOUS at 08:59

## 2024-08-18 RX ADMIN — BUPROPION HYDROCHLORIDE 300 MG: 150 TABLET, EXTENDED RELEASE ORAL at 08:59

## 2024-08-18 RX ADMIN — Medication 10 ML: at 09:00

## 2024-08-18 RX ADMIN — SIROLIMUS 2 MG: 1 TABLET ORAL at 08:59

## 2024-08-18 RX ADMIN — INSULIN GLARGINE 15 UNITS: 100 INJECTION, SOLUTION SUBCUTANEOUS at 21:42

## 2024-08-18 RX ADMIN — PANTOPRAZOLE SODIUM 40 MG: 40 TABLET, DELAYED RELEASE ORAL at 08:59

## 2024-08-18 RX ADMIN — Medication 10 ML: at 21:42

## 2024-08-18 RX ADMIN — LACTULOSE 30 G: 10 SOLUTION ORAL at 08:59

## 2024-08-18 RX ADMIN — ATORVASTATIN CALCIUM 80 MG: 40 TABLET, FILM COATED ORAL at 08:59

## 2024-08-18 RX ADMIN — HEPARIN SODIUM 5000 UNITS: 5000 INJECTION INTRAVENOUS; SUBCUTANEOUS at 08:59

## 2024-08-18 RX ADMIN — ESCITALOPRAM OXALATE 20 MG: 20 TABLET, FILM COATED ORAL at 08:59

## 2024-08-18 RX ADMIN — LACTULOSE 30 G: 10 SOLUTION ORAL at 21:42

## 2024-08-18 RX ADMIN — HEPARIN SODIUM 5000 UNITS: 5000 INJECTION INTRAVENOUS; SUBCUTANEOUS at 21:42

## 2024-08-18 RX ADMIN — ASPIRIN 325 MG: 325 TABLET, COATED ORAL at 08:59

## 2024-08-18 RX ADMIN — LACTULOSE 30 G: 10 SOLUTION ORAL at 16:50

## 2024-08-18 RX ADMIN — TAMSULOSIN HYDROCHLORIDE 0.4 MG: 0.4 CAPSULE ORAL at 09:03

## 2024-08-18 RX ADMIN — FUROSEMIDE 40 MG: 40 TABLET ORAL at 09:03

## 2024-08-18 NOTE — PLAN OF CARE
Goal Outcome Evaluation:      VSS overnight with no complaints of pain or discomfort. Patient remained intermittently confused (as comparable with baseline and previous assessments, especially on previous night shifts). Patient remained on RA-2L NC overnight and CPAP worn for a few hours in the beginning of the shift until removed by patient. Pads used to determine urinary output since patient continues to pull off purwick. Patient regularly restless and requires frequent check-ins. Left foot wound care performed as well as abdominal wound care due to patient removing abdominal dressing despite frequent education and reorientation. Patient became increasingly aggressive overnight. Call light left continuously with patient and bed alarm used constantly when nurse or tech is not in patient's room. No further concerns at this time, will continue to monitor.        Problem: Fall Injury Risk  Goal: Absence of Fall and Fall-Related Injury  Outcome: Ongoing, Progressing  Intervention: Identify and Manage Contributors  Recent Flowsheet Documentation  Taken 8/18/2024 0400 by Zeina Moore RN  Medication Review/Management: medications reviewed  Self-Care Promotion:   independence encouraged   BADL personal objects within reach  Taken 8/18/2024 0200 by Zeina Moore RN  Medication Review/Management: medications reviewed  Taken 8/18/2024 0000 by Zeina Moore RN  Medication Review/Management: medications reviewed  Self-Care Promotion:   independence encouraged   BADL personal objects within reach  Taken 8/17/2024 2200 by Zeina Moore RN  Medication Review/Management: medications reviewed  Taken 8/17/2024 2000 by Zeina Moore RN  Medication Review/Management: medications reviewed  Self-Care Promotion:   independence encouraged   BADL personal objects within reach  Intervention: Promote Injury-Free Environment  Recent Flowsheet Documentation  Taken 8/18/2024 0400 by Zeina Moore RN  Safety  Promotion/Fall Prevention:   toileting scheduled   safety round/check completed   room organization consistent   nonskid shoes/slippers when out of bed   fall prevention program maintained   clutter free environment maintained  Taken 8/18/2024 0200 by Zeina Moore RN  Safety Promotion/Fall Prevention:   toileting scheduled   safety round/check completed   room organization consistent   nonskid shoes/slippers when out of bed   fall prevention program maintained   clutter free environment maintained  Taken 8/18/2024 0000 by Zeina Moore RN  Safety Promotion/Fall Prevention:   toileting scheduled   safety round/check completed   room organization consistent   nonskid shoes/slippers when out of bed   fall prevention program maintained   clutter free environment maintained  Taken 8/17/2024 2200 by Zeina Moore RN  Safety Promotion/Fall Prevention:   toileting scheduled   safety round/check completed   room organization consistent   nonskid shoes/slippers when out of bed   fall prevention program maintained   clutter free environment maintained  Taken 8/17/2024 2000 by Zeina Moore RN  Safety Promotion/Fall Prevention:   toileting scheduled   safety round/check completed   room organization consistent   nonskid shoes/slippers when out of bed   fall prevention program maintained   clutter free environment maintained     Problem: Adult Inpatient Plan of Care  Goal: Plan of Care Review  Outcome: Ongoing, Progressing  Goal: Patient-Specific Goal (Individualized)  Outcome: Ongoing, Progressing  Goal: Absence of Hospital-Acquired Illness or Injury  Outcome: Ongoing, Progressing  Intervention: Identify and Manage Fall Risk  Recent Flowsheet Documentation  Taken 8/18/2024 0400 by Zeina Moore RN  Safety Promotion/Fall Prevention:   toileting scheduled   safety round/check completed   room organization consistent   nonskid shoes/slippers when out of bed   fall prevention program maintained   clutter free  environment maintained  Taken 8/18/2024 0200 by Zeina Moore RN  Safety Promotion/Fall Prevention:   toileting scheduled   safety round/check completed   room organization consistent   nonskid shoes/slippers when out of bed   fall prevention program maintained   clutter free environment maintained  Taken 8/18/2024 0000 by Zeina Moore RN  Safety Promotion/Fall Prevention:   toileting scheduled   safety round/check completed   room organization consistent   nonskid shoes/slippers when out of bed   fall prevention program maintained   clutter free environment maintained  Taken 8/17/2024 2200 by Zeina Moore RN  Safety Promotion/Fall Prevention:   toileting scheduled   safety round/check completed   room organization consistent   nonskid shoes/slippers when out of bed   fall prevention program maintained   clutter free environment maintained  Taken 8/17/2024 2000 by Zeina Moore RN  Safety Promotion/Fall Prevention:   toileting scheduled   safety round/check completed   room organization consistent   nonskid shoes/slippers when out of bed   fall prevention program maintained   clutter free environment maintained  Intervention: Prevent Skin Injury  Recent Flowsheet Documentation  Taken 8/18/2024 0400 by Zeina Moore RN  Body Position:   weight shifting   upper extremity elevated   lower extremity elevated   position changed independently  Skin Protection:   adhesive use limited   tubing/devices free from skin contact   transparent dressing maintained  Taken 8/18/2024 0200 by Zeina Moore RN  Body Position:   weight shifting   upper extremity elevated   lower extremity elevated   position changed independently  Skin Protection:   adhesive use limited   tubing/devices free from skin contact   transparent dressing maintained  Taken 8/18/2024 0000 by Zeina Moore RN  Body Position:   weight shifting   upper extremity elevated   lower extremity elevated   position changed  independently  Skin Protection:   adhesive use limited   tubing/devices free from skin contact   transparent dressing maintained  Taken 8/17/2024 2200 by Zeina Moore RN  Body Position:   weight shifting   upper extremity elevated   lower extremity elevated   position changed independently  Skin Protection:   adhesive use limited   tubing/devices free from skin contact   transparent dressing maintained  Taken 8/17/2024 2000 by Zeina Moore RN  Body Position:   weight shifting   upper extremity elevated   lower extremity elevated   position changed independently  Skin Protection:   adhesive use limited   tubing/devices free from skin contact   transparent dressing maintained  Intervention: Prevent and Manage VTE (Venous Thromboembolism) Risk  Recent Flowsheet Documentation  Taken 8/18/2024 0400 by Zeina Moore RN  Activity Management:   activity encouraged   activity minimized  Range of Motion:   active ROM (range of motion) encouraged   ROM (range of motion) performed  Taken 8/18/2024 0200 by Zeina Moore RN  Activity Management:   activity encouraged   activity minimized  Taken 8/18/2024 0000 by Zeina Moore RN  Activity Management:   activity encouraged   activity minimized  Range of Motion:   active ROM (range of motion) encouraged   ROM (range of motion) performed  Taken 8/17/2024 2200 by Zeina Moore RN  Activity Management:   activity encouraged   activity minimized  Taken 8/17/2024 2000 by Zeina Moore RN  Activity Management:   activity encouraged   activity minimized  VTE Prevention/Management: (see MAR) other (see comments)  Range of Motion:   active ROM (range of motion) encouraged   ROM (range of motion) performed  Intervention: Prevent Infection  Recent Flowsheet Documentation  Taken 8/18/2024 0400 by Zeina Moore RN  Infection Prevention:   environmental surveillance performed   hand hygiene promoted   personal protective equipment utilized   rest/sleep  promoted   single patient room provided  Taken 8/18/2024 0200 by Zeina Moore RN  Infection Prevention:   environmental surveillance performed   hand hygiene promoted   personal protective equipment utilized   rest/sleep promoted   single patient room provided  Taken 8/18/2024 0000 by Zeina Moore RN  Infection Prevention:   environmental surveillance performed   hand hygiene promoted   personal protective equipment utilized   rest/sleep promoted   single patient room provided  Taken 8/17/2024 2200 by Zeina Moore RN  Infection Prevention:   environmental surveillance performed   hand hygiene promoted   personal protective equipment utilized   rest/sleep promoted   single patient room provided  Taken 8/17/2024 2000 by Zeina Moore RN  Infection Prevention:   environmental surveillance performed   hand hygiene promoted   personal protective equipment utilized   rest/sleep promoted   single patient room provided  Goal: Optimal Comfort and Wellbeing  Outcome: Ongoing, Progressing  Intervention: Provide Person-Centered Care  Recent Flowsheet Documentation  Taken 8/18/2024 0400 by Zeina Moore RN  Trust Relationship/Rapport:   care explained   choices provided   emotional support provided   empathic listening provided   questions answered   reassurance provided   thoughts/feelings acknowledged   questions encouraged  Taken 8/18/2024 0000 by Zeina Moore RN  Trust Relationship/Rapport:   care explained   choices provided   emotional support provided   empathic listening provided   questions answered   questions encouraged   reassurance provided   thoughts/feelings acknowledged  Taken 8/17/2024 2000 by Zeina Moore RN  Trust Relationship/Rapport:   care explained   choices provided   emotional support provided   empathic listening provided   questions answered   questions encouraged   reassurance provided   thoughts/feelings acknowledged  Goal: Readiness for Transition of Care  Outcome:  Ongoing, Progressing     Problem: Skin Injury Risk Increased  Goal: Skin Health and Integrity  Outcome: Ongoing, Progressing  Intervention: Promote and Optimize Oral Intake  Recent Flowsheet Documentation  Taken 8/17/2024 2000 by Zeina Moore RN  Oral Nutrition Promotion: rest periods promoted  Intervention: Optimize Skin Protection  Recent Flowsheet Documentation  Taken 8/18/2024 0400 by Zeina Moore RN  Pressure Reduction Techniques:   frequent weight shift encouraged   heels elevated off bed   rest period provided between sit times   weight shift assistance provided  Head of Bed (HOB) Positioning: HOB elevated  Pressure Reduction Devices:   positioning supports utilized   heel offloading device utilized   foam padding utilized  Skin Protection:   adhesive use limited   tubing/devices free from skin contact   transparent dressing maintained  Taken 8/18/2024 0200 by Zeina Moore RN  Pressure Reduction Techniques:   frequent weight shift encouraged   heels elevated off bed   rest period provided between sit times   weight shift assistance provided  Head of Bed (HOB) Positioning: HOB elevated  Pressure Reduction Devices:   positioning supports utilized   heel offloading device utilized   foam padding utilized  Skin Protection:   adhesive use limited   tubing/devices free from skin contact   transparent dressing maintained  Taken 8/18/2024 0000 by Zeina Moore RN  Pressure Reduction Techniques:   frequent weight shift encouraged   heels elevated off bed   rest period provided between sit times   weight shift assistance provided  Head of Bed (HOB) Positioning: HOB elevated  Pressure Reduction Devices:   positioning supports utilized   heel offloading device utilized   foam padding utilized  Skin Protection:   adhesive use limited   tubing/devices free from skin contact   transparent dressing maintained  Taken 8/17/2024 2200 by Zeina Moore RN  Pressure Reduction Techniques:   frequent weight  shift encouraged   heels elevated off bed   rest period provided between sit times   weight shift assistance provided  Head of Bed (HOB) Positioning: HOB elevated  Pressure Reduction Devices:   positioning supports utilized   heel offloading device utilized   foam padding utilized  Skin Protection:   adhesive use limited   tubing/devices free from skin contact   transparent dressing maintained  Taken 8/17/2024 2000 by Zeina Moore RN  Pressure Reduction Techniques:   frequent weight shift encouraged   heels elevated off bed   rest period provided between sit times   weight shift assistance provided  Head of Bed (HOB) Positioning: HOB elevated  Pressure Reduction Devices:   positioning supports utilized   heel offloading device utilized   foam padding utilized  Skin Protection:   adhesive use limited   tubing/devices free from skin contact   transparent dressing maintained     Problem: Noninvasive Ventilation Acute  Goal: Effective Unassisted Ventilation and Oxygenation  Outcome: Ongoing, Progressing  Intervention: Monitor and Manage Noninvasive Ventilation  Recent Flowsheet Documentation  Taken 8/18/2024 0400 by Zeina Moore, RN  Airway/Ventilation Management: pulmonary hygiene promoted  Taken 8/17/2024 2000 by Zeina Moore, RN  Airway/Ventilation Management:   pulmonary hygiene promoted   calming measures promoted

## 2024-08-18 NOTE — PROGRESS NOTES
[facility] Medicine Services  PROGRESS NOTE    Patient Name: Quirino Sheppard  : 1955  MRN: [id number]    Date of Admission: 2024  Primary Care Physician: Andrew Fernandez MD    Subjective   Subjective     CC: CHFpEF exacerbation    HPI:  Awake alert and conversational today again  Eating breakfast - eats very fast    Objective   Objective     Vital Signs:   Temp:  [98.1 °F (36.7 °C)-98.4 °F (36.9 °C)] 98.1 °F (36.7 °C)  Heart Rate:  [75-80] 80  Resp:  [18] 18  BP: (140-166)/(83-84) 166/83  Flow (L/min):  [2] 2     Physical Exam:  Constitutional: No acute distress, awake, alert male sitting up in bed finishing breakfast  HENT: NCAT, mucous membranes moist, bilateral eye discharge without conjunctivitis reassuringly  Respiratory: Clear to auscultation bilaterally, respiratory effort normal   Cardiovascular: RRR, no murmurs, rubs, or gallops  Gastrointestinal: Soft, nontender, nondistended  Musculoskeletal: Left foot wound wrapped, sp amputation toes on that foot  Psychiatric: Awake and alert, conversational calm and pleasant  Neurologic: Alert, awake and conversational. Dramatic improvement from last 48 hours  Skin: Abdominal rash covered w bandage, left foot wound covered as well    Results Reviewed:  LAB RESULTS:      Lab 24  0547 08/15/24  0455 24  0413 24  0338 24  1349   WBC 8.32 8.53 6.62 7.31 8.23   HEMOGLOBIN 10.0* 9.6* 9.6* 9.4* 11.0*   HEMATOCRIT 32.8* 31.6* 31.7* 30.9* 37.5   PLATELETS 219 200 198 181 228   NEUTROS ABS  --  6.45 4.45 5.62 6.23   IMMATURE GRANS (ABS)  --  0.03 0.02 0.02 0.02   LYMPHS ABS  --  1.06 1.30 0.94 1.09   MONOS ABS  --  0.83 0.66 0.62 0.73   EOS ABS  --  0.13 0.16 0.08 0.14   MCV 85.0 85.4 85.0 86.3 88.9   PROCALCITONIN  --   --   --   --  0.10   LACTATE  --   --   --   --  0.9   PROTIME 14.4  --   --   --   --          Lab 24  0547 08/15/24  0455 24  0413 24  0338 24  1349   SODIUM 135* 135* 135*  136 134*   POTASSIUM 5.0 4.8 4.8 5.1 5.4*   CHLORIDE 98 99 100 104 100   CO2 28.0 28.0 25.0 26.0 26.0   ANION GAP 9.0 8.0 10.0 6.0 8.0   BUN 22 21 22 24* 24*   CREATININE 1.15 1.23 1.26 1.23 1.31*   EGFR 68.9 63.6 61.7 63.6 58.9*   GLUCOSE 138* 111* 111* 184* 119*   CALCIUM 8.7 8.6 8.5* 8.3* 8.8   MAGNESIUM  --   --   --   --  1.8   HEMOGLOBIN A1C  --   --   --  7.60*  --    TSH  --  5.230*  --   --   --          Lab 08/16/24  05 08/12/24  1349   TOTAL PROTEIN 6.8 7.5   ALBUMIN 3.6 3.9   GLOBULIN 3.2 3.6   ALT (SGPT) 21 27   AST (SGOT) 21 18   BILIRUBIN 0.4 0.3   ALK PHOS 137* 171*         Lab 08/16/24  0547 08/15/24  0455 08/12/24  1646 08/12/24  1349   PROBNP  --  1,234.0*  --  1,567.0*   HSTROP T  --   --  42* 41*   PROTIME 14.4  --   --   --    INR 1.11  --   --   --              Lab 08/15/24  0455   VITAMIN B 12 577         Lab 08/15/24  1428 08/12/24  1530   PH, ARTERIAL  --  7.409   PCO2, ARTERIAL  --  40.2   PO2 ART  --  72.0*   FIO2 21 21   HCO3 ART  --  25.4   BASE EXCESS ART  --  0.7   CARBOXYHEMOGLOBIN  --  0.7   CARBOXYHEMOGLOBIN (VENOUS) 0.9  --      Brief Urine Lab Results  (Last result in the past 365 days)        Color   Clarity   Blood   Leuk Est   Nitrite   Protein   CREAT   Urine HCG        08/12/24 1339 Yellow   Clear   Negative   Negative   Negative   Negative                   Microbiology Results Abnormal       Procedure Component Value - Date/Time    Blood Culture - Blood, Arm, Right [[id number]]  (Normal) Collected: 08/12/24 1349    Lab Status: Final result Specimen: Blood from Arm, Right Updated: 08/17/24 1415     Blood Culture No growth at 5 days    Blood Culture - Blood, Arm, Left [[id number]]  (Normal) Collected: 08/12/24 1349    Lab Status: Final result Specimen: Blood from Arm, Left Updated: 08/17/24 1415     Blood Culture No growth at 5 days    COVID PRE-OP / PRE-PROCEDURE SCREENING ORDER (NO ISOLATION) - Swab, Nasopharynx [[id number]]  (Normal) Collected: 08/12/24 1345    Lab Status:  Final result Specimen: Swab from Nasopharynx Updated: 08/12/24 1423    Narrative:      The following orders were created for panel order COVID PRE-OP / PRE-PROCEDURE SCREENING ORDER (NO ISOLATION) - Swab, Nasopharynx.  Procedure                               Abnormality         Status                     ---------                               -----------         ------                     COVID-19 and FLU A/B PCR...[[id number]]  Normal              Final result                 Please view results for these tests on the individual orders.    COVID-19 and FLU A/B PCR, 1 HR TAT - Swab, Nasopharynx [[id number]]  (Normal) Collected: 08/12/24 1345    Lab Status: Final result Specimen: Swab from Nasopharynx Updated: 08/12/24 1423     COVID19 Not Detected     Influenza A PCR Not Detected     Influenza B PCR Not Detected    Narrative:      Fact sheet for providers: https://www.fda.gov/media/212457/download    Fact sheet for patients: https://www.fda.gov/media/707045/download    Test performed by PCR.            No radiology results from the last 24 hrs    Results for orders placed during the hospital encounter of 08/12/24    Adult Transthoracic Echo Complete W/ Cont if Necessary Per Protocol    Interpretation Summary    Left ventricular systolic function is normal. Left ventricular ejection fraction appears to be 51 - 55%.    Left ventricular diastolic function was normal.    The left atrial cavity is mildly dilated.    The right atrial cavity is mildly  dilated.    There is severe calcification of the aortic valve.    Moderate mitral valve regurgitation is present.    Estimated right ventricular systolic pressure from tricuspid regurgitation is mildly elevated (35-45 mmHg).    Mitral valve regurgitation is worsened since 2017 but otherwise unchanged.      Current medications:  Scheduled Meds:aspirin, 325 mg, Oral, Daily  atorvastatin, 80 mg, Oral, Daily  buPROPion XL, 300 mg, Oral, Daily  escitalopram, 20 mg, Oral,  Daily  ferrous sulfate, 325 mg, Oral, Daily With Breakfast  furosemide, 40 mg, Oral, Daily  [Held by provider] gabapentin, 100 mg, Oral, Nightly  heparin (porcine), 5,000 Units, Subcutaneous, Q12H  insulin glargine, 15 Units, Subcutaneous, BID  insulin lispro, 3-14 Units, Subcutaneous, TID With Meals  lactulose, 30 g, Oral, TID  nystatin, , Topical, Q12H  pantoprazole, 40 mg, Oral, Daily  pharmacy consult - MTM, , Does not apply, Daily  QUEtiapine, 12.5 mg, Oral, Nightly  sirolimus, 2 mg, Oral, Daily  sodium chloride, 10 mL, Intravenous, Q12H  tamsulosin, 0.4 mg, Oral, Daily      Continuous Infusions:   PRN Meds:.  dextrose    dextrose    glucagon (human recombinant)    melatonin    nitroglycerin    sodium chloride    sodium chloride    sodium chloride    Assessment & Plan   Assessment & Plan     Active Hospital Problems    Diagnosis  POA    **CHF (congestive heart failure) [I50.9]  Yes    Wound of abdomen [S31.109A]  Yes    Hyperkalemia [E87.5]  Yes    LUZMARIA (acute kidney injury) [N17.9]  Yes    Obstructive sleep apnea syndrome [G47.33]  Yes    Essential hypertension [I10]  Yes    Hyperlipidemia LDL goal <70 [E78.5]  Yes    Hx of liver transplant [Z94.4]  Not Applicable    Type 2 diabetes mellitus without complication, with long-term current use of insulin [E11.9, Z79.4]  Not Applicable    Coronary artery disease involving coronary bypass graft of native heart without angina pectoris [I25.810]  Yes      Resolved Hospital Problems   No resolved problems to display.        Brief Hospital Course to date:  Quirino Sheppard is a 69 y.o. male w CAD s/p CABG, DMII, h/o liver transplant, AURELIANO without home CPAP, decline since 4/2024 toe amputation, chronic wounds, vascular dementia per family report who presents w 30-40 lb weight gain and shortness of breath.   Stay c/b very excessive sleepiness 8/13-8/15 w complete difference 8/16 w only holding small doses home gabapentin + seroquel and 1-2 hours on cpap. Unclear if  causative v spontaneous improvement. Work-up for excessive sleepiness to now unrevealing.  Previously lived at senior living w home health/wound support and wheelchair bound state, help from family    Excessive sleepiness - resolved 8/16 until now  -vbg normal, prior blood gas also normal, free T4 normal, B12 wnl, EEG w encephalopathy only  -CT head wnl, MRI could not tolerate now awake/alert  -unclear etiology: uncontrolled AURELIANO v med effect (though doses appear so small?)  -restarted low dose seroquel at night and some increased sedation 8/18 so will d/c both seroquel and gabapentin for now entirely.  -repeat PT/OT assessment since improved to apparent baseline given improvement and family hope to return to senior living (is on cost days)    Untreated AURELIANO - working on OP CPAP, tolerating hospital machine minimally    Acute on chronic CHFpEF - improving  -only on home spironolactone it appears, ECHO stable from 2017  -per family dry weight ~180 lbs,  up to 230 lbs on admission w improvement w diuresis  -diuresed well with IV diuretics  -transitioned to PO diuretics which appear effective - continue OP    Left chronic foot wound   Chronic abdominal wound, appears 2/2 excoriation   -follows biweekly w OP wound clinic w senior facility  -does not appear infected on visualization. Therefore will not treat culture as likely colonization and not wound infection on review. OP close f/u     Vascular dementia, appears worsened in recent months  DMII - on basal insulin BID w good control here  H/o liver transplant  Chronic debility - worry patient is moving toward [facility] level of care  BMI 37    *D/w CM tmrw needs to return to senior living (if repeat evaluation needed) given improvement back to apparent baseline per family. Is NWB-GIA at baseline, using wheelchair largely    Expected Discharge Location and Transportation: if senior living will allow to return now back at baseline, ideally there  Expected Discharge 8/20  (Discharge date is tentative pending patient's medical condition and is subject to change)  Expected Discharge Date: 8/20/2024; Expected Discharge Time:      VTE Prophylaxis:  Pharmacologic VTE prophylaxis orders are present.         AM-PAC 6 Clicks Score (PT): 9 (08/17/24 2000)    CODE STATUS:   Code Status and Medical Interventions: CPR (Attempt to Resuscitate); Full Support   Ordered at: 08/12/24 2010     Code Status (Patient has no pulse and is not breathing):    CPR (Attempt to Resuscitate)     Medical Interventions (Patient has pulse or is breathing):    Full Support       Marilyn Pham MD  08/18/24

## 2024-08-19 LAB
ANION GAP SERPL CALCULATED.3IONS-SCNC: 10 MMOL/L (ref 5–15)
BUN SERPL-MCNC: 23 MG/DL (ref 8–23)
BUN/CREAT SERPL: 18.7 (ref 7–25)
CALCIUM SPEC-SCNC: 8.8 MG/DL (ref 8.6–10.5)
CHLORIDE SERPL-SCNC: 99 MMOL/L (ref 98–107)
CO2 SERPL-SCNC: 29 MMOL/L (ref 22–29)
CREAT SERPL-MCNC: 1.23 MG/DL (ref 0.76–1.27)
EGFRCR SERPLBLD CKD-EPI 2021: 63.6 ML/MIN/1.73
GLUCOSE BLDC GLUCOMTR-MCNC: 147 MG/DL (ref 70–130)
GLUCOSE BLDC GLUCOMTR-MCNC: 149 MG/DL (ref 70–130)
GLUCOSE BLDC GLUCOMTR-MCNC: 246 MG/DL (ref 70–130)
GLUCOSE BLDC GLUCOMTR-MCNC: 288 MG/DL (ref 70–130)
GLUCOSE SERPL-MCNC: 131 MG/DL (ref 65–99)
POTASSIUM SERPL-SCNC: 4.5 MMOL/L (ref 3.5–5.2)
SIROLIMUS BLD-MCNC: 4.2 NG/ML (ref 3–20)
SODIUM SERPL-SCNC: 138 MMOL/L (ref 136–145)

## 2024-08-19 PROCEDURE — 25010000002 HEPARIN (PORCINE) PER 1000 UNITS: Performed by: PHYSICIAN ASSISTANT

## 2024-08-19 PROCEDURE — 92610 EVALUATE SWALLOWING FUNCTION: CPT

## 2024-08-19 PROCEDURE — 94799 UNLISTED PULMONARY SVC/PX: CPT

## 2024-08-19 PROCEDURE — 97530 THERAPEUTIC ACTIVITIES: CPT

## 2024-08-19 PROCEDURE — 63710000001 INSULIN GLARGINE PER 5 UNITS: Performed by: INTERNAL MEDICINE

## 2024-08-19 PROCEDURE — 99232 SBSQ HOSP IP/OBS MODERATE 35: CPT | Performed by: STUDENT IN AN ORGANIZED HEALTH CARE EDUCATION/TRAINING PROGRAM

## 2024-08-19 PROCEDURE — 97110 THERAPEUTIC EXERCISES: CPT

## 2024-08-19 PROCEDURE — 94660 CPAP INITIATION&MGMT: CPT

## 2024-08-19 PROCEDURE — 80048 BASIC METABOLIC PNL TOTAL CA: CPT | Performed by: INTERNAL MEDICINE

## 2024-08-19 PROCEDURE — 63710000001 SIROLIMUS PER 1 MG: Performed by: PHYSICIAN ASSISTANT

## 2024-08-19 PROCEDURE — 25010000002 HALOPERIDOL LACTATE PER 5 MG: Performed by: NURSE PRACTITIONER

## 2024-08-19 PROCEDURE — 82948 REAGENT STRIP/BLOOD GLUCOSE: CPT

## 2024-08-19 PROCEDURE — 63710000001 INSULIN LISPRO (HUMAN) PER 5 UNITS: Performed by: PHYSICIAN ASSISTANT

## 2024-08-19 RX ORDER — HALOPERIDOL 5 MG/ML
1 INJECTION INTRAMUSCULAR ONCE
Status: COMPLETED | OUTPATIENT
Start: 2024-08-19 | End: 2024-08-19

## 2024-08-19 RX ADMIN — Medication 10 ML: at 09:29

## 2024-08-19 RX ADMIN — LACTULOSE 30 G: 10 SOLUTION ORAL at 09:20

## 2024-08-19 RX ADMIN — HALOPERIDOL LACTATE 1 MG: 5 INJECTION, SOLUTION INTRAMUSCULAR at 21:05

## 2024-08-19 RX ADMIN — INSULIN LISPRO 8 UNITS: 100 INJECTION, SOLUTION INTRAVENOUS; SUBCUTANEOUS at 11:58

## 2024-08-19 RX ADMIN — FERROUS SULFATE TAB 325 MG (65 MG ELEMENTAL FE) 325 MG: 325 (65 FE) TAB at 09:22

## 2024-08-19 RX ADMIN — INSULIN GLARGINE 15 UNITS: 100 INJECTION, SOLUTION SUBCUTANEOUS at 09:21

## 2024-08-19 RX ADMIN — ESCITALOPRAM OXALATE 20 MG: 20 TABLET, FILM COATED ORAL at 09:21

## 2024-08-19 RX ADMIN — ASPIRIN 325 MG: 325 TABLET, COATED ORAL at 09:22

## 2024-08-19 RX ADMIN — HEPARIN SODIUM 5000 UNITS: 5000 INJECTION INTRAVENOUS; SUBCUTANEOUS at 09:21

## 2024-08-19 RX ADMIN — HEPARIN SODIUM 5000 UNITS: 5000 INJECTION INTRAVENOUS; SUBCUTANEOUS at 21:06

## 2024-08-19 RX ADMIN — ATORVASTATIN CALCIUM 80 MG: 40 TABLET, FILM COATED ORAL at 09:22

## 2024-08-19 RX ADMIN — NYSTATIN: 100000 POWDER TOPICAL at 09:24

## 2024-08-19 RX ADMIN — Medication 10 ML: at 21:06

## 2024-08-19 RX ADMIN — LACTULOSE 30 G: 10 SOLUTION ORAL at 21:05

## 2024-08-19 RX ADMIN — LACTULOSE 30 G: 10 SOLUTION ORAL at 16:43

## 2024-08-19 RX ADMIN — TAMSULOSIN HYDROCHLORIDE 0.4 MG: 0.4 CAPSULE ORAL at 09:29

## 2024-08-19 RX ADMIN — NYSTATIN: 100000 POWDER TOPICAL at 21:05

## 2024-08-19 RX ADMIN — PANTOPRAZOLE SODIUM 40 MG: 40 TABLET, DELAYED RELEASE ORAL at 09:29

## 2024-08-19 RX ADMIN — SIROLIMUS 2 MG: 1 TABLET ORAL at 09:21

## 2024-08-19 RX ADMIN — INSULIN GLARGINE 15 UNITS: 100 INJECTION, SOLUTION SUBCUTANEOUS at 21:06

## 2024-08-19 RX ADMIN — Medication 5 MG: at 21:05

## 2024-08-19 RX ADMIN — BUPROPION HYDROCHLORIDE 300 MG: 150 TABLET, EXTENDED RELEASE ORAL at 09:22

## 2024-08-19 RX ADMIN — FUROSEMIDE 40 MG: 40 TABLET ORAL at 09:22

## 2024-08-19 NOTE — PLAN OF CARE
Goal Outcome Evaluation:  Plan of Care Reviewed With: patient, durable power of                   Anticipated Discharge Disposition (SLP): skilled nursing facility          SLP Swallowing Diagnosis: suspected pharyngeal dysphagia (08/19/24 2287)

## 2024-08-19 NOTE — THERAPY TREATMENT NOTE
Patient Name: Quirino Sheppard  : 1955    MRN: [id number]                              Today's Date: 2024       Admit Date: 2024    Visit Dx:     ICD-10-CM ICD-9-CM   1. Acute on chronic congestive heart failure, unspecified heart failure type  I50.9 428.0   2. Hyperkalemia  E87.5 276.7   3. SOB (shortness of breath)  R06.02 786.05   4. AURELIANO (obstructive sleep apnea)  G47.33 327.23   5. Hx of CABG  Z95.1 V45.81   6. History of liver transplant  Z94.4 V42.7   7. Pleural effusion  J90 511.9   8. Chronic wound infection of abdomen, initial encounter  S31.109A 958.3    L08.9    9. Dysphagia, unspecified type  R13.10 787.20     Patient Active Problem List   Diagnosis    Coronary artery disease involving coronary bypass graft of native heart without angina pectoris    Hepatitis C    Hx of liver transplant    Essential hypertension    Hyperlipidemia LDL goal <70    Type 2 diabetes mellitus without complication, with long-term current use of insulin    S/P CABG x 3    BPH associated with nocturia    Degeneration of lumbar intervertebral disc    Disorder of sulfur-bearing amino acid metabolism    Gastroesophageal reflux disease without esophagitis    Obstructive sleep apnea syndrome    Presence of aortocoronary bypass graft    Recurrent major depression in full remission    Severe obesity    Cellulitis of abdominal wall    CHF (congestive heart failure)    Wound of abdomen    Hyperkalemia    LUZMARIA (acute kidney injury)     Past Medical History:   Diagnosis Date    Anxiety and depression     BPH associated with nocturia     Chronic kidney disease, stage 2 (mild)     Coronary arteriosclerosis in native artery     3V    Degeneration of lumbar intervertebral disc     Diabetes mellitus     Disorder of sulfur-bearing amino acid metabolism     Gastroesophageal reflux disease without esophagitis     Glaucoma     Hepatitis C     Heterozygous MTHFR mutation [id number]     Heterozygous MTHFR mutation [id number]     High risk  medication use     Hyperlipidemia     Hypertension     Liver transplanted     Low back pain     Obstructive sleep apnea syndrome     Recurrent major depression in full remission     Severe obesity     Type 2 diabetes mellitus      Past Surgical History:   Procedure Laterality Date    CORONARY ARTERY BYPASS GRAFT N/A 11/22/2017    Procedure: MEDIAN STERNOTOMY, CORONARY ARTERY BYPASS GRAFT X 3, UTILIZING THE LEFT INTERNAL MAMMARY ARTERY AND EVH USING THE LEFT GREATER SAPHENOUS VEIN;  Surgeon: Naga Guaman MD;  Location: [facility];  Service:     LIVER TRANSPLANTATION  2001      General Information       Row Name 08/19/24 1428          Physical Therapy Time and Intention    Document Type therapy note (daily note)  -CD     Mode of Treatment physical therapy  -CD       Row Name 08/19/24 1428          General Information    Patient Profile Reviewed yes  -CD     Existing Precautions/Restrictions fall  remote L foot 2-5 toe amputations April 2024, NWB LLE; BLE edema; chronic abdominal wound; sensitive skin-hypersensitivity to touch  -CD     Barriers to Rehab medically complex;previous functional deficit;cognitive status  -CD       Row Name 08/19/24 1428          Cognition    Orientation Status (Cognition) oriented to;person;place;time;verbal cues/prompts needed for orientation  Situational confusion. Using humor during session.  -CD       Row Name 08/19/24 1428          Safety Issues, Functional Mobility    Safety Issues Affecting Function (Mobility) ability to follow commands;awareness of need for assistance;insight into deficits/self-awareness;safety precaution awareness;safety precautions follow-through/compliance;sequencing abilities  -CD     Impairments Affecting Function (Mobility) balance;coordination;endurance/activity tolerance;motor planning;pain;postural/trunk control;range of motion (ROM);sensation/sensory awareness;strength  -CD     Cognitive Impairments, Mobility Safety/Performance attention;awareness,  need for assistance;insight into deficits/self-awareness;judgment;problem-solving/reasoning;safety precaution follow-through;sequencing abilities;safety precaution awareness  -CD     Comment, Safety Issues/Impairments (Mobility) PT alert and following commands but needs cues to stay on task.  -CD               User Key  (r) = Recorded By, (t) = Taken By, (c) = Cosigned By      Initials Name Provider Type    CD Josefina Chao, PT Physical Therapist                   Mobility       Row Name 08/19/24 1432          Bed Mobility    Rolling Left [place] (Bed Mobility) moderate assist (50% patient effort);2 person assist  -CD     Rolling Right [place] (Bed Mobility) moderate assist (50% patient effort);2 person assist  -CD     Scooting/Bridging [place] (Bed Mobility) dependent (less than 25% patient effort);2 person assist  -CD     Assistive Device (Bed Mobility) bed rails;draw sheet;head of bed elevated  -CD     Comment, (Bed Mobility) Rolled L/R for hygiene and for placement of mechanical lift sling. Max cues for sequencing.  -CD       Row Name 08/19/24 1432          Transfers    Comment, (Transfers) Cues for hand placement. Completed partial STS via mini chair push-ups x 5 reps followed by STS x 2 reps. Pt cued for NWB LLE (per md order). Able to maintain NWB status LLE with support of r walker and tolerated standing x 1 minutes with encouragement.  -CD       Row Name 08/19/24 1432          Bed-Chair Transfer    Bed-Chair [place] (Transfers) dependent (less than 25% patient effort);2 person assist  -CD     Assistive Device (Bed-Chair Transfers) lift device  -CD       Row Name 08/19/24 1432          Sit-Stand Transfer    Sit-Stand [place] (Transfers) maximum assist (25% patient effort);2 person assist  progressed to mod assist of 2.  -CD     Assistive Device (Sit-Stand Transfers) walker, front-wheeled  -CD     Comment, (Sit-Stand Transfer) Required max cues for upright posture. Did well  maintaining NWB status LLE but fatigued quickly.  -CD       Row Name 08/19/24 1432          Gait/Stairs (Locomotion)    [place] Level (Gait) unable to assess  -CD     Comment, (Gait/Stairs) not appropriate at this time.  -CD               User Key  (r) = Recorded By, (t) = Taken By, (c) = Cosigned By      Initials Name Provider Type    CD Josefina Chao PT Physical Therapist                   Obj/Interventions       Row Name 08/19/24 1446          Motor Skills    Functional Endurance O2 SATS STABLE ON RA, BUT FATIGUES QUICKLY.  -CD     Therapeutic Exercise --  COMPLETED B LE THER EX X 10 REPS EACH- SUPINE SAQ, SITTING -HIP FLEX, LAQ, AP'S  -CD       Row Name 08/19/24 1446          Balance    Balance Assessment sitting static balance;sitting dynamic balance;sit to stand dynamic balance;standing static balance;standing dynamic balance  -CD     Static Sitting Balance standby assist  -CD     Dynamic Sitting Balance contact guard  -CD     Position, Sitting Balance unsupported;sitting in chair  -CD     Sit to Stand Dynamic Balance maximum assist;2-person assist  PROGRESSED TO MOD ASSIST OF 2.  -CD     Static Standing Balance maximum assist  PROGRESSED TO MOD ASSIST.  -CD     Position/Device Used, Standing Balance supported;walker, rolling  -CD     Balance Interventions sitting;standing;sit to stand;supported;static;dynamic  -CD     Comment, Balance PT TOLERATED STANDING AT R WALKER X 1 MIN WITH MOD/MAX ASSIST OF 2, MAINTAINING NWB L LE. ABLE TO RECIPROCAL SCOOT FORWARD/BACKWARD.  SITTING IN RECLINER.  -CD               User Key  (r) = Recorded By, (t) = Taken By, (c) = Cosigned By      Initials Name Provider Type     Josefina Chao PT Physical Therapist                   Goals/Plan    No documentation.                  Clinical Impression       Row Name 08/19/24 1450          Pain    Pretreatment Pain Rating 0/10 - no pain  -CD     Posttreatment Pain Rating 0/10 - no pain  -CD       Row Name 08/19/24 4210           Plan of Care Review    Plan of Care Reviewed With patient;friend  -CD     Progress improving  -CD     Outcome Evaluation PT GIVES GOOD EFFORT BUT NEEDS CUES FOR SEQUENCING MOBILITY SAFELY. PT DEMONSTRATED IMPROVED BED MOBILITY AND PROGRESSED TO STS TRANSFERS WITH R WALKER AND MOD TO MAX ASSIST OF 2. TOLERATED STANDING X 1 MIN AT WALKER, MAINTAINING NWB STATUS L LE. RECOMMEND SNF AT D/C.  -CD       Row Name 08/19/24 2904          Therapy Assessment/Plan (PT)    Patient/Family Therapy Goals Statement (PT) TO GO HOME. AND RETURN TO PLOF.  -CD     Rehab Potential (PT) good, to achieve stated therapy goals  -CD     Criteria for Skilled Interventions Met (PT) yes;meets criteria;skilled treatment is necessary  -CD     Therapy Frequency (PT) daily  -CD       Row Name 08/19/24 1585          Vital Signs    Pre Systolic BP Rehab 155  -CD     Pre Treatment Diastolic BP 83  -CD     Post Systolic BP Rehab 135  -CD     Post Treatment Diastolic BP 66  -CD     Pretreatment Heart Rate (beats/min) 80  -CD     Posttreatment Heart Rate (beats/min) 80  -CD     Pre SpO2 (%) 91  -CD     O2 Delivery Pre Treatment supplemental O2  -CD     O2 Delivery Intra Treatment supplemental O2  -CD     Post SpO2 (%) 92  -CD     O2 Delivery Post Treatment supplemental O2  -CD     Pre Patient Position Supine  -CD     Intra Patient Position Standing  -CD     Post Patient Position Sitting  -CD       Row Name 08/19/24 7944          Positioning and Restraints    Pre-Treatment Position in bed  -CD     Post Treatment Position chair  -CD     In Chair reclined;call light within reach;encouraged to call for assist;with SLP;notified nsg;exit alarm on;with family/caregiver;waffle cushion;on mechanical lift sling;heels elevated;legs elevated  -CD               User Key  (r) = Recorded By, (t) = Taken By, (c) = Cosigned By      Initials Name Provider Type    CD Josefina Chao, PT Physical Therapist                   Outcome Measures       Row Name 08/19/24 5924           How much help from another person do you currently need...    Turning from your back to your side while in flat bed without using bedrails? 2  -CD     Moving from lying on back to sitting on the side of a flat bed without bedrails? 2  -CD     Moving to and from a bed to a chair (including a wheelchair)? 1  -CD     Standing up from a chair using your arms (e.g., wheelchair, bedside chair)? 2  -CD     Climbing 3-5 steps with a railing? 1  -CD     To walk in hospital room? 1  -CD     AM-PAC 6 Clicks Score (PT) 9  -CD     Highest Level of Mobility Goal 3 --> Sit at edge of bed  -CD               User Key  (r) = Recorded By, (t) = Taken By, (c) = Cosigned By      Initials Name Provider Type    Josefina Cleveland, PT Physical Therapist                                 Physical Therapy Education       Title: PT OT SLP Therapies (In Progress)       Topic: Physical Therapy (Done)       Point: Mobility training (Done)       Learning Progress Summary             Patient Acceptance, E, VU,NR by CD at 8/19/2024 1455    Comment: SEE FLOWSHEET    Acceptance, E, NR by AS at 8/17/2024 0434    Acceptance, E, VU,NR by BA at 8/13/2024 1246                         Point: Home exercise program (Done)       Learning Progress Summary             Patient Acceptance, E, VU,NR by CD at 8/19/2024 1455    Comment: SEE FLOWSHEET    Acceptance, E, NR by AS at 8/17/2024 0434                         Point: Body mechanics (Done)       Learning Progress Summary             Patient Acceptance, E, VU,NR by CD at 8/19/2024 1455    Comment: SEE FLOWSHEET    Acceptance, E, NR by AS at 8/17/2024 0434    Acceptance, E, VU,NR by BA at 8/13/2024 1246                         Point: Precautions (Done)       Learning Progress Summary             Patient Acceptance, E, VU,NR by CD at 8/19/2024 1455    Comment: SEE FLOWSHEET    Acceptance, E, NR by AS at 8/17/2024 0434    Acceptance, E, VU,NR by BA at 8/13/2024 1246                                         User  Key       Initials Effective Dates Name Provider Type Discipline    CD 02/03/23 -  Josefina Chao PT Physical Therapist PT    BA 09/21/21 -  Ness Jerez PT Physical Therapist PT    AS 06/14/24 -  Zeina Moore, RN Registered Nurse Nurse                  PT Recommendation and Plan     Plan of Care Reviewed With: patient, friend  Progress: improving  Outcome Evaluation: PT GIVES GOOD EFFORT BUT NEEDS CUES FOR SEQUENCING MOBILITY SAFELY. PT DEMONSTRATED IMPROVED BED MOBILITY AND PROGRESSED TO STS TRANSFERS WITH R WALKER AND MOD TO MAX ASSIST OF 2. TOLERATED STANDING X 1 MIN AT WALKER, MAINTAINING NWB STATUS L LE. RECOMMEND SNF AT D/C.     Time Calculation:         PT Charges       Row Name 08/19/24 1456             Time Calculation    Start Time 1325  -CD      PT Received On 08/19/24  -CD         Time Calculation- PT    Total Timed Code Minutes- PT 50 minute(s)  -CD         Timed Charges    75865 - PT Therapeutic Exercise Minutes 20  -CD      83521 - PT Therapeutic Activity Minutes 30  -CD         Total Minutes    Timed Charges Total Minutes 50  -CD       Total Minutes 50  -CD                User Key  (r) = Recorded By, (t) = Taken By, (c) = Cosigned By      Initials Name Provider Type    CD Josefina Chao PT Physical Therapist                  Therapy Charges for Today       Code Description Service Date Service Provider Modifiers Qty    17741396047 HC PT THERAPEUTIC ACT EA 15 MIN 8/19/2024 Josefina Chao, PT GP 2    59182685825 HC PT THER PROC EA 15 MIN 8/19/2024 Josefina Chao, PT GP 1    19578681584 HC PT THER SUPP EA 15 MIN 8/19/2024 Josefina Chao, PT GP 3            PT G-Codes  Outcome Measure Options: AM-PAC 6 Clicks Basic Mobility (PT)  AM-PAC 6 Clicks Score (PT): 9  AM-PAC 6 Clicks Score (OT): 10  PT Discharge Summary  Anticipated Discharge Disposition (PT): skilled nursing facility    Josefina Chao PT  8/19/2024

## 2024-08-19 NOTE — PLAN OF CARE
Goal Outcome Evaluation:  Plan of Care Reviewed With: patient        Progress: no change  Outcome Evaluation: Pt alert and participatory in OT interventions from supported sitting position in chair, pt reports fatigue and mild frustration from multiple therapists assessing pt this afternoon. Pt tolerated BUE A/AROM at BUEs for joint mobility and to prep for graded strengthening for improved integration in ADLs and related t/fs while maintaining NWB LLE. Pt progressed in TE from proximal to distal UEs, 10 reps each with no reported increase in pain. Pt able to d/d gown with min A and wash face/hands with SUA and spv with latter for initiation and quality. Pt is still below baseline occupational performance w/ balanced deficits, muscle weakness, decreased activity tolerance, pain and would benefit from IPOT POC and SNF at d/c when medically ready.      Anticipated Discharge Disposition (OT): skilled nursing facility

## 2024-08-19 NOTE — CASE MANAGEMENT/SOCIAL WORK
Discharge Planning Assessment  [facility]     Patient Name: Quirino Sheppard  MRN: [id number]  Today's Date: 8/19/2024    Admit Date: 8/12/2024    Plan: SNF   Discharge Needs Assessment    No documentation.                  Discharge Plan       Row Name 08/19/24 1156       Plan    Plan SNF    Patient/Family in Agreement with Plan yes    Plan Comments Spoke with Bailey at [facility].  Insurance precert is still pending.  CM will cont to follow for a determination.    Final Discharge Disposition Code 03 - skilled nursing facility (SNF)                  Continued Care and Services - Admitted Since 8/12/2024       Destination Coordination complete.      Service Provider Request Status Selected Services Address Phone Fax Patient Preferred    [facility]  Selected Skilled Nursing [address] [place] [id number] [phone] --                  Expected Discharge Date and Time       Expected Discharge Date Expected Discharge Time    Aug 20, 2024            Demographic Summary    No documentation.                  Functional Status    No documentation.                  Psychosocial    No documentation.                  Abuse/Neglect    No documentation.                  Legal    No documentation.                  Substance Abuse    No documentation.                  Patient Forms    No documentation.                     Annelise Land RN

## 2024-08-19 NOTE — PROGRESS NOTES
[facility] Medicine Services  PROGRESS NOTE    Patient Name: Quirino Sheppard  : 1955  MRN: [id number]    Date of Admission: 2024  Primary Care Physician: Andrew Fernandez MD    Subjective   Subjective     CC: CHFpEF exacerbation    HPI:  Reports feeling sleepy.  Per nursing, a lift and 2 nurses were required to move the patient yesterday.  Had intermittent confusion and sleepiness that is worse with Seroquel.    Objective   Objective     Vital Signs:   Temp:  [97.9 °F (36.6 °C)-98.6 °F (37 °C)] 98.6 °F (37 °C)  Heart Rate:  [73-80] 73  Resp:  [16-18] 16  BP: (131-166)/(66-83) 158/76  Flow (L/min):  [2] 2     Physical Exam:  Constitutional: No acute distress, awake, alert  HENT: NCAT, mucous membranes moist  Respiratory: Clear to auscultation bilaterally, respiratory effort normal   Cardiovascular: RRR, no murmurs, rubs, or gallops  Gastrointestinal: Positive bowel sounds, soft, nontender, nondistended  Musculoskeletal: No bilateral ankle edema; left foot covered in bandage, s/p toe amputations   Psychiatric: Appropriate affect, cooperative  Neurologic: Oriented to self, [facility], , speech clear  Skin: left foot covered with bandage    Results Reviewed:  LAB RESULTS:      Lab 24  0547 08/15/24  0455 24  0413 24  0338 24  1349   WBC 8.32 8.53 6.62 7.31 8.23   HEMOGLOBIN 10.0* 9.6* 9.6* 9.4* 11.0*   HEMATOCRIT 32.8* 31.6* 31.7* 30.9* 37.5   PLATELETS 219 200 198 181 228   NEUTROS ABS  --  6.45 4.45 5.62 6.23   IMMATURE GRANS (ABS)  --  0.03 0.02 0.02 0.02   LYMPHS ABS  --  1.06 1.30 0.94 1.09   MONOS ABS  --  0.83 0.66 0.62 0.73   EOS ABS  --  0.13 0.16 0.08 0.14   MCV 85.0 85.4 85.0 86.3 88.9   PROCALCITONIN  --   --   --   --  0.10   LACTATE  --   --   --   --  0.9   PROTIME 14.4  --   --   --   --          Lab 24  0504 24  0547 08/15/24  0455 24  0413 24  0338 24  1349   SODIUM 138 135* 135* 135* 136 134*    POTASSIUM 4.5 5.0 4.8 4.8 5.1 5.4*   CHLORIDE 99 98 99 100 104 100   CO2 29.0 28.0 28.0 25.0 26.0 26.0   ANION GAP 10.0 9.0 8.0 10.0 6.0 8.0   BUN 23 22 21 22 24* 24*   CREATININE 1.23 1.15 1.23 1.26 1.23 1.31*   EGFR 63.6 68.9 63.6 61.7 63.6 58.9*   GLUCOSE 131* 138* 111* 111* 184* 119*   CALCIUM 8.8 8.7 8.6 8.5* 8.3* 8.8   MAGNESIUM  --   --   --   --   --  1.8   HEMOGLOBIN A1C  --   --   --   --  7.60*  --    TSH  --   --  5.230*  --   --   --          Lab 08/16/24  0547 08/12/24  1349   TOTAL PROTEIN 6.8 7.5   ALBUMIN 3.6 3.9   GLOBULIN 3.2 3.6   ALT (SGPT) 21 27   AST (SGOT) 21 18   BILIRUBIN 0.4 0.3   ALK PHOS 137* 171*         Lab 08/16/24  0547 08/15/24  0455 08/12/24  1646 08/12/24  1349   PROBNP  --  1,234.0*  --  1,567.0*   HSTROP T  --   --  42* 41*   PROTIME 14.4  --   --   --    INR 1.11  --   --   --              Lab 08/15/24  0455   VITAMIN B 12 577         Lab 08/15/24  1428 08/12/24  1530   PH, ARTERIAL  --  7.409   PCO2, ARTERIAL  --  40.2   PO2 ART  --  72.0*   FIO2 21 21   HCO3 ART  --  25.4   BASE EXCESS ART  --  0.7   CARBOXYHEMOGLOBIN  --  0.7   CARBOXYHEMOGLOBIN (VENOUS) 0.9  --      Brief Urine Lab Results  (Last result in the past 365 days)        Color   Clarity   Blood   Leuk Est   Nitrite   Protein   CREAT   Urine HCG        08/12/24 1339 Yellow   Clear   Negative   Negative   Negative   Negative                   Microbiology Results Abnormal       Procedure Component Value - Date/Time    Blood Culture - Blood, Arm, Right [[id number]]  (Normal) Collected: 08/12/24 1349    Lab Status: Final result Specimen: Blood from Arm, Right Updated: 08/17/24 1415     Blood Culture No growth at 5 days    Blood Culture - Blood, Arm, Left [[id number]]  (Normal) Collected: 08/12/24 1349    Lab Status: Final result Specimen: Blood from Arm, Left Updated: 08/17/24 1415     Blood Culture No growth at 5 days    COVID PRE-OP / PRE-PROCEDURE SCREENING ORDER (NO ISOLATION) - Swab, Nasopharynx [[id number]]   (Normal) Collected: 08/12/24 1345    Lab Status: Final result Specimen: Swab from Nasopharynx Updated: 08/12/24 1423    Narrative:      The following orders were created for panel order COVID PRE-OP / PRE-PROCEDURE SCREENING ORDER (NO ISOLATION) - Swab, Nasopharynx.  Procedure                               Abnormality         Status                     ---------                               -----------         ------                     COVID-19 and FLU A/B PCR...[[id number]]  Normal              Final result                 Please view results for these tests on the individual orders.    COVID-19 and FLU A/B PCR, 1 HR TAT - Swab, Nasopharynx [[id number]]  (Normal) Collected: 08/12/24 1345    Lab Status: Final result Specimen: Swab from Nasopharynx Updated: 08/12/24 1423     COVID19 Not Detected     Influenza A PCR Not Detected     Influenza B PCR Not Detected    Narrative:      Fact sheet for providers: https://www.fda.gov/media/365370/download    Fact sheet for patients: https://www.fda.gov/media/234264/download    Test performed by PCR.            No radiology results from the last 24 hrs    Results for orders placed during the hospital encounter of 08/12/24    Adult Transthoracic Echo Complete W/ Cont if Necessary Per Protocol    Interpretation Summary    Left ventricular systolic function is normal. Left ventricular ejection fraction appears to be 51 - 55%.    Left ventricular diastolic function was normal.    The left atrial cavity is mildly dilated.    The right atrial cavity is mildly  dilated.    There is severe calcification of the aortic valve.    Moderate mitral valve regurgitation is present.    Estimated right ventricular systolic pressure from tricuspid regurgitation is mildly elevated (35-45 mmHg).    Mitral valve regurgitation is worsened since 2017 but otherwise unchanged.      Current medications:  Scheduled Meds:aspirin, 325 mg, Oral, Daily  atorvastatin, 80 mg, Oral, Daily  buPROPion XL, 300 mg,  Oral, Daily  escitalopram, 20 mg, Oral, Daily  ferrous sulfate, 325 mg, Oral, Daily With Breakfast  furosemide, 40 mg, Oral, Daily  heparin (porcine), 5,000 Units, Subcutaneous, Q12H  insulin glargine, 15 Units, Subcutaneous, BID  insulin lispro, 3-14 Units, Subcutaneous, TID With Meals  lactulose, 30 g, Oral, TID  nystatin, , Topical, Q12H  pantoprazole, 40 mg, Oral, Daily  pharmacy consult - MTM, , Does not apply, Daily  sirolimus, 2 mg, Oral, Daily  sodium chloride, 10 mL, Intravenous, Q12H  tamsulosin, 0.4 mg, Oral, Daily      Continuous Infusions:   PRN Meds:.  dextrose    dextrose    glucagon (human recombinant)    melatonin    nitroglycerin    sodium chloride    sodium chloride    sodium chloride    Assessment & Plan   Assessment & Plan     Active Hospital Problems    Diagnosis  POA    **CHF (congestive heart failure) [I50.9]  Yes    Wound of abdomen [S31.109A]  Yes    Hyperkalemia [E87.5]  Yes    LUZMARIA (acute kidney injury) [N17.9]  Yes    Obstructive sleep apnea syndrome [G47.33]  Yes    Essential hypertension [I10]  Yes    Hyperlipidemia LDL goal <70 [E78.5]  Yes    Hx of liver transplant [Z94.4]  Not Applicable    Type 2 diabetes mellitus without complication, with long-term current use of insulin [E11.9, Z79.4]  Not Applicable    Coronary artery disease involving coronary bypass graft of native heart without angina pectoris [I25.810]  Yes      Resolved Hospital Problems   No resolved problems to display.        Brief Hospital Course to date:  Quirino Sheppard is a 69 y.o. male w CAD s/p CABG, DMII, h/o liver transplant, AURELIANO without home CPAP, decline since 4/2024 toe amputation, chronic wounds, vascular dementia per family report who presented w 30-40 lb weight gain and shortness of breath. Stay c/b very excessive sleepiness 8/13-8/15 w complete difference 8/16 w only holding small doses home gabapentin + seroquel and 1-2 hours on cpap. Unclear if causative v spontaneous improvement. Work-up for excessive  sleepiness unrevealing.    Previously lived at senior living w home health/wound support and wheelchair bound state, help from family    This patient's problems and plans were partially entered by my partner and updated as appropriate by me 08/19/24.    Excessive sleepiness - resolved 8/16 until now  -vbg normal, prior blood gas also normal, free T4 normal, B12 wnl, EEG w encephalopathy only  -CT head wnl, MRI could not tolerate now awake/alert  -unclear etiology: uncontrolled AURELIANO v med effect (though doses appear so small?)  -restarted low dose seroquel at night and some increased sedation 8/18 so will d/c both seroquel and gabapentin for now entirely (and at time of DC)  -repeat PT/OT assessment since improved to apparent baseline given improvement and family hope to return to senior living (is on cost days for rehab)    Untreated AURELIANO - working on OP CPAP, tolerating hospital machine minimally    Acute on chronic CHFpEF - improving  -only on home spironolactone it appears, ECHO stable from 2017  -per family dry weight ~180 lbs,  up to 230 lbs on admission w improvement w diuresis  -diuresed well with IV diuretics  -transitioned to PO diuretics which appear effective - continue on DC    Left chronic foot wound   Chronic abdominal wound, appears 2/2 excoriation   -follows biweekly w OP wound clinic w senior facility  -does not appear infected on visualization. Therefore will not treat culture as likely colonization and not wound infection on review. OP close f/u with wound clinic    Vascular dementia, appears worsened in recent months  DMII - on basal insulin BID w good control here  H/o liver transplant  Chronic debility - worry patient is moving toward [facility] level of care  BMI 37    *Is NWB-GIA at baseline, using wheelchair largely. Per discussion with CM, patient would need to be an assist of 1 to get in and out of his wheelchair and be able to self-propel in the wheelchair. Unfortunately, he is currently a 2  person assist plus lift to move, thus cannot return to his senior living facility at this time.     Expected Discharge Location and Transportation: SNF  Expected Discharge  (Discharge date is tentative pending patient's medical condition and is subject to change)  Expected Discharge Date: 8/20/2024; Expected Discharge Time:      VTE Prophylaxis:  Pharmacologic VTE prophylaxis orders are present.         AM-PAC 6 Clicks Score (PT): 9 (08/18/24 2000)    CODE STATUS:   Code Status and Medical Interventions: CPR (Attempt to Resuscitate); Full Support   Ordered at: 08/12/24 2010     Code Status (Patient has no pulse and is not breathing):    CPR (Attempt to Resuscitate)     Medical Interventions (Patient has pulse or is breathing):    Full Support       Consuelo Olivares MD  08/19/24

## 2024-08-19 NOTE — THERAPY TREATMENT NOTE
Patient Name: Quirino Sheppard  : 1955    MRN: [id number]                              Today's Date: 2024       Admit Date: 2024    Visit Dx:     ICD-10-CM ICD-9-CM   1. Acute on chronic congestive heart failure, unspecified heart failure type  I50.9 428.0   2. Hyperkalemia  E87.5 276.7   3. SOB (shortness of breath)  R06.02 786.05   4. AURELIANO (obstructive sleep apnea)  G47.33 327.23   5. Hx of CABG  Z95.1 V45.81   6. History of liver transplant  Z94.4 V42.7   7. Pleural effusion  J90 511.9   8. Chronic wound infection of abdomen, initial encounter  S31.109A 958.3    L08.9    9. Dysphagia, unspecified type  R13.10 787.20     Patient Active Problem List   Diagnosis    Coronary artery disease involving coronary bypass graft of native heart without angina pectoris    Hepatitis C    Hx of liver transplant    Essential hypertension    Hyperlipidemia LDL goal <70    Type 2 diabetes mellitus without complication, with long-term current use of insulin    S/P CABG x 3    BPH associated with nocturia    Degeneration of lumbar intervertebral disc    Disorder of sulfur-bearing amino acid metabolism    Gastroesophageal reflux disease without esophagitis    Obstructive sleep apnea syndrome    Presence of aortocoronary bypass graft    Recurrent major depression in full remission    Severe obesity    Cellulitis of abdominal wall    CHF (congestive heart failure)    Wound of abdomen    Hyperkalemia    LUZMARIA (acute kidney injury)     Past Medical History:   Diagnosis Date    Anxiety and depression     BPH associated with nocturia     Chronic kidney disease, stage 2 (mild)     Coronary arteriosclerosis in native artery     3V    Degeneration of lumbar intervertebral disc     Diabetes mellitus     Disorder of sulfur-bearing amino acid metabolism     Gastroesophageal reflux disease without esophagitis     Glaucoma     Hepatitis C     Heterozygous MTHFR mutation [id number]     Heterozygous MTHFR mutation [id number]     High risk  medication use     Hyperlipidemia     Hypertension     Liver transplanted     Low back pain     Obstructive sleep apnea syndrome     Recurrent major depression in full remission     Severe obesity     Type 2 diabetes mellitus      Past Surgical History:   Procedure Laterality Date    CORONARY ARTERY BYPASS GRAFT N/A 11/22/2017    Procedure: MEDIAN STERNOTOMY, CORONARY ARTERY BYPASS GRAFT X 3, UTILIZING THE LEFT INTERNAL MAMMARY ARTERY AND EVH USING THE LEFT GREATER SAPHENOUS VEIN;  Surgeon: Naga Guaman MD;  Location: [facility];  Service:     LIVER TRANSPLANTATION  2001      General Information       Row Name 08/19/24 1523          OT Time and Intention    Document Type therapy note (daily note)  -JY     Mode of Treatment occupational therapy;individual therapy  -JY       Row Name 08/19/24 1523          General Information    Patient Profile Reviewed yes  -JY     Existing Precautions/Restrictions fall  remote L foot 2-5 amputations APril 2024, NWB LLE, BLE edema, chronic abdominal wound, sensitive skin - hypersensitive to touch  -JY     Barriers to Rehab medically complex;previous functional deficit;cognitive status  -JY       Row Name 08/19/24 1523          Cognition    Orientation Status (Cognition) oriented x 3  -J       Row Name 08/19/24 1523          Safety Issues, Functional Mobility    Safety Issues Affecting Function (Mobility) ability to follow commands;awareness of need for assistance;insight into deficits/self-awareness;safety precaution awareness;safety precautions follow-through/compliance;sequencing abilities  -JY     Impairments Affecting Function (Mobility) balance;coordination;endurance/activity tolerance;motor planning;pain;postural/trunk control;range of motion (ROM);sensation/sensory awareness;strength  -JY     Cognitive Impairments, Mobility Safety/Performance awareness, need for assistance;insight into deficits/self-awareness;judgment;problem-solving/reasoning;safety precaution  awareness;safety precaution follow-through;sequencing abilities  -DANIEL     Comment, Safety Issues/Impairments (Mobility) pt alert and able to follow commands; fatigued and mildly frustrated by consecutive therapies this afternoon and repeat assessment, declined out of chair activity; safety issues and impairments affecting function based on other observations  -DANIEL               User Key  (r) = Recorded By, (t) = Taken By, (c) = Cosigned By      Initials Name Provider Type    Cha Montano OT Occupational Therapist                     Mobility/ADL's       Row Name 08/19/24 152          Bed Mobility    Bed Mobility other (see comments)  received UIC  -DANIEL     Comment, (Bed Mobility) received UIC and preferred to remain OOB thus bed mobility not assessed this date  -JY       Row Name 08/19/24 [facility]6          Transfers    Comment, (Transfers) fxl t/fs not assessed this session d/t focus on ADLs and BUE TE; pt fatigued from earlier t/f training with PT  -[facility] 08/19/24 152          Bed-Chair Transfer    Bed-Chair [place] (Transfers) not tested  -JY       Row Name 08/19/24 [facility]          Sit-Stand Transfer    Sit-Stand [place] (Transfers) not tested  -JY       Row Name 08/19/24 [facility]          Functional Mobility    Functional Mobility- Ind. Level not tested  -     Functional Mobility- Comment defer to PT for specifics  -JY       Row Name 08/19/24 [facility]6          Activities of Daily Living    BADL Assessment/Intervention upper body dressing;grooming  -JY       Row Name 08/19/24 [facility]6          Grooming Assessment/Training    [place] Level (Grooming) wash face, hands;set up;supervision  -DANIEL     Comment, (Grooming) spv for initiation of task jarrett to wash hands as need present, no physical A req'd  -JY       Row Name 08/19/24 [facility]          Upper Body Dressing Assessment/Training    [place] Level (Upper Body Dressing) doff;don;pajama/robe;minimum assist (75% patient effort);verbal cues  -DANIEL      Position (Upper Body Dressing) supported sitting  -     Comment, (Upper Body Dressing) min A for posterior and proximal mgmt of gown  -               User Key  (r) = Recorded By, (t) = Taken By, (c) = Cosigned By      Initials Name Provider Type    Cha Montano OT Occupational Therapist                   Obj/Interventions       Row Name 08/19/24 1528          Shoulder (Therapeutic Exercise)    Shoulder (Therapeutic Exercise) AAROM (active assistive range of motion)  -     Shoulder AAROM (Therapeutic Exercise) left assist right;right assist left;bilateral;flexion;extension;scapular protraction;scapular retraction;sitting;10 repetitions  -       Row Name 08/19/24 1528          Elbow/Forearm (Therapeutic Exercise)    Elbow/Forearm (Therapeutic Exercise) AAROM (active assistive range of motion)  -     Elbow/Forearm AAROM (Therapeutic Exercise) left assist right;right assist left;bilateral;flexion;extension;supination;pronation;10 repetitions  -       Row Name 08/19/24 1528          Wrist (Therapeutic Exercise)    Wrist (Therapeutic Exercise) AAROM (active assistive range of motion)  -     Wrist AAROM (Therapeutic Exercise) left assist right;right assist left;bilateral;flexion;extension;10 repetitions  -       Row Name 08/19/24 1528          Hand (Therapeutic Exercise)    Hand (Therapeutic Exercise) AROM (active range of motion)  -     Hand AROM/AAROM (Therapeutic Exercise) bilateral;AROM (active range of motion);finger flexion;finger extension;10 repetitions  -       Row Name 08/19/24 1528          Motor Skills    Motor Skills functional endurance  -     Functional Endurance decreased activity tolerance toward more dynamic demands; fatigues easily, SPO2 > 90% throughout  -     Therapeutic Exercise shoulder;elbow/forearm;wrist;hand;other (see comments)  faciltiated BUE A/AROM to promote joint mobility to better integrate UEs in fxl tasks and prepare for graded strengthening given pt must  maintain LLE NWB  -JY       Row Name 08/19/24 1528          Balance    Balance Assessment sitting static balance;sitting dynamic balance  -JY     Static Sitting Balance supervision  -JY     Dynamic Sitting Balance standby assist  -JY     Position, Sitting Balance supported;sitting in chair  -JY     Balance Interventions sitting;supported;static;dynamic  -JY     Comment, Balance no LOB noted during supported sitting even with UEs away from ROSA  -JY               User Key  (r) = Recorded By, (t) = Taken By, (c) = Cosigned By      Initials Name Provider Type    Cha Montano, ASHA Occupational Therapist                   Goals/Plan    No documentation.                  Clinical Impression       Row Name 08/19/24 1531          Pain Assessment    Pretreatment Pain Rating 3/10  -JY     Posttreatment Pain Rating 3/10  -JY     Pain Location - Side/Orientation Left  -JY     Pain Location lower  -JY     Pain Location - extremity  -JY     Pre/Posttreatment Pain Comment persistent LLE pain rated at 3/10 throughout; RN aware  -JY     Pain Intervention(s) Rest;Elevated  -JY       Row Name 08/19/24 1531          Plan of Care Review    Plan of Care Reviewed With patient  -DANIEL     Progress no change  -JY     Outcome Evaluation Pt alert and participatory in OT interventions from supported sitting position in chair, pt reports fatigue and mild frustration from multiple therapists assessing pt this afternoon. Pt tolerated BUE A/AROM at BUEs for joint mobility and to prep for graded strengthening for improved integration in ADLs and related t/fs while maintaining NWB LLE. Pt progressed in TE from proximal to distal UEs, 10 reps each with no reported increase in pain. Pt able to d/d gown with min A and wash face/hands with SUA and spv with latter for initiation and quality. Pt is still below baseline occupational performance w/ balanced deficits, muscle weakness, decreased activity tolerance, pain and would benefit from IPOT POC and SNF  at d/c when medically ready.  -JBLADIMIR       Row Name 08/19/24 1531          Therapy Assessment/Plan (OT)    Rehab Potential (OT) good, to achieve stated therapy goals  -JY     Criteria for Skilled Therapeutic Interventions Met (OT) yes;meets criteria;skilled treatment is necessary  -JY     Therapy Frequency (OT) daily  -JBLADIMIR       Row Name 08/19/24 1531          Therapy Plan Review/Discharge Plan (OT)    Anticipated Discharge Disposition (OT) [facility] nursing [facility]  -JBLADIMIR       Row Name 08/19/24 1531          Vital Signs    Pre Systolic BP Rehab 138  -JY     Pre Treatment Diastolic BP 72  -JY     Pretreatment Heart Rate (beats/min) 77  -JY     Posttreatment Heart Rate (beats/min) 77  -JY     Pre SpO2 (%) 91  -JY     O2 Delivery Pre Treatment supplemental O2  -JY     O2 Delivery Intra Treatment supplemental O2  -JY     Post SpO2 (%) 92  -JY     O2 Delivery Post Treatment supplemental O2  -JY     Pre Patient Position Sitting  -JY     Intra Patient Position Sitting  -JY     Post Patient Position Sitting  -JY       Row Name 08/19/24 1531          Positioning and Restraints    Pre-Treatment Position sitting in chair/recliner  -JY     Post Treatment Position chair  -JY     In Chair notified nsg;reclined;call light within reach;encouraged to call for assist;exit alarm on;waffle cushion;on mechanical lift sling;legs elevated  -JY               User Key  (r) = Recorded By, (t) = Taken By, (c) = Cosigned By      Initials Name Provider Type    Cha Montano, ASHA Occupational Therapist                   Outcome Measures       Row Name 08/19/24 1536          How much help from another is currently needed...    Putting on and taking off regular lower body clothing? 1  -JY     Bathing (including washing, rinsing, and drying) 1  -JY     Toileting (which includes using toilet bed pan or urinal) 1  -JY     Putting on and taking off regular upper body clothing 3  -JY     Taking care of personal grooming (such as brushing teeth) 3  -JY      Eating meals 3  -JY     AM-PAC 6 Clicks Score (OT) 12  -JY       Row Name 08/19/24 1455          How much help from another person do you currently need...    Turning from your back to your side while in flat bed without using bedrails? 2  -CD     Moving from lying on back to sitting on the side of a flat bed without bedrails? 2  -CD     Moving to and from a bed to a chair (including a wheelchair)? 1  -CD     Standing up from a chair using your arms (e.g., wheelchair, bedside chair)? 2  -CD     Climbing 3-5 steps with a railing? 1  -CD     To walk in hospital room? 1  -CD     AM-PAC 6 Clicks Score (PT) 9  -CD     Highest Level of Mobility Goal 3 --> Sit at edge of bed  -CD       Row Name 08/19/24 1536          Functional Assessment    Outcome Measure Options AM-PAC 6 Clicks Daily Activity (OT)  -JY               User Key  (r) = Recorded By, (t) = Taken By, (c) = Cosigned By      Initials Name Provider Type    CD Josefina Chao, PT Physical Therapist    Cha Montano, OT Occupational Therapist                    Occupational Therapy Education       Title: PT OT SLP Therapies (In Progress)       Topic: Occupational Therapy (In Progress)       Point: ADL training (In Progress)       Description:   Instruct learner(s) on proper safety adaptation and remediation techniques during self care or transfers.   Instruct in proper use of assistive devices.                  Learning Progress Summary             Patient Acceptance, E, NR by AS at 8/17/2024 0434    Acceptance, E, NL,NR by CARLA at 8/13/2024 2247    Comment: OT POC; bed mobility                         Point: Home exercise program (In Progress)       Description:   Instruct learner(s) on appropriate technique for monitoring, assisting and/or progressing therapeutic exercises/activities.                  Learning Progress Summary             Patient Acceptance, E, NR by AS at 8/17/2024 0434                         Point: Precautions (In Progress)        Description:   Instruct learner(s) on prescribed precautions during self-care and functional transfers.                  Learning Progress Summary             Patient Acceptance, E, NR by AS at 8/17/2024 0434    Acceptance, E, NL,NR by CARLA at 8/13/2024 1147    Comment: OT POC; bed mobility                         Point: Body mechanics (In Progress)       Description:   Instruct learner(s) on proper positioning and spine alignment during self-care, functional mobility activities and/or exercises.                  Learning Progress Summary             Patient Acceptance, E, NR by AS at 8/17/2024 0434                                         User Key       Initials Effective Dates Name Provider Type Discipline    CARLA 06/16/21 -  Cha Fritz OT Occupational Therapist OT    AS 06/14/24 -  Zeina Moore, RN Registered Nurse Nurse                  OT Recommendation and Plan  Therapy Frequency (OT): daily  Plan of Care Review  Plan of Care Reviewed With: patient  Progress: no change  Outcome Evaluation: Pt alert and participatory in OT interventions from supported sitting position in chair, pt reports fatigue and mild frustration from multiple therapists assessing pt this afternoon. Pt tolerated BUE A/AROM at BUEs for joint mobility and to prep for graded strengthening for improved integration in ADLs and related t/fs while maintaining NWB LLE. Pt progressed in TE from proximal to distal UEs, 10 reps each with no reported increase in pain. Pt able to d/d gown with min A and wash face/hands with SUA and spv with latter for initiation and quality. Pt is still below baseline occupational performance w/ balanced deficits, muscle weakness, decreased activity tolerance, pain and would benefit from IPOT POC and SNF at d/c when medically ready.     Time Calculation:         Time Calculation- OT       Row Name 08/19/24 1537             Time Calculation- OT    OT Start Time 1512  -JY      OT Received On 08/19/24  -JY      OT Goal  Re-Cert Due Date 08/23/24  -JY         Timed Charges    33393 - OT Therapeutic Exercise Minutes 8  -JY      99341 - OT Self Care/Mgmt Minutes 7  -JY         Total Minutes    Timed Charges Total Minutes 15  -JY       Total Minutes 15  -JY                User Key  (r) = Recorded By, (t) = Taken By, (c) = Cosigned By      Initials Name Provider Type    Cha Montano OT Occupational Therapist                  Therapy Charges for Today       Code Description Service Date Service Provider Modifiers Qty    [id number]  OT THER PROC EA 15 MIN 8/19/2024 Cha Conde OT GO 1                 Cha Conde OT  8/19/2024

## 2024-08-19 NOTE — PLAN OF CARE
Goal Outcome Evaluation:  Plan of Care Reviewed With: patient, friend        Progress: improving  Outcome Evaluation: PT GIVES GOOD EFFORT BUT NEEDS CUES FOR SEQUENCING MOBILITY SAFELY. PT DEMONSTRATED IMPROVED BED MOBILITY AND PROGRESSED TO STS TRANSFERS WITH R WALKER AND MOD TO MAX ASSIST OF 2. TOLERATED STANDING X 1 MIN AT WALKER, MAINTAINING NWB STATUS L LE. RECOMMEND SNF AT D/C.      Anticipated Discharge Disposition (PT): skilled nursing facility

## 2024-08-19 NOTE — THERAPY EVALUATION
Acute Care - Speech Language Pathology   Swallow Initial Evaluation [facility]  Clinical Swallow Evaluation     Patient Name: Quirino Sheppard  : 1955  MRN: [id number]  Today's Date: 2024               Admit Date: 2024    Visit Dx:     ICD-10-CM ICD-9-CM   1. Acute on chronic congestive heart failure, unspecified heart failure type  I50.9 428.0   2. Hyperkalemia  E87.5 276.7   3. SOB (shortness of breath)  R06.02 786.05   4. AURELIANO (obstructive sleep apnea)  G47.33 327.23   5. Hx of CABG  Z95.1 V45.81   6. History of liver transplant  Z94.4 V42.7   7. Pleural effusion  J90 511.9   8. Chronic wound infection of abdomen, initial encounter  S31.109A 958.3    L08.9    9. Dysphagia, unspecified type  R13.10 787.20     Patient Active Problem List   Diagnosis    Coronary artery disease involving coronary bypass graft of native heart without angina pectoris    Hepatitis C    Hx of liver transplant    Essential hypertension    Hyperlipidemia LDL goal <70    Type 2 diabetes mellitus without complication, with long-term current use of insulin    S/P CABG x 3    BPH associated with nocturia    Degeneration of lumbar intervertebral disc    Disorder of sulfur-bearing amino acid metabolism    Gastroesophageal reflux disease without esophagitis    Obstructive sleep apnea syndrome    Presence of aortocoronary bypass graft    Recurrent major depression in full remission    Severe obesity    Cellulitis of abdominal wall    CHF (congestive heart failure)    Wound of abdomen    Hyperkalemia    LUZMARIA (acute kidney injury)     Past Medical History:   Diagnosis Date    Anxiety and depression     BPH associated with nocturia     Chronic kidney disease, stage 2 (mild)     Coronary arteriosclerosis in native artery     3V    Degeneration of lumbar intervertebral disc     Diabetes mellitus     Disorder of sulfur-bearing amino acid metabolism     Gastroesophageal reflux disease without esophagitis     Glaucoma     Hepatitis C      Heterozygous MTHFR mutation [id number]     Heterozygous MTHFR mutation [id number]     High risk medication use     Hyperlipidemia     Hypertension     Liver transplanted     Low back pain     Obstructive sleep apnea syndrome     Recurrent major depression in full remission     Severe obesity     Type 2 diabetes mellitus      Past Surgical History:   Procedure Laterality Date    CORONARY ARTERY BYPASS GRAFT N/A 11/22/2017    Procedure: MEDIAN STERNOTOMY, CORONARY ARTERY BYPASS GRAFT X 3, UTILIZING THE LEFT INTERNAL MAMMARY ARTERY AND EVH USING THE LEFT GREATER SAPHENOUS VEIN;  Surgeon: Naga Guaman MD;  Location: [facility];  Service:     LIVER TRANSPLANTATION  2001       SLP Recommendation and Plan  SLP Swallowing Diagnosis: suspected pharyngeal dysphagia (08/19/24 1405)  SLP Diet Recommendation: soft to chew textures, chopped, no mixed consistencies, nectar thick liquids (extra sauce/gravy w/ dry foods) (08/19/24 1405)  Recommended Precautions and Strategies: upright posture during/after eating, general aspiration precautions (encourage small bites/sips and slow pace, reduce distractions and talking while eating/drinking) (08/19/24 1405)  SLP Rec. for Method of Medication Administration: meds whole, with puree, as tolerated (08/19/24 1405)     Monitor for Signs of Aspiration: yes, notify SLP if any concerns (08/19/24 1405)  Recommended Diagnostics: VFSS (MBS) (08/19/24 1405)  Swallow Criteria for Skilled Therapeutic Interventions Met: demonstrates skilled criteria (08/19/24 1405)  Anticipated Discharge Disposition (SLP): skilled nursing facility (08/19/24 1405)  Rehab Potential/Prognosis, Swallowing: good, to achieve stated therapy goals (08/19/24 1405)        Oral Care Recommendations: Oral Care BID/PRN, Toothbrush (08/19/24 1405)                                        Plan of Care Reviewed With: patient, durable power of       SWALLOW EVALUATION (Last 72 Hours)       SLP Adult Swallow Evaluation       Row  Name 08/19/24 1385                   Rehab Evaluation    Document Type evaluation  -AC        Subjective Information no complaints  -AC        Patient Observations alert;cooperative  -AC        Patient/Family/Caregiver Comments/Observations Visitor present.  -AC        Patient Effort good  -AC           General Information    Patient Profile Reviewed yes  -AC        Pertinent History Of Current Problem CHF, excessive sleepiness, confusion. Consulted 2' excessive coughing while eating. RN reported pt mouth-breathes and eats very quickly. Hx liver transplant, LUZMARIA, Hepatitis C, GERD, AURELIANO, s/p CABG, s/p toe amputation, vascular dementia per family report, wheelchair-bound.  -AC        Current Method of Nutrition regular textures;thin liquids  -AC        Precautions/Limitations, Vision WFL;for purposes of eval  -AC        Precautions/Limitations, Hearing WFL;for purposes of eval  -AC        Prior Level of Function-Swallowing no diet consistency restrictions  -AC        Plans/Goals Discussed with patient;agreed upon;other (see comments)  POA  -AC        Barriers to Rehab cognitive status  -AC        Patient's Goals for Discharge patient did not state  -AC        Family Goals for Discharge family did not state  -AC           Pain Scale: FACES Pre/Post-Treatment    Pain: FACES Scale, Pretreatment 0-->no hurt  -AC        Posttreatment Pain Rating 0-->no hurt  -AC           Oral Motor Structure and Function    Dentition Assessment missing teeth;teeth are in poor condition  -AC        Secretion Management WNL/WFL  -AC        Mucosal Quality dry  -AC        Volitional Swallow WFL  -AC        Volitional Cough reduced respiratory support  -AC           Oral Musculature and Cranial Nerve Assessment    Oral Motor General Assessment WFL  -AC           General Eating/Swallowing Observations    Respiratory Support Currently in Use room air  -AC        Eating/Swallowing Skills self-fed;impulsive self-feeding behaviors;rate is too  fast;unaware of safety concerns  -        Positioning During Eating upright in chair  -        Utensils Used spoon;cup;straw  -        Consistencies Trialed thin liquids;nectar/syrup-thick liquids;pureed;soft to chew textures;whole  -AC           Respiratory    Respiratory Status increase in respiratory rate;during swallowing/eating  -        Respiratory Pattern decrease control/incoordination of breathing swallow;inhale-exhale pattern  -           Clinical Swallow Eval    Oral Prep Phase WFL  -AC        Oral Transit WFL  -AC        Oral Residue WFL  -AC        Pharyngeal Phase suspected pharyngeal impairment  -        Esophageal Phase unremarkable  -AC           Pharyngeal Phase Concerns    Pharyngeal Phase Concerns acute vocal quality changes;cough  -        Acute Vocal Quality Changes dysphonia  acute issue that started just prior to hospitalization per POA, fluctuates t/o the day  -        Cough thin;nectar;mechanical soft;other (see comments)  w/ thin via cup/straw, nectar via straw (delayed), and dry solids  -        Pharyngeal Phase Concerns, Comment Pt had difficulty taking small/single sips and bites, despite cues. Has a habit of eating/drinking very quickly w/o taking breaks and has difficulty breathing through his nose, per pt/POA.  -           Swallowing Quality of Life Assessment    Education and counseling provided Signs of aspiration;Silent aspiration;Risks of aspiration;Aspiration precautions;Safest diet options  -           SLP Evaluation Clinical Impression    SLP Swallowing Diagnosis suspected pharyngeal dysphagia  -        Functional Impact risk of aspiration/pneumonia  -        Rehab Potential/Prognosis, Swallowing good, to achieve stated therapy goals  -        Swallow Criteria for Skilled Therapeutic Interventions Met demonstrates skilled criteria  -           Recommendations    SLP Diet Recommendation soft to chew textures;chopped;no mixed consistencies;nectar  thick liquids  extra sauce/gravy w/ dry foods  -        Recommended Diagnostics VFSS (MBS)  -        Recommended Precautions and Strategies upright posture during/after eating;general aspiration precautions  encourage small bites/sips and slow pace, reduce distractions and talking while eating/drinking  -        Oral Care Recommendations Oral Care BID/PRN;Toothbrush  -        SLP Rec. for Method of Medication Administration meds whole;with puree;as tolerated  -        Monitor for Signs of Aspiration yes;notify SLP if any concerns  -        Anticipated Discharge Disposition (SLP) skilled nursing facility  -                  User Key  (r) = Recorded By, (t) = Taken By, (c) = Cosigned By      Initials Name Effective Dates    Giselle Quesada MS CCC-SLP 02/03/23 -                     EDUCATION  The patient has been educated in the following areas:   Dysphagia (Swallowing Impairment) Modified Diet Instruction.                Time Calculation:    Time Calculation- SLP       Row Name 08/19/24 1504             Time Calculation- SLP    SLP Start Time 1405  -      SLP Received On 08/19/24  -         Untimed Charges    07728-[id number] Eval Oral Pharyng Swallow Minutes 64  -AC         Total Minutes    Untimed Charges Total Minutes 64  -AC       Total Minutes 64  -AC                User Key  (r) = Recorded By, (t) = Taken By, (c) = Cosigned By      Initials Name Provider Type     Giselle Mosquera MS CCC-SLP Speech and Language Pathologist                    Therapy Charges for Today       Code Description Service Date Service Provider Modifiers Qty    [id number] HC ST EVAL ORAL PHARYNG SWALLOW 4 8/19/2024 Giselle Mosquera MS CCC-SLP GN 1                 Giselle Mosquera MS CCC-SLP  8/19/2024

## 2024-08-19 NOTE — PLAN OF CARE
Goal Outcome Evaluation:  Plan of Care Reviewed With: patient           Outcome Evaluation: Patient alert but not confused, NSR and on room air, Use CPAP when sleep. Could not leave abdominal dressing in place, removed and started to scratch his  abdomen,. Education provided and dressing reapplied. Patient incontinent.

## 2024-08-19 NOTE — PLAN OF CARE
Goal Outcome Evaluation:           Progress: improving  Outcome Evaluation: Pt up in chair today. Diet modified. Resting on 2 liters nc.

## 2024-08-20 ENCOUNTER — APPOINTMENT (OUTPATIENT)
Dept: GENERAL RADIOLOGY | Facility: HOSPITAL | Age: 69
DRG: 291 | End: 2024-08-20
Payer: MEDICARE

## 2024-08-20 LAB
GLUCOSE BLDC GLUCOMTR-MCNC: 103 MG/DL (ref 70–130)
GLUCOSE BLDC GLUCOMTR-MCNC: 178 MG/DL (ref 70–130)
GLUCOSE BLDC GLUCOMTR-MCNC: 299 MG/DL (ref 70–130)
GLUCOSE BLDC GLUCOMTR-MCNC: 92 MG/DL (ref 70–130)

## 2024-08-20 PROCEDURE — 63710000001 INSULIN GLARGINE PER 5 UNITS: Performed by: STUDENT IN AN ORGANIZED HEALTH CARE EDUCATION/TRAINING PROGRAM

## 2024-08-20 PROCEDURE — 92611 MOTION FLUOROSCOPY/SWALLOW: CPT

## 2024-08-20 PROCEDURE — 63710000001 SIROLIMUS PER 1 MG: Performed by: PHYSICIAN ASSISTANT

## 2024-08-20 PROCEDURE — 25010000002 HEPARIN (PORCINE) PER 1000 UNITS: Performed by: PHYSICIAN ASSISTANT

## 2024-08-20 PROCEDURE — 74230 X-RAY XM SWLNG FUNCJ C+: CPT

## 2024-08-20 PROCEDURE — 94660 CPAP INITIATION&MGMT: CPT

## 2024-08-20 PROCEDURE — 94799 UNLISTED PULMONARY SVC/PX: CPT

## 2024-08-20 PROCEDURE — 63710000001 INSULIN LISPRO (HUMAN) PER 5 UNITS: Performed by: PHYSICIAN ASSISTANT

## 2024-08-20 PROCEDURE — 82948 REAGENT STRIP/BLOOD GLUCOSE: CPT

## 2024-08-20 PROCEDURE — 99232 SBSQ HOSP IP/OBS MODERATE 35: CPT | Performed by: STUDENT IN AN ORGANIZED HEALTH CARE EDUCATION/TRAINING PROGRAM

## 2024-08-20 RX ORDER — INSULIN GLARGINE 100 [IU]/ML
15 INJECTION, SOLUTION SUBCUTANEOUS NIGHTLY
Status: DISCONTINUED | OUTPATIENT
Start: 2024-08-20 | End: 2024-08-22 | Stop reason: HOSPADM

## 2024-08-20 RX ADMIN — HEPARIN SODIUM 5000 UNITS: 5000 INJECTION INTRAVENOUS; SUBCUTANEOUS at 10:31

## 2024-08-20 RX ADMIN — BARIUM SULFATE 100 ML: 0.81 POWDER, FOR SUSPENSION ORAL at 10:09

## 2024-08-20 RX ADMIN — ASPIRIN 325 MG: 325 TABLET, COATED ORAL at 10:31

## 2024-08-20 RX ADMIN — BUPROPION HYDROCHLORIDE 300 MG: 150 TABLET, EXTENDED RELEASE ORAL at 10:31

## 2024-08-20 RX ADMIN — FERROUS SULFATE TAB 325 MG (65 MG ELEMENTAL FE) 325 MG: 325 (65 FE) TAB at 10:30

## 2024-08-20 RX ADMIN — LACTULOSE 30 G: 10 SOLUTION ORAL at 10:28

## 2024-08-20 RX ADMIN — SIROLIMUS 2 MG: 1 TABLET ORAL at 10:30

## 2024-08-20 RX ADMIN — FUROSEMIDE 40 MG: 40 TABLET ORAL at 10:30

## 2024-08-20 RX ADMIN — ESCITALOPRAM OXALATE 20 MG: 20 TABLET, FILM COATED ORAL at 10:31

## 2024-08-20 RX ADMIN — Medication 10 ML: at 10:32

## 2024-08-20 RX ADMIN — INSULIN GLARGINE 15 UNITS: 100 INJECTION, SOLUTION SUBCUTANEOUS at 21:11

## 2024-08-20 RX ADMIN — TAMSULOSIN HYDROCHLORIDE 0.4 MG: 0.4 CAPSULE ORAL at 10:31

## 2024-08-20 RX ADMIN — HEPARIN SODIUM 5000 UNITS: 5000 INJECTION INTRAVENOUS; SUBCUTANEOUS at 21:11

## 2024-08-20 RX ADMIN — INSULIN LISPRO 3 UNITS: 100 INJECTION, SOLUTION INTRAVENOUS; SUBCUTANEOUS at 13:13

## 2024-08-20 RX ADMIN — ATORVASTATIN CALCIUM 80 MG: 40 TABLET, FILM COATED ORAL at 10:30

## 2024-08-20 RX ADMIN — NYSTATIN: 100000 POWDER TOPICAL at 21:12

## 2024-08-20 RX ADMIN — LACTULOSE 30 G: 10 SOLUTION ORAL at 18:31

## 2024-08-20 RX ADMIN — LACTULOSE 30 G: 10 SOLUTION ORAL at 21:10

## 2024-08-20 RX ADMIN — PANTOPRAZOLE SODIUM 40 MG: 40 TABLET, DELAYED RELEASE ORAL at 10:30

## 2024-08-20 RX ADMIN — NYSTATIN: 100000 POWDER TOPICAL at 10:31

## 2024-08-20 RX ADMIN — BARIUM SULFATE 20 ML: 400 PASTE ORAL at 10:09

## 2024-08-20 RX ADMIN — Medication 10 ML: at 21:11

## 2024-08-20 NOTE — PROGRESS NOTES
[facility] Medicine Services  PROGRESS NOTE    Patient Name: Quirino Sheppard  : 1955  MRN: [id number]    Date of Admission: 2024  Primary Care Physician: Andrew Fernandez MD    Subjective   Subjective     CC: CHFpEF exacerbation    HPI:  Per nursing report, patient made sexually inappropriate move/comment to staff member overnight.  Had MBS today.  Has minimal cough.  Denied pain.    Objective   Objective     Vital Signs:   Temp:  [97.6 °F (36.4 °C)-98.6 °F (37 °C)] 97.6 °F (36.4 °C)  Heart Rate:  [71-81] 76  Resp:  [16-22] 16  BP: (134-159)/(72-86) 158/75  Flow (L/min):  [2] 2     Physical Exam:  Constitutional: No acute distress, awake, alert  HENT: NCAT, mucous membranes moist  Respiratory: Clear to auscultation bilaterally, respiratory effort normal   Cardiovascular: RRR, no murmurs, rubs, or gallops  Gastrointestinal: Positive bowel sounds, soft, nontender, nondistended  Musculoskeletal: No bilateral ankle edema; left foot covered in bandage, s/p toe amputations   Psychiatric: Appropriate affect, cooperative  Neurologic: Oriented to self, [facility], , speech clear  Skin: left foot covered with bandage    Results Reviewed:  LAB RESULTS:      Lab 24  0547 08/15/24  0455 24   WBC 8.32 8.53 6.62   HEMOGLOBIN 10.0* 9.6* 9.6*   HEMATOCRIT 32.8* 31.6* 31.7*   PLATELETS 219 200 198   NEUTROS ABS  --  6.45 4.45   IMMATURE GRANS (ABS)  --  0.03 0.02   LYMPHS ABS  --  1.06 1.30   MONOS ABS  --  0.83 0.66   EOS ABS  --  0.13 0.16   MCV 85.0 85.4 85.0   PROTIME 14.4  --   --          Lab 24  0504 24  0547 08/15/24  0455 24  0413   SODIUM 138 135* 135* 135*   POTASSIUM 4.5 5.0 4.8 4.8   CHLORIDE 99 98 99 100   CO2 29.0 28.0 28.0 25.0   ANION GAP 10.0 9.0 8.0 10.0   BUN 23 22 21 22   CREATININE 1.23 1.15 1.23 1.26   EGFR 63.6 68.9 63.6 61.7   GLUCOSE 131* 138* 111* 111*   CALCIUM 8.8 8.7 8.6 8.5*   TSH  --   --  5.230*  --          Lab  08/16/24  0547   TOTAL PROTEIN 6.8   ALBUMIN 3.6   GLOBULIN 3.2   ALT (SGPT) 21   AST (SGOT) 21   BILIRUBIN 0.4   ALK PHOS 137*         Lab 08/16/24  0547 08/15/24  0455   PROBNP  --  1,234.0*   PROTIME 14.4  --    INR 1.11  --              Lab 08/15/24  0455   VITAMIN B 12 577         Lab 08/15/24  1428   FIO2 21   CARBOXYHEMOGLOBIN (VENOUS) 0.9     Brief Urine Lab Results  (Last result in the past 365 days)        Color   Clarity   Blood   Leuk Est   Nitrite   Protein   CREAT   Urine HCG        08/12/24 1339 Yellow   Clear   Negative   Negative   Negative   Negative                   Microbiology Results Abnormal       Procedure Component Value - Date/Time    Blood Culture - Blood, Arm, Right [[id number]]  (Normal) Collected: 08/12/24 1349    Lab Status: Final result Specimen: Blood from Arm, Right Updated: 08/17/24 1415     Blood Culture No growth at 5 days    Blood Culture - Blood, Arm, Left [[id number]]  (Normal) Collected: 08/12/24 1349    Lab Status: Final result Specimen: Blood from Arm, Left Updated: 08/17/24 1415     Blood Culture No growth at 5 days    COVID PRE-OP / PRE-PROCEDURE SCREENING ORDER (NO ISOLATION) - Swab, Nasopharynx [[id number]]  (Normal) Collected: 08/12/24 1345    Lab Status: Final result Specimen: Swab from Nasopharynx Updated: 08/12/24 1423    Narrative:      The following orders were created for panel order COVID PRE-OP / PRE-PROCEDURE SCREENING ORDER (NO ISOLATION) - Swab, Nasopharynx.  Procedure                               Abnormality         Status                     ---------                               -----------         ------                     COVID-19 and FLU A/B PCR...[[id number]]  Normal              Final result                 Please view results for these tests on the individual orders.    COVID-19 and FLU A/B PCR, 1 HR TAT - Swab, Nasopharynx [[id number]]  (Normal) Collected: 08/12/24 1345    Lab Status: Final result Specimen: Swab from Nasopharynx Updated: 08/12/24  1423     COVID19 Not Detected     Influenza A PCR Not Detected     Influenza B PCR Not Detected    Narrative:      Fact sheet for providers: https://www.fda.gov/media/584204/download    Fact sheet for patients: https://www.fda.gov/media/790644/download    Test performed by PCR.            No radiology results from the last 24 hrs    Results for orders placed during the hospital encounter of 08/12/24    Adult Transthoracic Echo Complete W/ Cont if Necessary Per Protocol    Interpretation Summary    Left ventricular systolic function is normal. Left ventricular ejection fraction appears to be 51 - 55%.    Left ventricular diastolic function was normal.    The left atrial cavity is mildly dilated.    The right atrial cavity is mildly  dilated.    There is severe calcification of the aortic valve.    Moderate mitral valve regurgitation is present.    Estimated right ventricular systolic pressure from tricuspid regurgitation is mildly elevated (35-45 mmHg).    Mitral valve regurgitation is worsened since 2017 but otherwise unchanged.      Current medications:  Scheduled Meds:aspirin, 325 mg, Oral, Daily  atorvastatin, 80 mg, Oral, Daily  buPROPion XL, 300 mg, Oral, Daily  escitalopram, 20 mg, Oral, Daily  ferrous sulfate, 325 mg, Oral, Daily With Breakfast  furosemide, 40 mg, Oral, Daily  heparin (porcine), 5,000 Units, Subcutaneous, Q12H  insulin glargine, 15 Units, Subcutaneous, Nightly  insulin lispro, 3-14 Units, Subcutaneous, TID With Meals  lactulose, 30 g, Oral, TID  nystatin, , Topical, Q12H  pantoprazole, 40 mg, Oral, Daily  pharmacy consult - MTM, , Does not apply, Daily  sirolimus, 2 mg, Oral, Daily  sodium chloride, 10 mL, Intravenous, Q12H  tamsulosin, 0.4 mg, Oral, Daily      Continuous Infusions:   PRN Meds:.  dextrose    dextrose    glucagon (human recombinant)    melatonin    nitroglycerin    sodium chloride    sodium chloride    sodium chloride    Assessment & Plan   Assessment & Plan     Active  Hospital Problems    Diagnosis  POA    **CHF (congestive heart failure) [I50.9]  Yes    Wound of abdomen [S31.109A]  Yes    Hyperkalemia [E87.5]  Yes    LUZMARIA (acute kidney injury) [N17.9]  Yes    Obstructive sleep apnea syndrome [G47.33]  Yes    Essential hypertension [I10]  Yes    Hyperlipidemia LDL goal <70 [E78.5]  Yes    Hx of liver transplant [Z94.4]  Not Applicable    Type 2 diabetes mellitus without complication, with long-term current use of insulin [E11.9, Z79.4]  Not Applicable    Coronary artery disease involving coronary bypass graft of native heart without angina pectoris [I25.810]  Yes      Resolved Hospital Problems   No resolved problems to display.        Brief Hospital Course to date:  Quirino Sheppard is a 69 y.o. male w CAD s/p CABG, DMII, h/o liver transplant, AURELIANO without home CPAP, decline since 4/2024 toe amputation, chronic wounds, vascular dementia per family report who presented w 30-40 lb weight gain and shortness of breath. Stay c/b very excessive sleepiness 8/13-8/15 w complete difference 8/16 w only holding small doses home gabapentin + seroquel and 1-2 hours on cpap. Unclear if causative v spontaneous improvement. Work-up for excessive sleepiness unrevealing.    Previously lived at senior living w home health/wound support and wheelchair bound state, help from family    This patient's problems and plans were partially entered by my partner and updated as appropriate by me 08/20/24.    Excessive sleepiness - resolved 8/16 until now  -vbg normal, prior blood gas also normal, free T4 normal, B12 wnl, EEG w encephalopathy only  -CT head wnl, MRI could not tolerate now awake/alert  -unclear etiology: uncontrolled AURELIANO v med effect (though doses appear so small?)  -restarted low dose seroquel at night and some increased sedation 8/18 so now off both seroquel and gabapentin for now entirely (and at time of DC)      Untreated AURELIANO - working on OP CPAP, tolerating hospital machine  minimally    Acute on chronic CHFpEF - improving  -only on home spironolactone it appears, ECHO stable from 2017  -per family dry weight ~180 lbs,  up to 230 lbs on admission w improvement w diuresis  -diuresed well with IV diuretics  -transitioned to PO diuretics which appear effective - continue on DC    Left chronic foot wound   Chronic abdominal wound, appears 2/2 excoriation   -follows biweekly w OP wound clinic w senior facility  -does not appear infected on visualization. Therefore will not treat culture as likely colonization and not wound infection on review. OP close f/u with wound clinic    Vascular dementia, appears worsened in recent months  DMII - on basal insulin plus SSI, dosing last adjusted on 8/20  H/o liver transplant  Chronic debility - worry patient is moving toward [facility] level of care  BMI 37    *Is NWB-GIA at baseline, using wheelchair largely. Per discussion with CM, patient would need to be an assist of 1 to get in and out of his wheelchair and be able to self-propel in the wheelchair. Unfortunately, he is currently a 2 person assist plus lift to move, thus cannot return to his senior living facility at this time.     Expected Discharge Location and Transportation: SNF  Expected Discharge  (Discharge date is tentative pending patient's medical condition and is subject to change)  Expected Discharge Date: 8/20/2024; Expected Discharge Time:      VTE Prophylaxis:  Pharmacologic VTE prophylaxis orders are present.         AM-PAC 6 Clicks Score (PT): 9 (08/20/24 0600)    CODE STATUS:   Code Status and Medical Interventions: CPR (Attempt to Resuscitate); Full Support   Ordered at: 08/12/24 2010     Code Status (Patient has no pulse and is not breathing):    CPR (Attempt to Resuscitate)     Medical Interventions (Patient has pulse or is breathing):    Full Support       Consuelo Olivares MD  08/20/24

## 2024-08-20 NOTE — MBS/VFSS/FEES
Acute Care - Speech Language Pathology   Swallow Initial Evaluation  [place]  Modified Barium Swallow Study (MBS)     Patient Name: Quirino Sheppard  : 1955  MRN: [id number]  Today's Date: 2024               Admit Date: 2024    Visit Dx:     ICD-10-CM ICD-9-CM   1. Acute on chronic congestive heart failure, unspecified heart failure type  I50.9 428.0   2. Hyperkalemia  E87.5 276.7   3. SOB (shortness of breath)  R06.02 786.05   4. AURELIANO (obstructive sleep apnea)  G47.33 327.23   5. Hx of CABG  Z95.1 V45.81   6. History of liver transplant  Z94.4 V42.7   7. Pleural effusion  J90 511.9   8. Chronic wound infection of abdomen, initial encounter  S31.109A 958.3    L08.9    9. Oropharyngeal dysphagia  R13.12 787.22     Patient Active Problem List   Diagnosis    Coronary artery disease involving coronary bypass graft of native heart without angina pectoris    Hepatitis C    Hx of liver transplant    Essential hypertension    Hyperlipidemia LDL goal <70    Type 2 diabetes mellitus without complication, with long-term current use of insulin    S/P CABG x 3    BPH associated with nocturia    Degeneration of lumbar intervertebral disc    Disorder of sulfur-bearing amino acid metabolism    Gastroesophageal reflux disease without esophagitis    Obstructive sleep apnea syndrome    Presence of aortocoronary bypass graft    Recurrent major depression in full remission    Severe obesity    Cellulitis of abdominal wall    CHF (congestive heart failure)    Wound of abdomen    Hyperkalemia    LUZMARIA (acute kidney injury)     Past Medical History:   Diagnosis Date    Anxiety and depression     BPH associated with nocturia     Chronic kidney disease, stage 2 (mild)     Coronary arteriosclerosis in native artery     3V    Degeneration of lumbar intervertebral disc     Diabetes mellitus     Disorder of sulfur-bearing amino acid metabolism     Gastroesophageal reflux disease without esophagitis     Glaucoma     Hepatitis C      Heterozygous MTHFR mutation [id number]     Heterozygous MTHFR mutation [id number]     High risk medication use     Hyperlipidemia     Hypertension     Liver transplanted     Low back pain     Obstructive sleep apnea syndrome     Recurrent major depression in full remission     Severe obesity     Type 2 diabetes mellitus      Past Surgical History:   Procedure Laterality Date    CORONARY ARTERY BYPASS GRAFT N/A 11/22/2017    Procedure: MEDIAN STERNOTOMY, CORONARY ARTERY BYPASS GRAFT X 3, UTILIZING THE LEFT INTERNAL MAMMARY ARTERY AND EVH USING THE LEFT GREATER SAPHENOUS VEIN;  Surgeon: Naga Guaman MD;  Location: [facility];  Service:     LIVER TRANSPLANTATION  2001       SLP Recommendation and Plan  SLP Swallowing Diagnosis: mild, oral dysphagia, pharyngeal dysphagia (08/20/24 1000)  SLP Diet Recommendation: soft to chew textures, chopped, no mixed consistencies, thin liquids (08/20/24 1000)  Recommended Precautions and Strategies: upright posture during/after eating, general aspiration precautions (encourage small bites/sips, slow pace, and reduce distractions/talking while eating and drinking) (08/20/24 1000)  SLP Rec. for Method of Medication Administration: meds whole, with puree, as tolerated (08/20/24 1000)     Monitor for Signs of Aspiration: yes, notify SLP if any concerns (08/20/24 1000)  Swallow Criteria for Skilled Therapeutic Interventions Met: demonstrates skilled criteria (08/20/24 1000)  Anticipated Discharge Disposition (SLP): skilled nursing facility (08/20/24 1000)  Rehab Potential/Prognosis, Swallowing: adequate, monitor progress closely (08/20/24 1000)  Therapy Frequency (Swallow): 5 days per week (08/20/24 1000)  Predicted Duration Therapy Intervention (Days): 1 week (08/20/24 1000)  Oral Care Recommendations: Oral Care BID/PRN, Toothbrush (08/20/24 1000)             Plan of Care Reviewed With: patient  Progress: no change      SWALLOW EVALUATION (Last 72 Hours)       SLP Adult Swallow Evaluation        Row Name 08/20/24 1000 08/19/24 1406                Rehab Evaluation    Document Type evaluation  -AC evaluation  -AC       Subjective Information no complaints  -AC no complaints  -AC       Patient Observations lethargic;cooperative  -AC alert;cooperative  -AC       Patient/Family/Caregiver Comments/Observations No family present.  -AC Visitor present.  -AC       Patient Effort adequate  -AC good  -AC          General Information    Patient Profile Reviewed yes  -AC yes  -AC       Pertinent History Of Current Problem See previous eval.  -AC CHF, excessive sleepiness, confusion. Consulted 2' excessive coughing while eating. RN reported pt mouth-breathes and eats very quickly. Hx liver transplant, LUMZARIA, Hepatitis C, GERD, AURELIANO, s/p CABG, s/p toe amputation, vascular dementia per family report, wheelchair-bound.  -AC       Current Method of Nutrition soft to chew textures;chopped;no mixed consistencies;nectar/syrup-thick liquids  -AC regular textures;thin liquids  -AC       Precautions/Limitations, Vision -- WFL;for purposes of eval  -AC       Precautions/Limitations, Hearing -- WFL;for purposes of eval  -AC       Prior Level of Function-Swallowing -- no diet consistency restrictions  -AC       Plans/Goals Discussed with patient;agreed upon  -AC patient;agreed upon;other (see comments)  POA  -AC       Barriers to Rehab cognitive status  -AC cognitive status  -AC       Patient's Goals for Discharge patient did not state  -AC patient did not state  -AC       Family Goals for Discharge -- family did not state  -AC          Pain Scale: FACES Pre/Post-Treatment    Pain: FACES Scale, Pretreatment 0-->no hurt  -AC 0-->no hurt  -AC       Posttreatment Pain Rating 0-->no hurt  -AC 0-->no hurt  -AC          Oral Motor Structure and Function    Dentition Assessment -- missing teeth;teeth are in poor condition  -AC       Secretion Management -- WNL/WFL  -AC       Mucosal Quality -- dry  -AC       Volitional Swallow -- WFL   -       Volitional Cough -- reduced respiratory support  -          Oral Musculature and Cranial Nerve Assessment    Oral Motor General Assessment -- WFL  -AC          General Eating/Swallowing Observations    Respiratory Support Currently in Use nasal cannula  - room air  -       Eating/Swallowing Skills fed by SLP;self-fed;needed assist;impulsive self-feeding behaviors  - self-fed;impulsive self-feeding behaviors;rate is too fast;unaware of safety concerns  -       Positioning During Eating upright in chair  - upright in chair  -       Utensils Used -- spoon;cup;straw  -       Consistencies Trialed -- thin liquids;nectar/syrup-thick liquids;pureed;soft to chew textures;whole  -AC          Respiratory    Respiratory Status -- increase in respiratory rate;during swallowing/eating  -       Respiratory Pattern -- decrease control/incoordination of breathing swallow;inhale-exhale pattern  -          Clinical Swallow Eval    Oral Prep Phase -- WFL  -AC       Oral Transit -- WFL  -AC       Oral Residue -- WFL  -AC       Pharyngeal Phase -- suspected pharyngeal impairment  -AC       Esophageal Phase -- unremarkable  -AC          Pharyngeal Phase Concerns    Pharyngeal Phase Concerns -- acute vocal quality changes;cough  -AC       Acute Vocal Quality Changes -- dysphonia  acute issue that started just prior to hospitalization per POA, fluctuates t/o the day  -       Cough -- thin;nectar;mechanical soft;other (see comments)  w/ thin via cup/straw, nectar via straw (delayed), and dry solids  -       Pharyngeal Phase Concerns, Comment -- Pt had difficulty taking small/single sips and bites, despite cues. Has a habit of eating/drinking very quickly w/o taking breaks and has difficulty breathing through his nose, per pt/POA.  -          MBS/VFSS    Utensils Used spoon;cup;straw  - --       Consistencies Trialed thin liquids;pudding thick;mixed consistency;regular textures  - --          MBS/VFSS  Interpretation    Oral Prep Phase WFL  -AC --       Oral Transit Phase impaired  -AC --       Oral Residue WFL  -AC --       Oral Phase, Comment Adequate mastication, but increased risk for dyspnea while masticating as pt breathes primarily via mouth. Reduced coordination breathing/mastication/swallowing.  -AC --          Oral Transit Phase    Impaired Oral Transit Phase premature spillage of liquids into pharynx  -AC --       Premature Spillage of Liquids into Pharynx thin liquids;mixed consistency;secondary to reduced lingual control  -AC --       Oral Transit Phase, Comment Affected by respiratory status.  -AC --          Initiation of Pharyngeal Swallow    Initiation of Pharyngeal Swallow bolus in pyriform sinuses  -AC --       Pharyngeal Phase impaired pharyngeal phase of swallowing  -AC --       Penetration Before the Swallow thin liquids;mixed consistency;secondary to reduced back of tongue control;secondary to delayed swallow initiation or mistiming  -AC --       Aspiration Before the Swallow other (see comments)  ? w/ initial tsp of thin liquid--u/a to fully visualize TVFs/trachea due to positioning w/ initial trial. No response if was aspiration.  -AC --       Penetration During the Swallow thin liquids;secondary to delayed swallow initiation or mistiming  -AC --       Depth of Penetration transient  -AC --       Rosenbek's Scale thin:;mixed:;2--->level 2  -AC --       Pharyngeal Residue no significant pharyngeal residue noted  -AC --       Pharyngeal Phase, Comment No aspiration w/ thin via sequential cup/straw drinks, pudding, mixed consistency, or solid; however, remains generally at risk given respiratory status and impulsivity.  -AC --          Swallowing Quality of Life Assessment    Education and counseling provided Risks of aspiration;Safest diet options;Aspiration precautions;Compensatory strategy recommendations and rationale  -AC Signs of aspiration;Silent aspiration;Risks of  aspiration;Aspiration precautions;Safest diet options  -AC          SLP Evaluation Clinical Impression    SLP Swallowing Diagnosis mild;oral dysphagia;pharyngeal dysphagia  -AC suspected pharyngeal dysphagia  -       Functional Impact risk of aspiration/pneumonia  - risk of aspiration/pneumonia  -AC       Rehab Potential/Prognosis, Swallowing adequate, monitor progress closely  -AC good, to achieve stated therapy goals  -       Swallow Criteria for Skilled Therapeutic Interventions Met demonstrates skilled criteria  -AC demonstrates skilled criteria  -          Recommendations    Therapy Frequency (Swallow) 5 days per week  -AC --       Predicted Duration Therapy Intervention (Days) 1 week  -AC --       SLP Diet Recommendation soft to chew textures;chopped;no mixed consistencies;thin liquids  - soft to chew textures;chopped;no mixed consistencies;nectar thick liquids  extra sauce/gravy w/ dry foods  -       Recommended Diagnostics -- VFSS (MBS)  -       Recommended Precautions and Strategies upright posture during/after eating;general aspiration precautions  encourage small bites/sips, slow pace, and reduce distractions/talking while eating and drinking  - upright posture during/after eating;general aspiration precautions  encourage small bites/sips and slow pace, reduce distractions and talking while eating/drinking  -AC       Oral Care Recommendations Oral Care BID/PRN;Toothbrush  -AC Oral Care BID/PRN;Toothbrush  -AC       SLP Rec. for Method of Medication Administration meds whole;with puree;as tolerated  -AC meds whole;with puree;as tolerated  -AC       Monitor for Signs of Aspiration yes;notify SLP if any concerns  -AC yes;notify SLP if any concerns  -AC       Anticipated Discharge Disposition (SLP) skilled nursing facility  - skilled nursing facility  -                 User Key  (r) = Recorded By, (t) = Taken By, (c) = Cosigned By      Initials Name Effective Dates    AC Giselle Mosquera, MS  CCC-SLP 02/03/23 -                     EDUCATION  The patient has been educated in the following areas:   Dysphagia (Swallowing Impairment) Modified Diet Instruction.        SLP GOALS       Row Name 08/20/24 1000             (LTG) Patient will demonstrate functional swallow for    Diet Texture (Demonstrate functional swallow) regular textures  -AC      Liquid viscosity (Demonstrate functional swallow) thin liquids  -AC      Buford (Demonstrate functional swallow) with use of compensatory strategies  -AC      Time Frame (Demonstrate functional swallow) by discharge  -AC         (STG) Patient will tolerate therapeutic trials of    Consistencies Trialed (Tolerate therapeutic trials) soft to chew (whole) textures;mixed consistencies  -AC      Desired Outcome (Tolerate therapeutic trials) without signs/symptoms of aspiration;without signs of distress;with adequate oral prep/transit/clearance  as respiratory status improves  -AC      Buford (Tolerate therapeutic trials) with minimal cues (75-90% accuracy)  -AC      Time Frame (Tolerate therapeutic trials) 1 week  -AC         (STG) Pharyngeal Strengthening Exercise Goal 1 (SLP)    Activity (Pharyngeal Strengthening Goal 1, SLP) increase closure at entrance to airway/closure of airway at glottis  -AC      Increase Closure at Entrance to Airway/Closure of Airway at Glottis super-supraglottic swallow  -AC      Buford/Accuracy (Pharyngeal Strengthening Goal 1, SLP) with minimal cues (75-90% accuracy)  -AC      Time Frame (Pharyngeal Strengthening Goal 1, SLP) 1 week  -AC         (STG) Swallow Management Recall Goal 1 (SLP)    Activity (Swallow Management Recall Goal 1, SLP) recall of;aspiration precautions;fatigue precautions;compensatory swallow strategies/techniques  pt/caregiver recall  -AC      Buford/Accuracy (Swallow Management Recall Goal 1, SLP) with minimal cues (75-90% accuracy)  -AC      Time Frame (Swallow Management Recall Goal 1, SLP) 1  week  -AC         (STG) Swallow Compensatory Strategies Goal 1 (SLP)    Activity (Swallow Compensatory Strategies/Techniques Goal 1, SLP) aspiration precautions;fatigue precautions;compensatory strategies;during meal intake;during p.o. trials;small bites;small cup sips;small straw sips;other (see comments)  slow pace  -AC      [place]/Accuracy (Swallow Compensatory Strategies/Techniques Goal 1, SLP) with minimal cues (75-90% accuracy)  -AC      Time Frame (Swallow Compensatory Strategies/Techniques Goal 1, SLP) 1 week  -AC                User Key  (r) = Recorded By, (t) = Taken By, (c) = Cosigned By      Initials Name Provider Type     Giselle Mosquera MS CCC-SLP Speech and Language Pathologist                         Time Calculation:    Time Calculation- SLP       Row Name 08/20/24 1036             Time Calculation- SLP    SLP Start Time 1000  -AC      SLP Received On 08/20/24  -AC         Untimed Charges    33413-[place] Motion Fluoro Eval Swallow Minutes 44  -AC         Total Minutes    Untimed Charges Total Minutes 44  -AC       Total Minutes 44  -AC                User Key  (r) = Recorded By, (t) = Taken By, (c) = Cosigned By      Initials Name Provider Type     Giselle Mosquera MS CCC-SLP Speech and Language Pathologist                    Therapy Charges for Today       Code Description Service Date Service Provider Modifiers Qty    29027538521 HC ST EVAL ORAL PHARYNG SWALLOW 4 8/19/2024 Giselle Mosquera MS CCC-SLP GN 1    17134880234 HC ST MOTION FLUORO EVAL SWALLOW 3 8/20/2024 Giselle Mosquera MS CCC-SLP GN 1                 Giselle Mosquera MS CCC-AAKASH  8/20/2024

## 2024-08-20 NOTE — PLAN OF CARE
Goal Outcome Evaluation:           Progress: no change  Outcome Evaluation: No changes during this shift. Pt resting in bed with eyes closed, in lowest position and locked, call light within reach. will continue to monitor.    Problem: Fall Injury Risk  Goal: Absence of Fall and Fall-Related Injury  Outcome: Ongoing, Progressing     Problem: Adult Inpatient Plan of Care  Goal: Plan of Care Review  Outcome: Ongoing, Progressing  Flowsheets  Taken 8/20/2024 0555 by Lisa Mays RN  Progress: no change  Outcome Evaluation: No changes during this shift. Pt resting in bed with eyes closed, in lowest position and locked, call light within reach. will continue to monitor.  Taken 8/19/2024 1531 by Cha Conde OT  Plan of Care Reviewed With: patient  Goal: Patient-Specific Goal (Individualized)  Outcome: Ongoing, Progressing  Goal: Absence of Hospital-Acquired Illness or Injury  Outcome: Ongoing, Progressing  Intervention: Identify and Manage Fall Risk  Flowsheets (Taken 8/20/2024 0500 by Dayanara Evans PCT)  Safety Promotion/Fall Prevention:   activity supervised   assistive device/personal items within reach   clutter free environment maintained   fall prevention program maintained   lighting adjusted   nonskid shoes/slippers when out of bed   room organization consistent   safety round/check completed  Intervention: Prevent Skin Injury  Flowsheets  Taken 8/20/2024 0500 by Dayanara Evans PCT  Body Position: supine, legs elevated  Taken 8/19/2024 1800 by Bernice Jay RN  Skin Protection:   adhesive use limited   incontinence pads utilized   transparent dressing maintained   tubing/devices free from skin contact  Intervention: Prevent and Manage VTE (Venous Thromboembolism) Risk  Flowsheets  Taken 8/20/2024 0500 by Dayanara Evans PCT  Activity Management: activity encouraged  Taken 8/18/2024 0830 by Bernice Jay, JOAQUIM  VTE Prevention/Management: (SQ Heparin) other (see comments)  Taken 8/18/2024 0400 by  Zeina Moore, RN  Range of Motion:   active ROM (range of motion) encouraged   ROM (range of motion) performed  Intervention: Prevent Infection  Flowsheets (Taken 8/19/2024 1800 by Bernice Jay, RN)  Infection Prevention: single patient room provided  Goal: Optimal Comfort and Wellbeing  Outcome: Ongoing, Progressing  Intervention: Monitor Pain and Promote Comfort  Flowsheets (Taken 8/13/2024 2055 by Saige Chávez, RN)  Pain Management Interventions:   see MAR   quiet environment facilitated   pillow support provided   position adjusted  Intervention: Provide Person-Centered Care  Flowsheets (Taken 8/19/2024 0845 by Bernice Jay, RN)  Trust Relationship/Rapport:   care explained   choices provided   questions answered   questions encouraged  Goal: Readiness for Transition of Care  Outcome: Ongoing, Progressing     Problem: Skin Injury Risk Increased  Goal: Skin Health and Integrity  Outcome: Ongoing, Progressing  Intervention: Promote and Optimize Oral Intake  Flowsheets (Taken 8/18/2024 2000 by Mary Douglas, RN)  Oral Nutrition Promotion: rest periods promoted  Intervention: Optimize Skin Protection  Flowsheets  Taken 8/20/2024 0500 by Dayanara Evans PCT  Head of Bed (HOB) Positioning: HOB elevated  Taken 8/19/2024 1800 by Bernice Jay, RN  Pressure Reduction Techniques:   frequent weight shift encouraged   pressure points protected   positioned off wounds   weight shift assistance provided  Pressure Reduction Devices:   positioning supports utilized   pressure-redistributing mattress utilized  Skin Protection:   adhesive use limited   incontinence pads utilized   transparent dressing maintained   tubing/devices free from skin contact     Problem: Noninvasive Ventilation Acute  Goal: Effective Unassisted Ventilation and Oxygenation  Outcome: Ongoing, Progressing  Intervention: Monitor and Manage Noninvasive Ventilation  Flowsheets (Taken 8/18/2024 0400 by Zeina Moore,  RN)  Airway/Ventilation Management: pulmonary hygiene promoted

## 2024-08-21 PROBLEM — E44.0 MODERATE MALNUTRITION: Status: ACTIVE | Noted: 2024-08-21

## 2024-08-21 LAB
ANION GAP SERPL CALCULATED.3IONS-SCNC: 10 MMOL/L (ref 5–15)
BUN SERPL-MCNC: 19 MG/DL (ref 8–23)
BUN/CREAT SERPL: 16.8 (ref 7–25)
CALCIUM SPEC-SCNC: 8.8 MG/DL (ref 8.6–10.5)
CHLORIDE SERPL-SCNC: 95 MMOL/L (ref 98–107)
CO2 SERPL-SCNC: 28 MMOL/L (ref 22–29)
CREAT SERPL-MCNC: 1.13 MG/DL (ref 0.76–1.27)
DEPRECATED RDW RBC AUTO: 59.8 FL (ref 37–54)
EGFRCR SERPLBLD CKD-EPI 2021: 70.4 ML/MIN/1.73
ERYTHROCYTE [DISTWIDTH] IN BLOOD BY AUTOMATED COUNT: 19.3 % (ref 12.3–15.4)
GLUCOSE BLDC GLUCOMTR-MCNC: 102 MG/DL (ref 70–130)
GLUCOSE BLDC GLUCOMTR-MCNC: 109 MG/DL (ref 70–130)
GLUCOSE BLDC GLUCOMTR-MCNC: 234 MG/DL (ref 70–130)
GLUCOSE BLDC GLUCOMTR-MCNC: 252 MG/DL (ref 70–130)
GLUCOSE SERPL-MCNC: 135 MG/DL (ref 65–99)
HCT VFR BLD AUTO: 33.7 % (ref 37.5–51)
HGB BLD-MCNC: 10.4 G/DL (ref 13–17.7)
MCH RBC QN AUTO: 26.1 PG (ref 26.6–33)
MCHC RBC AUTO-ENTMCNC: 30.9 G/DL (ref 31.5–35.7)
MCV RBC AUTO: 84.7 FL (ref 79–97)
PLATELET # BLD AUTO: 214 10*3/MM3 (ref 140–450)
PMV BLD AUTO: 8.4 FL (ref 6–12)
POTASSIUM SERPL-SCNC: 4.1 MMOL/L (ref 3.5–5.2)
RBC # BLD AUTO: 3.98 10*6/MM3 (ref 4.14–5.8)
SODIUM SERPL-SCNC: 133 MMOL/L (ref 136–145)
WBC NRBC COR # BLD AUTO: 7.55 10*3/MM3 (ref 3.4–10.8)

## 2024-08-21 PROCEDURE — 99232 SBSQ HOSP IP/OBS MODERATE 35: CPT | Performed by: STUDENT IN AN ORGANIZED HEALTH CARE EDUCATION/TRAINING PROGRAM

## 2024-08-21 PROCEDURE — 82948 REAGENT STRIP/BLOOD GLUCOSE: CPT

## 2024-08-21 PROCEDURE — 94660 CPAP INITIATION&MGMT: CPT

## 2024-08-21 PROCEDURE — 63710000001 INSULIN GLARGINE PER 5 UNITS: Performed by: STUDENT IN AN ORGANIZED HEALTH CARE EDUCATION/TRAINING PROGRAM

## 2024-08-21 PROCEDURE — 25010000002 HEPARIN (PORCINE) PER 1000 UNITS: Performed by: PHYSICIAN ASSISTANT

## 2024-08-21 PROCEDURE — 63710000001 INSULIN LISPRO (HUMAN) PER 5 UNITS: Performed by: PHYSICIAN ASSISTANT

## 2024-08-21 PROCEDURE — 80048 BASIC METABOLIC PNL TOTAL CA: CPT | Performed by: STUDENT IN AN ORGANIZED HEALTH CARE EDUCATION/TRAINING PROGRAM

## 2024-08-21 PROCEDURE — 63710000001 SIROLIMUS PER 1 MG: Performed by: PHYSICIAN ASSISTANT

## 2024-08-21 PROCEDURE — 85027 COMPLETE CBC AUTOMATED: CPT | Performed by: STUDENT IN AN ORGANIZED HEALTH CARE EDUCATION/TRAINING PROGRAM

## 2024-08-21 PROCEDURE — 94799 UNLISTED PULMONARY SVC/PX: CPT

## 2024-08-21 RX ADMIN — TAMSULOSIN HYDROCHLORIDE 0.4 MG: 0.4 CAPSULE ORAL at 09:25

## 2024-08-21 RX ADMIN — INSULIN GLARGINE 15 UNITS: 100 INJECTION, SOLUTION SUBCUTANEOUS at 22:00

## 2024-08-21 RX ADMIN — ATORVASTATIN CALCIUM 80 MG: 40 TABLET, FILM COATED ORAL at 09:25

## 2024-08-21 RX ADMIN — Medication 10 ML: at 09:28

## 2024-08-21 RX ADMIN — Medication 10 ML: at 22:01

## 2024-08-21 RX ADMIN — LACTULOSE 30 G: 10 SOLUTION ORAL at 09:25

## 2024-08-21 RX ADMIN — HEPARIN SODIUM 5000 UNITS: 5000 INJECTION INTRAVENOUS; SUBCUTANEOUS at 22:01

## 2024-08-21 RX ADMIN — INSULIN LISPRO 8 UNITS: 100 INJECTION, SOLUTION INTRAVENOUS; SUBCUTANEOUS at 12:15

## 2024-08-21 RX ADMIN — ESCITALOPRAM OXALATE 20 MG: 20 TABLET, FILM COATED ORAL at 09:25

## 2024-08-21 RX ADMIN — LACTULOSE 30 G: 10 SOLUTION ORAL at 18:09

## 2024-08-21 RX ADMIN — SIROLIMUS 2 MG: 1 TABLET ORAL at 09:38

## 2024-08-21 RX ADMIN — FUROSEMIDE 40 MG: 40 TABLET ORAL at 09:25

## 2024-08-21 RX ADMIN — HEPARIN SODIUM 5000 UNITS: 5000 INJECTION INTRAVENOUS; SUBCUTANEOUS at 09:27

## 2024-08-21 RX ADMIN — NYSTATIN: 100000 POWDER TOPICAL at 09:26

## 2024-08-21 RX ADMIN — FERROUS SULFATE TAB 325 MG (65 MG ELEMENTAL FE) 325 MG: 325 (65 FE) TAB at 09:25

## 2024-08-21 RX ADMIN — BUPROPION HYDROCHLORIDE 300 MG: 150 TABLET, EXTENDED RELEASE ORAL at 09:25

## 2024-08-21 RX ADMIN — NYSTATIN: 100000 POWDER TOPICAL at 22:01

## 2024-08-21 RX ADMIN — ASPIRIN 325 MG: 325 TABLET, COATED ORAL at 09:25

## 2024-08-21 RX ADMIN — LACTULOSE 30 G: 10 SOLUTION ORAL at 22:01

## 2024-08-21 RX ADMIN — PANTOPRAZOLE SODIUM 40 MG: 40 TABLET, DELAYED RELEASE ORAL at 09:26

## 2024-08-21 NOTE — PROGRESS NOTES
Malnutrition Severity Assessment    Patient Name:  Quirino Sheppard  YOB: 1955  MRN: [id number]  Admit Date:  8/12/2024    Patient meets criteria for : Moderate (non-severe) Malnutrition (Moderate acute malnutrition: mild muscle wasting and mild fat loss)      Malnutrition Severity Assessment  Malnutrition Type: Acute Disease or Injury - Related Malnutrition  Malnutrition Type (Last 8 Hours)       Malnutrition Severity Assessment       Row Name 08/21/24 1409       Malnutrition Severity Assessment    Malnutrition Type Acute Disease or Injury - Related Malnutrition      Row Name 08/21/24 1409       Muscle Loss    Loss of Muscle Mass Findings Mild    Advent Region --  Mild    Clavicle Bone Region None    Acromion Bone Region --  Mild    Dorsal Hand Region --  Mild    Patellar Region Moderate - patella more prominent, less muscle definition around patella  Mild    Anterior Thigh Region Moderate - mild depression on inner thigh    Posterior Calf Region Moderate - some roundness, slight firmness      Row Name 08/21/24 1409       Fat Loss    Subcutaneous Fat Loss Findings Mild    Orbital Region  --  Mild    Upper Arm Region --  Mild      Row Name 08/21/24 1409       Criteria Met (Must meet criteria for severity in at least 2 of these categories: M Wasting, Fat Loss, Fluid, Secondary Signs, Wt. Status, Intake)    Patient meets criteria for  Moderate (non-severe) Malnutrition  Moderate acute malnutrition: mild muscle wasting and mild fat loss                    Electronically signed by:  Sydni Ramirez RD eligible  08/21/24 14:20 EDT

## 2024-08-21 NOTE — CASE MANAGEMENT/SOCIAL WORK
Case Management Discharge Note      Final Note: Spoke with Bailey at [facility].  Insurance precert has been approved and a bed is available tomorrow, 8/22/24.  RN to call report to [phone] and fax DC summary to [phone].  [facility] to transport at 1000.  Pt updated at bedside and Regan CAREY, updated via phone.         Selected Continued Care - Admitted Since 8/12/2024       Destination Coordination complete.      Service Provider Selected Services Address Phone Fax Patient Preferred    [facility] [address] [address] [phone] --              Durable Medical Equipment    No services have been selected for the patient.                Dialysis/Infusion    No services have been selected for the patient.                Home Medical Care    No services have been selected for the patient.                Therapy    No services have been selected for the patient.                Community Resources    No services have been selected for the patient.                Community & DME    No services have been selected for the patient.                    Transportation Services  Ambulance: [facility]    Final Discharge Disposition Code: 03 - skilled nursing facility (SNF)

## 2024-08-21 NOTE — PROGRESS NOTES
"          Clinical Nutrition Assessment     Patient Name: Quirino Sheppard  YOB: 1955  MRN: [id number]  Date of Encounter: 08/21/24 09:41 EDT  Admission date: 8/12/2024  Reason for Visit: Follow-up protocol    Assessment   Nutrition Assessment   Admission Diagnosis:  CHF (congestive heart failure) [I50.9]    Problem List:    CHF (congestive heart failure)    Coronary artery disease involving coronary bypass graft of native heart without angina pectoris    Hx of liver transplant    Essential hypertension    Hyperlipidemia LDL goal <70    Type 2 diabetes mellitus without complication, with long-term current use of insulin    Obstructive sleep apnea syndrome    Wound of abdomen    Hyperkalemia    LUZMARIA (acute kidney injury)      PMH:   He  has a past medical history of Anxiety and depression, BPH associated with nocturia, Chronic kidney disease, stage 2 (mild), Coronary arteriosclerosis in native artery, Degeneration of lumbar intervertebral disc, Diabetes mellitus, Disorder of sulfur-bearing amino acid metabolism, Gastroesophageal reflux disease without esophagitis, Glaucoma, Hepatitis C, Heterozygous MTHFR mutation D1468P, Heterozygous MTHFR mutation C677T, High risk medication use, Hyperlipidemia, Hypertension, Liver transplanted, Low back pain, Obstructive sleep apnea syndrome, Recurrent major depression in full remission, Severe obesity, and Type 2 diabetes mellitus.    PSH:  He  has a past surgical history that includes Liver transplantation (2001) and Coronary artery bypass graft (N/A, 11/22/2017).    Applicable Nutrition History:   Hx L foot toe amputation x4  Abdominal wound (had for 2 years per patient)- cultured per wound care  8/19/24 - SLP Diet Rec: soft to chew textures, chopped, no mixed consistencies, nectar thick liquids   8/20/24 - SLP Diet Rec - MBS: soft to chew textures, chopped, no mixed consistencies, thin liquids   Anthropometrics     Height: Height: 167.6 cm (65.98\")  Last Filed " Weight: Weight: 96.9 kg (213 lb 9.6 oz) (08/20/24 0500)  Method: Weight Method: Bed scale  BMI: BMI (Calculated): 34.5    UBW:  WILLIE  Weight change:   Per MD note, spoke w/ daughter - reports pt dry wt of 170-180#,  up to 230#     Weight       Weight (kg) Weight (lbs) Weight Method Visit Report   2/16/2023 87.998 kg  194 lb   --    4/13/2023 92.534 kg  204 lb   --    9/6/2023 83.915 kg  185 lb   --    3/28/2024 78.472 kg  173 lb   --    4/19/2024 81.647 kg  180 lb   --    8/12/2024 107.502 kg  237 lb  Stated      102.513 kg  226 lb  Bed scale     8/13/2024 104.237 kg  229 lb 12.8 oz  Bed scale      104 kg  229 lb 4.5 oz      8/14/2024 104.373 kg  230 lb 1.6 oz  Bed scale     8/16/2024 99.338 kg  219 lb  Bed scale     8/18/2024 96.072 kg  211 lb 12.8 oz      8/20/2024 96.888 kg  213 lb 9.6 oz  Bed scale       Wt fluctuations likely 2/2 to fluid status  Edema noted per MD    Nutrition Focused Physical Exam    Date:  8/21       Patient meets criteria for malnutrition diagnosis, see MSA note.      Subjective   Reported/Observed/Food/Nutrition Related History:     8/21) Spoke with RN, states pt has been more oriented today, able to have a conversation with. RN reports patient eating well.  Patient able to consent to NFPE, patient asking when he will be going home, slightly confused.    8/16) Per MD notes patient is very sleepy and has to be aroused by nursing to eat.  Was able to speak with daughter, she reports patient was at an assisted living facility, states pt is confused often, and believed patient had a regular diet. Daughter denies pt has any chewing/swallowing difficulties, despite patient having poor dentition. Thinks patient will be fine with regular consistencies. NKFA.    Spoke with RN, states patient eats all of his meals, drinks some of Boost.    8/13) Attempted to see patient x2, spoke with RN. States patient has been eating well. Lunch tray observed upon visit; 100% consumed.    Current Nutrition  Prescription   PO: Diet: Cardiac, Fluid Restriction (240 mL/tray); Low Sodium (2g); 2000 mL/day; No Mixed Consistencies; Texture: Soft to Chew (NDD 3); Soft to Chew: Chopped Meat; Fluid Consistency: Thin (IDDSI 0)  Oral Nutrition Supplement: N/A  Intake:  5 days  75% x 7 meals    Assessment & Plan   Nutrition Diagnosis   Date:  8/13            Updated:  8/21  Problem Increased nutrient needs (protein)   Etiology Demand for wound healing   Signs/Symptoms Delayed wound healing   Status: New    Date: 8/21  Updated:  Problem Malnutrition, acute moderate   Etiology Decreased ability to consume adequate PO 2/2 confusion   Signs/Symptoms mild muscle wasting and mild subcutaneous fat loss   Status: New      Goal:   Nutrition to support treatment and Maintain intake      Nutrition Intervention      Follow treatment progress, Care plan reviewed, Supplement provided    Patient meets criteria for MSA.  -Continue Boost GC BID w/ breakfast and dinner    Monitoring/Evaluation:   Per protocol, I&O, PO intake, Supplement intake, Weight, Skin status, Symptoms, POC/GOC    Sydni Ramirez RD eligible  Time Spent: 25 min

## 2024-08-21 NOTE — PROGRESS NOTES
[facility] Medicine Services  PROGRESS NOTE    Patient Name: Quirino Sheppard  : 1955  MRN: [id number]    Date of Admission: 2024  Primary Care Physician: Andrew Fernandez MD    Subjective   Subjective     CC: CHFpEF exacerbation    HPI:  Denied any pain, nausea, vomiting, chest pain, shortness of breath.  Had a bowel movement.    Objective   Objective     Vital Signs:   Temp:  [97.8 °F (36.6 °C)-98.2 °F (36.8 °C)] 98.2 °F (36.8 °C)  Heart Rate:  [72-77] 75  Resp:  [16] 16  BP: (134-158)/(70-85) 136/70  Flow (L/min):  [3] 3     Physical Exam:  Constitutional: No acute distress, awake, alert, sitting up in bed and feeding himself breakfast  HENT: NCAT, mucous membranes moist  Respiratory: Clear to auscultation bilaterally, respiratory effort normal   Cardiovascular: RRR, no murmurs, rubs, or gallops  Gastrointestinal: Positive bowel sounds, soft, nontender, nondistended  Musculoskeletal: No bilateral ankle edema; left foot covered in bandage, s/p toe amputations   Psychiatric: Appropriate affect, cooperative  Neurologic: Alert, oriented, moves all extremity, speech clear  Skin: left foot covered with bandage    Results Reviewed:  LAB RESULTS:      Lab 24  0547 08/15/24  0455   WBC 7.55 8.32 8.53   HEMOGLOBIN 10.4* 10.0* 9.6*   HEMATOCRIT 33.7* 32.8* 31.6*   PLATELETS 214 219 200   NEUTROS ABS  --   --  6.45   IMMATURE GRANS (ABS)  --   --  0.03   LYMPHS ABS  --   --  1.06   MONOS ABS  --   --  0.83   EOS ABS  --   --  0.13   MCV 84.7 85.0 85.4   PROTIME  --  14.4  --          Lab 24  0422 24  0504 24  0547 08/15/24  0455   SODIUM 133* 138 135* 135*   POTASSIUM 4.1 4.5 5.0 4.8   CHLORIDE 95* 99 98 99   CO2 28.0 29.0 28.0 28.0   ANION GAP 10.0 10.0 9.0 8.0   BUN 19 23 22 21   CREATININE 1.13 1.23 1.15 1.23   EGFR 70.4 63.6 68.9 63.6   GLUCOSE 135* 131* 138* 111*   CALCIUM 8.8 8.8 8.7 8.6   TSH  --   --   --  5.230*         Lab 24  0547    TOTAL PROTEIN 6.8   ALBUMIN 3.6   GLOBULIN 3.2   ALT (SGPT) 21   AST (SGOT) 21   BILIRUBIN 0.4   ALK PHOS 137*         Lab 08/16/24  0547 08/15/24  0455   PROBNP  --  1,234.0*   PROTIME 14.4  --    INR 1.11  --              Lab 08/15/24  0455   VITAMIN B 12 577         Lab 08/15/24  1428   FIO2 21   CARBOXYHEMOGLOBIN (VENOUS) 0.9     Brief Urine Lab Results  (Last result in the past 365 days)        Color   Clarity   Blood   Leuk Est   Nitrite   Protein   CREAT   Urine HCG        08/12/24 1339 Yellow   Clear   Negative   Negative   Negative   Negative                   Microbiology Results Abnormal       Procedure Component Value - Date/Time    Blood Culture - Blood, Arm, Right [[id number]]  (Normal) Collected: 08/12/24 1349    Lab Status: Final result Specimen: Blood from Arm, Right Updated: 08/17/24 1415     Blood Culture No growth at 5 days    Blood Culture - Blood, Arm, Left [[id number]]  (Normal) Collected: 08/12/24 1349    Lab Status: Final result Specimen: Blood from Arm, Left Updated: 08/17/24 1415     Blood Culture No growth at 5 days    COVID PRE-OP / PRE-PROCEDURE SCREENING ORDER (NO ISOLATION) - Swab, Nasopharynx [[id number]]  (Normal) Collected: 08/12/24 1345    Lab Status: Final result Specimen: Swab from Nasopharynx Updated: 08/12/24 1423    Narrative:      The following orders were created for panel order COVID PRE-OP / PRE-PROCEDURE SCREENING ORDER (NO ISOLATION) - Swab, Nasopharynx.  Procedure                               Abnormality         Status                     ---------                               -----------         ------                     COVID-19 and FLU A/B PCR...[[id number]]  Normal              Final result                 Please view results for these tests on the individual orders.    COVID-19 and FLU A/B PCR, 1 HR TAT - Swab, Nasopharynx [[id number]]  (Normal) Collected: 08/12/24 1345    Lab Status: Final result Specimen: Swab from Nasopharynx Updated: 08/12/24 1423     COVID19  Not Detected     Influenza A PCR Not Detected     Influenza B PCR Not Detected    Narrative:      Fact sheet for providers: https://www.fda.gov/media/444071/download    Fact sheet for patients: https://www.fda.gov/media/794365/download    Test performed by PCR.            FL Video Swallow With Speech Single Contrast    Result Date: 8/20/2024  FL VIDEO SWALLOW W SPEECH SINGLE-CONTRAST Date of Exam: 8/20/2024 9:56 AM EDT Indication: dysphagia.   Comparison: None available. Technique:   The speech pathologist administered food and/or liquid mixed with barium to the patient with cine/video imaging.  Imaging assistance was provided to the speech pathologist and an image was saved. Fluoroscopic Time: 42 seconds Number of Images: 8 associated fluoroscopic loops were saved Findings: No aspiration was seen during the course of barium swallowing series. Please see speech therapy report for full details and recommendations.     Impression: Impression: Fluoroscopy provided for a modified barium swallow. No aspiration was seen during swallowing evaluation. Please see speech therapy report for full details and recommendations. Report dictated by: Flor Coker PA-c  I have personally reviewed this case and agree with the findings above: Electronically Signed: Kenan Stacy MD  8/20/2024 5:13 PM EDT  Workstation ID: [id number]     Results for orders placed during the hospital encounter of 08/12/24    Adult Transthoracic Echo Complete W/ Cont if Necessary Per Protocol    Interpretation Summary    Left ventricular systolic function is normal. Left ventricular ejection fraction appears to be 51 - 55%.    Left ventricular diastolic function was normal.    The left atrial cavity is mildly dilated.    The right atrial cavity is mildly  dilated.    There is severe calcification of the aortic valve.    Moderate mitral valve regurgitation is present.    Estimated right ventricular systolic pressure from tricuspid regurgitation is mildly  elevated (35-45 mmHg).    Mitral valve regurgitation is worsened since 2017 but otherwise unchanged.      Current medications:  Scheduled Meds:aspirin, 325 mg, Oral, Daily  atorvastatin, 80 mg, Oral, Daily  buPROPion XL, 300 mg, Oral, Daily  escitalopram, 20 mg, Oral, Daily  ferrous sulfate, 325 mg, Oral, Daily With Breakfast  furosemide, 40 mg, Oral, Daily  heparin (porcine), 5,000 Units, Subcutaneous, Q12H  insulin glargine, 15 Units, Subcutaneous, Nightly  insulin lispro, 3-14 Units, Subcutaneous, TID With Meals  lactulose, 30 g, Oral, TID  nystatin, , Topical, Q12H  pantoprazole, 40 mg, Oral, Daily  pharmacy consult - MTM, , Does not apply, Daily  sirolimus, 2 mg, Oral, Daily  sodium chloride, 10 mL, Intravenous, Q12H  tamsulosin, 0.4 mg, Oral, Daily      Continuous Infusions:   PRN Meds:.  dextrose    dextrose    glucagon (human recombinant)    melatonin    nitroglycerin    sodium chloride    sodium chloride    sodium chloride    Assessment & Plan   Assessment & Plan     Active Hospital Problems    Diagnosis  POA    **CHF (congestive heart failure) [I50.9]  Yes    Wound of abdomen [S31.109A]  Yes    Hyperkalemia [E87.5]  Yes    LUZMARIA (acute kidney injury) [N17.9]  Yes    Obstructive sleep apnea syndrome [G47.33]  Yes    Essential hypertension [I10]  Yes    Hyperlipidemia LDL goal <70 [E78.5]  Yes    Hx of liver transplant [Z94.4]  Not Applicable    Type 2 diabetes mellitus without complication, with long-term current use of insulin [E11.9, Z79.4]  Not Applicable    Coronary artery disease involving coronary bypass graft of native heart without angina pectoris [I25.810]  Yes      Resolved Hospital Problems   No resolved problems to display.        Brief Hospital Course to date:  Quirino Sheppard is a 69 y.o. male w CAD s/p CABG, DMII, h/o liver transplant, AURELIANO without home CPAP, decline since 4/2024 toe amputation, chronic wounds, vascular dementia per family report who presented w 30-40 lb weight gain and  shortness of breath. Stay c/b very excessive sleepiness 8/13-8/15 w complete difference 8/16 w only holding small doses home gabapentin + seroquel and 1-2 hours on cpap. Unclear if causative v spontaneous improvement. Work-up for excessive sleepiness unrevealing.    Previously lived at senior living w home health/wound support and wheelchair bound state, help from family    This patient's problems and plans were partially entered by my partner and updated as appropriate by me 08/21/24.    Excessive sleepiness - resolved  -vbg normal, prior blood gas also normal, free T4 normal, B12 wnl, EEG w encephalopathy only  -CT head wnl, MRI could not tolerate now awake/alert  -unclear etiology: uncontrolled AURELIANO v med effect (though doses appear so small?)  -restarted low dose seroquel at night and some increased sedation 8/18 so now off both seroquel and gabapentin for now entirely (and at time of DC)    Untreated AURELIANO - working on OP CPAP, tolerating hospital machine minimally    Acute on chronic CHFpEF - resolved  -only on home spironolactone it appears, ECHO stable from 2017  -per family dry weight ~180 lbs,  up to 230 lbs on admission w improvement w diuresis  -diuresed well with IV diuretics  -transitioned to PO diuretics which appear effective - continue on DC    Left chronic foot wound   Chronic abdominal wound, appears 2/2 excoriation   -follows biweekly w OP wound clinic w senior facility  -does not appear infected on visualization. Therefore will not treat culture as likely colonization and not wound infection on review. OP close f/u with wound clinic    Vascular dementia, appears worsened in recent months  DMII - on basal insulin plus SSI, adjust PRN  H/o liver transplant  Chronic debility - worry patient is moving toward long-term care  BMI 37    *Is NWB-GIA at baseline, using wheelchair largely. Per discussion with CM, patient would need to be an assist of 1 to get in and out of his wheelchair and be able to  self-propel in the wheelchair. Unfortunately, he is currently a 2 person assist plus lift to move, thus cannot return to his senior living facility at this time.     Medically ready and awaiting placement.    Expected Discharge Location and Transportation: SNF  Expected Discharge  (Discharge date is tentative pending patient's medical condition and is subject to change)  Expected Discharge Date: 8/23/2024; Expected Discharge Time:      VTE Prophylaxis:  Pharmacologic VTE prophylaxis orders are present.         AM-PAC 6 Clicks Score (PT): 9 (08/20/24 2000)    CODE STATUS:   Code Status and Medical Interventions: CPR (Attempt to Resuscitate); Full Support   Ordered at: 08/12/24 2010     Code Status (Patient has no pulse and is not breathing):    CPR (Attempt to Resuscitate)     Medical Interventions (Patient has pulse or is breathing):    Full Support       Consuelo Olivares MD  08/21/24

## 2024-08-22 VITALS
TEMPERATURE: 99.1 F | RESPIRATION RATE: 20 BRPM | OXYGEN SATURATION: 95 % | HEART RATE: 74 BPM | HEIGHT: 66 IN | DIASTOLIC BLOOD PRESSURE: 79 MMHG | SYSTOLIC BLOOD PRESSURE: 147 MMHG | BODY MASS INDEX: 32.11 KG/M2 | WEIGHT: 199.8 LBS

## 2024-08-22 LAB
ANION GAP SERPL CALCULATED.3IONS-SCNC: 12 MMOL/L (ref 5–15)
BUN SERPL-MCNC: 20 MG/DL (ref 8–23)
BUN/CREAT SERPL: 16.7 (ref 7–25)
CALCIUM SPEC-SCNC: 9.2 MG/DL (ref 8.6–10.5)
CHLORIDE SERPL-SCNC: 96 MMOL/L (ref 98–107)
CO2 SERPL-SCNC: 25 MMOL/L (ref 22–29)
CREAT SERPL-MCNC: 1.2 MG/DL (ref 0.76–1.27)
DEPRECATED RDW RBC AUTO: 58.4 FL (ref 37–54)
EGFRCR SERPLBLD CKD-EPI 2021: 65.5 ML/MIN/1.73
ERYTHROCYTE [DISTWIDTH] IN BLOOD BY AUTOMATED COUNT: 19.2 % (ref 12.3–15.4)
GLUCOSE BLDC GLUCOMTR-MCNC: 129 MG/DL (ref 70–130)
GLUCOSE SERPL-MCNC: 190 MG/DL (ref 65–99)
HCT VFR BLD AUTO: 35.4 % (ref 37.5–51)
HGB BLD-MCNC: 11 G/DL (ref 13–17.7)
MCH RBC QN AUTO: 26 PG (ref 26.6–33)
MCHC RBC AUTO-ENTMCNC: 31.1 G/DL (ref 31.5–35.7)
MCV RBC AUTO: 83.7 FL (ref 79–97)
PLATELET # BLD AUTO: 238 10*3/MM3 (ref 140–450)
PMV BLD AUTO: 8.3 FL (ref 6–12)
POTASSIUM SERPL-SCNC: 4.4 MMOL/L (ref 3.5–5.2)
RBC # BLD AUTO: 4.23 10*6/MM3 (ref 4.14–5.8)
SODIUM SERPL-SCNC: 133 MMOL/L (ref 136–145)
WBC NRBC COR # BLD AUTO: 7.41 10*3/MM3 (ref 3.4–10.8)

## 2024-08-22 PROCEDURE — 25010000002 HEPARIN (PORCINE) PER 1000 UNITS: Performed by: PHYSICIAN ASSISTANT

## 2024-08-22 PROCEDURE — 92526 ORAL FUNCTION THERAPY: CPT

## 2024-08-22 PROCEDURE — 63710000001 SIROLIMUS PER 1 MG: Performed by: PHYSICIAN ASSISTANT

## 2024-08-22 PROCEDURE — 82948 REAGENT STRIP/BLOOD GLUCOSE: CPT

## 2024-08-22 PROCEDURE — 85027 COMPLETE CBC AUTOMATED: CPT | Performed by: STUDENT IN AN ORGANIZED HEALTH CARE EDUCATION/TRAINING PROGRAM

## 2024-08-22 PROCEDURE — 80048 BASIC METABOLIC PNL TOTAL CA: CPT | Performed by: STUDENT IN AN ORGANIZED HEALTH CARE EDUCATION/TRAINING PROGRAM

## 2024-08-22 PROCEDURE — 99239 HOSP IP/OBS DSCHRG MGMT >30: CPT | Performed by: NURSE PRACTITIONER

## 2024-08-22 RX ORDER — BUPROPION HYDROCHLORIDE 300 MG/1
300 TABLET ORAL DAILY
Start: 2024-08-22

## 2024-08-22 RX ORDER — FUROSEMIDE 40 MG
40 TABLET ORAL DAILY
Start: 2024-08-23

## 2024-08-22 RX ORDER — INSULIN GLARGINE 100 [IU]/ML
15 INJECTION, SOLUTION SUBCUTANEOUS NIGHTLY
Start: 2024-08-22

## 2024-08-22 RX ADMIN — TAMSULOSIN HYDROCHLORIDE 0.4 MG: 0.4 CAPSULE ORAL at 09:00

## 2024-08-22 RX ADMIN — ASPIRIN 325 MG: 325 TABLET, COATED ORAL at 08:00

## 2024-08-22 RX ADMIN — ESCITALOPRAM OXALATE 20 MG: 20 TABLET, FILM COATED ORAL at 08:00

## 2024-08-22 RX ADMIN — FUROSEMIDE 40 MG: 40 TABLET ORAL at 06:33

## 2024-08-22 RX ADMIN — SIROLIMUS 2 MG: 1 TABLET ORAL at 08:00

## 2024-08-22 RX ADMIN — FERROUS SULFATE TAB 325 MG (65 MG ELEMENTAL FE) 325 MG: 325 (65 FE) TAB at 08:00

## 2024-08-22 RX ADMIN — BUPROPION HYDROCHLORIDE 300 MG: 150 TABLET, EXTENDED RELEASE ORAL at 08:00

## 2024-08-22 RX ADMIN — HEPARIN SODIUM 5000 UNITS: 5000 INJECTION INTRAVENOUS; SUBCUTANEOUS at 08:00

## 2024-08-22 RX ADMIN — LACTULOSE 30 G: 10 SOLUTION ORAL at 09:00

## 2024-08-22 RX ADMIN — PANTOPRAZOLE SODIUM 40 MG: 40 TABLET, DELAYED RELEASE ORAL at 06:33

## 2024-08-22 RX ADMIN — ATORVASTATIN CALCIUM 80 MG: 40 TABLET, FILM COATED ORAL at 09:00

## 2024-08-22 RX ADMIN — NYSTATIN: 100000 POWDER TOPICAL at 09:00

## 2024-08-22 NOTE — PLAN OF CARE
Goal Outcome Evaluation:  Plan of Care Reviewed With: patient        Progress: no change     A&Ox4, RA, non-tele. Confused to situation early in the morning but easily reoriented. Foot and abd dressings changed.

## 2024-08-22 NOTE — DISCHARGE SUMMARY
[facility]  DISCHARGE SUMMARY    Patient Name: Quirino Sheppard  : 1955  MRN: [id number]    Date of Admission: 2024  2:52 PM  Date of Discharge:  2024  Primary Care Physician: Andrew Fernandez MD    Consults       Date and Time Order Name Status Description    2024  8:10 PM Inpatient Cardiology Consult Completed             Hospital Course     Presenting Problem: CHFpEF exac    Active Hospital Problems    Diagnosis  POA    **CHF (congestive heart failure) [I50.9]  Yes    Moderate malnutrition [E44.0]  Yes    Wound of abdomen [S31.109A]  Yes    Hyperkalemia [E87.5]  Yes    LUZMARIA (acute kidney injury) [N17.9]  Yes    Obstructive sleep apnea syndrome [G47.33]  Yes    Essential hypertension [I10]  Yes    Hyperlipidemia LDL goal <70 [E78.5]  Yes    Hx of liver transplant [Z94.4]  Not Applicable    Type 2 diabetes mellitus without complication, with long-term current use of insulin [E11.9, Z79.4]  Not Applicable    Coronary artery disease involving coronary bypass graft of native heart without angina pectoris [I25.810]  Yes      Resolved Hospital Problems   No resolved problems to display.          Hospital Course:  Quirino Sheppard is a 69 y.o. male  w CAD s/p CABG, DMII, h/o liver transplant, AURELIANO without home CPAP, decline since 2024 toe amputation, chronic wounds, vascular dementia per family report who presented w 30-40 lb weight gain and shortness of breath. Stay c/b very excessive sleepiness -8/15 w complete difference  w only holding small doses home gabapentin + seroquel and 1-2 hours on cpap. Unclear if causative v spontaneous improvement. Work-up for excessive sleepiness unrevealing.     Previously lived at senior living w home health/wound support and wheelchair bound state, help from family     Excessive sleepiness - resolved  -abg normal, prior blood gas also normal, free T4 normal, B12 wnl, EEG w encephalopathy only  -CT head wnl, MRI  could not tolerate now awake/alert  -unclear etiology: uncontrolled AURELIANO v med effect (though doses appear so small?)  -restarted low dose seroquel at night and some increased sedation 8/18 so now off both seroquel and gabapentin for now entirely (and at time of DC)     Untreated AURELIANO - working on OP CPAP, tolerating hospital machine minimally     Acute on chronic CHFpEF - resolved  -only on home spironolactone it appears, ECHO stable from 2017  -per family dry weight ~180 lbs,  up to 230 lbs on admission w improvement w diuresis  -diuresed well with IV diuretics  -transitioned to PO diuretics which appear effective - continue on DC  \-- BB stopped per cardiology      Left chronic foot wound   Chronic abdominal wound, appears 2/2 excoriation   -follows biweekly w OP wound clinic w senior facility  -does not appear infected on visualization. Therefore will not treat culture as likely colonization and not wound infection on review. OP close f/u with wound clinic     Vascular dementia, appears worsened in recent months  DMII - resume home meds, decrease lantus dose  H/o liver transplant  Chronic debility - worry patient is moving toward long-term care  BMI 37     *Is NWB-GIA at baseline, using wheelchair largely. Per discussion with CM, patient would need to be an assist of 1 to get in and out of his wheelchair and be able to self-propel in the wheelchair. Unfortunately, he is currently a 2 person assist plus lift to move, thus cannot return to his senior living facility at this time.      Patient has remained clinically stable and will be discharged to facility today.       Discharge Follow Up Recommendations for outpatient labs/diagnostics:   Follow up with pcp after dc from rehab   Follow up with cardiology after dc from rehab   Follow up with OP wound clinic after dc from rehab, wound care needs to follow at facility     Day of Discharge     HPI:   Patient is resting in bed in NAD. He states he slept well. Denies any  soa or pain, no acute events overnight per nursing     Review of Systems  Gen- No fevers, chills  CV- No chest pain, palpitations  Resp- No cough, dyspnea  GI- No N/V/D, abd pain      Vital Signs:   Temp:  [97.9 °F (36.6 °C)-99.1 °F (37.3 °C)] 99.1 °F (37.3 °C)  Heart Rate:  [73-77] 74  Resp:  [16-20] 20  BP: (114-147)/(60-79) 147/79      Physical Exam:  Constitutional: No acute distress, awake, alert  HENT: NCAT, mucous membranes moist  Respiratory: Clear to auscultation bilaterally, respiratory effort normal room air 96%  Cardiovascular: RRR, no murmurs, rubs, or gallops  Gastrointestinal: Positive bowel sounds, soft, nontender, nondistended  Musculoskeletal: No bilateral ankle edema, left foot dressing cdi, sp toe amputations   Psychiatric: Appropriate affect, cooperative  Neurologic: Oriented x 3, strength symmetric in all extremities, Cranial Nerves grossly intact to confrontation, speech clear  Skin: No rashes\    Pertinent  and/or Most Recent Results     LAB RESULTS:      Lab 08/21/24  0422 08/16/24  0547   WBC 7.55 8.32   HEMOGLOBIN 10.4* 10.0*   HEMATOCRIT 33.7* 32.8*   PLATELETS 214 219   MCV 84.7 85.0   PROTIME  --  14.4         Lab 08/21/24  0422 08/19/24  0504 08/16/24  0547   SODIUM 133* 138 135*   POTASSIUM 4.1 4.5 5.0   CHLORIDE 95* 99 98   CO2 28.0 29.0 28.0   ANION GAP 10.0 10.0 9.0   BUN 19 23 22   CREATININE 1.13 1.23 1.15   EGFR 70.4 63.6 68.9   GLUCOSE 135* 131* 138*   CALCIUM 8.8 8.8 8.7         Lab 08/16/24  0547   TOTAL PROTEIN 6.8   ALBUMIN 3.6   GLOBULIN 3.2   ALT (SGPT) 21   AST (SGOT) 21   BILIRUBIN 0.4   ALK PHOS 137*         Lab 08/16/24  0547   PROTIME 14.4   INR 1.11                 Lab 08/15/24  1428   FIO2 21   CARBOXYHEMOGLOBIN (VENOUS) 0.9     Brief Urine Lab Results  (Last result in the past 365 days)        Color   Clarity   Blood   Leuk Est   Nitrite   Protein   CREAT   Urine HCG        08/12/24 1339 Yellow   Clear   Negative   Negative   Negative   Negative                  Microbiology Results (last 10 days)       Procedure Component Value - Date/Time    Wound Culture - Wound, Abdominal Wall [[id number]]  (Abnormal) Collected: 08/13/24 1008    Lab Status: Final result Specimen: Wound from Abdominal Wall Updated: 08/15/24 0850     Wound Culture Light growth (2+) Staphylococcus aureus, MRSA     Comment:   Methicillin resistant Staphylococcus aureus, Patient may be an isolation risk.        Gram Stain No WBCs or organisms seen    Narrative:      Refer to previous wound culture collected on 08/13/2024 1008 for MICs.    Wound Culture - Wound, Foot, Left [[id number]]  (Abnormal)  (Susceptibility) Collected: 08/13/24 1008    Lab Status: Final result Specimen: Wound from Foot, Left Updated: 08/15/24 0850     Wound Culture Light growth (2+) Staphylococcus aureus, MRSA     Comment:   Methicillin resistant Staphylococcus aureus, Patient may be an isolation risk.         Scant growth (1+) Pseudomonas aeruginosa      Light growth (2+) Normal Skin Cortney     Gram Stain Rare (1+) WBCs seen      No organisms seen    Susceptibility        Staphylococcus aureus, MRSA      CHRISTIAN      Clindamycin Susceptible      Erythromycin Resistant      Oxacillin Resistant      Rifampin Susceptible      Tetracycline Susceptible      Trimethoprim + Sulfamethoxazole Susceptible      Vancomycin Susceptible                       Susceptibility        Pseudomonas aeruginosa      CHRISTIAN      Cefepime Susceptible      Ceftazidime Susceptible      Ciprofloxacin Susceptible      Levofloxacin Susceptible      Piperacillin + Tazobactam Susceptible      Tobramycin Susceptible                       Susceptibility Comments       Pseudomonas aeruginosa    With the exception of urinary-sourced infections, aminoglycosides should not be used as monotherapy.               Blood Culture - Blood, Arm, Right [[id number]]  (Normal) Collected: 08/12/24 1349    Lab Status: Final result Specimen: Blood from Arm, Right Updated: 08/17/24 4815      Blood Culture No growth at 5 days    Blood Culture - Blood, Arm, Left [[id number]]  (Normal) Collected: 08/12/24 1349    Lab Status: Final result Specimen: Blood from Arm, Left Updated: 08/17/24 1415     Blood Culture No growth at 5 days    COVID PRE-OP / PRE-PROCEDURE SCREENING ORDER (NO ISOLATION) - Swab, Nasopharynx [[id number]]  (Normal) Collected: 08/12/24 1345    Lab Status: Final result Specimen: Swab from Nasopharynx Updated: 08/12/24 1423    Narrative:      The following orders were created for panel order COVID PRE-OP / PRE-PROCEDURE SCREENING ORDER (NO ISOLATION) - Swab, Nasopharynx.  Procedure                               Abnormality         Status                     ---------                               -----------         ------                     COVID-19 and FLU A/B PCR...[[id number]]  Normal              Final result                 Please view results for these tests on the individual orders.    COVID-19 and FLU A/B PCR, 1 HR TAT - Swab, Nasopharynx [[id number]]  (Normal) Collected: 08/12/24 1345    Lab Status: Final result Specimen: Swab from Nasopharynx Updated: 08/12/24 1423     COVID19 Not Detected     Influenza A PCR Not Detected     Influenza B PCR Not Detected    Narrative:      Fact sheet for providers: https://www.fda.gov/media/601402/download    Fact sheet for patients: https://www.fda.gov/media/048009/download    Test performed by PCR.            FL Video Swallow With Speech Single Contrast    Result Date: 8/20/2024  FL VIDEO SWALLOW W SPEECH SINGLE-CONTRAST Date of Exam: 8/20/2024 9:56 AM EDT Indication: dysphagia.   Comparison: None available. Technique:   The speech pathologist administered food and/or liquid mixed with barium to the patient with cine/video imaging.  Imaging assistance was provided to the speech pathologist and an image was saved. Fluoroscopic Time: 42 seconds Number of Images: 8 associated fluoroscopic loops were saved Findings: No aspiration was seen during the  course of barium swallowing series. Please see speech therapy report for full details and recommendations.     Impression: Fluoroscopy provided for a modified barium swallow. No aspiration was seen during swallowing evaluation. Please see speech therapy report for full details and recommendations. Report dictated by: Flor Coker PA-c  I have personally reviewed this case and agree with the findings above: Electronically Signed: Kenan Stacy MD  8/20/2024 5:13 PM EDT  Workstation ID: [id number]    EEG    Result Date: 8/16/2024  History: 69 y old male Procedure: A 21 Channel digital electroencephalogram was performed in the Clinical Neurophysiology Laboratory. The 10/20 International System of electrode placement was used and both Bipolar and referential electrode montages were monitored. EEG description: This EEG is continuous and symmetric. During awake state the back Background  consists if  generalized delta activity with intermixed  alpha and theta activity. During the awake state with the eyes closed, there is a posterior dominant rhythm of  7-8 Hz that is symmetrical and reacts appropriately to eye opening. Anterior to posterior amplitude frequency gradient is preserved. Photic stimulation using a step wise increase in photic  frequency showed no driving or activation of any epileptiform activity. EEG  Interpretation: This EEG is abnormal due to slow background activity Clinical correlation : This EEG suggest diagnosis of encephalopathy of unspecific etiology.  Clinical correlation is recommended     Adult Transthoracic Echo Complete W/ Cont if Necessary Per Protocol    Result Date: 8/13/2024    Left ventricular systolic function is normal. Left ventricular ejection fraction appears to be 51 - 55%.   Left ventricular diastolic function was normal.   The left atrial cavity is mildly dilated.   The right atrial cavity is mildly  dilated.   There is severe calcification of the aortic valve.   Moderate mitral valve  regurgitation is present.   Estimated right ventricular systolic pressure from tricuspid regurgitation is mildly elevated (35-45 mmHg). Mitral valve regurgitation is worsened since 2017 but otherwise unchanged.     CT Abdomen Pelvis Without Contrast    Result Date: 8/12/2024  CT ABDOMEN PELVIS WO CONTRAST Date of Exam: 8/12/2024 4:16 PM EDT Indication: confusion, weight gain, acute on chronic wound to abdomen. Comparison: None available. Technique: Axial CT images were obtained of the abdomen and pelvis without the administration of contrast. Reconstructed coronal and sagittal images were also obtained. Automated exposure control and iterative construction methods were used. Findings: Patient history includes previous liver transplant in 2001, chronic kidney disease. Significant recent weight gain. Worsening generalized weakness. Today's study shows at least a moderate right pleural effusion and a small left pleural effusion both of which appear to be free-flowing. There is very mild associated atelectasis and no particular findings to suggest basilar pneumonia. There appears to be relatively mild fatty liver change. Spleen is not enlarged. Pancreas appears mildly atrophic. Gallbladder is not identified either surgically absent or contracted. Adrenal glands appear normal. Kidneys show grossly normal parenchymal thickness and no hydronephrosis or nephrolithiasis is seen. Upper abdominal bowel loops are normal in caliber and appearance. No free air ascites or adenopathy is seen. Regarding the lower abdomen/pelvis, there is a trace amount of ascites along the lower paracolic gutters. No large or small bowel inflammation is identified. There is no evidence of diverticulosis or diverticulitis. Bladder is moderately distended and normal in appearance. Prostate and seminal vesicles are not enlarged. There is a fat-only containing left inguinal/scrotal hernia with no evidence of strangulation. There is very dense diffuse  subcutaneous fat stranding consistent with anasarca, with some symmetric fluid between the muscles the abdominal wall.  History indicates chronic wound to abdomen. There is focal skin thickening to the right and superior of the umbilicus, but only superficial involvement. The underlying subcutaneous fat appears normal. Images 123 through 145 show a rounded cystic area 4 1/2 cm in diameter lateral of the left greater trochanter, with a small amount of apparently contiguous fluid passing along the upper margin of the gluteus medius image 135. This contains a small amount  of dense material and may represent an old hematoma. Bony structures appear to be intact.     Impression: 1. Very dense diffuse subcutaneous fat stranding consistent with anasarca. Small, symmetric inter-muscular fluid collections of the right and left lower abdominal wall, likely related to anasarca. 2. Approximately 4.5 cm fluid collection lateral of the left greater trochanter, favored to represent old, liquefied hematoma. 3. Superficial mid abdominal wall skin thickening, which may be a superficial cellulitis. No evidence of underlying abscess. No evidence of potential abscess here or elsewhere. 4. Moderate right pleural effusion, and small left effusion. Minimal ascites. 5. Fat-only containing left inguinal/scrotal hernia with no evidence of strangulation. Electronically Signed: Kenan Stacy MD  8/12/2024 4:51 PM EDT  Workstation ID: [id number]    CT Head Without Contrast    Result Date: 8/12/2024  CT HEAD WO CONTRAST Date of Exam: 8/12/2024 4:16 PM EDT Indication: AMS. Comparison: MRI of the brain performed on October 5, 2023 Technique: Axial CT images were obtained of the head without contrast administration.  Automated exposure control and iterative construction methods were used. Findings: There is no evidence of hemorrhage. There is no mass effect or midline shift. Age-related involutional changes are visualized. There is no extra-axial  collections. Ventricles are normal in size and configuration for patient's stated age. Posterior fossa is within normal limits. Calvarium and skull base appear intact. Visualized sinuses show no air fluid levels. The mastoid air cells are clear. Visualized orbits are unremarkable.     Impression: No acute intracranial process evident. Electronically Signed: Omega Moore MD  8/12/2024 4:29 PM EDT  Workstation ID: [id number]    XR Chest 1 View    Result Date: 8/12/2024  XR CHEST 1 VW Date of Exam: 8/12/2024 2:03 PM EDT Indication: SOA triage protocol Comparison: 11/24/2017 Findings: Sternotomy wires are again noted. The heart shadow appears borderline enlarged but the pulmonary vasculature appears normal. There is mild coarsening of the pulmonary interstitial markings and a few granulomatous calcifications that appears stable from prior exams in 2017. No edema, effusion or pneumothorax is seen.     Impression: Mild heart shadow enlargement and mild chronic appearing interstitial lung changes. No clearly acute chest disease. Electronically Signed: Kenan Stacy MD  8/12/2024 2:41 PM EDT  Workstation ID: [id number]     Results for orders placed during the hospital encounter of 11/20/17    Carotid Duplex - Bilateral    Interpretation Summary  · Right internal carotid artery stenosis of 0-49%.  · Left internal carotid artery stenosis of 0-49%.      Results for orders placed during the hospital encounter of 11/20/17    Carotid Duplex - Bilateral    Interpretation Summary  · Right internal carotid artery stenosis of 0-49%.  · Left internal carotid artery stenosis of 0-49%.      Results for orders placed during the hospital encounter of 08/12/24    Adult Transthoracic Echo Complete W/ Cont if Necessary Per Protocol    Interpretation Summary    Left ventricular systolic function is normal. Left ventricular ejection fraction appears to be 51 - 55%.    Left ventricular diastolic function was normal.    The left atrial cavity is  mildly dilated.    The right atrial cavity is mildly  dilated.    There is severe calcification of the aortic valve.    Moderate mitral valve regurgitation is present.    Estimated right ventricular systolic pressure from tricuspid regurgitation is mildly elevated (35-45 mmHg).    Mitral valve regurgitation is worsened since 2017 but otherwise unchanged.      Plan for Follow-up of Pending Labs/Results:     Discharge Details        Discharge Medications        New Medications        Instructions Start Date   furosemide 40 MG tablet  Commonly known as: LASIX   40 mg, Oral, Daily   Start Date: August 23, 2024     melatonin 5 MG tablet tablet   5 mg, Oral, Nightly PRN             Changes to Medications        Instructions Start Date   aspirin 325 MG EC tablet  What changed: See the new instructions.   @@TAKE ONE TABLET BY MOUTH DAILY      insulin glargine 100 UNIT/ML injection  Commonly known as: LANTUS, SEMGLEE  What changed:   how much to take  when to take this   15 Units, Subcutaneous, Nightly             Continue These Medications        Instructions Start Date   atorvastatin 80 MG tablet  Commonly known as: LIPITOR   80 mg, Oral, Every Night at Bedtime      brimonidine-timolol 0.2-0.5 % ophthalmic solution  Commonly known as: COMBIGAN   1 drop, Both Eyes, Every 12 Hours      buPROPion  MG 24 hr tablet  Commonly known as: WELLBUTRIN XL   300 mg, Oral, Daily      clobetasol propionate 0.05 % cream  Commonly known as: TEMOVATE   1 Application, Topical, 2 Times Daily, Abd wound      D3 Super Strength 50 MCG (2000 UT) capsule  Generic drug: Cholecalciferol   TAKE 1 CAPSULE BY MOUTH EVERY DAY      escitalopram 20 MG tablet  Commonly known as: LEXAPRO   20 mg, Oral, Daily      ferrous sulfate 325 (65 FE) MG tablet   325 mg, Oral, Daily With Breakfast      gentamicin 0.1 % cream  Commonly known as: GARAMYCIN   1 Application, Topical, 2 Times Daily, Left foot      lactulose 10 GM/15ML solution  Commonly known as:  CHRONULAC   20 g, Oral, 2 Times Daily PRN      Magnesium Oxide -Mg Supplement 400 (240 Mg) MG tablet   400 mg, Oral, Daily      multivitamin tablet tablet   1 tablet, Oral, Daily      NovoLIN R FlexPen 100 UNIT/ML injection  Generic drug: Insulin Regular Human   12 Units, Subcutaneous, 3 Times Daily Before Meals, Inject SC before meals per sliding scale: 150-200=4U,201-250=6U, 251-300=8U, 301-350=10U, 351-400=12U, >400=Call MD      nystatin 688900 UNIT/GM powder  Commonly known as: MYCOSTATIN   1 Application, Topical, 2 Times Daily      omeprazole 20 MG capsule  Commonly known as: priLOSEC   20 mg, Oral, Daily      sirolimus 2 MG tablet  Commonly known as: RAPAMUNE   2 mg, Oral, Daily      SITagliptin 25 MG tablet  Commonly known as: JANUVIA   25 mg, Oral, Daily      tamsulosin 0.4 MG capsule 24 hr capsule  Commonly known as: FLOMAX   1 capsule, Oral, Daily      Vitamin C 500 MG capsule   1 capsule, Oral, Daily      vitamin E 1000 UNIT capsule   1,000 Units, Oral, Daily      Zinc 220 (50 Zn) MG capsule   1 capsule, Oral, Daily             Stop These Medications      acetaminophen 500 MG tablet  Commonly known as: TYLENOL     gabapentin 100 MG capsule  Commonly known as: NEURONTIN     LORazepam 0.5 MG tablet  Commonly known as: ATIVAN     metoprolol tartrate 25 MG tablet  Commonly known as: LOPRESSOR     mupirocin 2 % cream  Commonly known as: Bactroban     QUEtiapine 25 MG tablet  Commonly known as: SEROquel     ramipril 10 MG capsule  Commonly known as: ALTACE     Saw Palmetto 160 MG capsule     silver sulfadiazine 1 % cream  Commonly known as: SILVADENE, SSD     spironolactone 25 MG tablet  Commonly known as: ALDACTONE     traZODone 50 MG tablet  Commonly known as: DESYREL              No Known Allergies      Discharge Disposition:  Rehab Facility or Unit (DC - External)    Diet:  Hospital:  Diet Order   Procedures    Diet: Cardiac, Fluid Restriction (240 mL/tray); Low Sodium (2g); 2000 mL/day; No Mixed  Consistencies; Texture: Soft to Chew (NDD 3); Soft to Chew: Chopped Meat; Fluid Consistency: Thin (IDDSI 0)       Diet Instructions       Diet: Cardiac Diets; Low Sodium (2g); Soft to Chew (NDD 3); Chopped Meat; Thin (IDDSI 0)      Discharge Diet: Cardiac Diets    Cardiac Diet: Low Sodium (2g)    Texture: Soft to Chew (NDD 3)    Soft to Chew: Chopped Meat    Fluid Consistency: Thin (IDDSI 0)    Diet: Cardiac, Fluid Restriction (240 mL/tray); Low Sodium (2g); 2000 mL/day; No Mixed Consistencies; Texture: Soft to Chew (NDD 3); Soft to Chew: Chopped Meat; Fluid Consistency: Thin 8             Activity:  Activity Instructions       Activity as Tolerated      Measure Blood Pressure      Measure Weight              Restrictions or Other Recommendations:  Wound Locations Left foot amputation site   Wound Care Instructions Normal saline.  Apply Vashe moistened 4 x 4 gauze to the open wound on the left foot wrapped with rolled gauze and secure.   Cleanse Irrigate With Normal Saline   Securement Roll Gauze    Paper Tape          CODE STATUS:    Code Status and Medical Interventions: CPR (Attempt to Resuscitate); Full Support   Ordered at: 08/12/24 2010     Code Status (Patient has no pulse and is not breathing):    CPR (Attempt to Resuscitate)     Medical Interventions (Patient has pulse or is breathing):    Full Support       Future Appointments   Date Time Provider Department Center   8/22/2024 10:00 AM MED 8  [facility]       Additional Instructions for the Follow-ups that You Need to Schedule       Discharge Follow-up with PCP   As directed       Currently Documented PCP:    Andrew Fernandez MD    PCP Phone Number:    [phone]     Follow Up Details: follow up with pcp after dc from rehab        Discharge Follow-up with Specified Provider: follow up with cardiology after dc from rehab   As directed      To: follow up with cardiology after dc from rehab                      DAVID Rodriguez  08/22/24      Time  Spent on Discharge:  I spent  45  minutes on this discharge activity which included: face-to-face encounter with the patient, reviewing the data in the system, coordination of the care with the nursing staff as well as consultants, documentation, and entering orders.

## 2024-08-22 NOTE — DISCHARGE PLACEMENT REQUEST
"Quirino Sheppard (69 y.o. Male)       Date of Birth   1955    Social Security Number       Address   [facility] [place] [address]    Home Phone   [phone]    MRN   [id number]       [employer]   [employer]    Marital Status                               Admission Date   24    Admission Type   Emergency    Admitting Provider   Rebecca Arias MD    Attending Provider   Rebecca Arias MD    Department, Room/Bed   [address], S454/1       Discharge Date       Discharge Disposition   Rehab Facility or Unit (DC - External)    Discharge Destination                                 Attending Provider: Rebecca Arias MD    Allergies: No Known Allergies    Isolation: None   Infection: MRSA (24)   Code Status: CPR    Ht: 167.6 cm (65.98\")   Wt: 90.6 kg (199 lb 12.8 oz)    Admission Cmt: None   Principal Problem: CHF (congestive heart failure) [I50.9]                   Active Insurance as of 2024       Primary Coverage       Payor Plan Insurance Group Employer/Plan Group    AETNA MEDICARE REPLACEMENT AETNA MEDICARE REPLACEMENT [id number]       Payor Plan Address Payor Plan Phone Number Payor Plan Fax Number Effective Dates    [address] [phone]  2022 - None Entered    [place] [address]         Subscriber Name Subscriber Birth Date Member ID       QUIRINO SHEPPARD 1955 [id number]                     Emergency Contacts        (Rel.) Home Phone Work Phone Mobile Phone    KVNG OTOOLE (Friend) [phone] -- [phone]    Harley Sheppard (Daughter) [phone] -- [phone]    Kassie Xie -- -- [phone]    Ankit Sheppard (Brother) [phone] -- [phone]                 Discharge Summary        Tamra Dumont, APRN at 24 0836              [facility]  DISCHARGE SUMMARY    Patient Name: Quirino Sheppard  : 1955  MRN: [id number]    Date of Admission: 2024  2:52 " PM  Date of Discharge:  8/22/2024  Primary Care Physician: Andrew Fernandez MD    Consults       Date and Time Order Name Status Description    8/12/2024  8:10 PM Inpatient Cardiology Consult Completed             Hospital Course     Presenting Problem: CHFpEF exac    Active Hospital Problems    Diagnosis  POA    **CHF (congestive heart failure) [I50.9]  Yes    Moderate malnutrition [E44.0]  Yes    Wound of abdomen [S31.109A]  Yes    Hyperkalemia [E87.5]  Yes    LUZMARIA (acute kidney injury) [N17.9]  Yes    Obstructive sleep apnea syndrome [G47.33]  Yes    Essential hypertension [I10]  Yes    Hyperlipidemia LDL goal <70 [E78.5]  Yes    Hx of liver transplant [Z94.4]  Not Applicable    Type 2 diabetes mellitus without complication, with long-term current use of insulin [E11.9, Z79.4]  Not Applicable    Coronary artery disease involving coronary bypass graft of native heart without angina pectoris [I25.810]  Yes      Resolved Hospital Problems   No resolved problems to display.          Hospital Course:  Quirino Sheppard is a 69 y.o. male  w CAD s/p CABG, DMII, h/o liver transplant, AURELIANO without home CPAP, decline since 4/2024 toe amputation, chronic wounds, vascular dementia per family report who presented w 30-40 lb weight gain and shortness of breath. Stay c/b very excessive sleepiness 8/13-8/15 w complete difference 8/16 w only holding small doses home gabapentin + seroquel and 1-2 hours on cpap. Unclear if causative v spontaneous improvement. Work-up for excessive sleepiness unrevealing.     Previously lived at senior living w home health/wound support and wheelchair bound state, help from family     Excessive sleepiness - resolved  -abg normal, prior blood gas also normal, free T4 normal, B12 wnl, EEG w encephalopathy only  -CT head wnl, MRI could not tolerate now awake/alert  -unclear etiology: uncontrolled AURELIANO v med effect (though doses appear so small?)  -restarted low dose seroquel at night and some increased  sedation 8/18 so now off both seroquel and gabapentin for now entirely (and at time of DC)     Untreated AURELIANO - working on OP CPAP, tolerating hospital machine minimally     Acute on chronic CHFpEF - resolved  -only on home spironolactone it appears, ECHO stable from 2017  -per family dry weight ~180 lbs,  up to 230 lbs on admission w improvement w diuresis  -diuresed well with IV diuretics  -transitioned to PO diuretics which appear effective - continue on DC  \-- BB stopped per cardiology      Left chronic foot wound   Chronic abdominal wound, appears 2/2 excoriation   -follows biweekly w OP wound clinic w senior facility  -does not appear infected on visualization. Therefore will not treat culture as likely colonization and not wound infection on review. OP close f/u with wound clinic     Vascular dementia, appears worsened in recent months  DMII - resume home meds, decrease lantus dose  H/o liver transplant  Chronic debility - worry patient is moving toward long-term care  BMI 37     *Is NWB-GIA at baseline, using wheelchair largely. Per discussion with CM, patient would need to be an assist of 1 to get in and out of his wheelchair and be able to self-propel in the wheelchair. Unfortunately, he is currently a 2 person assist plus lift to move, thus cannot return to his senior living facility at this time.      Patient has remained clinically stable and will be discharged to facility today.       Discharge Follow Up Recommendations for outpatient labs/diagnostics:   Follow up with pcp after dc from rehab   Follow up with cardiology after dc from rehab   Follow up with OP wound clinic after dc from rehab, wound care needs to follow at facility     Day of Discharge     HPI:   Patient is resting in bed in NAD. He states he slept well. Denies any soa or pain, no acute events overnight per nursing     Review of Systems  Gen- No fevers, chills  CV- No chest pain, palpitations  Resp- No cough, dyspnea  GI- No N/V/D, abd  pain      Vital Signs:   Temp:  [97.9 °F (36.6 °C)-99.1 °F (37.3 °C)] 99.1 °F (37.3 °C)  Heart Rate:  [73-77] 74  Resp:  [16-20] 20  BP: (114-147)/(60-79) 147/79      Physical Exam:  Constitutional: No acute distress, awake, alert  HENT: NCAT, mucous membranes moist  Respiratory: Clear to auscultation bilaterally, respiratory effort normal room air 96%  Cardiovascular: RRR, no murmurs, rubs, or gallops  Gastrointestinal: Positive bowel sounds, soft, nontender, nondistended  Musculoskeletal: No bilateral ankle edema, left foot dressing cdi, sp toe amputations   Psychiatric: Appropriate affect, cooperative  Neurologic: Oriented x 3, strength symmetric in all extremities, Cranial Nerves grossly intact to confrontation, speech clear  Skin: No rashes\    Pertinent  and/or Most Recent Results     LAB RESULTS:      Lab 08/21/24  0422 08/16/24  0547   WBC 7.55 8.32   HEMOGLOBIN 10.4* 10.0*   HEMATOCRIT 33.7* 32.8*   PLATELETS 214 219   MCV 84.7 85.0   PROTIME  --  14.4         Lab 08/21/24  0422 08/19/24  0504 08/16/24  0547   SODIUM 133* 138 135*   POTASSIUM 4.1 4.5 5.0   CHLORIDE 95* 99 98   CO2 28.0 29.0 28.0   ANION GAP 10.0 10.0 9.0   BUN 19 23 22   CREATININE 1.13 1.23 1.15   EGFR 70.4 63.6 68.9   GLUCOSE 135* 131* 138*   CALCIUM 8.8 8.8 8.7         Lab 08/16/24  0547   TOTAL PROTEIN 6.8   ALBUMIN 3.6   GLOBULIN 3.2   ALT (SGPT) 21   AST (SGOT) 21   BILIRUBIN 0.4   ALK PHOS 137*         Lab 08/16/24  0547   PROTIME 14.4   INR 1.11                 Lab 08/15/24  1428   FIO2 21   CARBOXYHEMOGLOBIN (VENOUS) 0.9     Brief Urine Lab Results  (Last result in the past 365 days)        Color   Clarity   Blood   Leuk Est   Nitrite   Protein   CREAT   Urine HCG        08/12/24 1339 Yellow   Clear   Negative   Negative   Negative   Negative                 Microbiology Results (last 10 days)       Procedure Component Value - Date/Time    Wound Culture - Wound, Abdominal Wall [[id number]]  (Abnormal) Collected: 08/13/24 1008    Lab  Status: Final result Specimen: Wound from Abdominal Wall Updated: 08/15/24 0850     Wound Culture Light growth (2+) Staphylococcus aureus, MRSA     Comment:   Methicillin resistant Staphylococcus aureus, Patient may be an isolation risk.        Gram Stain No WBCs or organisms seen    Narrative:      Refer to previous wound culture collected on 08/13/2024 1008 for MICs.    Wound Culture - Wound, Foot, Left [[id number]]  (Abnormal)  (Susceptibility) Collected: 08/13/24 1008    Lab Status: Final result Specimen: Wound from Foot, Left Updated: 08/15/24 0850     Wound Culture Light growth (2+) Staphylococcus aureus, MRSA     Comment:   Methicillin resistant Staphylococcus aureus, Patient may be an isolation risk.         Scant growth (1+) Pseudomonas aeruginosa      Light growth (2+) Normal Skin Cortney     Gram Stain Rare (1+) WBCs seen      No organisms seen    Susceptibility        Staphylococcus aureus, MRSA      CHRISTIAN      Clindamycin Susceptible      Erythromycin Resistant      Oxacillin Resistant      Rifampin Susceptible      Tetracycline Susceptible      Trimethoprim + Sulfamethoxazole Susceptible      Vancomycin Susceptible                       Susceptibility        Pseudomonas aeruginosa      CHRITSIAN      Cefepime Susceptible      Ceftazidime Susceptible      Ciprofloxacin Susceptible      Levofloxacin Susceptible      Piperacillin + Tazobactam Susceptible      Tobramycin Susceptible                       Susceptibility Comments       Pseudomonas aeruginosa    With the exception of urinary-sourced infections, aminoglycosides should not be used as monotherapy.               Blood Culture - Blood, Arm, Right [[id number]]  (Normal) Collected: 08/12/24 1349    Lab Status: Final result Specimen: Blood from Arm, Right Updated: 08/17/24 1415     Blood Culture No growth at 5 days    Blood Culture - Blood, Arm, Left [[id number]]  (Normal) Collected: 08/12/24 1349    Lab Status: Final result Specimen: Blood from Arm, Left  Updated: 08/17/24 1415     Blood Culture No growth at 5 days    COVID PRE-OP / PRE-PROCEDURE SCREENING ORDER (NO ISOLATION) - Swab, Nasopharynx [[id number]]  (Normal) Collected: 08/12/24 1345    Lab Status: Final result Specimen: Swab from Nasopharynx Updated: 08/12/24 1423    Narrative:      The following orders were created for panel order COVID PRE-OP / PRE-PROCEDURE SCREENING ORDER (NO ISOLATION) - Swab, Nasopharynx.  Procedure                               Abnormality         Status                     ---------                               -----------         ------                     COVID-19 and FLU A/B PCR...[[id number]]  Normal              Final result                 Please view results for these tests on the individual orders.    COVID-19 and FLU A/B PCR, 1 HR TAT - Swab, Nasopharynx [[id number]]  (Normal) Collected: 08/12/24 1345    Lab Status: Final result Specimen: Swab from Nasopharynx Updated: 08/12/24 1423     COVID19 Not Detected     Influenza A PCR Not Detected     Influenza B PCR Not Detected    Narrative:      Fact sheet for providers: https://www.fda.gov/media/418057/download    Fact sheet for patients: https://www.fda.gov/media/588988/download    Test performed by PCR.            FL Video Swallow With Speech Single Contrast    Result Date: 8/20/2024  FL VIDEO SWALLOW W SPEECH SINGLE-CONTRAST Date of Exam: 8/20/2024 9:56 AM EDT Indication: dysphagia.   Comparison: None available. Technique:   The speech pathologist administered food and/or liquid mixed with barium to the patient with cine/video imaging.  Imaging assistance was provided to the speech pathologist and an image was saved. Fluoroscopic Time: 42 seconds Number of Images: 8 associated fluoroscopic loops were saved Findings: No aspiration was seen during the course of barium swallowing series. Please see speech therapy report for full details and recommendations.     Impression: Fluoroscopy provided for a modified barium swallow. No  aspiration was seen during swallowing evaluation. Please see speech therapy report for full details and recommendations. Report dictated by: Flor Coker PA-c  I have personally reviewed this case and agree with the findings above: Electronically Signed: Kenan Stacy MD  8/20/2024 5:13 PM EDT  Workstation ID: [id number]    EEG    Result Date: 8/16/2024  History: 69 y old male Procedure: A 21 Channel digital electroencephalogram was performed in the Clinical Neurophysiology Laboratory. The 10/20 International System of electrode placement was used and both Bipolar and referential electrode montages were monitored. EEG description: This EEG is continuous and symmetric. During awake state the back Background  consists if  generalized delta activity with intermixed  alpha and theta activity. During the awake state with the eyes closed, there is a posterior dominant rhythm of  7-8 Hz that is symmetrical and reacts appropriately to eye opening. Anterior to posterior amplitude frequency gradient is preserved. Photic stimulation using a step wise increase in photic  frequency showed no driving or activation of any epileptiform activity. EEG  Interpretation: This EEG is abnormal due to slow background activity Clinical correlation : This EEG suggest diagnosis of encephalopathy of unspecific etiology.  Clinical correlation is recommended     Adult Transthoracic Echo Complete W/ Cont if Necessary Per Protocol    Result Date: 8/13/2024    Left ventricular systolic function is normal. Left ventricular ejection fraction appears to be 51 - 55%.   Left ventricular diastolic function was normal.   The left atrial cavity is mildly dilated.   The right atrial cavity is mildly  dilated.   There is severe calcification of the aortic valve.   Moderate mitral valve regurgitation is present.   Estimated right ventricular systolic pressure from tricuspid regurgitation is mildly elevated (35-45 mmHg). Mitral valve regurgitation is worsened  since 2017 but otherwise unchanged.     CT Abdomen Pelvis Without Contrast    Result Date: 8/12/2024  CT ABDOMEN PELVIS WO CONTRAST Date of Exam: 8/12/2024 4:16 PM EDT Indication: confusion, weight gain, acute on chronic wound to abdomen. Comparison: None available. Technique: Axial CT images were obtained of the abdomen and pelvis without the administration of contrast. Reconstructed coronal and sagittal images were also obtained. Automated exposure control and iterative construction methods were used. Findings: Patient history includes previous liver transplant in 2001, chronic kidney disease. Significant recent weight gain. Worsening generalized weakness. Today's study shows at least a moderate right pleural effusion and a small left pleural effusion both of which appear to be free-flowing. There is very mild associated atelectasis and no particular findings to suggest basilar pneumonia. There appears to be relatively mild fatty liver change. Spleen is not enlarged. Pancreas appears mildly atrophic. Gallbladder is not identified either surgically absent or contracted. Adrenal glands appear normal. Kidneys show grossly normal parenchymal thickness and no hydronephrosis or nephrolithiasis is seen. Upper abdominal bowel loops are normal in caliber and appearance. No free air ascites or adenopathy is seen. Regarding the lower abdomen/pelvis, there is a trace amount of ascites along the lower paracolic gutters. No large or small bowel inflammation is identified. There is no evidence of diverticulosis or diverticulitis. Bladder is moderately distended and normal in appearance. Prostate and seminal vesicles are not enlarged. There is a fat-only containing left inguinal/scrotal hernia with no evidence of strangulation. There is very dense diffuse subcutaneous fat stranding consistent with anasarca, with some symmetric fluid between the muscles the abdominal wall.  History indicates chronic wound to abdomen. There is  focal skin thickening to the right and superior of the umbilicus, but only superficial involvement. The underlying subcutaneous fat appears normal. Images 123 through 145 show a rounded cystic area 4 1/2 cm in diameter lateral of the left greater trochanter, with a small amount of apparently contiguous fluid passing along the upper margin of the gluteus medius image 135. This contains a small amount  of dense material and may represent an old hematoma. Bony structures appear to be intact.     Impression: 1. Very dense diffuse subcutaneous fat stranding consistent with anasarca. Small, symmetric inter-muscular fluid collections of the right and left lower abdominal wall, likely related to anasarca. 2. Approximately 4.5 cm fluid collection lateral of the left greater trochanter, favored to represent old, liquefied hematoma. 3. Superficial mid abdominal wall skin thickening, which may be a superficial cellulitis. No evidence of underlying abscess. No evidence of potential abscess here or elsewhere. 4. Moderate right pleural effusion, and small left effusion. Minimal ascites. 5. Fat-only containing left inguinal/scrotal hernia with no evidence of strangulation. Electronically Signed: Kenan Stacy MD  8/12/2024 4:51 PM EDT  Workstation ID: [id number]    CT Head Without Contrast    Result Date: 8/12/2024  CT HEAD WO CONTRAST Date of Exam: 8/12/2024 4:16 PM EDT Indication: AMS. Comparison: MRI of the brain performed on October 5, 2023 Technique: Axial CT images were obtained of the head without contrast administration.  Automated exposure control and iterative construction methods were used. Findings: There is no evidence of hemorrhage. There is no mass effect or midline shift. Age-related involutional changes are visualized. There is no extra-axial collections. Ventricles are normal in size and configuration for patient's stated age. Posterior fossa is within normal limits. Calvarium and skull base appear intact. Visualized  sinuses show no air fluid levels. The mastoid air cells are clear. Visualized orbits are unremarkable.     Impression: No acute intracranial process evident. Electronically Signed: Omega Moore MD  8/12/2024 4:29 PM EDT  Workstation ID: [id number]    XR Chest 1 View    Result Date: 8/12/2024  XR CHEST 1 VW Date of Exam: 8/12/2024 2:03 PM EDT Indication: SOA triage protocol Comparison: 11/24/2017 Findings: Sternotomy wires are again noted. The heart shadow appears borderline enlarged but the pulmonary vasculature appears normal. There is mild coarsening of the pulmonary interstitial markings and a few granulomatous calcifications that appears stable from prior exams in 2017. No edema, effusion or pneumothorax is seen.     Impression: Mild heart shadow enlargement and mild chronic appearing interstitial lung changes. No clearly acute chest disease. Electronically Signed: Kenan Stacy MD  8/12/2024 2:41 PM EDT  Workstation ID: [id number]     Results for orders placed during the hospital encounter of 11/20/17    Carotid Duplex - Bilateral    Interpretation Summary  · Right internal carotid artery stenosis of 0-49%.  · Left internal carotid artery stenosis of 0-49%.      Results for orders placed during the hospital encounter of 11/20/17    Carotid Duplex - Bilateral    Interpretation Summary  · Right internal carotid artery stenosis of 0-49%.  · Left internal carotid artery stenosis of 0-49%.      Results for orders placed during the hospital encounter of 08/12/24    Adult Transthoracic Echo Complete W/ Cont if Necessary Per Protocol    Interpretation Summary    Left ventricular systolic function is normal. Left ventricular ejection fraction appears to be 51 - 55%.    Left ventricular diastolic function was normal.    The left atrial cavity is mildly dilated.    The right atrial cavity is mildly  dilated.    There is severe calcification of the aortic valve.    Moderate mitral valve regurgitation is present.    Estimated  right ventricular systolic pressure from tricuspid regurgitation is mildly elevated (35-45 mmHg).    Mitral valve regurgitation is worsened since 2017 but otherwise unchanged.      Plan for Follow-up of Pending Labs/Results:     Discharge Details        Discharge Medications        New Medications        Instructions Start Date   furosemide 40 MG tablet  Commonly known as: LASIX   40 mg, Oral, Daily   Start Date: August 23, 2024     melatonin 5 MG tablet tablet   5 mg, Oral, Nightly PRN             Changes to Medications        Instructions Start Date   aspirin 325 MG EC tablet  What changed: See the new instructions.   @@TAKE ONE TABLET BY MOUTH DAILY      insulin glargine 100 UNIT/ML injection  Commonly known as: LANTUS, SEMGLEE  What changed:   how much to take  when to take this   15 Units, Subcutaneous, Nightly             Continue These Medications        Instructions Start Date   atorvastatin 80 MG tablet  Commonly known as: LIPITOR   80 mg, Oral, Every Night at Bedtime      brimonidine-timolol 0.2-0.5 % ophthalmic solution  Commonly known as: COMBIGAN   1 drop, Both Eyes, Every 12 Hours      buPROPion  MG 24 hr tablet  Commonly known as: WELLBUTRIN XL   300 mg, Oral, Daily      clobetasol propionate 0.05 % cream  Commonly known as: TEMOVATE   1 Application, Topical, 2 Times Daily, Abd wound      D3 Super Strength 50 MCG (2000 UT) capsule  Generic drug: Cholecalciferol   TAKE 1 CAPSULE BY MOUTH EVERY DAY      escitalopram 20 MG tablet  Commonly known as: LEXAPRO   20 mg, Oral, Daily      ferrous sulfate 325 (65 FE) MG tablet   325 mg, Oral, Daily With Breakfast      gentamicin 0.1 % cream  Commonly known as: GARAMYCIN   1 Application, Topical, 2 Times Daily, Left foot      lactulose 10 GM/15ML solution  Commonly known as: CHRONULAC   20 g, Oral, 2 Times Daily PRN      Magnesium Oxide -Mg Supplement 400 (240 Mg) MG tablet   400 mg, Oral, Daily      multivitamin tablet tablet   1 tablet, Oral, Daily       NovoLIN R FlexPen 100 UNIT/ML injection  Generic drug: Insulin Regular Human   12 Units, Subcutaneous, 3 Times Daily Before Meals, Inject SC before meals per sliding scale: 150-200=4U,201-250=6U, 251-300=8U, 301-350=10U, 351-400=12U, >400=Call MD      nystatin 425517 UNIT/GM powder  Commonly known as: MYCOSTATIN   1 Application, Topical, 2 Times Daily      omeprazole 20 MG capsule  Commonly known as: priLOSEC   20 mg, Oral, Daily      sirolimus 2 MG tablet  Commonly known as: RAPAMUNE   2 mg, Oral, Daily      SITagliptin 25 MG tablet  Commonly known as: JANUVIA   25 mg, Oral, Daily      tamsulosin 0.4 MG capsule 24 hr capsule  Commonly known as: FLOMAX   1 capsule, Oral, Daily      Vitamin C 500 MG capsule   1 capsule, Oral, Daily      vitamin E 1000 UNIT capsule   1,000 Units, Oral, Daily      Zinc 220 (50 Zn) MG capsule   1 capsule, Oral, Daily             Stop These Medications      acetaminophen 500 MG tablet  Commonly known as: TYLENOL     gabapentin 100 MG capsule  Commonly known as: NEURONTIN     LORazepam 0.5 MG tablet  Commonly known as: ATIVAN     metoprolol tartrate 25 MG tablet  Commonly known as: LOPRESSOR     mupirocin 2 % cream  Commonly known as: Bactroban     QUEtiapine 25 MG tablet  Commonly known as: SEROquel     ramipril 10 MG capsule  Commonly known as: ALTACE     Saw Palmetto 160 MG capsule     silver sulfadiazine 1 % cream  Commonly known as: SILVADENE, SSD     spironolactone 25 MG tablet  Commonly known as: ALDACTONE     traZODone 50 MG tablet  Commonly known as: DESYREL              No Known Allergies      Discharge Disposition:  Rehab Facility or Unit (DC - External)    Diet:  Hospital:  Diet Order   Procedures    Diet: Cardiac, Fluid Restriction (240 mL/tray); Low Sodium (2g); 2000 mL/day; No Mixed Consistencies; Texture: Soft to Chew (NDD 3); Soft to Chew: Chopped Meat; Fluid Consistency: Thin (IDDSI 0)       Diet Instructions       Diet: Cardiac Diets; Low Sodium (2g); Soft to Chew  (NDD 3); Chopped Meat; Thin (IDDSI 0)      Discharge Diet: Cardiac Diets    Cardiac Diet: Low Sodium (2g)    Texture: Soft to Chew (NDD 3)    Soft to Chew: Chopped Meat    Fluid Consistency: Thin (IDDSI 0)    Diet: Cardiac, Fluid Restriction (240 mL/tray); Low Sodium (2g); 2000 mL/day; No Mixed Consistencies; Texture: Soft to Chew (NDD 3); Soft to Chew: Chopped Meat; Fluid Consistency: Thin 8             Activity:  Activity Instructions       Activity as Tolerated      Measure Blood Pressure      Measure Weight              Restrictions or Other Recommendations:  Wound Locations Left foot amputation site   Wound Care Instructions Normal saline.  Apply Vashe moistened 4 x 4 gauze to the open wound on the left foot wrapped with rolled gauze and secure.   Cleanse Irrigate With Normal Saline   Securement Roll Gauze    Paper Tape          CODE STATUS:    Code Status and Medical Interventions: CPR (Attempt to Resuscitate); Full Support   Ordered at: 08/12/24 2010     Code Status (Patient has no pulse and is not breathing):    CPR (Attempt to Resuscitate)     Medical Interventions (Patient has pulse or is breathing):    Full Support       Future Appointments   Date Time Provider Department Center   8/22/2024 10:00 AM MED [facility]       Additional Instructions for the Follow-ups that You Need to Schedule       Discharge Follow-up with PCP   As directed       Currently Documented PCP:    Andrew Fernandez MD    PCP Phone Number:    [phone]     Follow Up Details: follow up with pcp after dc from rehab        Discharge Follow-up with Specified Provider: follow up with cardiology after dc from rehab   As directed      To: follow up with cardiology after dc from rehab                      DAVID Rodriguez  08/22/24      Time Spent on Discharge:  I spent  45  minutes on this discharge activity which included: face-to-face encounter with the patient, reviewing the data in the system, coordination of the care  with the nursing staff as well as consultants, documentation, and entering orders.            Electronically signed by Tamra Dumont APRN at 08/22/24 5753

## 2024-08-22 NOTE — PROGRESS NOTES
Enter Query Response Below      Query Response: Acute kidney injury was not supported based on baseline creatinine   Electronically signed by DAVID Rodriguez, 08/22/24, 12:54 PM EDT.               If applicable, please update the problem list.

## 2024-08-22 NOTE — THERAPY TREATMENT NOTE
Acute Care - Speech Language Pathology   Swallow Treatment Note [facility]     Patient Name: Quirino Sheppard  : 1955  MRN: [id number]  Today's Date: 2024               Admit Date: 2024    Visit Dx:     ICD-10-CM ICD-9-CM   1. Acute on chronic congestive heart failure, unspecified heart failure type  I50.9 428.0   2. Hyperkalemia  E87.5 276.7   3. SOB (shortness of breath)  R06.02 786.05   4. AURELIANO (obstructive sleep apnea)  G47.33 327.23   5. Hx of CABG  Z95.1 V45.81   6. History of liver transplant  Z94.4 V42.7   7. Pleural effusion  J90 511.9   8. Chronic wound infection of abdomen, initial encounter  S31.109A 958.3    L08.9    9. Oropharyngeal dysphagia  R13.12 787.22   10. Major depressive disorder with single episode, in full remission  F32.5 296.26     Patient Active Problem List   Diagnosis    Coronary artery disease involving coronary bypass graft of native heart without angina pectoris    Hepatitis C    Hx of liver transplant    Essential hypertension    Hyperlipidemia LDL goal <70    Type 2 diabetes mellitus without complication, with long-term current use of insulin    S/P CABG x 3    BPH associated with nocturia    Degeneration of lumbar intervertebral disc    Disorder of sulfur-bearing amino acid metabolism    Gastroesophageal reflux disease without esophagitis    Obstructive sleep apnea syndrome    Presence of aortocoronary bypass graft    Recurrent major depression in full remission    Severe obesity    Cellulitis of abdominal wall    CHF (congestive heart failure)    Wound of abdomen    Hyperkalemia    LUZMARIA (acute kidney injury)    Moderate malnutrition     Past Medical History:   Diagnosis Date    Anxiety and depression     BPH associated with nocturia     Chronic kidney disease, stage 2 (mild)     Coronary arteriosclerosis in native artery     3V    Degeneration of lumbar intervertebral disc     Diabetes mellitus     Disorder of sulfur-bearing amino acid metabolism      Gastroesophageal reflux disease without esophagitis     Glaucoma     Hepatitis C     Heterozygous MTHFR mutation [id number]     Heterozygous MTHFR mutation [id number]     High risk medication use     Hyperlipidemia     Hypertension     Liver transplanted     Low back pain     Obstructive sleep apnea syndrome     Recurrent major depression in full remission     Severe obesity     Type 2 diabetes mellitus      Past Surgical History:   Procedure Laterality Date    CORONARY ARTERY BYPASS GRAFT N/A 11/22/2017    Procedure: MEDIAN STERNOTOMY, CORONARY ARTERY BYPASS GRAFT X 3, UTILIZING THE LEFT INTERNAL MAMMARY ARTERY AND EVH USING THE LEFT GREATER SAPHENOUS VEIN;  Surgeon: Naga Guaman MD;  Location: [facility];  Service:     LIVER TRANSPLANTATION  2001       SLP Recommendation and Plan  SLP Swallowing Diagnosis: mild, oral dysphagia, pharyngeal dysphagia (08/22/24 0900)  SLP Diet Recommendation: soft to chew textures, chopped, no mixed consistencies, thin liquids (08/22/24 0900)  Recommended Precautions and Strategies: upright posture during/after eating, general aspiration precautions, other (see comments) (encourage small bites/sips, slow pace, and reduce distractions/talking while eating and drinking) (08/22/24 0900)  SLP Rec. for Method of Medication Administration: meds whole, with puree, as tolerated (08/22/24 0900)     Monitor for Signs of Aspiration: yes, notify SLP if any concerns (08/22/24 0900)  Recommended Diagnostics: other (see comments) (monitor diet tolerance closely) (08/22/24 0900)  Swallow Criteria for Skilled Therapeutic Interventions Met: demonstrates skilled criteria (08/22/24 0900)  Anticipated Discharge Disposition (SLP): skilled nursing facility, anticipate therapy at next level of care (08/22/24 0900)  Rehab Potential/Prognosis, Swallowing: adequate, monitor progress closely (08/22/24 0900)  Therapy Frequency (Swallow): 5 days per week (08/22/24 0900)  Predicted Duration Therapy Intervention  (Days): 1 week (08/22/24 0900)  Oral Care Recommendations: Oral Care BID/PRN, Toothbrush (08/22/24 0900)        Daily Summary of Progress (SLP): progress toward functional goals as expected (08/22/24 0900)               Treatment Assessment (SLP): continued, toleration of diet, no clinical signs of, aspiration (With initial cues provided for small sips of thin liquids.) (08/22/24 0900)  Treatment Assessment Comments (SLP): Patient tolerated trials of thin liquids via small single cup sips. He required initial cues for small sips and slow pace. Able to demonstrate without further cues during session. Monitor diet tolerance closely and downgrade to nectar thick liquids if any concerns. (08/22/24 0900)  Plan for Continued Treatment (SLP): continue treatment per plan of care (08/22/24 0900)                SWALLOW EVALUATION (Last 72 Hours)       SLP Adult Swallow Evaluation       Row Name 08/22/24 0900 08/20/24 1000 08/19/24 8533             Rehab Evaluation    Document Type therapy note (daily note)  -CH evaluation  - evaluation  -      Subjective Information no complaints  -CH no complaints  -AC no complaints  -      Patient Observations alert;cooperative;agree to therapy  - lethargic;cooperative  -AC alert;cooperative  -      Patient/Family/Caregiver Comments/Observations none present  - No family present.  -AC Visitor present.  -AC      Patient Effort adequate  -CH adequate  -AC good  -AC      Oral Care patient refused intervention  - -- --         General Information    Patient Profile Reviewed yes  -CH yes  -AC yes  -AC      Pertinent History Of Current Problem -- See previous eval.  -AC CHF, excessive sleepiness, confusion. Consulted 2' excessive coughing while eating. RN reported pt mouth-breathes and eats very quickly. Hx liver transplant, LUZMARIA, Hepatitis C, GERD, AURELIANO, s/p CABG, s/p toe amputation, vascular dementia per family report, wheelchair-bound.  -AC      Current Method of Nutrition -- soft  to chew textures;chopped;no mixed consistencies;nectar/syrup-thick liquids  - regular textures;thin liquids  -AC      Precautions/Limitations, Vision -- -- WFL;for purposes of eval  -AC      Precautions/Limitations, Hearing -- -- WFL;for purposes of eval  -AC      Prior Level of Function-Swallowing -- -- no diet consistency restrictions  -      Plans/Goals Discussed with -- patient;agreed upon  - patient;agreed upon;other (see comments)  POA  -      Barriers to Rehab -- cognitive status  - cognitive status  -      Patient's Goals for Discharge -- patient did not state  - patient did not state  -      Family Goals for Discharge -- -- family did not state  -         Pain Scale: FACES Pre/Post-Treatment    Pain: FACES Scale, Pretreatment 0-->no hurt  -CH 0-->no hurt  -AC 0-->no hurt  -AC      Posttreatment Pain Rating 0-->no hurt  -CH 0-->no hurt  -AC 0-->no hurt  -AC         Oral Motor Structure and Function    Dentition Assessment -- -- missing teeth;teeth are in poor condition  -      Secretion Management -- -- WNL/WFL  -AC      Mucosal Quality -- -- dry  -AC      Volitional Swallow -- -- WFL  -AC      Volitional Cough -- -- reduced respiratory support  -         Oral Musculature and Cranial Nerve Assessment    Oral Motor General Assessment -- -- WFL  -AC         General Eating/Swallowing Observations    Respiratory Support Currently in Use -- nasal cannula  - room air  -      Eating/Swallowing Skills -- fed by SLP;self-fed;needed assist;impulsive self-feeding behaviors  - self-fed;impulsive self-feeding behaviors;rate is too fast;unaware of safety concerns  -      Positioning During Eating -- upright in chair  - upright in chair  -      Utensils Used -- -- spoon;cup;straw  -      Consistencies Trialed -- -- thin liquids;nectar/syrup-thick liquids;pureed;soft to chew textures;whole  -AC         Respiratory    Respiratory Status -- -- increase in respiratory rate;during  swallowing/eating  -      Respiratory Pattern -- -- decrease control/incoordination of breathing swallow;inhale-exhale pattern  -         Clinical Swallow Eval    Oral Prep Phase -- -- WFL  -AC      Oral Transit -- -- WFL  -AC      Oral Residue -- -- WFL  -AC      Pharyngeal Phase -- -- suspected pharyngeal impairment  -AC      Esophageal Phase -- -- unremarkable  -AC         Pharyngeal Phase Concerns    Pharyngeal Phase Concerns -- -- acute vocal quality changes;cough  -      Acute Vocal Quality Changes -- -- dysphonia  acute issue that started just prior to hospitalization per POA, fluctuates t/o the day  -      Cough -- -- thin;nectar;mechanical soft;other (see comments)  w/ thin via cup/straw, nectar via straw (delayed), and dry solids  -      Pharyngeal Phase Concerns, Comment -- -- Pt had difficulty taking small/single sips and bites, despite cues. Has a habit of eating/drinking very quickly w/o taking breaks and has difficulty breathing through his nose, per pt/POA.  -         MBS/VFSS    Utensils Used -- spoon;cup;straw  - --      Consistencies Trialed -- thin liquids;pudding thick;mixed consistency;regular textures  - --         MBS/VFSS Interpretation    Oral Prep Phase -- WFL  -AC --      Oral Transit Phase -- impaired  -AC --      Oral Residue -- WFL  -AC --      Oral Phase, Comment -- Adequate mastication, but increased risk for dyspnea while masticating as pt breathes primarily via mouth. Reduced coordination breathing/mastication/swallowing.  -AC --         Oral Transit Phase    Impaired Oral Transit Phase -- premature spillage of liquids into pharynx  -AC --      Premature Spillage of Liquids into Pharynx -- thin liquids;mixed consistency;secondary to reduced lingual control  -AC --      Oral Transit Phase, Comment -- Affected by respiratory status.  -AC --         Initiation of Pharyngeal Swallow    Initiation of Pharyngeal Swallow -- bolus in pyriform sinuses  -AC --      Pharyngeal  Phase -- impaired pharyngeal phase of swallowing  -AC --      Penetration Before the Swallow -- thin liquids;mixed consistency;secondary to reduced back of tongue control;secondary to delayed swallow initiation or mistiming  -AC --      Aspiration Before the Swallow -- other (see comments)  ? w/ initial tsp of thin liquid--u/a to fully visualize TVFs/trachea due to positioning w/ initial trial. No response if was aspiration.  -AC --      Penetration During the Swallow -- thin liquids;secondary to delayed swallow initiation or mistiming  -AC --      Depth of Penetration -- transient  -AC --      Rosenbek's Scale -- thin:;mixed:;2--->level 2  -AC --      Pharyngeal Residue -- no significant pharyngeal residue noted  -AC --      Pharyngeal Phase, Comment -- No aspiration w/ thin via sequential cup/straw drinks, pudding, mixed consistency, or solid; however, remains generally at risk given respiratory status and impulsivity.  -AC --         Swallowing Quality of Life Assessment    Education and counseling provided -- Risks of aspiration;Safest diet options;Aspiration precautions;Compensatory strategy recommendations and rationale  -AC Signs of aspiration;Silent aspiration;Risks of aspiration;Aspiration precautions;Safest diet options  -AC         SLP Evaluation Clinical Impression    SLP Swallowing Diagnosis mild;oral dysphagia;pharyngeal dysphagia  -CH mild;oral dysphagia;pharyngeal dysphagia  -AC suspected pharyngeal dysphagia  -AC      Functional Impact risk of aspiration/pneumonia  -CH risk of aspiration/pneumonia  -AC risk of aspiration/pneumonia  -AC      Rehab Potential/Prognosis, Swallowing adequate, monitor progress closely  -CH adequate, monitor progress closely  -AC good, to achieve stated therapy goals  -AC      Swallow Criteria for Skilled Therapeutic Interventions Met demonstrates skilled criteria  -CH demonstrates skilled criteria  -AC demonstrates skilled criteria  -AC         SLP Treatment Clinical  Impressions    Treatment Assessment (SLP) continued;toleration of diet;no clinical signs of;aspiration  With initial cues provided for small sips of thin liquids.  - -- --      Treatment Assessment Comments (SLP) Patient tolerated trials of thin liquids via small single cup sips. He required initial cues for small sips and slow pace. Able to demonstrate without further cues during session. Monitor diet tolerance closely and downgrade to nectar thick liquids if any concerns.  -CH -- --      Daily Summary of Progress (SLP) progress toward functional goals as expected  -CH -- --      Plan for Continued Treatment (SLP) continue treatment per plan of care  - -- --      Care Plan Review evaluation/treatment results reviewed;care plan/treatment goals reviewed;risks/benefits reviewed;patient/other agree to care plan  - -- --         Recommendations    Therapy Frequency (Swallow) 5 days per week  - 5 days per week  -AC --      Predicted Duration Therapy Intervention (Days) 1 week  - 1 week  -AC --      SLP Diet Recommendation soft to chew textures;chopped;no mixed consistencies;thin liquids  - soft to chew textures;chopped;no mixed consistencies;thin liquids  -AC soft to chew textures;chopped;no mixed consistencies;nectar thick liquids  extra sauce/gravy w/ dry foods  -      Recommended Diagnostics other (see comments)  monitor diet tolerance closely  - -- VFSS ([facility])  -      Recommended Precautions and Strategies upright posture during/after eating;general aspiration precautions;other (see comments)  encourage small bites/sips, slow pace, and reduce distractions/talking while eating and drinking  - upright posture during/after eating;general aspiration precautions  encourage small bites/sips, slow pace, and reduce distractions/talking while eating and drinking  - upright posture during/after eating;general aspiration precautions  encourage small bites/sips and slow pace, reduce distractions and talking  while eating/drinking  -AC      Oral Care Recommendations Oral Care BID/PRN;Toothbrush  - Oral Care BID/PRN;Toothbrush  -AC Oral Care BID/PRN;Toothbrush  -AC      SLP Rec. for Method of Medication Administration meds whole;with puree;as tolerated  -CH meds whole;with puree;as tolerated  -AC meds whole;with puree;as tolerated  -AC      Monitor for Signs of Aspiration yes;notify SLP if any concerns  - yes;notify SLP if any concerns  -AC yes;notify SLP if any concerns  -AC      Anticipated Discharge Disposition (SLP) skilled nursing facility;anticipate therapy at next level of care  - skilled nursing facility  -AC skilled nursing facility  -AC                User Key  (r) = Recorded By, (t) = Taken By, (c) = Cosigned By      Initials Name Effective Dates    AC Giselle Mosquera, MS CCC-SLP 02/03/23 -      Vilma Gordon MS CCC-SLP 06/16/21 -                     EDUCATION  The patient has been educated in the following areas:   Dysphagia (Swallowing Impairment) Oral Care/Hydration Modified Diet Instruction.        SLP GOALS       Row Name 08/22/24 0900 08/20/24 1000          (LTG) Patient will demonstrate functional swallow for    Diet Texture (Demonstrate functional swallow) regular textures  - regular textures  -AC     Liquid viscosity (Demonstrate functional swallow) thin liquids  - thin liquids  -     [place] (Demonstrate functional swallow) with use of compensatory strategies  - with use of compensatory strategies  -AC     Time Frame (Demonstrate functional swallow) by discharge  -CH by discharge  -AC     Progress/Outcomes (Demonstrate functional swallow) continuing progress toward goal  - --        (STG) Patient will tolerate therapeutic trials of    Consistencies Trialed (Tolerate therapeutic trials) soft to chew (whole) textures;mixed consistencies  - soft to chew (whole) textures;mixed consistencies  -AC     Desired Outcome (Tolerate therapeutic trials) without signs/symptoms of  aspiration;without signs of distress;with adequate oral prep/transit/clearance  -CH without signs/symptoms of aspiration;without signs of distress;with adequate oral prep/transit/clearance  as respiratory status improves  -AC     [place] (Tolerate therapeutic trials) with minimal cues (75-90% accuracy)  -CH with minimal cues (75-90% accuracy)  -AC     Time Frame (Tolerate therapeutic trials) 1 week  -CH 1 week  -AC     Progress/Outcomes (Tolerate therapeutic trials) good progress toward goal  -CH --     Comment (Tolerate therapeutic trials) Prolonged mastication. Initial cues for small single bites/sips, slow  -CH --        (STG) Pharyngeal Strengthening Exercise Goal 1 (SLP)    Activity (Pharyngeal Strengthening Goal 1, SLP) increase closure at entrance to airway/closure of airway at glottis  -CH increase closure at entrance to airway/closure of airway at glottis  -AC     Increase Closure at Entrance to Airway/Closure of Airway at Glottis super-supraglottic swallow  -CH super-supraglottic swallow  -AC     [place]/Accuracy (Pharyngeal Strengthening Goal 1, SLP) with minimal cues (75-90% accuracy)  -CH with minimal cues (75-90% accuracy)  -AC     Time Frame (Pharyngeal Strengthening Goal 1, SLP) 1 week  -CH 1 week  -AC     Progress/Outcomes (Pharyngeal Strengthening Goal 1, SLP) continuing progress toward goal  -CH --     Comment (Pharyngeal Strengthening Goal 1, SLP) Patient completed x 10 with min cues.  -CH --        (STG) Swallow Management Recall Goal 1 (SLP)    Activity (Swallow Management Recall Goal 1, SLP) recall of;aspiration precautions;fatigue precautions;compensatory swallow strategies/techniques  -CH recall of;aspiration precautions;fatigue precautions;compensatory swallow strategies/techniques  pt/caregiver recall  -AC     [place]/Accuracy (Swallow Management Recall Goal 1, SLP) with minimal cues (75-90% accuracy)  -CH with minimal cues (75-90% accuracy)  -AC     Time Frame (Swallow  Management Recall Goal 1, SLP) 1 week  -CH 1 week  -AC     Progress/Outcomes (Swallow Management Recall Goal 1, SLP) continuing progress toward goal  -CH --     Comment (Swallow Management Recall Goal 1, SLP) Initial cue required for small single sips and slow pace. REviewed rationale with patient.  -CH --        ([facility]) Swallow Compensatory Strategies Goal 1 (SLP)    Activity (Swallow Compensatory Strategies/Techniques Goal 1, SLP) aspiration precautions;fatigue precautions;compensatory strategies;during meal intake;during p.o. trials;small bites;small cup sips;small straw sips;other (see comments)  -CH aspiration precautions;fatigue precautions;compensatory strategies;during meal intake;during p.o. trials;small bites;small cup sips;small straw sips;other (see comments)  slow pace  -AC     [place]/Accuracy (Swallow Compensatory Strategies/Techniques Goal 1, SLP) with minimal cues (75-90% accuracy)  -CH with minimal cues (75-90% accuracy)  -AC     Time Frame (Swallow Compensatory Strategies/Techniques Goal 1, SLP) 1 week  -CH 1 week  -AC     Progress/Outcomes (Swallow Compensatory Strategies/Techniques Goal 1, SLP) continuing progress toward goal  -CH --     Comment (Swallow Compensatory Strategies/Techniques Goal 1, SLP) Initial cue required for small single sips and slow pace. REviewed rationale with patient.  -CH --               User Key  (r) = Recorded By, (t) = Taken By, (c) = Cosigned By      Initials Name Provider Type    AC Giselle Mosquera MS CCC-SLP Speech and Language Pathologist    Vilma Staton MS CCC-SLP Speech and Language Pathologist                         Time Calculation:    Time Calculation- SLP       Row Name 08/22/24 0957             Time Calculation- SLP    SLP Start Time 0900  -      SLP Received On 08/22/24  -         Untimed Charges    65435-WF Treatment Swallow Minutes 45  -CH         Total Minutes    Untimed Charges Total Minutes 45  -CH       Total Minutes 45  -CH                 User Key  (r) = Recorded By, (t) = Taken By, (c) = Cosigned By      Initials Name Provider Type    Vilma Staton MS CCC-SLP Speech and Language Pathologist                    Therapy Charges for Today       Code Description Service Date Service Provider Modifiers Qty    [id number]  ST TREATMENT SWALLOW 3 8/22/2024 Vilma Gordon MS CCC-SLP GN 1                 Vilma Gordon MS CCC-AAKASH  8/22/2024   [FreeTextEntry1] : In regards to patients Physical exam, routine blood work drawn, will review results with patient. \par \par \par left foot pain- patient referred to Podiatrist. \par \par Patient will continue to follow with Heme/Onc and Urologist\par Patient will continue to follow with cardiologist. \par \par Counseling included abnormal lab results, differential diagnoses, treatment options, risks and benefits, lifestyle changes, current condition, medications, and dose adjustments. \par The patient was interactive, attentive, asked questions, and verbalized understanding \par

## 2024-08-22 NOTE — PAYOR COMM NOTE
"[facility]# [id number]   8/21/24 Discharge Date    ARABELLA Blount, RN  Utilization Review  Phone [phone]  Fax [phone]    [address] [address]           Quirino Sheppard (69 y.o. Male)       Date of Birth   1955    Social Security Number       Address   [address] [place], [place] [address]    Home Phone   [phone]    MRN   [id number]       Baptism   Alevism    Marital Status                               Admission Date   8/12/24    Admission Type   Emergency    Admitting Provider   Rebecca Arias MD    Attending Provider       Department, Room/Bed   [facility] 4G, S454/1       Discharge Date   8/22/2024    Discharge Disposition   Rehab Facility or Unit (DC - External)    Discharge Destination                                 Attending Provider: (none)   Allergies: No Known Allergies    Isolation: None   Infection: MRSA (08/14/24)   Code Status: Prior    Ht: 167.6 cm (65.98\")   Wt: 90.6 kg (199 lb 12.8 oz)    Admission Cmt: None   Principal Problem: CHF (congestive heart failure) [I50.9]                   Active Insurance as of 8/12/2024       Primary Coverage       Payor Plan Insurance Group Employer/Plan Group    AETNA MEDICARE REPLACEMENT AETNA MEDICARE REPLACEMENT [id number]       Payor Plan Address Payor Plan Phone Number Payor Plan Fax Number Effective Dates    [address] [phone]  1/1/2022 - None Entered    [facility] [id number]         Subscriber Name Subscriber Birth Date Member ID       QUIRINO SHEPPARD 1955 [id number]                     Emergency Contacts        (Rel.) Home Phone Work Phone Mobile Phone    KVNG OTOOLE (Friend) [phone] -- [phone]    Harley Sheppard (Daughter) [phone] -- [phone]    Kassie Xie -- -- [phone]    Ankit Sheppard (Brother) [phone] -- [phone]                 Discharge Summary        Tamra Dumont, APRN at 08/22/24 " 0836       Attestation signed by Rebecca Arias MD at 24 0901    I have reviewed this documentation and agree.                      [facility]  DISCHARGE SUMMARY    Patient Name: Quirino Sheppard  : 1955  MRN: [id number]    Date of Admission: 2024  2:52 PM  Date of Discharge:  2024  Primary Care Physician: Andrew Fernandez MD    Consults       Date and Time Order Name Status Description    2024  8:10 PM Inpatient Cardiology Consult Completed             Hospital Course     Presenting Problem: CHFpEF exac    Active Hospital Problems    Diagnosis  POA    **CHF (congestive heart failure) [I50.9]  Yes    Moderate malnutrition [E44.0]  Yes    Wound of abdomen [S31.109A]  Yes    Hyperkalemia [E87.5]  Yes    LUZMARIA (acute kidney injury) [N17.9]  Yes    Obstructive sleep apnea syndrome [G47.33]  Yes    Essential hypertension [I10]  Yes    Hyperlipidemia LDL goal <70 [E78.5]  Yes    Hx of liver transplant [Z94.4]  Not Applicable    Type 2 diabetes mellitus without complication, with long-term current use of insulin [E11.9, Z79.4]  Not Applicable    Coronary artery disease involving coronary bypass graft of native heart without angina pectoris [I25.810]  Yes      Resolved Hospital Problems   No resolved problems to display.          Hospital Course:  Quirino Sheppard is a 69 y.o. male  w CAD s/p CABG, DMII, h/o liver transplant, AURELIANO without home CPAP, decline since 2024 toe amputation, chronic wounds, vascular dementia per family report who presented w 30-40 lb weight gain and shortness of breath. Stay c/b very excessive sleepiness -8/15 w complete difference  w only holding small doses home gabapentin + seroquel and 1-2 hours on cpap. Unclear if causative v spontaneous improvement. Work-up for excessive sleepiness unrevealing.     Previously lived at senior living w home health/wound support and wheelchair bound state, help from family     Excessive  sleepiness - resolved  -abg normal, prior blood gas also normal, free T4 normal, B12 wnl, EEG w encephalopathy only  -CT head wnl, MRI could not tolerate now awake/alert  -unclear etiology: uncontrolled AURELIANO v med effect (though doses appear so small?)  -restarted low dose seroquel at night and some increased sedation 8/18 so now off both seroquel and gabapentin for now entirely (and at time of DC)     Untreated AURELIANO - working on OP CPAP, tolerating hospital machine minimally     Acute on chronic CHFpEF - resolved  -only on home spironolactone it appears, ECHO stable from 2017  -per family dry weight ~180 lbs,  up to 230 lbs on admission w improvement w diuresis  -diuresed well with IV diuretics  -transitioned to PO diuretics which appear effective - continue on DC  \-- BB stopped per cardiology      Left chronic foot wound   Chronic abdominal wound, appears 2/2 excoriation   -follows biweekly w OP wound clinic w senior facility  -does not appear infected on visualization. Therefore will not treat culture as likely colonization and not wound infection on review. OP close f/u with wound clinic     Vascular dementia, appears worsened in recent months  DMII - resume home meds, decrease lantus dose  H/o liver transplant  Chronic debility - worry patient is moving toward long-term care  BMI 37     *Is NWB-GIA at baseline, using wheelchair largely. Per discussion with CM, patient would need to be an assist of 1 to get in and out of his wheelchair and be able to self-propel in the wheelchair. Unfortunately, he is currently a 2 person assist plus lift to move, thus cannot return to his senior living facility at this time.      Patient has remained clinically stable and will be discharged to facility today.       Discharge Follow Up Recommendations for outpatient labs/diagnostics:   Follow up with pcp after dc from rehab   Follow up with cardiology after dc from rehab   Follow up with OP wound clinic after dc from rehab, wound  care needs to follow at facility     Day of Discharge     HPI:   Patient is resting in bed in NAD. He states he slept well. Denies any soa or pain, no acute events overnight per nursing     Review of Systems  Gen- No fevers, chills  CV- No chest pain, palpitations  Resp- No cough, dyspnea  GI- No N/V/D, abd pain      Vital Signs:   Temp:  [97.9 °F (36.6 °C)-99.1 °F (37.3 °C)] 99.1 °F (37.3 °C)  Heart Rate:  [73-77] 74  Resp:  [16-20] 20  BP: (114-147)/(60-79) 147/79      Physical Exam:  Constitutional: No acute distress, awake, alert  HENT: NCAT, mucous membranes moist  Respiratory: Clear to auscultation bilaterally, respiratory effort normal room air 96%  Cardiovascular: RRR, no murmurs, rubs, or gallops  Gastrointestinal: Positive bowel sounds, soft, nontender, nondistended  Musculoskeletal: No bilateral ankle edema, left foot dressing cdi, sp toe amputations   Psychiatric: Appropriate affect, cooperative  Neurologic: Oriented x 3, strength symmetric in all extremities, Cranial Nerves grossly intact to confrontation, speech clear  Skin: No rashes\    Pertinent  and/or Most Recent Results     LAB RESULTS:      Lab 08/21/24  0422 08/16/24  0547   WBC 7.55 8.32   HEMOGLOBIN 10.4* 10.0*   HEMATOCRIT 33.7* 32.8*   PLATELETS 214 219   MCV 84.7 85.0   PROTIME  --  14.4         Lab 08/21/24  0422 08/19/24  0504 08/16/24  0547   SODIUM 133* 138 135*   POTASSIUM 4.1 4.5 5.0   CHLORIDE 95* 99 98   CO2 28.0 29.0 28.0   ANION GAP 10.0 10.0 9.0   BUN 19 23 22   CREATININE 1.13 1.23 1.15   EGFR 70.4 63.6 68.9   GLUCOSE 135* 131* 138*   CALCIUM 8.8 8.8 8.7         Lab 08/16/24  0547   TOTAL PROTEIN 6.8   ALBUMIN 3.6   GLOBULIN 3.2   ALT (SGPT) 21   AST (SGOT) 21   BILIRUBIN 0.4   ALK PHOS 137*         Lab 08/16/24  0547   PROTIME 14.4   INR 1.11                 Lab 08/15/24  1428   FIO2 21   CARBOXYHEMOGLOBIN (VENOUS) 0.9     Brief Urine Lab Results  (Last result in the past 365 days)        Color   Clarity   Blood   Leuk Est    Nitrite   Protein   CREAT   Urine HCG        08/12/24 1339 Yellow   Clear   Negative   Negative   Negative   Negative                 Microbiology Results (last 10 days)       Procedure Component Value - Date/Time    Wound Culture - Wound, Abdominal Wall [[id number]]  (Abnormal) Collected: 08/13/24 1008    Lab Status: Final result Specimen: Wound from Abdominal Wall Updated: 08/15/24 0850     Wound Culture Light growth (2+) Staphylococcus aureus, MRSA     Comment:   Methicillin resistant Staphylococcus aureus, Patient may be an isolation risk.        Gram Stain No WBCs or organisms seen    Narrative:      Refer to previous wound culture collected on 08/13/2024 1008 for MICs.    Wound Culture - Wound, Foot, Left [[id number]]  (Abnormal)  (Susceptibility) Collected: 08/13/24 1008    Lab Status: Final result Specimen: Wound from Foot, Left Updated: 08/15/24 0850     Wound Culture Light growth (2+) Staphylococcus aureus, MRSA     Comment:   Methicillin resistant Staphylococcus aureus, Patient may be an isolation risk.         Scant growth (1+) Pseudomonas aeruginosa      Light growth (2+) Normal Skin Cortney     Gram Stain Rare (1+) WBCs seen      No organisms seen    Susceptibility        Staphylococcus aureus, MRSA      CHRISTIAN      Clindamycin Susceptible      Erythromycin Resistant      Oxacillin Resistant      Rifampin Susceptible      Tetracycline Susceptible      Trimethoprim + Sulfamethoxazole Susceptible      Vancomycin Susceptible                       Susceptibility        Pseudomonas aeruginosa      CHRISTIAN      Cefepime Susceptible      Ceftazidime Susceptible      Ciprofloxacin Susceptible      Levofloxacin Susceptible      Piperacillin + Tazobactam Susceptible      Tobramycin Susceptible                       Susceptibility Comments       Pseudomonas aeruginosa    With the exception of urinary-sourced infections, aminoglycosides should not be used as monotherapy.               Blood Culture - Blood, Arm, Right  [[id number]]  (Normal) Collected: 08/12/24 1349    Lab Status: Final result Specimen: Blood from Arm, Right Updated: 08/17/24 1415     Blood Culture No growth at 5 days    Blood Culture - Blood, Arm, Left [[id number]]  (Normal) Collected: 08/12/24 1349    Lab Status: Final result Specimen: Blood from Arm, Left Updated: 08/17/24 1415     Blood Culture No growth at 5 days    COVID PRE-OP / PRE-PROCEDURE SCREENING ORDER (NO ISOLATION) - Swab, Nasopharynx [[id number]]  (Normal) Collected: 08/12/24 1345    Lab Status: Final result Specimen: Swab from Nasopharynx Updated: 08/12/24 1423    Narrative:      The following orders were created for panel order COVID PRE-OP / PRE-PROCEDURE SCREENING ORDER (NO ISOLATION) - Swab, Nasopharynx.  Procedure                               Abnormality         Status                     ---------                               -----------         ------                     COVID-19 and FLU A/B PCR...[[id number]]  Normal              Final result                 Please view results for these tests on the individual orders.    COVID-19 and FLU A/B PCR, 1 HR TAT - Swab, Nasopharynx [[id number]]  (Normal) Collected: 08/12/24 1345    Lab Status: Final result Specimen: Swab from Nasopharynx Updated: 08/12/24 1423     COVID19 Not Detected     Influenza A PCR Not Detected     Influenza B PCR Not Detected    Narrative:      Fact sheet for providers: https://www.fda.gov/media/631701/download    Fact sheet for patients: https://www.fda.gov/media/845181/download    Test performed by PCR.            FL Video Swallow With Speech Single Contrast    Result Date: 8/20/2024  FL VIDEO SWALLOW W SPEECH SINGLE-CONTRAST Date of Exam: 8/20/2024 9:56 AM EDT Indication: dysphagia.   Comparison: None available. Technique:   The speech pathologist administered food and/or liquid mixed with barium to the patient with cine/video imaging.  Imaging assistance was provided to the speech pathologist and an image was saved.  Fluoroscopic Time: 42 seconds Number of Images: 8 associated fluoroscopic loops were saved Findings: No aspiration was seen during the course of barium swallowing series. Please see speech therapy report for full details and recommendations.     Impression: Fluoroscopy provided for a modified barium swallow. No aspiration was seen during swallowing evaluation. Please see speech therapy report for full details and recommendations. Report dictated by: Flor Coker PA-c  I have personally reviewed this case and agree with the findings above: Electronically Signed: Kenan Stacy MD  8/20/2024 5:13 PM EDT  Workstation ID: [id number]    EEG    Result Date: 8/16/2024  History: 69 y old male Procedure: A 21 Channel digital electroencephalogram was performed in the Clinical Neurophysiology Laboratory. The 10/20 International System of electrode placement was used and both Bipolar and referential electrode montages were monitored. EEG description: This EEG is continuous and symmetric. During awake state the back Background  consists if  generalized delta activity with intermixed  alpha and theta activity. During the awake state with the eyes closed, there is a posterior dominant rhythm of  7-8 Hz that is symmetrical and reacts appropriately to eye opening. Anterior to posterior amplitude frequency gradient is preserved. Photic stimulation using a step wise increase in photic  frequency showed no driving or activation of any epileptiform activity. EEG  Interpretation: This EEG is abnormal due to slow background activity Clinical correlation : This EEG suggest diagnosis of encephalopathy of unspecific etiology.  Clinical correlation is recommended     Adult Transthoracic Echo Complete W/ Cont if Necessary Per Protocol    Result Date: 8/13/2024    Left ventricular systolic function is normal. Left ventricular ejection fraction appears to be 51 - 55%.   Left ventricular diastolic function was normal.   The left atrial cavity is  mildly dilated.   The right atrial cavity is mildly  dilated.   There is severe calcification of the aortic valve.   Moderate mitral valve regurgitation is present.   Estimated right ventricular systolic pressure from tricuspid regurgitation is mildly elevated (35-45 mmHg). Mitral valve regurgitation is worsened since 2017 but otherwise unchanged.     CT Abdomen Pelvis Without Contrast    Result Date: 8/12/2024  CT ABDOMEN PELVIS WO CONTRAST Date of Exam: 8/12/2024 4:16 PM EDT Indication: confusion, weight gain, acute on chronic wound to abdomen. Comparison: None available. Technique: Axial CT images were obtained of the abdomen and pelvis without the administration of contrast. Reconstructed coronal and sagittal images were also obtained. Automated exposure control and iterative construction methods were used. Findings: Patient history includes previous liver transplant in 2001, chronic kidney disease. Significant recent weight gain. Worsening generalized weakness. Today's study shows at least a moderate right pleural effusion and a small left pleural effusion both of which appear to be free-flowing. There is very mild associated atelectasis and no particular findings to suggest basilar pneumonia. There appears to be relatively mild fatty liver change. Spleen is not enlarged. Pancreas appears mildly atrophic. Gallbladder is not identified either surgically absent or contracted. Adrenal glands appear normal. Kidneys show grossly normal parenchymal thickness and no hydronephrosis or nephrolithiasis is seen. Upper abdominal bowel loops are normal in caliber and appearance. No free air ascites or adenopathy is seen. Regarding the lower abdomen/pelvis, there is a trace amount of ascites along the lower paracolic gutters. No large or small bowel inflammation is identified. There is no evidence of diverticulosis or diverticulitis. Bladder is moderately distended and normal in appearance. Prostate and seminal vesicles are  not enlarged. There is a fat-only containing left inguinal/scrotal hernia with no evidence of strangulation. There is very dense diffuse subcutaneous fat stranding consistent with anasarca, with some symmetric fluid between the muscles the abdominal wall.  History indicates chronic wound to abdomen. There is focal skin thickening to the right and superior of the umbilicus, but only superficial involvement. The underlying subcutaneous fat appears normal. Images 123 through 145 show a rounded cystic area 4 1/2 cm in diameter lateral of the left greater trochanter, with a small amount of apparently contiguous fluid passing along the upper margin of the gluteus medius image 135. This contains a small amount  of dense material and may represent an old hematoma. Bony structures appear to be intact.     Impression: 1. Very dense diffuse subcutaneous fat stranding consistent with anasarca. Small, symmetric inter-muscular fluid collections of the right and left lower abdominal wall, likely related to anasarca. 2. Approximately 4.5 cm fluid collection lateral of the left greater trochanter, favored to represent old, liquefied hematoma. 3. Superficial mid abdominal wall skin thickening, which may be a superficial cellulitis. No evidence of underlying abscess. No evidence of potential abscess here or elsewhere. 4. Moderate right pleural effusion, and small left effusion. Minimal ascites. 5. Fat-only containing left inguinal/scrotal hernia with no evidence of strangulation. Electronically Signed: Kenan Stacy MD  8/12/2024 4:51 PM EDT  Workstation ID: [id number]    CT Head Without Contrast    Result Date: 8/12/2024  CT HEAD WO CONTRAST Date of Exam: 8/12/2024 4:16 PM EDT Indication: AMS. Comparison: MRI of the brain performed on October 5, 2023 Technique: Axial CT images were obtained of the head without contrast administration.  Automated exposure control and iterative construction methods were used. Findings: There is no evidence  of hemorrhage. There is no mass effect or midline shift. Age-related involutional changes are visualized. There is no extra-axial collections. Ventricles are normal in size and configuration for patient's stated age. Posterior fossa is within normal limits. Calvarium and skull base appear intact. Visualized sinuses show no air fluid levels. The mastoid air cells are clear. Visualized orbits are unremarkable.     Impression: No acute intracranial process evident. Electronically Signed: Omega Moore MD  8/12/2024 4:29 PM EDT  Workstation ID: [id number]    XR Chest 1 View    Result Date: 8/12/2024  XR CHEST 1 VW Date of Exam: 8/12/2024 2:03 PM EDT Indication: SOA triage protocol Comparison: 11/24/2017 Findings: Sternotomy wires are again noted. The heart shadow appears borderline enlarged but the pulmonary vasculature appears normal. There is mild coarsening of the pulmonary interstitial markings and a few granulomatous calcifications that appears stable from prior exams in 2017. No edema, effusion or pneumothorax is seen.     Impression: Mild heart shadow enlargement and mild chronic appearing interstitial lung changes. No clearly acute chest disease. Electronically Signed: Kenan Stacy MD  8/12/2024 2:41 PM EDT  Workstation ID: [id number]     Results for orders placed during the hospital encounter of 11/20/17    Carotid Duplex - Bilateral    Interpretation Summary  · Right internal carotid artery stenosis of 0-49%.  · Left internal carotid artery stenosis of 0-49%.      Results for orders placed during the hospital encounter of 11/20/17    Carotid Duplex - Bilateral    Interpretation Summary  · Right internal carotid artery stenosis of 0-49%.  · Left internal carotid artery stenosis of 0-49%.      Results for orders placed during the hospital encounter of 08/12/24    Adult Transthoracic Echo Complete W/ Cont if Necessary Per Protocol    Interpretation Summary    Left ventricular systolic function is normal. Left  ventricular ejection fraction appears to be 51 - 55%.    Left ventricular diastolic function was normal.    The left atrial cavity is mildly dilated.    The right atrial cavity is mildly  dilated.    There is severe calcification of the aortic valve.    Moderate mitral valve regurgitation is present.    Estimated right ventricular systolic pressure from tricuspid regurgitation is mildly elevated (35-45 mmHg).    Mitral valve regurgitation is worsened since 2017 but otherwise unchanged.      Plan for Follow-up of Pending Labs/Results:     Discharge Details        Discharge Medications        New Medications        Instructions Start Date   furosemide 40 MG tablet  Commonly known as: LASIX   40 mg, Oral, Daily   Start Date: August 23, 2024     melatonin 5 MG tablet tablet   5 mg, Oral, Nightly PRN             Changes to Medications        Instructions Start Date   aspirin 325 MG EC tablet  What changed: See the new instructions.   @@TAKE ONE TABLET BY MOUTH DAILY      insulin glargine 100 UNIT/ML injection  Commonly known as: LANTUS, SEMGLEE  What changed:   how much to take  when to take this   15 Units, Subcutaneous, Nightly             Continue These Medications        Instructions Start Date   atorvastatin 80 MG tablet  Commonly known as: LIPITOR   80 mg, Oral, Every Night at Bedtime      brimonidine-timolol 0.2-0.5 % ophthalmic solution  Commonly known as: COMBIGAN   1 drop, Both Eyes, Every 12 Hours      buPROPion  MG 24 hr tablet  Commonly known as: WELLBUTRIN XL   300 mg, Oral, Daily      clobetasol propionate 0.05 % cream  Commonly known as: TEMOVATE   1 Application, Topical, 2 Times Daily, Abd wound      D3 Super Strength 50 MCG (2000 UT) capsule  Generic drug: Cholecalciferol   TAKE 1 CAPSULE BY MOUTH EVERY DAY      escitalopram 20 MG tablet  Commonly known as: LEXAPRO   20 mg, Oral, Daily      ferrous sulfate 325 (65 FE) MG tablet   325 mg, Oral, Daily With Breakfast      gentamicin 0.1 %  cream  Commonly known as: GARAMYCIN   1 Application, Topical, 2 Times Daily, Left foot      lactulose 10 GM/15ML solution  Commonly known as: CHRONULAC   20 g, Oral, 2 Times Daily PRN      Magnesium Oxide -Mg Supplement 400 (240 Mg) MG tablet   400 mg, Oral, Daily      multivitamin tablet tablet   1 tablet, Oral, Daily      NovoLIN R FlexPen 100 UNIT/ML injection  Generic drug: Insulin Regular Human   12 Units, Subcutaneous, 3 Times Daily Before Meals, Inject SC before meals per sliding scale: 150-200=4U,201-250=6U, 251-300=8U, 301-350=10U, 351-400=12U, >400=Call MD      nystatin 899109 UNIT/GM powder  Commonly known as: MYCOSTATIN   1 Application, Topical, 2 Times Daily      omeprazole 20 MG capsule  Commonly known as: priLOSEC   20 mg, Oral, Daily      sirolimus 2 MG tablet  Commonly known as: RAPAMUNE   2 mg, Oral, Daily      SITagliptin 25 MG tablet  Commonly known as: JANUVIA   25 mg, Oral, Daily      tamsulosin 0.4 MG capsule 24 hr capsule  Commonly known as: FLOMAX   1 capsule, Oral, Daily      Vitamin C 500 MG capsule   1 capsule, Oral, Daily      vitamin E 1000 UNIT capsule   1,000 Units, Oral, Daily      Zinc 220 (50 Zn) MG capsule   1 capsule, Oral, Daily             Stop These Medications      acetaminophen 500 MG tablet  Commonly known as: TYLENOL     gabapentin 100 MG capsule  Commonly known as: NEURONTIN     LORazepam 0.5 MG tablet  Commonly known as: ATIVAN     metoprolol tartrate 25 MG tablet  Commonly known as: LOPRESSOR     mupirocin 2 % cream  Commonly known as: Bactroban     QUEtiapine 25 MG tablet  Commonly known as: SEROquel     ramipril 10 MG capsule  Commonly known as: ALTACE     Saw Palmetto 160 MG capsule     silver sulfadiazine 1 % cream  Commonly known as: SILVADENE, SSD     spironolactone 25 MG tablet  Commonly known as: ALDACTONE     traZODone 50 MG tablet  Commonly known as: DESYREL              No Known Allergies      Discharge Disposition:  Rehab Facility or Unit (DC -  External)    Diet:  Hospital:  Diet Order   Procedures    Diet: Cardiac, Fluid Restriction (240 mL/tray); Low Sodium (2g); 2000 mL/day; No Mixed Consistencies; Texture: Soft to Chew (NDD 3); Soft to Chew: Chopped Meat; Fluid Consistency: Thin (IDDSI 0)       Diet Instructions       Diet: Cardiac Diets; Low Sodium (2g); Soft to Chew (NDD 3); Chopped Meat; Thin (IDDSI 0)      Discharge Diet: Cardiac Diets    Cardiac Diet: Low Sodium (2g)    Texture: Soft to Chew (NDD 3)    Soft to Chew: Chopped Meat    Fluid Consistency: Thin (IDDSI 0)    Diet: Cardiac, Fluid Restriction (240 mL/tray); Low Sodium (2g); 2000 mL/day; No Mixed Consistencies; Texture: Soft to Chew (NDD 3); Soft to Chew: Chopped Meat; Fluid Consistency: Thin 8             Activity:  Activity Instructions       Activity as Tolerated      Measure Blood Pressure      Measure Weight              Restrictions or Other Recommendations:  Wound Locations Left foot amputation site   Wound Care Instructions Normal saline.  Apply Vashe moistened 4 x 4 gauze to the open wound on the left foot wrapped with rolled gauze and secure.   Cleanse Irrigate With Normal Saline   Securement Roll Gauze    Paper Tape          CODE STATUS:    Code Status and Medical Interventions: CPR (Attempt to Resuscitate); Full Support   Ordered at: 08/12/24 2010     Code Status (Patient has no pulse and is not breathing):    CPR (Attempt to Resuscitate)     Medical Interventions (Patient has pulse or is breathing):    Full Support       Future Appointments   Date Time Provider Department Center   8/22/2024 10:00 AM MED 8  [facility]       Additional Instructions for the Follow-ups that You Need to Schedule       Discharge Follow-up with PCP   As directed       Currently Documented PCP:    Andrew Fernandez MD    PCP Phone Number:    [phone]     Follow Up Details: follow up with pcp after dc from rehab        Discharge Follow-up with Specified Provider: follow up with cardiology after dc  from rehab   As directed      To: follow up with cardiology after dc from rehab                      Tamra Dumont, DAVID  08/22/24      Time Spent on Discharge:  I spent  45  minutes on this discharge activity which included: face-to-face encounter with the patient, reviewing the data in the system, coordination of the care with the nursing staff as well as consultants, documentation, and entering orders.            Electronically signed by Rebecca Torres MD at 08/22/24 0931       Discharge Order (From admission, onward)       Start     Ordered    08/22/24 0843  Discharge patient  Once        Expected Discharge Date: 08/22/24   Discharge Disposition: Rehab Facility or Unit (DC - External)   Physician of Record for Attribution - Please select from Treatment Team: REBECCA TORRES [[id number]]   Review needed by CMO to determine Physician of Record: No      Question Answer Comment   Physician of Record for Attribution - Please select from Treatment Team REBECCA TORRES    Review needed by CMO to determine Physician of Record No        08/22/24 3945

## 2024-08-22 NOTE — PLAN OF CARE
Goal Outcome Evaluation:                     Anticipated Discharge Disposition (SLP): skilled nursing facility, anticipate therapy at next level of care          SLP Swallowing Diagnosis: mild, oral dysphagia, pharyngeal dysphagia (08/22/24 0900)  Treatment Assessment (SLP): continued, toleration of diet, no clinical signs of, aspiration (With initial cues provided for small sips of thin liquids.) (08/22/24 0900)  Treatment Assessment Comments (SLP): Patient tolerated trials of thin liquids via small single cup sips. He required initial cues for small sips and slow pace. Able to demonstrate without further cues during session. Monitor diet tolerance closely and downgrade to nectar thick liquids if any concerns. (08/22/24 0900)  Plan for Continued Treatment (SLP): continue treatment per plan of care (08/22/24 0900)

## 2025-05-14 ENCOUNTER — TELEPHONE (OUTPATIENT)
Dept: FAMILY MEDICINE CLINIC | Facility: CLINIC | Age: 70
End: 2025-05-14
Payer: MEDICARE

## 2025-05-14 NOTE — TELEPHONE ENCOUNTER
Not seen patient in while. Hs wound care right now.. nurse with wound care is requesting pt and ot see this patient [facility] [phone]      Once a week re-eval      Can we call with a verbal

## (undated) DEVICE — SENSR CERBRL O2 SOMASENSOR ADHS A/ LF

## (undated) DEVICE — 32 FR RIGHT ANGLE – SOFT PVC CATHETER: Brand: PVC THORACIC CATHETERS

## (undated) DEVICE — SUCTION CANISTER, 2500CC, RIGID: Brand: DEROYAL

## (undated) DEVICE — PRESSURE MONITORING SET: Brand: TRUWAVE

## (undated) DEVICE — 12 FOOT DISPOSABLE EXTENSION CABLE WITH SAFE CONNECT / SCREW-DOWN

## (undated) DEVICE — BNDG ELAS ELITE V/CLOSE 4IN 5YD LF STRL

## (undated) DEVICE — TRAP,MUCUS SPECIMEN,40CC: Brand: MEDLINE

## (undated) DEVICE — SAFETY SCALPEL: Brand: DEROYAL

## (undated) DEVICE — CLTH CLENS READYCLEANSE PERI CARE PK/5

## (undated) DEVICE — MEDI-VAC NON-CONDUCTIVE SUCTION TUBING: Brand: CARDINAL HEALTH

## (undated) DEVICE — OASIS DRAIN, SINGLE, INLINE & ATS COMPATIBLE: Brand: OASIS

## (undated) DEVICE — 2963 MEDIPORE SOFT CLOTH TAPE 3 IN X 10 YD 12 RLS/CS: Brand: 3M™ MEDIPORE™

## (undated) DEVICE — TUBING, SUCTION, 1/4" X 10', STRAIGHT: Brand: MEDLINE

## (undated) DEVICE — NDL PERC 1PRT THNWALL W/BASEPLT 18G 7CM

## (undated) DEVICE — PK HEART OPN 10

## (undated) DEVICE — PK SPECIALTYCARE M/STATE 1 OH 1/2V CUST

## (undated) DEVICE — ANTIBACTERIAL UNDYED BRAIDED (POLYGLACTIN 910), SYNTHETIC ABSORBABLE SUTURE: Brand: COATED VICRYL

## (undated) DEVICE — SUT PROLN 6/0 C1 D/A 30IN 8706H

## (undated) DEVICE — VASOVIEW HEMOPRO: Brand: VASOVIEW HEMOPRO

## (undated) DEVICE — SUT SILK 4/0 TIES 18IN A183H

## (undated) DEVICE — CONNECTOR PH 6-IN-1 Y ST: Brand: CARDINAL HEALTH

## (undated) DEVICE — SUT PROLN 7/0 CV BV1 24IN 8304H BX/36

## (undated) DEVICE — Device

## (undated) DEVICE — 32 FR STRAIGHT – SOFT PVC CATHETER: Brand: PVC THORACIC CATHETERS

## (undated) DEVICE — Device: Brand: MEDEX

## (undated) DEVICE — SUT PROLN 4/0 RB1 D/A 36IN 8557H

## (undated) DEVICE — PRESSURE MONITORING ACCESSORY: Brand: TRUWAVE

## (undated) DEVICE — SOL NS 500ML

## (undated) DEVICE — 3M™ STERI-DRAPE™ INSTRUMENT POUCH 1018: Brand: STERI-DRAPE™

## (undated) DEVICE — SPNG GZ WOVN 4X4IN 12PLY 10/BX STRL

## (undated) DEVICE — BNDG ELAS ELITE V/CLOSE 6IN 5YD LF STRL

## (undated) DEVICE — TOWEL,OR,DSP,ST,BLUE,STD,8/PK,10PK/CS: Brand: MEDLINE

## (undated) DEVICE — DRSNG SURESITE WNDW 4X4.5

## (undated) DEVICE — TEMP PACING WIRE: Brand: MYO/WIRE

## (undated) DEVICE — SUT SILK 2 SUTUPAK TIE 60IN SA8H 2STRAND

## (undated) DEVICE — PRESSURE MONITORING SET: Brand: TRUWAVE, VAMP

## (undated) DEVICE — SUT PROLN 3/0 SH D/A 36IN 8522H

## (undated) DEVICE — SUT SILK 0/0 CT2 18IN C027D

## (undated) DEVICE — AVANTI + 4F STD W/GW: Brand: AVANTI

## (undated) DEVICE — SUT PDS 1 CTX 36IN VIO PDP371T

## (undated) DEVICE — PENCL E/S HNDSWCH ROCKRBTN HOLSTR 10FT

## (undated) DEVICE — SUT PROLN 4/0 SH D/A 36IN 8521H

## (undated) DEVICE — DRSNG WND BORDR/ADHS NONADHR/GZ LF 4X14IN STRL

## (undated) DEVICE — 3M™ MEDIPORE™ H SOFT CLOTH SURGICAL TAPE, 2863, 3 IN X 10 YD, 12/CASE: Brand: 3M™ MEDIPORE™

## (undated) DEVICE — INTRAOPERATIVE COVER KIT, 10 PACK: Brand: SITE-RITE